# Patient Record
Sex: MALE | Race: BLACK OR AFRICAN AMERICAN | Employment: FULL TIME | URBAN - METROPOLITAN AREA
[De-identification: names, ages, dates, MRNs, and addresses within clinical notes are randomized per-mention and may not be internally consistent; named-entity substitution may affect disease eponyms.]

---

## 2017-03-26 ENCOUNTER — HOSPITAL ENCOUNTER (EMERGENCY)
Age: 32
Discharge: HOME | End: 2017-03-26
Attending: EMERGENCY MEDICINE
Payer: COMMERCIAL

## 2017-03-26 ENCOUNTER — APPOINTMENT (OUTPATIENT)
Dept: ULTRASOUND IMAGING | Age: 32
End: 2017-03-26
Attending: STUDENT IN AN ORGANIZED HEALTH CARE EDUCATION/TRAINING PROGRAM
Payer: COMMERCIAL

## 2017-03-26 VITALS
WEIGHT: 297 LBS | HEART RATE: 87 BPM | TEMPERATURE: 98.6 F | SYSTOLIC BLOOD PRESSURE: 160 MMHG | BODY MASS INDEX: 43.99 KG/M2 | DIASTOLIC BLOOD PRESSURE: 91 MMHG | RESPIRATION RATE: 16 BRPM | HEIGHT: 69 IN | OXYGEN SATURATION: 98 %

## 2017-03-26 DIAGNOSIS — N45.1 EPIDIDYMITIS: Primary | ICD-10-CM

## 2017-03-26 LAB
APPEARANCE UR: CLEAR
BACTERIA URNS QL MICRO: NEGATIVE /HPF
BILIRUB UR QL: NEGATIVE
COLOR UR: ABNORMAL
EPITH CASTS URNS QL MICRO: ABNORMAL /LPF
GLUCOSE UR STRIP.AUTO-MCNC: NEGATIVE MG/DL
HGB UR QL STRIP: NEGATIVE
HYALINE CASTS URNS QL MICRO: ABNORMAL /LPF (ref 0–5)
KETONES UR QL STRIP.AUTO: NEGATIVE MG/DL
LEUKOCYTE ESTERASE UR QL STRIP.AUTO: NEGATIVE
NITRITE UR QL STRIP.AUTO: NEGATIVE
PH UR STRIP: 6.5 [PH] (ref 5–8)
PROT UR STRIP-MCNC: 30 MG/DL
RBC #/AREA URNS HPF: ABNORMAL /HPF (ref 0–5)
SP GR UR REFRACTOMETRY: 1.02 (ref 1–1.03)
UA: UC IF INDICATED,UAUC: ABNORMAL
UROBILINOGEN UR QL STRIP.AUTO: 0.2 EU/DL (ref 0.2–1)
WBC URNS QL MICRO: ABNORMAL /HPF (ref 0–4)

## 2017-03-26 PROCEDURE — 81001 URINALYSIS AUTO W/SCOPE: CPT | Performed by: EMERGENCY MEDICINE

## 2017-03-26 PROCEDURE — 76870 US EXAM SCROTUM: CPT

## 2017-03-26 PROCEDURE — 99283 EMERGENCY DEPT VISIT LOW MDM: CPT

## 2017-03-26 RX ORDER — CIPROFLOXACIN 500 MG/1
500 TABLET ORAL 2 TIMES DAILY
Qty: 20 TAB | Refills: 0 | Status: SHIPPED | OUTPATIENT
Start: 2017-03-26 | End: 2017-03-26

## 2017-03-26 RX ORDER — CIPROFLOXACIN 500 MG/1
500 TABLET ORAL 2 TIMES DAILY
Qty: 20 TAB | Refills: 0 | Status: SHIPPED | OUTPATIENT
Start: 2017-03-26 | End: 2017-04-05

## 2017-03-26 NOTE — ED PROVIDER NOTES
HPI Comments: 28 y.o. male with past medical history significant for HTN who presents from [facility] with chief complaint of testicular pain. Pt reports he has had 7/10 L testicle pain for the last 2 weeks which worsened this morning while working. He states it is tender to touch and \"has 2 lumps, one at the top and one at the bottom\". He also c/o a \"pulling\" pain which radiates from his groin through his L upper medial thigh. Pt notes his pain is exacerbated by walking, standing, and sitting. He states he was evaluated at [facility] \"for 20 minutes\" PTA where he was given Motrin and referred to a urologist. He states he did not receive urine tests or an US while at [facility]. In addition, Pt also c/o a \"knot\" around his R ribs which he initially noticed 5 years ago. He states it has been increasing in size over the last 5 years, and was previously told it \"could be a fatty tumor\". There are no other acute medical concerns at this time. Note written by Natalia Lopez, as dictated by Radha Patterson MD 1:56 PM     The history is provided by the patient. Past Medical History:   Diagnosis Date    Hypertension        Past Surgical History:   Procedure Laterality Date    HX ADENOIDECTOMY           History reviewed. No pertinent family history. Social History     Social History    Marital status: SINGLE     Spouse name: N/A    Number of children: N/A    Years of education: N/A     Occupational History    Not on file. Social History Main Topics    Smoking status: Never Smoker    Smokeless tobacco: Not on file    Alcohol use No    Drug use: No    Sexual activity: Not on file     Other Topics Concern    Not on file     Social History Narrative         ALLERGIES: Lisinopril and Pcn [penicillins]    Review of Systems   Constitutional: Negative for appetite change, chills and fever. HENT: Negative for rhinorrhea, sore throat and trouble swallowing.     Eyes: Negative for photophobia. Respiratory: Negative for cough and shortness of breath. Cardiovascular: Negative for chest pain and palpitations. Gastrointestinal: Negative for abdominal pain, nausea and vomiting. Genitourinary: Positive for testicular pain. Negative for dysuria, frequency and hematuria. Musculoskeletal: Positive for myalgias. Negative for arthralgias. Neurological: Negative for dizziness, syncope and weakness. Psychiatric/Behavioral: Negative for behavioral problems. The patient is not nervous/anxious. All other systems reviewed and are negative. Vitals:    03/26/17 1305   BP: (!) 171/109   Pulse: 99   Resp: 16   Temp: 98.8 °F (37.1 °C)   SpO2: 96%   Weight: 134.7 kg (297 lb)   Height: 5' 9\" (1.753 m)            Physical Exam   Constitutional: He appears well-developed and well-nourished. HENT:   Head: Normocephalic and atraumatic. Mouth/Throat: Oropharynx is clear and moist.   Eyes: EOM are normal. Pupils are equal, round, and reactive to light. Neck: Normal range of motion. Neck supple. Cardiovascular: Normal rate, regular rhythm, normal heart sounds and intact distal pulses. Exam reveals no gallop and no friction rub. No murmur heard. Pulmonary/Chest: Effort normal. No respiratory distress. He has no wheezes. He has no rales. Abdominal: Soft. There is no tenderness. There is no rebound. Genitourinary:   Genitourinary Comments: No palpable lymph nodes. Tender over L proximal scrotum. Musculoskeletal: Normal range of motion. He exhibits no tenderness. Neurological: He is alert. No cranial nerve deficit. Motor; symmetric   Skin: No erythema. Psychiatric: He has a normal mood and affect. His behavior is normal.   Nursing note and vitals reviewed.   Note written by Natalia Taylor, as dictated by Maksim Moreno MD 1:56 PM     [facility]  ED Course       Procedures

## 2017-03-26 NOTE — ED TRIAGE NOTES
TRIAGE NOTE: \"I just left [place]. I have been having a sharp pain in my left testicle and there are two knots in it. \" Symptoms started two weeks ago, denies penile drainage.  Is , denies change in partner

## 2017-03-26 NOTE — DISCHARGE INSTRUCTIONS
Epididymitis and Orchitis: Care Instructions  Your Care Instructions    Epididymitis is pain and swelling of the tube that is attached to each testicle. This tube is called the epididymis. Orchitis is pain and swelling of the testicle. Infection with bacteria often causes these conditions. Sexually transmitted infections (STIs) also can cause both conditions. This is often the case in men younger than 28. Other causes in boys and older men are infections from surgery or having a catheter that drains urine. The mumps virus also can cause orchitis. Anti-inflammatory or pain medicines can help with the pain. Antibiotics are used if the problem is caused by bacteria. They are not used if a virus is the cause. Your testicle may stay swollen for many days or even a few weeks. The doctor has checked you carefully, but problems can develop later. If you notice any problems or new symptoms, get medical treatment right away. Follow-up care is a key part of your treatment and safety. Be sure to make and go to all appointments, and call your doctor if you are having problems. It's also a good idea to know your test results and keep a list of the medicines you take. How can you care for yourself at home? · If your doctor prescribed antibiotics, take them as directed. Do not stop taking them just because you feel better. You need to take the full course of antibiotics. · Ask your doctor if you can take an over-the-counter pain medicine, such as acetaminophen (Tylenol), ibuprofen (Advil, Motrin), or naproxen (Aleve). Be safe with medicines. Read and follow all instructions on the label. · Limit your activity to what is comfortable. · Wear snug underwear or an athletic supporter. This can help reduce pain. · Apply either cold or heat to the swollen area. Use the one that works best for your pain. Sitting in a warm bath for 15 minutes twice a day will help reduce the swelling more quickly.   · If you have been told that an STI may have caused your condition, do not have sex until your doctor says it is safe. This will prevent spreading the infection. Tell your sex partner or partners that they need to be checked. They may need treatment. When should you call for help? Call your doctor now or seek immediate medical care if:  · Your pain gets worse. · You have a new or higher fever. · You have new or more swelling of your testicle. · You have new belly pain, or your pain gets worse. Watch closely for changes in your health, and be sure to contact your doctor if:  · You do not get better as expected. Where can you learn more? Go to http://maciel-de.info/. Enter S360 in the search box to learn more about \"Epididymitis and Orchitis: Care Instructions. \"  Current as of: August 12, 2016  Content Version: 11.1  © 5462-0288 [employer]. Care instructions adapted under license by [employer] (which disclaims liability or warranty for this information). If you have questions about a medical condition or this instruction, always ask your healthcare professional. [address] any warranty or liability for your use of this information. We hope that we have addressed all of your medical concerns. The examination and treatment you received in the Emergency Department were for an emergent problem and were not intended as complete care. It is important that you follow up with your healthcare provider(s) for ongoing care. If your symptoms worsen or do not improve as expected, and you are unable to reach your usual health care provider(s), you should return to the Emergency Department. Today's healthcare is undergoing tremendous change, and patient satisfaction surveys are one of the many tools to assess the quality of medical care. You may receive a survey from the [employer] regarding your experience in the Emergency Department.   I hope that your experience has been completely positive, particularly the medical care that I provided. As such, please participate in the survey; anything less than excellent does not meet my expectations or intentions. [address] [place] [address] and [employer] participate in nationally recognized quality of care measures. If your blood pressure is greater than 120/80, as reported below, we urge that you seek medical care to address the potential of high blood pressure, commonly known as hypertension. Hypertension can be hereditary or can be caused by certain medical conditions, pain, stress, or \"white coat syndrome. \"       Please make an appointment with your health care provider(s) for follow up of your Emergency Department visit. VITALS:   Patient Vitals for the past 8 hrs:   Temp Pulse Resp BP SpO2   03/26/17 1305 98.8 °F (37.1 °C) 99 16 (!) 171/109 96 %          Thank you for allowing us to provide you with medical care today. We realize that you have many choices for your emergency care needs. Please choose us in the future for any continued health care needs.       Samuel Ferrara MD    [address] [place] [address].   Office: [phone]            Recent Results (from the past 24 hour(s))   URINALYSIS W/ REFLEX CULTURE    Collection Time: 03/26/17  2:22 PM   Result Value Ref Range    Color YELLOW/STRAW      Appearance CLEAR CLEAR      Specific gravity 1.025 1.003 - 1.030      pH (UA) 6.5 5.0 - 8.0      Protein 30 (A) NEG mg/dL    Glucose NEGATIVE  NEG mg/dL    Ketone NEGATIVE  NEG mg/dL    Bilirubin NEGATIVE  NEG      Blood NEGATIVE  NEG      Urobilinogen 0.2 0.2 - 1.0 EU/dL    Nitrites NEGATIVE  NEG      Leukocyte Esterase NEGATIVE  NEG      UA:UC IF INDICATED PENDING     WBC 0-4 0 - 4 /hpf    RBC 0-5 0 - 5 /hpf    Epithelial cells FEW FEW /lpf    Bacteria NEGATIVE  NEG /hpf    Hyaline cast 0-2 0 - 5 /lpf       Us Scrotum/testicles    Result Date: 3/26/2017  EXAM:  US SCROTUM/TESTICLES INDICATION: Left testicular pain. COMPARISON: 7/1/2012. TECHNIQUE: Real-time sonography of the scrotum was performed with a high frequency linear transducer. Multiple static images were obtained. Color Doppler evaluation was also performed. FINDINGS: RIGHT TESTICLE: The right testicle is normal in size and echotexture with normal color-flow. The right testis measures 4.8 x 3.0 x 2.5 cm and the right testicular volume is 18.7 mL. RIGHT EPIDIDYMIS: The right epididymis is normal. LEFT TESTICLE: The left testicle is normal in size and echotexture with normal color-flow. The left testis measures 4.9 x 3.0 x 2.3 cm and the left testicular volume is 17.7 mL. There is a small left varicocele. LEFT EPIDIDYMIS: The left epididymis is slightly hypervascular. The palpable abnormality on the left corresponds to the head and tail of the left epididymis. IMPRESSION: Slightly hypervascular left epididymis likely related to acute epididymitis. Small left varicocele. Otherwise unremarkable.

## 2017-03-31 ENCOUNTER — HOSPITAL ENCOUNTER (EMERGENCY)
Age: 32
Discharge: HOME | End: 2017-03-31
Attending: EMERGENCY MEDICINE | Admitting: EMERGENCY MEDICINE
Payer: COMMERCIAL

## 2017-03-31 ENCOUNTER — OFFICE VISIT (OUTPATIENT)
Dept: SLEEP MEDICINE | Age: 32
End: 2017-03-31

## 2017-03-31 ENCOUNTER — APPOINTMENT (OUTPATIENT)
Dept: GENERAL RADIOLOGY | Age: 32
End: 2017-03-31
Attending: PHYSICIAN ASSISTANT
Payer: COMMERCIAL

## 2017-03-31 VITALS
HEART RATE: 101 BPM | RESPIRATION RATE: 18 BRPM | BODY MASS INDEX: 43.04 KG/M2 | WEIGHT: 290.57 LBS | HEIGHT: 69 IN | DIASTOLIC BLOOD PRESSURE: 110 MMHG | OXYGEN SATURATION: 99 % | SYSTOLIC BLOOD PRESSURE: 170 MMHG | TEMPERATURE: 97.8 F

## 2017-03-31 VITALS
BODY MASS INDEX: 43.1 KG/M2 | WEIGHT: 291 LBS | OXYGEN SATURATION: 100 % | HEIGHT: 69 IN | DIASTOLIC BLOOD PRESSURE: 110 MMHG | SYSTOLIC BLOOD PRESSURE: 162 MMHG | HEART RATE: 95 BPM

## 2017-03-31 DIAGNOSIS — E66.01 OBESITY, MORBID, BMI 40.0-49.9 (HCC): ICD-10-CM

## 2017-03-31 DIAGNOSIS — G47.33 OBSTRUCTIVE SLEEP APNEA (ADULT) (PEDIATRIC): Primary | ICD-10-CM

## 2017-03-31 DIAGNOSIS — M10.9 ACUTE GOUT INVOLVING TOE OF LEFT FOOT, UNSPECIFIED CAUSE: ICD-10-CM

## 2017-03-31 DIAGNOSIS — M79.672 LEFT FOOT PAIN: Primary | ICD-10-CM

## 2017-03-31 DIAGNOSIS — I10 ESSENTIAL HYPERTENSION: ICD-10-CM

## 2017-03-31 PROCEDURE — L3265 PLASTAZOTE SANDAL EACH: HCPCS

## 2017-03-31 PROCEDURE — 73660 X-RAY EXAM OF TOE(S): CPT

## 2017-03-31 PROCEDURE — 99282 EMERGENCY DEPT VISIT SF MDM: CPT

## 2017-03-31 RX ORDER — OXYCODONE AND ACETAMINOPHEN 5; 325 MG/1; MG/1
1 TABLET ORAL
Qty: 10 TAB | Refills: 0 | Status: SHIPPED | OUTPATIENT
Start: 2017-03-31 | End: 2017-08-29

## 2017-03-31 RX ORDER — PREDNISONE 5 MG/1
TABLET ORAL
Qty: 21 TAB | Refills: 0 | Status: SHIPPED | OUTPATIENT
Start: 2017-03-31 | End: 2017-08-29

## 2017-03-31 NOTE — PATIENT INSTRUCTIONS
[address] [facility] [place]. [place], [address]  Tel.  [phone]  Fax. [address] [facility] [address]  [place], [address] [facility]  Tel.  [phone]  Fax. [phone] [address]  Tel.  [phone]  Fax. [phone]     Sleep Apnea: After Your Visit  Your Care Instructions  Sleep apnea occurs when you frequently stop breathing for 10 seconds or longer during sleep. It can be mild to severe, based on the number of times per hour that you stop breathing or have slowed breathing. Blocked or narrowed airways in your nose, mouth, or throat can cause sleep apnea. Your airway can become blocked when your throat muscles and tongue relax during sleep. Sleep apnea is common, occurring in 1 out of 20 individuals. Individuals having any of the following characteristics should be evaluated and treated right away due to high risk and detrimental consequences from untreated sleep apnea:  1. Obesity  2. Congestive Heart failure  3. Atrial Fibrillation  4. Uncontrolled Hypertension  5. Type II Diabetes  6. Night-time Arrhythmias  7. Stroke  8. Pulmonary Hypertension  9. High-risk Driving Populations (pilots, truck drivers, etc.)  10. Patients Considering Weight-loss Surgery    How do you know you have sleep apnea? You probably have sleep apnea if you answer 'yes' to 3 or more of the following questions:  S - Have you been told that you Snore? T - Are you often Tired during the day? O - Has anyone Observed you stop breathing while sleeping? P- Do you have (or are being treated for) high blood Pressure? B - Are you obese (Body Mass Index > 35)? A - Is your Age 48years old or older? N - Is your Neck size greater than 16 inches? G - Are you male Gender? A sleep physician can prescribe a breathing device that prevents tissues in the throat from blocking your airway.  Or your doctor may recommend using a dental device (oral breathing device) to help keep your airway open. In some cases, surgery may be needed to remove enlarged tissues in the throat. Follow-up care is a key part of your treatment and safety. Be sure to make and go to all appointments, and call your doctor if you are having problems. It's also a good idea to know your test results and keep a list of the medicines you take. How can you care for yourself at home? · Lose weight, if needed. It may reduce the number of times you stop breathing or have slowed breathing. · Go to bed at the same time every night. · Sleep on your side. It may stop mild apnea. If you tend to roll onto your back, sew a pocket in the back of your pajama top. Put a tennis ball into the pocket, and stitch the pocket shut. This will help keep you from sleeping on your back. · Avoid alcohol and medicines such as sleeping pills and sedatives before bed. · Do not smoke. Smoking can make sleep apnea worse. If you need help quitting, talk to your doctor about stop-smoking programs and medicines. These can increase your chances of quitting for good. · Prop up the head of your bed 4 to 6 inches by putting bricks under the legs of the bed. · Treat breathing problems, such as a stuffy nose, caused by a cold or allergies. · Use a continuous positive airway pressure (CPAP) breathing machine if lifestyle changes do not help your apnea and your doctor recommends it. The machine keeps your airway from closing when you sleep. · If CPAP does not help you, ask your doctor whether you should try other breathing machines. A bilevel positive airway pressure machine has two types of air pressureâone for breathing in and one for breathing out. Another device raises or lowers air pressure as needed while you breathe. · If your nose feels dry or bleeds when using one of these machines, talk with your doctor about increasing moisture in the air. A humidifier may help.   · If your nose is runny or stuffy from using a breathing machine, talk with your doctor about using decongestants or a corticosteroid nasal spray. When should you call for help? Watch closely for changes in your health, and be sure to contact your doctor if:  · You still have sleep apnea even though you have made lifestyle changes. · You are thinking of trying a device such as CPAP. · You are having problems using a CPAP or similar machine. Where can you learn more? Go to [employer]. Enter S946 in the search box to learn more about \"Sleep Apnea: After Your Visit. \"   © 9767-1506 Healthwise, Incorporated. Care instructions adapted under license by [employer]ck[employer] (which disclaims liability or warranty for this information). This care instruction is for use with your licensed healthcare professional. If you have questions about a medical condition or this instruction, always ask your healthcare professional. Luis Wagner any warranty or liability for your use of this information. PROPER SLEEP HYGIENE    What to avoid  · Do not have drinks with caffeine, such as coffee or black tea, for 8 hours before bed. · Do not smoke or use other types of tobacco near bedtime. Nicotine is a stimulant and can keep you awake. · Avoid drinking alcohol late in the evening, because it can cause you to wake in the middle of the night. · Do not eat a big meal close to bedtime. If you are hungry, eat a light snack. · Do not drink a lot of water close to bedtime, because the need to urinate may wake you up during the night. · Do not read or watch TV in bed. Use the bed only for sleeping and sexual activity. What to try  · Go to bed at the same time every night, and wake up at the same time every morning. Do not take naps during the day. · Keep your bedroom quiet, dark, and cool. · Get regular exercise, but not within 3 to 4 hours of your bedtime. .  · Sleep on a comfortable pillow and mattress.   · If watching the clock makes you anxious, turn it facing away from you so you cannot see the time. · If you worry when you lie down, start a worry book. Well before bedtime, write down your worries, and then set the book and your concerns aside. · Try meditation or other relaxation techniques before you go to bed. · If you cannot fall asleep, get up and go to another room until you feel sleepy. Do something relaxing. Repeat your bedtime routine before you go to bed again. · Make your house quiet and calm about an hour before bedtime. Turn down the lights, turn off the TV, log off the computer, and turn down the volume on music. This can help you relax after a busy day. Drowsy Driving  The [address] [place] [address] Road Traffic Safety Administration cites drowsiness as a causing factor in more than 063,853 police reported crashes annually, resulting in 76,000 injuries and 1,500 deaths. Other surveys suggest 55% of people polled have driven while drowsy in the past year, 23% had fallen asleep but not crashed, 3% crashed, and 2% had and accident due to drowsy driving. Who is at risk? Young Drivers: One study of drowsy driving accidents states that 55% of the drivers were under 25 years. Of those, 75% were male. Shift Workers and Travelers: People who work overnight or travel across time zones frequently are at higher risk of experiencing Circadian Rhythm Disorders. They are trying to work and function when their body is programed to sleep. Sleep Deprived: Lack of sleep has a serious impact on your ability to pay attention or focus on a task. Consistently getting less than the average of 8 hours your body needs creates partial or cumulative sleep deprivation. Untreated Sleep Disorders: Sleep Apnea, Narcolepsy, R.L.S., and other sleep disorders (untreated) prevent a person from getting enough restful sleep. This leads to excessive daytime sleepiness and increases the risk for drowsy driving accidents by up to 7 times.   Medications / Alcohol: Even over the counter medications can cause drowsiness. Medications that impair a drivers attention should have a warning label. Alcohol naturally makes you sleepy and on its own can cause accidents. Combined with excessive drowsiness its effects are amplified. Signs of Drowsy Driving:   * You don't remember driving the last few miles   * You may drift out of your al   * You are unable to focus and your thoughts wander   * You may yawn more often than normal   * You have difficulty keeping your eyes open / nodding off   * Missing traffic signs, speeding, or tailgating  Prevention-   Good sleep hygiene, lifestyle and behavioral choices have the most impact on drowsy driving. There is no substitute for sleep and the average person requires 8 hours nightly. If you find yourself driving drowsy, stop and sleep. Consider the sleep hygiene tips provided during your visit as well. Medication Refill Policy: Refills for all medications require 1 week advance notice. Please have your pharmacy fax a refill request. We are unable to fax, or call in \"controled substance\" medications and you will need to pick these prescriptions up from our office. [facility] Activation    Thank you for requesting access to [facility]. Please follow the instructions below to securely access and download your online medical record. [facility] allows you to send messages to your doctor, view your test results, renew your prescriptions, schedule appointments, and more. How Do I Sign Up? 1. In your internet browser, go to [email or web address]. 2. Click on the First Time User? Click Here link in the Sign In box. You will see the New Member Sign Up page. 3. Enter your [facility] Access Code exactly as it appears below. You will not need to use this code after youve completed the sign-up process. If you do not sign up before the expiration date, you must request a new code.     [facility] Access Code: [email or web address]  Expires: 6/24/2017  1:59 PM (This is the date your [employer] access code will )    4. Enter the last four digits of your Social Security Number (xxxx) and Date of Birth (mm/dd/yyyy) as indicated and click Submit. You will be taken to the next sign-up page. 5. Create a [employer] ID. This will be your [employer] login ID and cannot be changed, so think of one that is secure and easy to remember. 6. Create a [employer] password. You can change your password at any time. 7. Enter your Password Reset Question and Answer. This can be used at a later time if you forget your password. 8. Enter your e-mail address. You will receive e-mail notification when new information is available in [address] 9. Click Sign Up. You can now view and download portions of your medical record. 10. Click the Download Summary menu link to download a portable copy of your medical information. Additional Information    If you have questions, please call [phone]. Remember, [employer] is NOT to be used for urgent needs. For medical emergencies, dial 911.

## 2017-03-31 NOTE — ED PROVIDER NOTES
HPI Comments: Manas Mcclellan is a 28 y.o. male with PMhx significant for HTN who presents ambulatory to the ED with cc of gradually worsening left great toe pain x 3 days. He also c/o associated swelling in his left great toe. The pt reports that his pain improved yesterday, but the pain returned upon waking this morning. He denies any recent fall or injury to the area, and he denies any previous h/o current symptoms. He is unsure of any h/o gout. He specifically denies fever or chills at this time. SHx: -tobacco, -EtOH, -illicit drug use    PCP: None    There are no other complaints, changes or physical findings at this time. The history is provided by the patient. No  was used. Past Medical History:   Diagnosis Date    Hypertension        Past Surgical History:   Procedure Laterality Date    HX ADENOIDECTOMY           Family History:   Problem Relation Age of Onset    Sleep Apnea Mother        Social History     Social History    Marital status:      Spouse name: N/A    Number of children: N/A    Years of education: N/A     Occupational History    Not on file. Social History Main Topics    Smoking status: Never Smoker    Smokeless tobacco: Not on file    Alcohol use No    Drug use: No    Sexual activity: Not on file     Other Topics Concern    Not on file     Social History Narrative         ALLERGIES: Lisinopril and Pcn [penicillins]    Review of Systems   Constitutional: Negative. Negative for activity change, appetite change, chills, fatigue, fever and unexpected weight change. HENT: Negative. Negative for congestion, hearing loss, rhinorrhea, sneezing and voice change. Eyes: Negative. Negative for pain and visual disturbance. Respiratory: Negative. Negative for apnea, cough, choking, chest tightness and shortness of breath. Cardiovascular: Negative. Negative for chest pain and palpitations. Gastrointestinal: Negative.   Negative for abdominal distention, abdominal pain, blood in stool, diarrhea, nausea and vomiting. Genitourinary: Negative. Negative for difficulty urinating, flank pain, frequency and urgency. No discharge   Musculoskeletal: Positive for myalgias (left great toe). Negative for arthralgias, back pain and neck stiffness.        (+) swelling to left great toe   Skin: Negative. Negative for color change and rash. Neurological: Negative. Negative for dizziness, seizures, syncope, speech difficulty, weakness, numbness and headaches. Hematological: Negative for adenopathy. Psychiatric/Behavioral: Negative. Negative for agitation, behavioral problems, dysphoric mood and suicidal ideas. The patient is not nervous/anxious. Patient Vitals for the past 12 hrs:   Temp Pulse Resp BP SpO2   03/31/17 1127 - (!) 101 18 (!) 214/130 99 %   03/31/17 1054 97.8 °F (36.6 °C) (!) 112 18 (!) 207/122 97 %            Physical Exam   Constitutional: He is oriented to person, place, and time. He appears well-developed and well-nourished. No distress. HENT:   Head: Normocephalic and atraumatic. Right Ear: External ear normal.   Left Ear: External ear normal.   Nose: Nose normal.   Mouth/Throat: Oropharynx is clear and moist. No oropharyngeal exudate. Eyes: Conjunctivae and EOM are normal. Pupils are equal, round, and reactive to light. Right eye exhibits no discharge. Left eye exhibits no discharge. No scleral icterus. Neck: Normal range of motion. Neck supple. No JVD present. No tracheal deviation present. Cardiovascular: Normal rate, regular rhythm, normal heart sounds and intact distal pulses. Exam reveals no gallop and no friction rub. No murmur heard. Neurovascularly intact. Pulmonary/Chest: Effort normal and breath sounds normal. No respiratory distress. He has no wheezes. He has no rales. He exhibits no tenderness. Abdominal: Soft. Bowel sounds are normal. He exhibits no distension and no mass.  There is no tenderness. There is no rebound and no guarding. Musculoskeletal: He exhibits edema (left great toe) and tenderness (left great toe). He exhibits no deformity. Slightly decreased active and passive range of motion to left great toe secondary to pain. Lymphadenopathy:     He has no cervical adenopathy. Neurological: He is alert and oriented to person, place, and time. He has normal reflexes. No cranial nerve deficit. He exhibits normal muscle tone. Coordination normal.   Skin: Skin is warm and dry. He is not diaphoretic. Psychiatric: He has a normal mood and affect. His behavior is normal. Judgment and thought content normal.   Nursing note and vitals reviewed. MDM  Number of Diagnoses or Management Options  Left foot pain:   Diagnosis management comments:   DDx: sprain, strain, fracture. Amount and/or Complexity of Data Reviewed  Tests in the radiology section of CPT®: ordered and reviewed  Review and summarize past medical records: yes  Independent visualization of images, tracings, or specimens: yes    Patient Progress  Patient progress: stable    ED Course       Procedures        IMAGING RESULTS:  XR GREAT TOE LT MIN 2 V   Final Result   EXAM: XR GREAT TOE LT MIN 2 V     INDICATION: Patient with new onset left toe pain. . Patient denies injury.     COMPARISON: None.     FINDINGS: Three views of the left great toe demonstrate no fracture or other  acute abnormality. No radiopaque foreign bodies.     IMPRESSION  IMPRESSION: No acute abnormality. IMPRESSION:  1. Left foot pain    2. Acute gout involving toe of left foot, unspecified cause        PLAN:  1. Current Discharge Medication List      START taking these medications    Details   oxyCODONE-acetaminophen (PERCOCET) 5-325 mg per tablet Take 1 Tab by mouth every six (6) hours as needed for Pain. Max Daily Amount: 4 Tabs.   Qty: 10 Tab, Refills: 0      predniSONE (STERAPRED) 5 mg dose pack See administration instruction per 5mg dose pack  Qty: 21 Tab, Refills: 0           2. Follow-up Information     Follow up With Details Comments Contact Info    None In 2 days As needed None ([phone]) Patient stated that they have no PCP          Return to ED if worse       DISCHARGE NOTE:  11:30 AM  The patient has been re-evaluated and is ready for discharge. Reviewed available results with patient. Counseled patient on diagnosis and care plan. Patient has expressed understanding, and all questions have been answered. Patient agrees with plan and agrees to follow up as recommended, or return to the ED if their symptoms worsen. Discharge instructions have been provided and explained to the patient, along with reasons to return to the ED. This note is prepared by Devon Rosa, acting as Scribe for [employer]. [employer]: The scribe's documentation has been prepared under my direction and personally reviewed by me in its entirety. I confirm that the note above accurately reflects all work, treatment, procedures, and medical decision making performed by me.

## 2017-03-31 NOTE — DISCHARGE INSTRUCTIONS
Gout: Care Instructions  Your Care Instructions  Gout is a form of arthritis caused by a buildup of uric acid crystals in a joint. It causes sudden attacks of pain, swelling, redness, and stiffness, usually in one joint, especially the big toe. Gout usually comes on without a cause. But it can be brought on by drinking alcohol (especially beer) or eating seafood and red meat. Taking certain medicines, such as diuretics or aspirin, also can bring on an attack of gout. Taking your medicines as prescribed and following up with your doctor regularly can help you avoid gout attacks in the future. Follow-up care is a key part of your treatment and safety. Be sure to make and go to all appointments, and call your doctor if you are having problems. Its also a good idea to know your test results and keep a list of the medicines you take. How can you care for yourself at home? · If the joint is swollen, put ice or a cold pack on the area for 10 to 20 minutes at a time. Put a thin cloth between the ice and your skin. · Prop up the sore limb on a pillow when you ice it or anytime you sit or lie down during the next 3 days. Try to keep it above the level of your heart. This will help reduce swelling. · Rest sore joints. Avoid activities that put weight or strain on the joints for a few days. Take short rest breaks from your regular activities during the day. · Take your medicines exactly as prescribed. Call your doctor if you think you are having a problem with your medicine. · Take pain medicines exactly as directed. ¨ If the doctor gave you a prescription medicine for pain, take it as prescribed. ¨ If you are not taking a prescription pain medicine, ask your doctor if you can take an over-the-counter medicine. · Eat less seafood and red meat. · Check with your doctor before drinking alcohol. · Losing weight, if you are overweight, may help reduce attacks of gout. But do not go on a [employer]. \" Losing a lot of weight in a short amount of time can cause a gout attack. When should you call for help? Call your doctor now or seek immediate medical care if:  · You have a fever. · The joint is so painful you cannot use it. · You have sudden, unexplained swelling, redness, warmth, or severe pain in one or more joints. Watch closely for changes in your health, and be sure to contact your doctor if:  · You have joint pain. · Your symptoms get worse or are not improving after 2 or 3 days. Where can you learn more? Go to http://maciel-de.info/. Enter Q924 in the search box to learn more about \"Gout: Care Instructions. \"  Current as of: October 31, 2016  Content Version: 11.2  © 0826-7837 [employer]. Care instructions adapted under license by [employer] (which disclaims liability or warranty for this information). If you have questions about a medical condition or this instruction, always ask your healthcare professional. [place] [address] any warranty or liability for your use of this information. Purine-Restricted Diet: Care Instructions  Your Care Instructions  Purines are substances that are found in some foods. Your body turns purines into uric acid. High levels of uric acid can cause gout, which is a form of arthritis that causes pain and inflammation in joints. You may be able to help control the amount of uric acid in your body by limiting high-purine foods in your diet. Follow-up care is a key part of your treatment and safety. Be sure to make and go to all appointments, and call your doctor if you are having problems. It's also a good idea to know your test results and keep a list of the medicines you take. How can you care for yourself at home? · Plan your meals and snacks around foods that are low in purines and are safe for you to eat. These foods include:  ¨ Green vegetables and tomatoes. ¨ Fruits.   ¨ Whole-grain breads, rice, and cereals. ¨ Eggs, peanut butter, and nuts. ¨ Low-fat milk, cheese, and other milk products. ¨ Popcorn. ¨ Gelatin desserts, chocolate, cocoa, and cakes and sweets, in small amounts. · You can eat certain foods that are medium-high in purines, but eat them only once in a while. These foods include:  ¨ Legumes, such as dried beans and dried peas. You can have 1 cup cooked legumes each day. ¨ Asparagus, cauliflower, spinach, mushrooms, and green peas. ¨ Fish and seafood (other than very high-purine seafood). ¨ Oatmeal, wheat bran, and wheat germ. · Limit very high-purine foods, including:  ¨ Organ meats, such as liver, kidneys, sweetbreads, and brains. ¨ Meats, including whitt, beef, pork, and lamb. ¨ Game meats and any other meats in large amounts. ¨ Anchovies, sardines, herring, mackerel, and scallops. ¨ Gravy. ¨ Beer. Where can you learn more? Go to http://macielTyraTechde.info/. Enter F448 in the search box to learn more about \"Purine-Restricted Diet: Care Instructions. \"  Current as of: July 26, 2016  Content Version: 11.2  © 7603-1095 [employer]. Care instructions adapted under license by [employer] (which disclaims liability or warranty for this information). If you have questions about a medical condition or this instruction, always ask your healthcare professional. [place] [address] any warranty or liability for your use of this information. Foot Pain: Care Instructions  Your Care Instructions  Foot injuries that cause pain and swelling are fairly common. Almost all sports or home repair projects can cause a misstep that ends up as foot pain. Normal wear and tear, especially as you get older, also can cause foot pain. Most minor foot injuries will heal on their own, and home treatment is usually all you need to do. If you have a severe injury, you may need tests and treatment.   Follow-up care is a key part of your treatment and safety. Be sure to make and go to all appointments, and call your doctor if you are having problems. Its also a good idea to know your test results and keep a list of the medicines you take. How can you care for yourself at home? · Take pain medicines exactly as directed. ¨ If the doctor gave you a prescription medicine for pain, take it as prescribed. ¨ If you are not taking a prescription pain medicine, ask your doctor if you can take an over-the-counter medicine. · Rest and protect your foot. Take a break from any activity that may cause pain. · Put ice or a cold pack on your foot for 10 to 20 minutes at a time. Put a thin cloth between the ice and your skin. · Prop up the sore foot on a pillow when you ice it or anytime you sit or lie down during the next 3 days. Try to keep it above the level of your heart. This will help reduce swelling. · Your doctor may recommend that you wrap your foot with an elastic bandage. Keep your foot wrapped for as long as your doctor advises. · If your doctor recommends crutches, use them as directed. · Wear roomy footwear. · As soon as pain and swelling end, begin gentle exercises of your foot. Your doctor can tell you which exercises will help. When should you call for help? Call 911 anytime you think you may need emergency care. For example, call if:  · Your foot turns pale, white, blue, or cold. Call your doctor now or seek immediate medical care if:  · You cannot move or stand on your foot. · Your foot looks twisted or out of its normal position. · Your foot is not stable when you step down. · You have signs of infection, such as:  ¨ Increased pain, swelling, warmth, or redness. ¨ Red streaks leading from the sore area. ¨ Pus draining from a place on your foot. ¨ A fever. · Your foot is numb or tingly. Watch closely for changes in your health, and be sure to contact your doctor if:  · You do not get better as expected.   · You have bruises from an injury that last longer than 2 weeks. Where can you learn more? Go to http://maciel-de.info/. Enter P159 in the search box to learn more about \"Foot Pain: Care Instructions. \"  Current as of: May 23, 2016  Content Version: 11.2  © 4585-7188 [employer]. Care instructions adapted under license by [employer] (which disclaims liability or warranty for this information). If you have questions about a medical condition or this instruction, always ask your healthcare professional. [address] any warranty or liability for your use of this information.

## 2017-03-31 NOTE — ED NOTES
CHAVEZ Rodriguez made aware of patients blood pressure. Blood pressure taken manually 170/110. CHAVEZ CULVER made aware. No further orders received at this time. Patient states that he started taking Norvasc 5 mg by mouth daily and first dose was this morning. CHAVEZ Santana  reviewed discharge instructions with the patient. The patient verbalized understanding.  Patient ambulatory out of treatment area with steady gait

## 2017-03-31 NOTE — PROGRESS NOTES
[address] [facility] [place]. [place], [address]  Tel.  [phone]  Fax. 100 [facility] 60  [place], [address]  Tel.  [phone]  Fax. [phone] [address] [facility] [address] [place] [place]   Tel.  [phone]  Fax. [phone]         Subjective:      Paola Hand is an 28 y.o. male referred by Dr. Guerda Macias. , for evaluation for a sleep disorder. He complains of snoring, snorting, choking, periods of not breathing associated with racing heart awakening him at night. Symptoms began 4 years ago, gradually worsening since that time. He usually can fall asleep in 10 minutes. Family or house members note snoring, snorting, choking, periods of not breathing. He denies falling asleep while at work, driving. Paola Hand does not wake up frequently at night. He is not bothered by waking up too early and left unable to get back to sleep. He actually sleeps about 5 hours at night and wakes up about 3 times during the night. He does work shifts: Third Shift. Greg Rubio indicates he does get too little sleep at night. His bedtime is 2100. He awakens at 0200. He does take naps. He takes 4 naps a week lasting 2, Hour(s). He has the following observed behaviors: Loud snoring, Pauses in breathing, Biting tongue;  . 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 He has 1year old twins  [place] Sleepiness Score: 8  . Allergies   Allergen Reactions    Lisinopril Swelling     Pt has sever swelling of the throat and tongue.  Pcn [Penicillins] Other (comments)     Allergic as a child           Current Outpatient Prescriptions:     diltiazem CD (CARDIZEM CD) 180 mg ER capsule, Take 1 Cap by mouth daily. , Disp: 30 Cap, Rfl: 11    ciprofloxacin HCl (CIPRO) 500 mg tablet, Take 1 Tab by mouth two (2) times a day for 10 days. , Disp: 20 Tab, Rfl: 0     He  has a past medical history of Hypertension.     He  has a past surgical history that includes adenoidectomy. He family history includes Sleep Apnea in his mother. He  reports that he has never smoked. He does not have any smokeless tobacco history on file. He reports that he does not drink alcohol or use illicit drugs. Review of Systems:  Constitutional:  60 pound  weight gain. Eyes:  No blurred vision. CVS:  No significant chest pain  Pulm:  No significant shortness of breath  GI:  No significant nausea or vomiting  :  No significant nocturia  Musculoskeletal:  No significant joint pain at night, currently pain in toe (going to his patient first today)  Skin:  No significant rashes  Neuro:  No significant dizziness   Psych:  No active mood issues    Sleep Review of Systems: notable for no difficulty falling asleep; infrequent awakenings at night; some  dreaming noted; no nightmares ; + early morning headaches; no memory problems; no concentration issues; no history of any automobile or occupational accidents due to daytime drowsiness. Objective:     Visit Vitals    BP (!) 162/110    Pulse 95    Ht 5' 9\" (1.753 m)    Wt 291 lb (132 kg)    SpO2 100%    BMI 42.97 kg/m2         General:   Not in acute distress   Eyes:  Anicteric sclerae, no obvious strabismus   Nose:  No obvious nasal septum deviation    Oropharynx:   Class 3 oropharyngeal outlet, thick tongue base, enlarged and boggy uvula, low-lying soft palate, narrow tonsilo-pharyngeal pilars   Tonsils:   tonsils are present and normal   Neck:   Neck circ. in \"inches\": 18; midline trachea   Chest/Lungs:  Equal lung expansion, clear on auscultation    CVS:  Normal rate, regular rhythm; no JVD   Skin:  Warm to touch; no obvious rashes   Neuro:  No focal deficits ; no obvious tremor    Psych:  Normal affect,  normal countenance;          Assessment:       ICD-10-CM ICD-9-CM    1. Obstructive sleep apnea (adult) (pediatric) G47.33 327.23 SLEEP STUDY UNATTENDED, 4 CHANNEL   2.  Essential hypertension I10 401.9    3. Obesity, morbid, BMI 40.0-49.9 ([facility]) E66.01 278.01          Plan:     * The patient currently has a High Risk for having sleep apnea. STOP-BANG score 6.  * PSG was ordered for initial evaluation. He prefers the HSAT for fastest evaluation of apnea. * He was provided information on sleep apnea including coresponding risk factors and the importance of proper treatment. * Counseling was provided regarding proper sleep hygiene and safe driving. * I will call him with the results. Treatment options for sleep apnea were reviewed. he is not against a trial of PAP if found to have significant sleep apnea. 2. Hypertension - he continues on his current regimen. I urged him to see a primary doctor. He has names to call given to him by cardiologist.  I have reviewed the relationship between hypertension as it relates to sleep-disordered breathing. 3. Obesity - I have counseled the patient regarding the benefits of weight loss. Thank you for allowing us to participate in your patient's medical care. We'll keep you updated on these investigations.   Nikunj Reynoso MD  Diplomate in Sleep Medicine  [facility]

## 2017-04-05 ENCOUNTER — TELEPHONE (OUTPATIENT)
Dept: SLEEP MEDICINE | Age: 32
End: 2017-04-05

## 2017-04-05 NOTE — TELEPHONE ENCOUNTER
HSAT Returned - [facility]  G3    Date of Study: 3/31/17    The following information was gathered from the patients study log:    · Lights off: 9p  · Estimated sleep onset: 9:30p    · Awakened a total of 0 times  · The patient felt they slept 7-8 hours  · Patient took no sleep aid before starting the test  · Sleep quality was better compared to a usual nights sleep.     Further information provided: -

## 2017-04-05 NOTE — TELEPHONE ENCOUNTER
I called to review results of the sleep study. I left a message to return my call to review results.

## 2017-04-10 ENCOUNTER — TELEPHONE (OUTPATIENT)
Dept: SLEEP MEDICINE | Age: 32
End: 2017-04-10

## 2017-04-10 DIAGNOSIS — G47.33 OBSTRUCTIVE SLEEP APNEA (ADULT) (PEDIATRIC): Primary | ICD-10-CM

## 2017-04-11 NOTE — TELEPHONE ENCOUNTER
The results of his home sleep study were reviewed. The results were positive for significant sleep disordered breathing. Severe oxygen desaturations noted. Treatment options for sleep apnea were reviewed. I have advised an attended PAP titration. He would like the first available slot and to be put on the cancellation list to have the test done as soon as possible. He may need a letter for work. I will call with results and order machine.

## 2017-04-11 NOTE — TELEPHONE ENCOUNTER
Called and reviewed study results  PAP titration ordered.  Refer to other telephone call for order details

## 2017-04-12 ENCOUNTER — TELEPHONE (OUTPATIENT)
Dept: SLEEP MEDICINE | Age: 32
End: 2017-04-12

## 2017-04-28 ENCOUNTER — TELEPHONE (OUTPATIENT)
Dept: SLEEP MEDICINE | Age: 32
End: 2017-04-28

## 2017-04-28 DIAGNOSIS — G47.33 OBSTRUCTIVE SLEEP APNEA (ADULT) (PEDIATRIC): Primary | ICD-10-CM

## 2017-04-28 NOTE — TELEPHONE ENCOUNTER
I called and left a message. Please update him on the reason why we are ordering APAP. Titration denied  Order APAP. Follow-up in a month on APAP but call if he has questions or concerns  Order PAP and call patient and let them know which DME company they should be hearing from. Schedule for first adherence visit in 6 weeks.

## 2017-04-28 NOTE — TELEPHONE ENCOUNTER
----- Message from Gerri Norton sent at 4/27/2017 12:08 PM EDT -----  Regarding: RE: P2P  P2P is scheduled for tomorrow 4/28/17 at 9am.    Thank you    ----- Message -----     From: Miriam Christianson MD     Sent: 4/25/2017   3:42 PM       To: Gerri Norton  Subject: RE: Gabby Fischer      ----- Message -----     From: Gerri Norton     Sent: 4/25/2017  11:10 AM       To: Miriam Christianson MD  Subject: P2P                                              Good Morning    Patient's sleep study was denied. Would you like to me to setup a P2P review?

## 2017-05-01 ENCOUNTER — DOCUMENTATION ONLY (OUTPATIENT)
Dept: SLEEP MEDICINE | Age: 32
End: 2017-05-01

## 2017-05-16 ENCOUNTER — DOCUMENTATION ONLY (OUTPATIENT)
Dept: SLEEP MEDICINE | Age: 32
End: 2017-05-16

## 2017-05-24 ENCOUNTER — DOCUMENTATION ONLY (OUTPATIENT)
Dept: SLEEP MEDICINE | Age: 32
End: 2017-05-24

## 2017-08-29 ENCOUNTER — APPOINTMENT (OUTPATIENT)
Dept: GENERAL RADIOLOGY | Age: 32
End: 2017-08-29
Attending: STUDENT IN AN ORGANIZED HEALTH CARE EDUCATION/TRAINING PROGRAM
Payer: COMMERCIAL

## 2017-08-29 ENCOUNTER — HOSPITAL ENCOUNTER (EMERGENCY)
Age: 32
Discharge: HOME | End: 2017-08-29
Attending: EMERGENCY MEDICINE
Payer: COMMERCIAL

## 2017-08-29 VITALS
HEART RATE: 85 BPM | OXYGEN SATURATION: 94 % | WEIGHT: 295.4 LBS | DIASTOLIC BLOOD PRESSURE: 115 MMHG | SYSTOLIC BLOOD PRESSURE: 165 MMHG | HEIGHT: 69 IN | BODY MASS INDEX: 43.75 KG/M2 | TEMPERATURE: 98.4 F | RESPIRATION RATE: 16 BRPM

## 2017-08-29 DIAGNOSIS — M25.561 ACUTE PAIN OF RIGHT KNEE: Primary | ICD-10-CM

## 2017-08-29 PROCEDURE — 99283 EMERGENCY DEPT VISIT LOW MDM: CPT

## 2017-08-29 PROCEDURE — 74011000250 HC RX REV CODE- 250: Performed by: PHYSICIAN ASSISTANT

## 2017-08-29 PROCEDURE — 74011250637 HC RX REV CODE- 250/637: Performed by: PHYSICIAN ASSISTANT

## 2017-08-29 PROCEDURE — 75810000054 HC ARTHOCENTISIS JOINT

## 2017-08-29 PROCEDURE — 73562 X-RAY EXAM OF KNEE 3: CPT

## 2017-08-29 RX ORDER — AMLODIPINE BESYLATE 5 MG/1
5 TABLET ORAL DAILY
COMMUNITY
End: 2017-09-22 | Stop reason: DRUGHIGH

## 2017-08-29 RX ORDER — IBUPROFEN 400 MG/1
800 TABLET ORAL
Status: COMPLETED | OUTPATIENT
Start: 2017-08-29 | End: 2017-08-29

## 2017-08-29 RX ORDER — PREDNISONE 50 MG/1
50 TABLET ORAL DAILY
Qty: 5 TAB | Refills: 0 | Status: SHIPPED | OUTPATIENT
Start: 2017-08-29 | End: 2017-09-03

## 2017-08-29 RX ORDER — CHOLECALCIFEROL (VITAMIN D3) 125 MCG
CAPSULE ORAL AS NEEDED
COMMUNITY
End: 2017-09-22 | Stop reason: ALTCHOICE

## 2017-08-29 RX ORDER — HYDROCODONE BITARTRATE AND ACETAMINOPHEN 5; 325 MG/1; MG/1
1 TABLET ORAL
Qty: 12 TAB | Refills: 0 | Status: SHIPPED | OUTPATIENT
Start: 2017-08-29 | End: 2017-09-22 | Stop reason: ALTCHOICE

## 2017-08-29 RX ORDER — LIDOCAINE HYDROCHLORIDE 20 MG/ML
5 INJECTION, SOLUTION INFILTRATION; PERINEURAL ONCE
Status: COMPLETED | OUTPATIENT
Start: 2017-08-29 | End: 2017-08-29

## 2017-08-29 RX ORDER — OXYCODONE AND ACETAMINOPHEN 5; 325 MG/1; MG/1
1 TABLET ORAL
Status: COMPLETED | OUTPATIENT
Start: 2017-08-29 | End: 2017-08-29

## 2017-08-29 RX ADMIN — LIDOCAINE HYDROCHLORIDE 100 MG: 20 INJECTION, SOLUTION INFILTRATION; PERINEURAL at 11:45

## 2017-08-29 RX ADMIN — OXYCODONE HYDROCHLORIDE AND ACETAMINOPHEN 1 TABLET: 5; 325 TABLET ORAL at 11:46

## 2017-08-29 RX ADMIN — IBUPROFEN 800 MG: 400 TABLET, FILM COATED ORAL at 11:45

## 2017-08-29 NOTE — ED TRIAGE NOTES
Pt c/o swelling to R knee x 2 days. Pt denies injury. Difficulty and pain with bending it. Pt sent from [facility] for evaluation of fluid collection.

## 2017-08-29 NOTE — DISCHARGE INSTRUCTIONS
We hope that we have addressed all of your medical concerns. The examination and treatment you received in the Emergency Department were for an emergent problem and were not intended as complete care. It is important that you follow up with your healthcare provider(s) for ongoing care. If your symptoms worsen or do not improve as expected, and you are unable to reach your usual health care provider(s), you should return to the Emergency Department. Today's healthcare is undergoing tremendous change, and patient satisfaction surveys are one of the many tools to assess the quality of medical care. You may receive a survey from the [employer] regarding your experience in the Emergency Department. I hope that your experience has been completely positive, particularly the medical care that I provided. As such, please participate in the survey; anything less than excellent does not meet my expectations or intentions. [facility] and [address] [place] [address] participate in nationally recognized quality of care measures. If your blood pressure is greater than 120/80, as reported below, we urge that you seek medical care to address the potential of high blood pressure, commonly known as hypertension. Hypertension can be hereditary or can be caused by certain medical conditions, pain, stress, or \"white coat syndrome. \"       Please make an appointment with your health care provider(s) for follow up of your Emergency Department visit. VITALS:   Patient Vitals for the past 8 hrs:   Temp Pulse Resp BP SpO2   08/29/17 1024 98.4 °F (36.9 °C) 98 16 (!) 198/110 96 %          Thank you for allowing us to provide you with medical care today. We realize that you have many choices for your emergency care needs. Please choose us in the future for any continued health care needs. Kylie Barraza, [facility]. Office: [phone]            No results found for this or any previous visit (from the past 24 hour(s)). Xr Knee Rt 3 V    Result Date: 8/29/2017  EXAM:  XR KNEE RT 3 V INDICATION:   Right knee pain and swelling for 2 days. COMPARISON: None. FINDINGS: Three views of the right knee demonstrate no fracture or other acute osseous or articular abnormality. There is no effusion. IMPRESSION:  No acute abnormality. Knee Pain or Injury: Care Instructions  Your Care Instructions    Injuries are a common cause of knee problems. Sudden (acute) injuries may be caused by a direct blow to the knee. They can also be caused by abnormal twisting, bending, or falling on the knee. Pain, bruising, or swelling may be severe, and may start within minutes of the injury. Overuse is another cause of knee pain. Other causes are climbing stairs, kneeling, and other activities that use the knee. Everyday wear and tear, especially as you get older, also can cause knee pain. Rest, along with home treatment, often relieves pain and allows your knee to heal. If you have a serious knee injury, you may need tests and treatment. Follow-up care is a key part of your treatment and safety. Be sure to make and go to all appointments, and call your doctor if you are having problems. It's also a good idea to know your test results and keep a list of the medicines you take. How can you care for yourself at home? · Be safe with medicines. Read and follow all instructions on the label. ¨ If the doctor gave you a prescription medicine for pain, take it as prescribed. ¨ If you are not taking a prescription pain medicine, ask your doctor if you can take an over-the-counter medicine. · Rest and protect your knee. Take a break from any activity that may cause pain. · Put ice or a cold pack on your knee for 10 to 20 minutes at a time. Put a thin cloth between the ice and your skin.   · Prop up a sore knee on a pillow when you ice it or anytime you sit or lie down for the next 3 days. Try to keep it above the level of your heart. This will help reduce swelling. · If your knee is not swollen, you can put moist heat, a heating pad, or a warm cloth on your knee. · If your doctor recommends an elastic bandage, sleeve, or other type of support for your knee, wear it as directed. · Follow your doctor's instructions about how much weight you can put on your leg. Use a cane, crutches, or a walker as instructed. · Follow your doctor's instructions about activity during your healing process. If you can do mild exercise, slowly increase your activity. · Reach and stay at a healthy weight. Extra weight can strain the joints, especially the knees and hips, and make the pain worse. Losing even a few pounds may help. When should you call for help? Call 911 anytime you think you may need emergency care. For example, call if:  · You have symptoms of a blood clot in your lung (called a pulmonary embolism). These may include:  ¨ Sudden chest pain. ¨ Trouble breathing. ¨ Coughing up blood. Call your doctor now or seek immediate medical care if:  · You have severe or increasing pain. · Your leg or foot turns cold or changes color. · You cannot stand or put weight on your knee. · Your knee looks twisted or bent out of shape. · You cannot move your knee. · You have signs of infection, such as:  ¨ Increased pain, swelling, warmth, or redness. ¨ Red streaks leading from the knee. ¨ Pus draining from a place on your knee. ¨ A fever. · You have signs of a blood clot in your leg (called a deep vein thrombosis), such as:  ¨ Pain in your calf, back of the knee, thigh, or groin. ¨ Redness and swelling in your leg or groin. Watch closely for changes in your health, and be sure to contact your doctor if:  · You have tingling, weakness, or numbness in your knee. · You have any new symptoms, such as swelling.   · You have bruises from a knee injury that last longer than 2 weeks. · You do not get better as expected. Where can you learn more? Go to http://maciel-de.info/. Enter K195 in the search box to learn more about \"Knee Pain or Injury: Care Instructions. \"  Current as of: March 20, 2017  Content Version: 11.3  © 6343-6323 [employer]. Care instructions adapted under license by [employer] (which disclaims liability or warranty for this information). If you have questions about a medical condition or this instruction, always ask your healthcare professional. [place] [address] any warranty or liability for your use of this information. Gout: Care Instructions  Your Care Instructions  Gout is a form of arthritis caused by a buildup of uric acid crystals in a joint. It causes sudden attacks of pain, swelling, redness, and stiffness, usually in one joint, especially the big toe. Gout usually comes on without a cause. But it can be brought on by drinking alcohol (especially beer) or eating seafood and red meat. Taking certain medicines, such as diuretics or aspirin, also can bring on an attack of gout. Taking your medicines as prescribed and following up with your doctor regularly can help you avoid gout attacks in the future. Follow-up care is a key part of your treatment and safety. Be sure to make and go to all appointments, and call your doctor if you are having problems. Its also a good idea to know your test results and keep a list of the medicines you take. How can you care for yourself at home? · If the joint is swollen, put ice or a cold pack on the area for 10 to 20 minutes at a time. Put a thin cloth between the ice and your skin. · Prop up the sore limb on a pillow when you ice it or anytime you sit or lie down during the next 3 days. Try to keep it above the level of your heart. This will help reduce swelling. · Rest sore joints.  Avoid activities that put weight or strain on the joints for a few days. Take short rest breaks from your regular activities during the day. · Take your medicines exactly as prescribed. Call your doctor if you think you are having a problem with your medicine. · Take pain medicines exactly as directed. ¨ If the doctor gave you a prescription medicine for pain, take it as prescribed. ¨ If you are not taking a prescription pain medicine, ask your doctor if you can take an over-the-counter medicine. · Eat less seafood and red meat. · Check with your doctor before drinking alcohol. · Losing weight, if you are overweight, may help reduce attacks of gout. But do not go on a [employer]. \" Losing a lot of weight in a short amount of time can cause a gout attack. When should you call for help? Call your doctor now or seek immediate medical care if:  · You have a fever. · The joint is so painful you cannot use it. · You have sudden, unexplained swelling, redness, warmth, or severe pain in one or more joints. Watch closely for changes in your health, and be sure to contact your doctor if:  · You have joint pain. · Your symptoms get worse or are not improving after 2 or 3 days. Where can you learn more? Go to http://maciel-de.info/. Enter D722 in the search box to learn more about \"Gout: Care Instructions. \"  Current as of: October 31, 2016  Content Version: 11.3  © 7408-4062 [employer]. Care instructions adapted under license by [employer] (which disclaims liability or warranty for this information). If you have questions about a medical condition or this instruction, always ask your healthcare professional. [place] [address] any warranty or liability for your use of this information. Learning About RICE (Rest, Ice, Compression, and Elevation)  What is RICE? RICE is a way to care for an injury. RICE helps relieve pain and swelling. It may also help with healing and flexibility.  RICE stands for:  · Rest and protect the injured or sore area. · Ice or a cold pack used as soon as possible. · Compression, or wrapping the injured or sore area with an elastic bandage. · Elevation (propping up) the injured or sore area. How do you do RICE? You can use RICE for home treatment when you have general aches and pains or after an injury or surgery. Rest  · Do not put weight on the injury for at least 24 to 48 hours. · Use crutches for a badly sprained knee or ankle. · Support a sprained wrist, elbow, or shoulder with a sling. Ice  · Put ice or a cold pack on the injury right away to reduce pain and swelling. Frozen vegetables will also work as an ice pack. Put a thin cloth between the ice or cold pack and your skin. The cloth protects the injured area from getting too cold. · Use ice for 10 to 15 minutes at a time for the first 48 to 72 hours. Compression  · Use compression for sprains, strains, and surgeries of the arms and legs. · Wrap the injured area with an elastic bandage or compression sleeve to reduce swelling. · Don't wrap it too tightly. If the area below it feels numb, tingles, or feels cool, loosen the wrap. Elevation  · Use elevation for areas of the body that can be propped up, such as arms and legs. · Prop up the injured area on pillows whenever you use ice. Keep it propped up anytime you sit or lie down. · Try to keep the injured area at or above the level of your heart. This will help reduce swelling and bruising. Where can you learn more? Go to http://maciel-de.info/. Enter C273 in the search box to learn more about \"Learning About RICE (Rest, Ice, Compression, and Elevation). \"  Current as of: March 21, 2017  Content Version: 11.3  © 0265-8535 [employer]. Care instructions adapted under license by [employer] (which disclaims liability or warranty for this information).  If you have questions about a medical condition or this instruction, always ask your healthcare professional. [place] [address] any warranty or liability for your use of this information.

## 2017-08-29 NOTE — ED PROVIDER NOTES
HPI Comments: 33year old male presenting to the ED for RIGHT knee pain. Pt reports about 4 days of right knee pain and swelling, constant, worsening, moderately severe, throbbing. Pt states that he cannot bend the knee. Denies F/C or other systemic symptoms. Atraumatic. Notes that he had a prior episode of gout in a toe one time previously. Taking OTC meds at home with no relief. Sent to the ED from urgent care today. PMHx: HTN  PSx: adenoidectomy  Social: non-smoker    Patient is a 33 y.o. male presenting with knee pain. The history is provided by the patient. Knee Pain           Past Medical History:   Diagnosis Date    Hypertension        Past Surgical History:   Procedure Laterality Date    HX ADENOIDECTOMY           Family History:   Problem Relation Age of Onset    Sleep Apnea Mother        Social History     Social History    Marital status:      Spouse name: N/A    Number of children: N/A    Years of education: N/A     Occupational History    Not on file. Social History Main Topics    Smoking status: Never Smoker    Smokeless tobacco: Not on file    Alcohol use No    Drug use: No    Sexual activity: Not on file     Other Topics Concern    Not on file     Social History Narrative         ALLERGIES: Lisinopril and Pcn [penicillins]    Review of Systems   Constitutional: Negative for chills and fever. Respiratory: Negative for shortness of breath. Cardiovascular: Negative for chest pain. Gastrointestinal: Negative for vomiting. Musculoskeletal: Positive for arthralgias and joint swelling. Skin: Negative for color change and wound. All other systems reviewed and are negative. Vitals:    08/29/17 1024   BP: (!) 198/110   Pulse: 98   Resp: 16   Temp: 98.4 °F (36.9 °C)   SpO2: 96%   Weight: 134 kg (295 lb 6.4 oz)   Height: 5' 9\" (1.753 m)            Physical Exam   Constitutional: He is oriented to person, place, and time.  He appears well-developed and well-nourished. No distress. Pleasant AA male   HENT:   Head: Normocephalic and atraumatic. Right Ear: External ear normal.   Left Ear: External ear normal.   Eyes: Conjunctivae are normal. No scleral icterus. Neck: Neck supple. No tracheal deviation present. Cardiovascular: Normal rate, regular rhythm and normal heart sounds. Exam reveals no gallop and no friction rub. No murmur heard. Pulmonary/Chest: Effort normal and breath sounds normal. No stridor. No respiratory distress. He has no wheezes. Abdominal: Soft. He exhibits no distension. Musculoskeletal: Normal range of motion. RIGHT KNEE: Visibly swollen. Warm to touch. No erythema or wound. Pt able to flex knee about 15 degrees. Diffusely tender. NVID. Neurological: He is alert and oriented to person, place, and time. Skin: Skin is warm and dry. Psychiatric: He has a normal mood and affect. His behavior is normal.   Nursing note and vitals reviewed. MDM  Number of Diagnoses or Management Options  Acute pain of right knee:   Diagnosis management comments: 33year old male hx gout presenting for atraumatic RIGHT knee pain, swelling, warmth. No cellulitis. No F/C or other systemic symptoms. Arthrocentesis attempted in the ED, unsuccessful. Discussed with ortho. Will assume gout and start steroids, ortho and rheumatology given for f/u. Amount and/or Complexity of Data Reviewed  Tests in the radiology section of CPT®: ordered and reviewed  Discuss the patient with other providers: yes ( [facility], ED attending. Ortho PA on call.)      ED Course       Arthrocentesis  Date/Time: 8/29/2017 12:08 PM  Performed by: Andrea Smith  Authorized by: Andrea Smith     Consent:     Consent obtained:  Verbal    Consent given by:  Patient    Risks discussed:  Infection    Alternatives discussed:  Delayed treatment  Location:     Location: right knee. Anesthesia (see MAR for exact dosages):      Anesthesia method:  Local infiltration    Local anesthetic:  Lidocaine 2% w/o epi  Procedure details:     Preparation: Patient was prepped and draped in usual sterile fashion      Needle gauge: 21.    Ultrasound guidance: no      Approach:  Lateral    Steroid injected: no      Specimen collected: no    Post-procedure details:     Dressing: ace wrap. Patient tolerance of procedure: Tolerated well, no immediate complications  Comments:      Dry tap, no synovial fluid obtained          Discussed with ortho via tiger text. Recommended ortho or rheumatology f/u PRN.   CHAVEZ Woods  12:34 PM

## 2017-09-22 ENCOUNTER — TELEPHONE (OUTPATIENT)
Dept: INTERNAL MEDICINE CLINIC | Age: 32
End: 2017-09-22

## 2017-09-22 ENCOUNTER — OFFICE VISIT (OUTPATIENT)
Dept: INTERNAL MEDICINE CLINIC | Age: 32
End: 2017-09-22

## 2017-09-22 VITALS
TEMPERATURE: 98.3 F | DIASTOLIC BLOOD PRESSURE: 106 MMHG | RESPIRATION RATE: 18 BRPM | HEIGHT: 69 IN | WEIGHT: 297 LBS | BODY MASS INDEX: 43.99 KG/M2 | OXYGEN SATURATION: 96 % | SYSTOLIC BLOOD PRESSURE: 154 MMHG | HEART RATE: 91 BPM

## 2017-09-22 DIAGNOSIS — I10 ESSENTIAL HYPERTENSION: ICD-10-CM

## 2017-09-22 DIAGNOSIS — E66.01 MORBID OBESITY DUE TO EXCESS CALORIES (HCC): ICD-10-CM

## 2017-09-22 DIAGNOSIS — J45.30: ICD-10-CM

## 2017-09-22 DIAGNOSIS — M10.9 ACUTE GOUT OF MULTIPLE SITES, UNSPECIFIED CAUSE: Primary | ICD-10-CM

## 2017-09-22 DIAGNOSIS — R06.2 WHEEZING: Primary | ICD-10-CM

## 2017-09-22 DIAGNOSIS — R06.2 WHEEZING: ICD-10-CM

## 2017-09-22 RX ORDER — ALBUTEROL SULFATE 90 UG/1
1 AEROSOL, METERED RESPIRATORY (INHALATION)
Qty: 1 INHALER | Refills: 1 | Status: SHIPPED | OUTPATIENT
Start: 2017-09-22 | End: 2017-12-06 | Stop reason: SDUPTHER

## 2017-09-22 RX ORDER — AMLODIPINE BESYLATE 10 MG/1
10 TABLET ORAL DAILY
Qty: 30 TAB | Refills: 5 | Status: SHIPPED | OUTPATIENT
Start: 2017-09-22 | End: 2018-04-17 | Stop reason: SDUPTHER

## 2017-09-22 RX ORDER — FLUTICASONE FUROATE AND VILANTEROL 100; 25 UG/1; UG/1
1 POWDER RESPIRATORY (INHALATION) DAILY
Qty: 1 INHALER | Refills: 1 | Status: SHIPPED | OUTPATIENT
Start: 2017-09-22 | End: 2017-12-06 | Stop reason: SDUPTHER

## 2017-09-22 NOTE — PROGRESS NOTES
Mr. Carine Crane is a new patient who is here to establish care. CC:  Establish Care (new patient); Breathing Problem (wheezing & shortness of breath intermittent x1 year); and Side Pain (\"knot\" on R side & pain that radiates along ribs )       HPI:      Recent gout on right knee - had arthrocentesis and nothing came out. And then prescribed steroids and got better. Had gout in foot before earlier this year. Diagnosis was made clinically no crystal analysis obtained. Only 2 episodes - first was on toe and then knee. Currently asymptomatic. HTN: Went to heart specialist at [facility] and was supposed to get stress test and had issues setting it up due to insurance. Had TTE and has not received results yet. Had blood work done there but unsure what was tested  During that visit he was started on  norvasc 5mg by cardiologist.   Bettie Ortiz well  Denies CP and dyspnea  Patient had angioedema on lisinopril      Respiratory symptoms: \"When the air is cold I start wheezign and cant breath\". Does not smoke. Happening for years. Never required hospitalization. No former diagnosis of asthma.   Patient has also noted at home dust can trigger symptoms      Review of systems:  Constitutional: negative for fever, chills, weight loss, night sweats   Eyes : negative for vision changes, eye pain and discharge  Nose and Throat: negative for tinnitus, sore throat   Cardiovascular: negative for chest pain, palpitations and shortness of breath  Respiratory: see HPI  Gastroinstestinal: negative for abdominal pain, nausea, vomiting, diarrhea, constipation, and blood in the stool  Musculoskeletal: negative for back ache and joint ache   Genitourinary: negative for dysuria, nocturia, polyuria and hematuria   Neurologic: Negative for focal weakness, numbness or incoordination  Skin: negative for rash, pruritus  Hematologic: negative for easy bruising      Past Medical History:   Diagnosis Date    Cold-induced asthma     Gout     Hypertension     JOS (obstructive sleep apnea)         Past Surgical History:   Procedure Laterality Date    HX ADENOIDECTOMY         Allergies   Allergen Reactions    Lisinopril Swelling     Pt has sever swelling of the throat and tongue.  Pcn [Penicillins] Other (comments)     Allergic as a child         No current outpatient prescriptions on file prior to visit. No current facility-administered medications on file prior to visit. family history includes Hypertension in his father and mother; Sleep Apnea in his mother. Social History     Social History    Marital status:      Spouse name: N/A    Number of children: N/A    Years of education: N/A     Occupational History    Not on file. Social History Main Topics    Smoking status: Never Smoker    Smokeless tobacco: Never Used    Alcohol use No    Drug use: No    Sexual activity: Yes     Partners: Female     Other Topics Concern    Not on file     Social History Narrative       Visit Vitals    BP (!) 154/106 (BP 1 Location: Left arm, BP Patient Position: Sitting)  Comment: manual BP    Pulse 91    Temp 98.3 °F (36.8 °C) (Oral)    Resp 18    Ht 5' 9\" (1.753 m)    Wt 297 lb (134.7 kg)    SpO2 96%    BMI 43.86 kg/m2     General:  Well appearing male no acute distress, obese  HEENT:   PERRL,normal conjunctiva. External ear and canals normal, TMs normal.  Hearing normal to voice. Nose without edema or discharge, normal septum. Lips, teeth, gums normal.  Oropharynx: no erythema, no exudates, no lesions, normal tongue. Neck:  Supple. Thyroid normal size, nontender, without nodules. No carotid bruit. No masses or lymphadenopathy  Respiratory: no respiratory distress,  no wheezing, no rhonchi, no rales. No chest wall tenderness. Cardiovascular:  RRR, normal S1S2, no murmur. Gastrointestinal: normal bowel sounds, soft, nontender, without masses. No hepatosplenomegaly.   Extremities +2 pulses, no edema, normal sensation   Musculoskeletal:  Normal gait. Normal digits and nails. Normal strength and tone, no atrophy, and no abnormal movement. Skin:  No rash, no lesions, no ulcers. Skin warm, normal turgor, without induration or nodules. Neuro:  A and OX4, fluent speech, cranial nerves normal 2-12. Sensation normal to light touch. DTR symmetrical  Psych:  Normal affect      Lab Results   Component Value Date/Time    WBC 8.5 05/05/2016 04:21 AM    HGB 14.3 05/05/2016 04:21 AM    HCT 43.5 05/05/2016 04:21 AM    PLATELET 736 63/20/4385 04:21 AM    MCV 81.6 05/05/2016 04:21 AM     Lab Results   Component Value Date/Time    Sodium 142 05/05/2016 04:21 AM    Potassium 3.8 05/05/2016 04:21 AM    Chloride 104 05/05/2016 04:21 AM    CO2 30 05/05/2016 04:21 AM    Anion gap 8 05/05/2016 04:21 AM    Glucose 125 05/05/2016 04:21 AM    BUN 15 05/05/2016 04:21 AM    Creatinine 1.16 05/05/2016 04:21 AM    BUN/Creatinine ratio 13 05/05/2016 04:21 AM    GFR est AA >60 05/05/2016 04:21 AM    GFR est non-AA >60 05/05/2016 04:21 AM    Calcium 8.7 05/05/2016 04:21 AM     Lab Results   Component Value Date/Time    Cholesterol, total 229 08/11/2014 02:45 PM     Lab Results   Component Value Date/Time    TSH 0.92 02/26/2013 04:17 AM     No results found for: HBA1C, HGBE8, LJA5FCJZ, LQZ7CCAF, XYB7POSQ  No results found for: VITD3, XQVID2, XQVID3, XQVID, VD3RIA                Assessment and Plan:       1. Essential hypertension  - blood pressure not well controlled on Norvasc 5mg. Increase dose to 10mg. - Discussed dash diet and avoiding drinking caffeine and energy drinks  - METABOLIC PANEL, COMPREHENSIVE  - CBC WITH AUTOMATED DIFF  - amLODIPine (NORVASC) 10 mg tablet; Take 1 Tab by mouth daily. Dispense: 30 Tab; Refill: 5  - will likely need second agent - cannot use hCTZ has hx of gout and cannot use lisinopril had angioedema   - requested [facility] records likely had TTE and lipid panel and EKG    2.  Acute gout of multiple sites, unspecified cause  - had 2 episodes this year / no prior hx. No crystal analysis  - URIC ACID  - URINALYSIS W/ RFLX MICROSCOPIC    3. Wheezing  - XR CHEST PA LAT; Future  - AMB POC SPIROMETRY REVIEW/INTERP  - ALLERGEN PROFILE, ZONE 2    4. Suspected  Cold-induced asthma, mild persistent, uncomplicated  - albuterol (PROVENTIL HFA, VENTOLIN HFA, PROAIR HFA) 90 mcg/actuation inhaler; Take 1 Puff by inhalation every six (6) hours as needed for Wheezing. Dispense: 1 Inhaler; Refill: 1  - fluticasone-vilanterol (BREO ELLIPTA) 100-25 mcg/dose inhaler; Take 1 Puff by inhalation daily. Dispense: 1 Inhaler; Refill: 1  - ALLERGEN PROFILE, ZONE 2    4. Morbid obesity due to excess calories ([address]  - counseled on weight loss   - HEMOGLOBIN A1C W/O EAG  Follow-up Disposition:  Return in about 2 weeks (around 10/6/2017) for blood pressure with MD and asthma .      Opal Warren MD

## 2017-09-22 NOTE — PATIENT INSTRUCTIONS
Asthma in Adults: Care Instructions  Your Care Instructions    During an asthma attack, your airways swell and narrow as a reaction to certain things (triggers). This makes it hard to breathe. You may be able to prevent asthma attacks if you avoid the things that set off your asthma symptoms. Keeping your asthma under control and treating symptoms before they get bad can help you avoid severe attacks. If you can control your asthma, you may be able to do all of your normal daily activities. You may also avoid asthma attacks and trips to the hospital.  Follow-up care is a key part of your treatment and safety. Be sure to make and go to all appointments, and call your doctor if you are having problems. It's also a good idea to know your test results and keep a list of the medicines you take. How can you care for yourself at home? · Follow your asthma action plan so you can manage your symptoms at home. An asthma action plan will help you prevent and control airway reactions and will tell you what to do during an asthma attack. If you do not have an asthma action plan, work with your doctor to build one. · Take your asthma medicine exactly as prescribed. Medicine plays an important role in controlling asthma. Talk to your doctor right away if you have any questions about what to take and how to take it. ¨ Use your quick-relief medicine when you have symptoms of an attack. Quick-relief medicine often is an albuterol inhaler. Some people need to use quick-relief medicine before they exercise. ¨ Take your controller medicine every day, not just when you have symptoms. Controller medicine is usually an inhaled corticosteroid. The goal is to prevent problems before they occur. Do not use your controller medicine to try to treat an attack that has already started. It does not work fast enough to help. ¨ If your doctor prescribed corticosteroid pills to use during an attack, take them as directed.  They may take hours to work, but they may shorten the attack and help you breathe better. ¨ Keep your quick-relief medicine with you at all times. · Talk to your doctor before using other medicines. Some medicines, such as aspirin, can cause asthma attacks in some people. · Check yourself for asthma symptoms to know which step to follow in your action plan. Watch for things like being short of breath, having chest tightness, coughing, and wheezing. Also notice if symptoms wake you up at night or if you get tired quickly when you exercise. · If you have a peak flow meter, use it to check how well you are breathing. This can help you predict when an asthma attack is going to occur. Then you can take medicine to prevent the asthma attack or make it less severe. · See your doctor regularly. These visits will help you learn more about asthma and what you can do to control it. Your doctor will monitor your treatment to make sure the medicine is helping you. · Keep track of your asthma attacks and your treatment. After you have had an attack, write down what triggered it, what helped end it, and any concerns you have about your asthma action plan. Take your diary when you see your doctor. You can then review your asthma action plan and decide if it is working. · Do not smoke or allow others to smoke around you. Avoid smoky places. Smoking makes asthma worse. If you need help quitting, talk to your doctor about stop-smoking programs and medicines. These can increase your chances of quitting for good. · Learn what triggers an asthma attack for you, and avoid the triggers when you can. Common triggers include colds, smoke, air pollution, dust, pollen, mold, pets, cockroaches, stress, and cold air. · Avoid colds and the flu. Get a pneumococcal vaccine shot. If you have had one before, ask your doctor whether you need a second dose. Get a flu vaccine every fall.  If you must be around people with colds or the flu, wash your hands often.  When should you call for help? Call 911 anytime you think you may need emergency care. For example, call if:  · You have severe trouble breathing. Call your doctor now or seek immediate medical care if:  · Your symptoms do not get better after you have followed your asthma action plan. · You cough up yellow, dark brown, or bloody mucus (sputum). Watch closely for changes in your health, and be sure to contact your doctor if:  · Your coughing and wheezing get worse. · You need to use quick-relief medicine on more than 2 days a week (unless it is just for exercise). · You need help figuring out what is triggering your asthma attacks. Where can you learn more? Go to http://maciel-de.info/. Enter P597 in the search box to learn more about \"Asthma in Adults: Care Instructions. \"  Current as of: March 25, 2017  Content Version: 11.3  © 4673-9087 [employer]. Care instructions adapted under license by [employer] (which disclaims liability or warranty for this information). If you have questions about a medical condition or this instruction, always ask your healthcare professional. [place] [address] any warranty or liability for your use of this information.    -------------------------------------------------  Take Breo once a day one puff  Take albuterol ( ventolin) as needed for wheezing - rescue inhaler  -------------------------  -Purchase a blood pressure cuff that goes around your upper arm and check blood pressure at least 3 times per week. Write down your numbers for review. Check blood pressure in the morning and evening. Rest for 5 minutes with feet elevated and arm resting on a table (at mid-chest level) when checking blood pressure  Goal blood pressure is 130/80    -Exercise 30-60 minutes most days of the week, preferably with a mix of cardiovascular and strength training.  Exercise can improve blood pressure, even if you do not lose weight!    -Limit alcohol intake to less than 2-3 drinks daily     -Avoid tobacco products    -Avoid caffeine, energy drinks, stimulants    -DASH diet - includes fruits, vegetables, fiber, and less than 2000 mg sodium (salt) daily. Try to eat less than 7614-5689 calories daily.  Limit fat intake.     -Avoid non-steroidal inflammatory medications (NSAIDS) such as ibuprofen, advil, motrin, aleve, and naproxyn as these may increase blood pressure if used long-term    -Avoid OTC decongestants such as pseudopherine, phenylephrine, Afrin

## 2017-09-22 NOTE — TELEPHONE ENCOUNTER
Patient states he wants a call back to get test results from tests done today. Please call.  Thank you

## 2017-09-22 NOTE — MR AVS SNAPSHOT
Visit Information Date & Time Provider Department Dept. Phone Encounter #  
 9/22/2017 11:15 AM Earl Parker [address] [place] [address],[address] [phone] [id number] Follow-up Instructions Return in about 2 weeks (around 10/6/2017) for blood pressure with MD and asthma . Upcoming Health Maintenance Date Due DTaP/Tdap/Td series (2 - Td) 9/30/2026 Allergies as of 9/22/2017  Review Complete On: 9/22/2017 By: Nicole Rubin LPN Severity Noted Reaction Type Reactions Lisinopril  09/30/2016    Swelling Pt has sever swelling of the throat and tongue. Pcn [Penicillins]  04/20/2012    Other (comments) Allergic as a child Current Immunizations  Never Reviewed No immunizations on file. Not reviewed this visit You Were Diagnosed With   
  
 Codes Comments Acute gout of multiple sites, unspecified cause    -  Primary ICD-10-CM: M10.9 ICD-9-CM: 274.01 Essential hypertension     ICD-10-CM: I10 
ICD-9-CM: 401. 9 Wheezing     ICD-10-CM: R06.2 ICD-9-CM: 786.07 Morbid obesity due to excess calories (HCC)     ICD-10-CM: E66.01 
ICD-9-CM: 278.01 Cold-induced asthma, mild persistent, uncomplicated     WDY-95-UT: J45.30 ICD-9-CM: 493.10 Vitals BP Pulse Temp Resp Height(growth percentile) Weight(growth percentile) (!) 154/106 (BP 1 Location: Left arm, BP Patient Position: Sitting) 91 98.3 °F (36.8 °C) (Oral) 18 5' 9\" (1.753 m) 297 lb (134.7 kg) SpO2 BMI Smoking Status 96% 43.86 kg/m2 Never Smoker Vitals History BMI and BSA Data Body Mass Index Body Surface Area  
 43.86 kg/m 2 2.56 m 2 Preferred Pharmacy Pharmacy Name Phone [facility] [address], [address] [place] [address] [phone] Your Updated Medication List  
  
   
This list is accurate as of: 9/22/17 11:59 AM.  Always use your most recent med list.  
  
  
  
  
 albuterol 90 mcg/actuation inhaler Commonly known as:  PROVENTIL HFA, VENTOLIN HFA, PROAIR HFA Take 1 Puff by inhalation every six (6) hours as needed for Wheezing. amLODIPine 10 mg tablet Commonly known as:  Toni Rings Take 1 Tab by mouth daily. cpap machine kit  
by Does Not Apply route. fluticasone-vilanterol 100-25 mcg/dose inhaler Commonly known as:  BREO ELLIPTA Take 1 Puff by inhalation daily. ONE-A-DAY MEN'S PO Take  by mouth. Prescriptions Sent to Pharmacy Refills  
 albuterol (PROVENTIL HFA, VENTOLIN HFA, PROAIR HFA) 90 mcg/actuation inhaler 1 Sig: Take 1 Puff by inhalation every six (6) hours as needed for Wheezing. Class: Normal  
 Pharmacy: [address] Ph #: [phone] Route: Inhalation  
 fluticasone-vilanterol (BREO ELLIPTA) 100-25 mcg/dose inhaler 1 Sig: Take 1 Puff by inhalation daily. Class: Normal  
 Pharmacy: [address] Ph #: [phone] Route: Inhalation  
 amLODIPine (NORVASC) 10 mg tablet 5 Sig: Take 1 Tab by mouth daily. Class: Normal  
 Pharmacy: [address] Ph #: [phone] Route: Oral  
  
We Performed the Following AMB POC SPIROMETRY REVIEW/INTERP [id number] CPT(R)] CBC WITH AUTOMATED DIFF [72235 CPT(R)] HEMOGLOBIN A1C W/O EAG [33187 CPT(R)] METABOLIC PANEL, COMPREHENSIVE [29780 CPT(R)] URIC ACID [id number] CPT(R)] URINALYSIS W/ RFLX MICROSCOPIC [81936 CPT(R)] Follow-up Instructions Return in about 2 weeks (around 10/6/2017) for blood pressure with MD and asthma . To-Do List   
 09/22/2017 Imaging:  XR CHEST PA LAT Patient Instructions Asthma in Adults: Care Instructions Your Care Instructions During an asthma attack, your airways swell and narrow as a reaction to certain things (triggers). This makes it hard to breathe. You may be able to prevent asthma attacks if you avoid the things that set off your asthma symptoms. Keeping your asthma under control and treating symptoms before they get bad can help you avoid severe attacks. If you can control your asthma, you may be able to do all of your normal daily activities. You may also avoid asthma attacks and trips to the hospital. 
Follow-up care is a key part of your treatment and safety. Be sure to make and go to all appointments, and call your doctor if you are having problems. It's also a good idea to know your test results and keep a list of the medicines you take. How can you care for yourself at home? · Follow your asthma action plan so you can manage your symptoms at home. An asthma action plan will help you prevent and control airway reactions and will tell you what to do during an asthma attack. If you do not have an asthma action plan, work with your doctor to build one. · Take your asthma medicine exactly as prescribed. Medicine plays an important role in controlling asthma. Talk to your doctor right away if you have any questions about what to take and how to take it. ¨ Use your quick-relief medicine when you have symptoms of an attack. Quick-relief medicine often is an albuterol inhaler. Some people need to use quick-relief medicine before they exercise. ¨ Take your controller medicine every day, not just when you have symptoms. Controller medicine is usually an inhaled corticosteroid. The goal is to prevent problems before they occur. Do not use your controller medicine to try to treat an attack that has already started. It does not work fast enough to help. ¨ If your doctor prescribed corticosteroid pills to use during an attack, take them as directed. They may take hours to work, but they may shorten the attack and help you breathe better. ¨ Keep your quick-relief medicine with you at all times. · Talk to your doctor before using other medicines. Some medicines, such as aspirin, can cause asthma attacks in some people. · Check yourself for asthma symptoms to know which step to follow in your action plan. Watch for things like being short of breath, having chest tightness, coughing, and wheezing. Also notice if symptoms wake you up at night or if you get tired quickly when you exercise. · If you have a peak flow meter, use it to check how well you are breathing. This can help you predict when an asthma attack is going to occur. Then you can take medicine to prevent the asthma attack or make it less severe. · See your doctor regularly. These visits will help you learn more about asthma and what you can do to control it. Your doctor will monitor your treatment to make sure the medicine is helping you. · Keep track of your asthma attacks and your treatment. After you have had an attack, write down what triggered it, what helped end it, and any concerns you have about your asthma action plan. Take your diary when you see your doctor. You can then review your asthma action plan and decide if it is working. · Do not smoke or allow others to smoke around you. Avoid smoky places. Smoking makes asthma worse. If you need help quitting, talk to your doctor about stop-smoking programs and medicines. These can increase your chances of quitting for good. · Learn what triggers an asthma attack for you, and avoid the triggers when you can. Common triggers include colds, smoke, air pollution, dust, pollen, mold, pets, cockroaches, stress, and cold air. · Avoid colds and the flu. Get a pneumococcal vaccine shot. If you have had one before, ask your doctor whether you need a second dose. Get a flu vaccine every fall. If you must be around people with colds or the flu, wash your hands often. When should you call for help? Call 911 anytime you think you may need emergency care. For example, call if: · You have severe trouble breathing. Call your doctor now or seek immediate medical care if: 
· Your symptoms do not get better after you have followed your asthma action plan. · You cough up yellow, dark brown, or bloody mucus (sputum). Watch closely for changes in your health, and be sure to contact your doctor if: 
· Your coughing and wheezing get worse. · You need to use quick-relief medicine on more than 2 days a week (unless it is just for exercise). · You need help figuring out what is triggering your asthma attacks. Where can you learn more? Go to http://maciel-de.info/. Enter P597 in the search box to learn more about \"Asthma in Adults: Care Instructions. \" Current as of: March 25, 2017 Content Version: 11.3 © 7270-7818 [employer]. Care instructions adapted under license by [employer] (which disclaims liability or warranty for this information). If you have questions about a medical condition or this instruction, always ask your healthcare professional. [address] any warranty or liability for your use of this information. 
 
------------------------------------------------- Take Breo once a day one puff Take albuterol ( ventolin) as needed for wheezing - rescue inhaler 
------------------------- 
-Purchase a blood pressure cuff that goes around your upper arm and check blood pressure at least 3 times per week. Write down your numbers for review. Check blood pressure in the morning and evening. Rest for 5 minutes with feet elevated and arm resting on a table (at mid-chest level) when checking blood pressure Goal blood pressure is 130/80 
 
-Exercise 30-60 minutes most days of the week, preferably with a mix of cardiovascular and strength training. Exercise can improve blood pressure, even if you do not lose weight! 
 
-Limit alcohol intake to less than 2-3 drinks daily -Avoid tobacco products -Avoid caffeine, energy drinks, stimulants -DASH diet - includes fruits, vegetables, fiber, and less than 2000 mg sodium (salt) daily. Try to eat less than 5462-5968 calories daily. Limit fat intake.  
 
-Avoid non-steroidal inflammatory medications (NSAIDS) such as ibuprofen, advil, motrin, aleve, and naproxyn as these may increase blood pressure if used long-term 
 
-Avoid OTC decongestants such as pseudopherine, phenylephrine, Afrin Introducing [facility]! [facility] introduces E-Sign patient portal. Now you can access parts of your medical record, email your doctor's office, and request medication refills online. 1. In your internet browser, go to [email or web address] 2. Click on the First Time User? Click Here link in the Sign In box. You will see the New Member Sign Up page. 3. Enter your E-Sign Access Code exactly as it appears below. You will not need to use this code after youve completed the sign-up process. If you do not sign up before the expiration date, you must request a new code. · E-Sign Access Code: [email or web address] Expires: 11/27/2017  1:07 PM 
 
4. Enter the last four digits of your Social Security Number (xxxx) and Date of Birth (mm/dd/yyyy) as indicated and click Submit. You will be taken to the next sign-up page. 5. Create a E-Sign ID. This will be your E-Sign login ID and cannot be changed, so think of one that is secure and easy to remember. 6. Create a E-Sign password. You can change your password at any time. 7. Enter your Password Reset Question and Answer. This can be used at a later time if you forget your password. 8. Enter your e-mail address. You will receive e-mail notification when new information is available in [address] 9. Click Sign Up. You can now view and download portions of your medical record. 10. Click the Download Summary menu link to download a portable copy of your medical information. If you have questions, please visit the Frequently Asked Questions section of the [employer] website. Remember, [employer] is NOT to be used for urgent needs. For medical emergencies, dial 911. Now available from your iPhone and Android! Please provide this summary of care documentation to your next provider. Your primary care clinician is listed as NONE. If you have any questions after today's visit, please call [phone].

## 2017-09-24 ENCOUNTER — APPOINTMENT (OUTPATIENT)
Dept: GENERAL RADIOLOGY | Age: 32
End: 2017-09-24
Attending: PHYSICIAN ASSISTANT
Payer: COMMERCIAL

## 2017-09-24 ENCOUNTER — HOSPITAL ENCOUNTER (EMERGENCY)
Age: 32
Discharge: HOME | End: 2017-09-24
Attending: EMERGENCY MEDICINE
Payer: COMMERCIAL

## 2017-09-24 VITALS
SYSTOLIC BLOOD PRESSURE: 161 MMHG | WEIGHT: 298.28 LBS | TEMPERATURE: 97.8 F | RESPIRATION RATE: 14 BRPM | OXYGEN SATURATION: 99 % | HEIGHT: 69 IN | BODY MASS INDEX: 44.18 KG/M2 | DIASTOLIC BLOOD PRESSURE: 98 MMHG | HEART RATE: 98 BPM

## 2017-09-24 DIAGNOSIS — R07.89 CHEST WALL PAIN: Primary | ICD-10-CM

## 2017-09-24 PROCEDURE — 71101 X-RAY EXAM UNILAT RIBS/CHEST: CPT

## 2017-09-24 PROCEDURE — 99282 EMERGENCY DEPT VISIT SF MDM: CPT

## 2017-09-24 RX ORDER — OXYCODONE AND ACETAMINOPHEN 5; 325 MG/1; MG/1
1 TABLET ORAL
Qty: 10 TAB | Refills: 0 | Status: SHIPPED | OUTPATIENT
Start: 2017-09-24 | End: 2017-10-16 | Stop reason: ALTCHOICE

## 2017-09-24 RX ORDER — CARISOPRODOL 350 MG/1
350 TABLET ORAL
Qty: 10 TAB | Refills: 0 | Status: SHIPPED | OUTPATIENT
Start: 2017-09-24 | End: 2017-10-16 | Stop reason: ALTCHOICE

## 2017-09-24 NOTE — ED NOTES
CHAVEZ Mcmahon reviewed discharge instructions with the patient. The patient verbalized understanding. Patient discharged home with BP decreasing. Patient ambulatory to vehicle with slow and steady gait. No further complaints noted.

## 2017-09-24 NOTE — DISCHARGE INSTRUCTIONS
Musculoskeletal Chest Pain: Care Instructions  Your Care Instructions  Chest pain is not always a sign that something is wrong with your heart or that you have another serious problem. The doctor thinks your chest pain is caused by strained muscles or ligaments, inflamed chest cartilage, or another problem in your chest, rather than by your heart. You may need more tests to find the cause of your chest pain. Follow-up care is a key part of your treatment and safety. Be sure to make and go to all appointments, and call your doctor if you are having problems. Its also a good idea to know your test results and keep a list of the medicines you take. How can you care for yourself at home? · Take pain medicines exactly as directed. ¨ If the doctor gave you a prescription medicine for pain, take it as prescribed. ¨ If you are not taking a prescription pain medicine, ask your doctor if you can take an over-the-counter medicine. · Rest and protect the sore area. · Stop, change, or take a break from any activity that may be causing your pain or soreness. · Put ice or a cold pack on the sore area for 10 to 20 minutes at a time. Try to do this every 1 to 2 hours for the next 3 days (when you are awake) or until the swelling goes down. Put a thin cloth between the ice and your skin. · After 2 or 3 days, apply a heating pad set on low or a warm cloth to the area that hurts. Some doctors suggest that you go back and forth between hot and cold. · Do not wrap or tape your ribs for support. This may cause you to take smaller breaths, which could increase your risk of lung problems. · Mentholated creams such as Bengay or Icy Hot may soothe sore muscles. Follow the instructions on the package. · Follow your doctor's instructions for exercising. · Gentle stretching and massage may help you get better faster. Stretch slowly to the point just before pain begins, and hold the stretch for at least 15 to 30 seconds.  Do this 3 or 4 times a day. Stretch just after you have applied heat. · As your pain gets better, slowly return to your normal activities. Any increased pain may be a sign that you need to rest a while longer. When should you call for help? Call 911 anytime you think you may need emergency care. For example, call if:  · You have chest pain or pressure. This may occur with:  ¨ Sweating. ¨ Shortness of breath. ¨ Nausea or vomiting. ¨ Pain that spreads from the chest to the neck, jaw, or one or both shoulders or arms. ¨ Dizziness or lightheadedness. ¨ A fast or uneven pulse. After calling 911, chew 1 adult-strength aspirin. Wait for an ambulance. Do not try to drive yourself. · You have sudden chest pain and shortness of breath, or you cough up blood. Call your doctor now or seek immediate medical care if:  · You have any trouble breathing. · Your chest pain gets worse. · Your chest pain occurs consistently with exercise and is relieved by rest.  Watch closely for changes in your health, and be sure to contact your doctor if:  · Your chest pain does not get better after 1 week. Where can you learn more? Go to http://maciel-de.info/. Enter V293 in the search box to learn more about \"Musculoskeletal Chest Pain: Care Instructions. \"  Current as of: March 20, 2017  Content Version: 11.3  © 2559-5523 [employer]. Care instructions adapted under license by [employer] (which disclaims liability or warranty for this information). If you have questions about a medical condition or this instruction, always ask your healthcare professional. [place] [address] any warranty or liability for your use of this information.

## 2017-09-24 NOTE — ED NOTES
Pt. States he was leaning down to tie shoe last night and sneezed and complains of right sided pain with a \"lump\"

## 2017-09-24 NOTE — ED PROVIDER NOTES
HPI Comments: Flori Reyes is a 33 y.o. male with significant PMHx of hypertension, presents ambulatory to the ED with c/o sudden onset of right sided rib pain since last night. Pt notes associated symptoms of pain radiating to his mid back and a \"painful lump\". He states he was bent over tying his shoe and when he sneezed he felt a sharp pain. Pt reports he noticed a painful \"lump\" in the RUQ of his abdomen. He notes he saw his PCP yesterday morning and he may have asthma. Pt denies pain with a deep breath, nausea, vomiting, fall/injury, any exacerbating/modifying factors, or alcohol/tobacco/illicit drug use. PCP: None    There are no other complaints, changes or physical findings at this time. Written by EFRAIN Barnett, as dictated by Gerri Rebollar. The history is provided by the patient. No  was used. Past Medical History:   Diagnosis Date    Angioedema     ace induced asthma     Cold-induced asthma     Gout     Hypertension     JOS (obstructive sleep apnea)        Past Surgical History:   Procedure Laterality Date    HX ADENOIDECTOMY           Family History:   Problem Relation Age of Onset    Sleep Apnea Mother     Hypertension Mother     Hypertension Father        Social History     Social History    Marital status:      Spouse name: N/A    Number of children: N/A    Years of education: N/A     Occupational History    Not on file. Social History Main Topics    Smoking status: Never Smoker    Smokeless tobacco: Never Used    Alcohol use No    Drug use: No    Sexual activity: Yes     Partners: Female     Other Topics Concern    Not on file     Social History Narrative         ALLERGIES: Lisinopril and Pcn [penicillins]    Review of Systems   Constitutional: Negative for fatigue and fever. HENT: Negative for congestion, ear pain and rhinorrhea. Eyes: Negative for pain and redness.    Respiratory: Negative for cough and wheezing. Cardiovascular: Negative for chest pain and palpitations. Gastrointestinal: Negative for abdominal pain, nausea and vomiting. Genitourinary: Negative for dysuria, frequency and urgency. Musculoskeletal: Positive for myalgias (right sided rib pain). Negative for back pain, neck pain and neck stiffness. Skin: Negative for rash and wound. + painful \"lump\" to RUQ abdomen   Neurological: Negative for weakness, light-headedness, numbness and headaches. Vitals:    09/24/17 0849 09/24/17 0920   BP: (!) 202/112 (!) 161/98   Pulse: 98    Resp: 14    Temp: 97.8 °F (36.6 °C)    SpO2: 99%    Weight: 135.3 kg (298 lb 4.5 oz)    Height: 5' 9\" (1.753 m)             Physical Exam   Constitutional: He is oriented to person, place, and time. He appears well-developed and well-nourished. No distress. HENT:   Head: Normocephalic and atraumatic. Right Ear: External ear normal.   Left Ear: External ear normal.   Nose: Nose normal.   Mouth/Throat: Oropharynx is clear and moist. No oropharyngeal exudate. Eyes: Conjunctivae and EOM are normal. Pupils are equal, round, and reactive to light. Right eye exhibits no discharge. Left eye exhibits no discharge. No scleral icterus. Neck: Normal range of motion. Neck supple. No JVD present. No tracheal deviation present. Cardiovascular: Normal rate, regular rhythm, normal heart sounds and intact distal pulses. Exam reveals no gallop and no friction rub. No murmur heard. Pulmonary/Chest: Effort normal and breath sounds normal. No respiratory distress. He has no wheezes. He has no rhonchi. He has no rales. He exhibits no tenderness. Abdominal: Soft. Bowel sounds are normal. He exhibits no distension and no mass. There is no tenderness. There is no rebound and no guarding. Musculoskeletal: Normal range of motion. He exhibits tenderness. He exhibits no edema. Anterior lateral rib cage tenderness noted.  No gross deformity    Lymphadenopathy: He has no cervical adenopathy. Neurological: He is alert and oriented to person, place, and time. He has normal reflexes. No cranial nerve deficit. He exhibits normal muscle tone. Coordination normal.   Skin: Skin is warm and dry. He is not diaphoretic. Psychiatric: He has a normal mood and affect. His behavior is normal. Judgment and thought content normal.   Nursing note and vitals reviewed. MDM  Number of Diagnoses or Management Options  Diagnosis management comments:     DDx: sprain, strain, fracture, pneumothorax        Amount and/or Complexity of Data Reviewed  Tests in the radiology section of CPT®: ordered and reviewed    Patient Progress  Patient progress: stable    ED Course       Procedures    IMAGING RESULTS:  INDICATION:   pain after sneeze feels like broke a rib or worried about  pneumothorax     COMPARISON: September 23, 2017     FINDINGS:     PA view of the chest demonstrates a normal cardiomediastinal silhouette. The  lungs are adequately expanded. There is no edema, effusion, consolidation, or  pneumothorax. 3 views of the right ribs demonstrate no displaced fracture.     IMPRESSION  IMPRESSION:  No acute process. IMPRESSION:  1. Chest wall pain        PLAN:  1. Discharge Medication List as of 9/24/2017  9:12 AM      START taking these medications    Details   oxyCODONE-acetaminophen (PERCOCET) 5-325 mg per tablet Take 1 Tab by mouth every six (6) hours as needed for Pain. Max Daily Amount: 4 Tabs., Print, Disp-10 Tab, R-0      carisoprodol (SOMA) 350 mg tablet Take 1 Tab by mouth every eight (8) hours as needed for Muscle Spasm(s).  Max Daily Amount: 1,050 mg., Print, Disp-10 Tab, R-0         CONTINUE these medications which have NOT CHANGED    Details   cpap machine kit by Does Not Apply route., Historical Med      MULTIVIT-MINERALS/FA/LYCOPENE (ONE-A-DAY MEN'S PO) Take  by mouth., Historical Med      albuterol (PROVENTIL HFA, VENTOLIN HFA, PROAIR HFA) 90 mcg/actuation inhaler Take 1 Puff by inhalation every six (6) hours as needed for Wheezing., Normal, Disp-1 Inhaler, R-1      fluticasone-vilanterol (BREO ELLIPTA) 100-25 mcg/dose inhaler Take 1 Puff by inhalation daily. , Normal, Disp-1 Inhaler, R-1      amLODIPine (NORVASC) 10 mg tablet Take 1 Tab by mouth daily. , Normal, Disp-30 Tab, R-5           2. Follow-up Information     Follow up With Details Comments Contact Info    None In 2 days As needed None ([phone]) Patient stated that they have no PCP          Return to ED if worse     DISCHARGE NOTE  9:22AM  The patient has been re-evaluated and is ready for discharge. Reviewed available results with patient. Counseled pt on diagnosis and care plan. Pt has expressed understanding, and all questions have been answered. Pt agrees with plan and agrees to F/U as recommended, or return to the ED if their sxs worsen. Discharge instructions have been provided and explained to the pt, along with reasons to return to the ED. Written by Mariela Hernández, ED Scribe, as dictated by Jason Rodriguez. This note is prepared by Mariela Hernández, acting as Scribe for Jason Rodriguez. SIOBHAN Cool : The scribe's documentation has been prepared under my direction and personally reviewed by me in its entirety. I confirm that the note above accurately reflects all work, treatment, procedures, and medical decision making performed by me.

## 2017-09-25 NOTE — TELEPHONE ENCOUNTER
Pt returning nurse call. Best contact for the pt is [phone].        Message received & copied from [facility] after closing on 9/22/17

## 2017-09-25 NOTE — TELEPHONE ENCOUNTER
Called patient. Left detailed message advising lab results are not back yet. Once we receive them and Dr. Neftaly Jerez reviews, we will call him with those results.

## 2017-09-26 LAB
A ALTERNATA IGE QN: <0.1 KU/L
A FUMIGATUS IGE QN: 0.16 KU/L
ALBUMIN SERPL-MCNC: 4.2 G/DL (ref 3.5–5.5)
ALBUMIN/GLOB SERPL: 1.3 {RATIO} (ref 1.2–2.2)
ALP SERPL-CCNC: 70 IU/L (ref 39–117)
ALT SERPL-CCNC: 33 IU/L (ref 0–44)
AMER ROACH IGE QN: 0.11 KU/L
APPEARANCE UR: CLEAR
AST SERPL-CCNC: 25 IU/L (ref 0–40)
BAHIA GRASS IGE QN: 1.99 KU/L
BASOPHILS # BLD AUTO: 0 X10E3/UL (ref 0–0.2)
BASOPHILS NFR BLD AUTO: 1 %
BERMUDA GRASS IGE QN: 3.06 KU/L
BILIRUB SERPL-MCNC: 0.3 MG/DL (ref 0–1.2)
BILIRUB UR QL STRIP: NEGATIVE
BOXELDER IGE QN: 5.71 KU/L
BUN SERPL-MCNC: 12 MG/DL (ref 6–20)
BUN/CREAT SERPL: 14 (ref 9–20)
C HERBARUM IGE QN: 0.23 KU/L
CALCIUM SERPL-MCNC: 9.4 MG/DL (ref 8.7–10.2)
CAT DANDER IGE QN: 0.26 KU/L
CHLORIDE SERPL-SCNC: 98 MMOL/L (ref 96–106)
CMN PIGWEED IGE QN: 2.57 KU/L
CO2 SERPL-SCNC: 25 MMOL/L (ref 18–29)
COLOR UR: YELLOW
COMMON RAGWEED IGE QN: 1.23 KU/L
CREAT SERPL-MCNC: 0.86 MG/DL (ref 0.76–1.27)
D FARINAE IGE QN: 20.8 KU/L
D PTERONYSS IGE QN: 19 KU/L
DOG DANDER IGE QN: 4.07 KU/L
ENGL PLANTAIN IGE QN: 1.8 KU/L
EOSINOPHIL # BLD AUTO: 0.2 X10E3/UL (ref 0–0.4)
EOSINOPHIL NFR BLD AUTO: 3 %
ERYTHROCYTE [DISTWIDTH] IN BLOOD BY AUTOMATED COUNT: 14.4 % (ref 12.3–15.4)
GLOBULIN SER CALC-MCNC: 3.3 G/DL (ref 1.5–4.5)
GLUCOSE SERPL-MCNC: 91 MG/DL (ref 65–99)
GLUCOSE UR QL: NEGATIVE
HBA1C MFR BLD: 5.8 % (ref 4.8–5.6)
HCT VFR BLD AUTO: 40.1 % (ref 37.5–51)
HGB BLD-MCNC: 12.9 G/DL (ref 12.6–17.7)
HGB UR QL STRIP: NEGATIVE
IMM GRANULOCYTES # BLD: 0 X10E3/UL (ref 0–0.1)
IMM GRANULOCYTES NFR BLD: 0 %
JOHNSON GRASS IGE QN: 1.07 KU/L
KETONES UR QL STRIP: NEGATIVE
LEUKOCYTE ESTERASE UR QL STRIP: NEGATIVE
LONDON PLANE IGE QN: 2.55 KU/L
LYMPHOCYTES # BLD AUTO: 2.5 X10E3/UL (ref 0.7–3.1)
LYMPHOCYTES NFR BLD AUTO: 36 %
Lab: ABNORMAL
M RACEMOSUS IGE QN: 0.24 KU/L
MCH RBC QN AUTO: 26 PG (ref 26.6–33)
MCHC RBC AUTO-ENTMCNC: 32.2 G/DL (ref 31.5–35.7)
MCV RBC AUTO: 81 FL (ref 79–97)
MICRO URNS: NORMAL
MONOCYTES # BLD AUTO: 0.5 X10E3/UL (ref 0.1–0.9)
MONOCYTES NFR BLD AUTO: 7 %
MT JUNIPER IGE QN: 0.8 KU/L
MUGWORT IGE QN: 1.43 KU/L
NETTLE IGE QN: 0.85 KU/L
NEUTROPHILS # BLD AUTO: 3.8 X10E3/UL (ref 1.4–7)
NEUTROPHILS NFR BLD AUTO: 53 %
NITRITE UR QL STRIP: NEGATIVE
P NOTATUM IGE QN: <0.1 KU/L
PH UR STRIP: 6 [PH] (ref 5–7.5)
PLATELET # BLD AUTO: 215 X10E3/UL (ref 150–379)
POTASSIUM SERPL-SCNC: 4.1 MMOL/L (ref 3.5–5.2)
PROT SERPL-MCNC: 7.5 G/DL (ref 6–8.5)
PROT UR QL STRIP: NEGATIVE
RBC # BLD AUTO: 4.96 X10E6/UL (ref 4.14–5.8)
S BOTRYOSUM IGE QN: 0.13 KU/L
SHEEP SORREL IGE QN: 3.13 KU/L
SILVER BIRCH IGE QN: 9 KU/L
SODIUM SERPL-SCNC: 139 MMOL/L (ref 134–144)
SP GR UR: 1.02 (ref 1–1.03)
SWEET GUM IGE QN: 3.4 KU/L
TIMOTHY IGE QN: 5.78 KU/L
URATE SERPL-MCNC: 9.4 MG/DL (ref 3.7–8.6)
UROBILINOGEN UR STRIP-MCNC: 0.2 MG/DL (ref 0.2–1)
WBC # BLD AUTO: 6.9 X10E3/UL (ref 3.4–10.8)
WHITE ELM IGE QN: 2.48 KU/L
WHITE HICKORY IGE QN: 2.28 KU/L
WHITE MULBERRY IGE QN: <0.1 KU/L
WHITE OAK IGE QN: 10.1 KU/L

## 2017-09-26 RX ORDER — ALLOPURINOL 100 MG/1
200 TABLET ORAL DAILY
Qty: 60 TAB | Refills: 3 | Status: SHIPPED | OUTPATIENT
Start: 2017-09-26 | End: 2018-03-05 | Stop reason: SDUPTHER

## 2017-09-27 NOTE — PROGRESS NOTES
Uric acid level is high recommend starting allopurinol 200mg daily to lower uric acid level and prevent gout  We will monitor the uric acid level   1. Screening for diabetes: shows that you in the pre diabetic range with you sugars. It is important to increase exercise to 30 minutes daily and decrease the amount of carbohydrates ( sugars, bread, pasta, rice, potato) to ensure that you do not develop diabetes. We will repeat this in 3-6 months and if still elevated we can consider starting medication to prevent diabetes - Metformin  Kidney and liver function are normal  Blood count is normal  Normal urine  Patient has allergies to dog hair and dander, mites, caitlin grass, white oak, silver birch and maple.   He should avoid these triggers

## 2017-09-28 ENCOUNTER — OFFICE VISIT (OUTPATIENT)
Dept: INTERNAL MEDICINE CLINIC | Age: 32
End: 2017-09-28

## 2017-09-28 DIAGNOSIS — R06.2 WHEEZING: Primary | ICD-10-CM

## 2017-09-28 LAB — SPIROMETRY INTERPRETATION, SPITPOCT: NORMAL

## 2017-09-28 NOTE — PROGRESS NOTES
Pt presents for spirometry per Dr. Santiago Scripture. Pt results with 3 matches in 6 attempts. Pt informed that results will be given to PCP for further review and pt will be contacted with results. Pt verbalized understanding of information discussed w/ no further questions at this time.

## 2017-09-30 NOTE — PROGRESS NOTES
Breathing test spirometry showed mild restrictive lung disease which is likely related to obesity, no evidence emphysema. Patient has clinical symptoms of asthma and allergy testing showed multiple environmental allergies. Please ask patient if inhaler is helping.

## 2017-10-04 NOTE — PROGRESS NOTES
Spoke to the patient and let patient know Breathing test spirometry showed mild restrictive lung disease which is likely related to obesity, no evidence emphysema. Patient has clinical symptoms of asthma and allergy testing showed multiple environmental allergies. Please ask patient if inhaler is helping. . Patient stated that the inhaler is helping.

## 2017-10-16 ENCOUNTER — OFFICE VISIT (OUTPATIENT)
Dept: INTERNAL MEDICINE CLINIC | Age: 32
End: 2017-10-16

## 2017-10-16 VITALS
DIASTOLIC BLOOD PRESSURE: 66 MMHG | TEMPERATURE: 97.9 F | SYSTOLIC BLOOD PRESSURE: 136 MMHG | HEIGHT: 69 IN | WEIGHT: 298.6 LBS | BODY MASS INDEX: 44.23 KG/M2 | HEART RATE: 102 BPM

## 2017-10-16 DIAGNOSIS — E78.00 HIGH CHOLESTEROL: ICD-10-CM

## 2017-10-16 DIAGNOSIS — M10.9 ACUTE GOUT OF KNEE, UNSPECIFIED CAUSE, UNSPECIFIED LATERALITY: Primary | ICD-10-CM

## 2017-10-16 NOTE — PROGRESS NOTES
Mr. Chris Tan is a new patient who is here to establish care. CC:  Hypertension (follow up) and Asthma (follow up)  Gout     HPI:    Gout   Recent gout on right knee -Only 2 episodes - first was on toe and then knee. Currently asymptomatic.   high uric acid and started on allopurinol and tolerating it well. HTN: Last visit I increased the Norvasc to 10 mg. Tolerating mediation well  Denies CP and dyspnea  Patient had angioedema on lisinopril  Walking and increasing water and vegetable intake      Asthma: multiple environmental allergies. Oliverio Wright is helping. Cannot afford albuterol  No recent flareups        Review of systems:  12 systems reviewed and negative other than HPI    Past Medical History:   Diagnosis Date    Angioedema     ace induced asthma     Cold-induced asthma     Gout     Hypertension     JOS (obstructive sleep apnea)         Past Surgical History:   Procedure Laterality Date    HX ADENOIDECTOMY         Allergies   Allergen Reactions    Lisinopril Swelling     Pt has sever swelling of the throat and tongue.  Pcn [Penicillins] Other (comments)     Allergic as a child         Current Outpatient Prescriptions on File Prior to Visit   Medication Sig Dispense Refill    allopurinol (ZYLOPRIM) 100 mg tablet Take 2 Tabs by mouth daily. 60 Tab 3    cpap machine kit by Does Not Apply route.  MULTIVIT-MINERALS/FA/LYCOPENE (ONE-A-DAY MEN'S PO) Take  by mouth.  albuterol (PROVENTIL HFA, VENTOLIN HFA, PROAIR HFA) 90 mcg/actuation inhaler Take 1 Puff by inhalation every six (6) hours as needed for Wheezing. 1 Inhaler 1    fluticasone-vilanterol (BREO ELLIPTA) 100-25 mcg/dose inhaler Take 1 Puff by inhalation daily. 1 Inhaler 1    amLODIPine (NORVASC) 10 mg tablet Take 1 Tab by mouth daily. 30 Tab 5     No current facility-administered medications on file prior to visit. family history includes Hypertension in his father and mother; Sleep Apnea in his mother.     Social History     Social History    Marital status:      Spouse name: N/A    Number of children: N/A    Years of education: N/A     Occupational History    Not on file. Social History Main Topics    Smoking status: Never Smoker    Smokeless tobacco: Never Used    Alcohol use No    Drug use: No    Sexual activity: Yes     Partners: Female     Other Topics Concern    Not on file     Social History Narrative       Visit Vitals    /66 (BP 1 Location: Right arm, BP Patient Position: Sitting)    Pulse (!) 102    Temp 97.9 °F (36.6 °C) (Oral)    Ht 5' 9\" (1.753 m)    Wt 298 lb 9.6 oz (135.4 kg)    BMI 44.1 kg/m2     General:  Well appearing male no acute distress, obese  HEENT:   PERRL,normal conjunctiva. External ear and canals normal, TMs normal.  Hearing normal to voice. Nose without edema or discharge, normal septum. Lips, teeth, gums normal.  Oropharynx: no erythema, no exudates, no lesions, normal tongue. Neck:  Supple. Thyroid normal size, nontender, without nodules. No carotid bruit. No masses or lymphadenopathy  Respiratory: no respiratory distress,  no wheezing, no rhonchi, no rales. No chest wall tenderness. Cardiovascular:  RRR, normal S1S2, no murmur. Gastrointestinal: normal bowel sounds, soft, nontender, without masses. No hepatosplenomegaly. Extremities +2 pulses, no edema, normal sensation   Musculoskeletal:  Normal gait. Normal digits and nails. Normal strength and tone, no atrophy, and no abnormal movement. Skin:  No rash, no lesions, no ulcers. Skin warm, normal turgor, without induration or nodules. Neuro:  A and OX4, fluent speech, cranial nerves normal 2-12. Sensation normal to light touch.   DTR symmetrical  Psych:  Normal affect      Lab Results   Component Value Date/Time    WBC 6.9 09/22/2017 12:12 PM    HGB 12.9 09/22/2017 12:12 PM    HCT 40.1 09/22/2017 12:12 PM    PLATELET 018 29/97/3294 12:12 PM    MCV 81 09/22/2017 12:12 PM     Lab Results   Component Value Date/Time    Sodium 139 09/22/2017 12:12 PM    Potassium 4.1 09/22/2017 12:12 PM    Chloride 98 09/22/2017 12:12 PM    CO2 25 09/22/2017 12:12 PM    Anion gap 8 05/05/2016 04:21 AM    Glucose 91 09/22/2017 12:12 PM    BUN 12 09/22/2017 12:12 PM    Creatinine 0.86 09/22/2017 12:12 PM    BUN/Creatinine ratio 14 09/22/2017 12:12 PM    GFR est  09/22/2017 12:12 PM    GFR est non- 09/22/2017 12:12 PM    Calcium 9.4 09/22/2017 12:12 PM     Lab Results   Component Value Date/Time    Cholesterol, total 229 08/11/2014 02:45 PM     Lab Results   Component Value Date/Time    TSH 0.92 02/26/2013 04:17 AM     Lab Results   Component Value Date/Time    Hemoglobin A1c 5.8 09/22/2017 12:12 PM     No results found for: Sandip Godinez, XQVID3, XQVID, VD3RIA                Assessment and Plan:       1. Essential hypertension  -Blood pressure is well controlled today on Norvasc 10mg  - Discussed dash diet and avoiding drinking caffeine and energy drinks  -Had normal [facility] and [facility]/records review pending to be scanned    2. Acute gout of multiple sites, unspecified cause  - had 2 episodes this year / no prior hx. No crystal analysis  - URIC ACID high and patient was started on allopurinol last visit  -Repeat uric acid to ensure ago    3. Mild persistent asthma-improved with Breo. -Patient has multiple environmental allergies.  -Encouraged to buy albuterol to have a rescue inhaler     4. Morbid obesity due to excess calories ([address]  - counseled on weight loss   -Has prediabetes hemoglobin A1c is 5.8    5. Obstructive sleep apnea: on CPAP     Return for fasting cholesterol  Follow-up Disposition:  Return in about 4 months (around 2/16/2018) for HTN, obesity and asthma .      Fernandez Lira MD

## 2017-10-16 NOTE — MR AVS SNAPSHOT
Visit Information Date & Time Provider Department Dept. Phone Encounter #  
 10/16/2017  9:00 AM Lillie Liao [facility] [phone] [id number] Follow-up Instructions Return in about 4 months (around 2/16/2018) for HTN, obesity and asthma . Upcoming Health Maintenance Date Due Pneumococcal 19-64 Medium Risk (1 of 1 - PPSV23) 8/6/2005 DTaP/Tdap/Td series (2 - Td) 9/30/2026 Allergies as of 10/16/2017  Review Complete On: 10/16/2017 By: Jt Beard Severity Noted Reaction Type Reactions Lisinopril  09/30/2016    Swelling Pt has sever swelling of the throat and tongue. Pcn [Penicillins]  04/20/2012    Other (comments) Allergic as a child Current Immunizations  Never Reviewed No immunizations on file. Not reviewed this visit You Were Diagnosed With   
  
 Codes Comments Acute gout of knee, unspecified cause, unspecified laterality    -  Primary ICD-10-CM: M10.9 ICD-9-CM: 274.01 High cholesterol     ICD-10-CM: E78.00 ICD-9-CM: 272.0 Class 3 obesity due to excess calories without serious comorbidity with body mass index (BMI) of 40.0 to 44.9 in adult [facility])     ICD-10-CM: E66.09, Z68.41 
ICD-9-CM: 278.00, V85.41 Vitals BP Pulse Temp Height(growth percentile) Weight(growth percentile) BMI  
 136/66 (BP 1 Location: Right arm, BP Patient Position: Sitting) (!) 102 97.9 °F (36.6 °C) (Oral) 5' 9\" (1.753 m) 298 lb 9.6 oz (135.4 kg) 44.1 kg/m2 Smoking Status Never Smoker Vitals History BMI and BSA Data Body Mass Index Body Surface Area  
 44.1 kg/m 2 2.57 m 2 Preferred Pharmacy Pharmacy Name Phone [id number] [phone] Your Updated Medication List  
  
   
This list is accurate as of: 10/16/17  9:08 AM.  Always use your most recent med list.  
  
  
  
  
 albuterol 90 mcg/actuation inhaler Commonly known as:  PROVENTIL HFA, VENTOLIN HFA, PROAIR HFA Take 1 Puff by inhalation every six (6) hours as needed for Wheezing. allopurinol 100 mg tablet Commonly known as:  Clementeen Cuevas Take 2 Tabs by mouth daily. amLODIPine 10 mg tablet Commonly known as:  Sammie Fraction Take 1 Tab by mouth daily. cpap machine kit  
by Does Not Apply route. fluticasone-vilanterol 100-25 mcg/dose inhaler Commonly known as:  BREO ELLIPTA Take 1 Puff by inhalation daily. ONE-A-DAY MEN'S PO Take  by mouth. We Performed the Following LIPID PANEL [17969 CPT(R)] URIC ACID [id number] CPT(R)] Follow-up Instructions Return in about 4 months (around 2/16/2018) for HTN, obesity and asthma . Patient Instructions Constipation: Care Instructions Your Care Instructions Constipation means that you have a hard time passing stools (bowel movements). People pass stools from 3 times a day to once every 3 days. What is normal for you may be different. Constipation may occur with pain in the rectum and cramping. The pain may get worse when you try to pass stools. Sometimes there are small amounts of bright red blood on toilet paper or the surface of stools. This is because of enlarged veins near the rectum (hemorrhoids). A few changes in your diet and lifestyle may help you avoid ongoing constipation. Your doctor may also prescribe medicine to help loosen your stool. Some medicines can cause constipation. These include pain medicines and antidepressants. Tell your doctor about all the medicines you take. Your doctor may want to make a medicine change to ease your symptoms. Follow-up care is a key part of your treatment and safety. Be sure to make and go to all appointments, and call your doctor if you are having problems. It's also a good idea to know your test results and keep a list of the medicines you take. How can you care for yourself at home? · Drink plenty of fluids, enough so that your urine is light yellow or clear like water. If you have kidney, heart, or liver disease and have to limit fluids, talk with your doctor before you increase the amount of fluids you drink. · Include high-fiber foods in your diet each day. These include fruits, vegetables, beans, and whole grains. · Get at least 30 minutes of exercise on most days of the week. Walking is a good choice. You also may want to do other activities, such as running, swimming, cycling, or playing tennis or team sports. · Take a fiber supplement, such as Citrucel or Metamucil, every day. Read and follow all instructions on the label. · Schedule time each day for a bowel movement. A daily routine may help. Take your time having your bowel movement. · Support your feet with a small step stool when you sit on the toilet. This helps flex your hips and places your pelvis in a squatting position. · Your doctor may recommend an over-the-counter laxative to relieve your constipation. Examples are Milk of Magnesia and MiraLax. Read and follow all instructions on the label. Do not use laxatives on a long-term basis. When should you call for help? Call your doctor now or seek immediate medical care if: 
· You have new or worse belly pain. · You have new or worse nausea or vomiting. · You have blood in your stools. Watch closely for changes in your health, and be sure to contact your doctor if: 
· Your constipation is getting worse. · You do not get better as expected. Where can you learn more? Go to http://maciel-de.info/. Enter 21  in the search box to learn more about \"Constipation: Care Instructions. \" Current as of: March 20, 2017 Content Version: 11.3 © 0285-6799 [employer].  Care instructions adapted under license by [employer] (which disclaims liability or warranty for this information). If you have questions about a medical condition or this instruction, always ask your healthcare professional. [address] any warranty or liability for your use of this information. 
 
-------------------------- Metamucil capsules  
 
------------------------------- Return fasting for cholesterol check/ no appointment needed Introducing [facility] SERVICES! Dear Moises Smith: Thank you for requesting a [employer] account. Our records indicate that you already have an active [employer] account. You can access your account anytime at [email or web address]. [employer] Did you know that you can access your hospital and ER discharge instructions at any time in [employer]? You can also review all of your test results from your hospital stay or ER visit. Additional Information If you have questions, please visit the Frequently Asked Questions section of the [employer] website at [email or web address]. [employer]/. Remember, [employer] is NOT to be used for urgent needs. For medical emergencies, dial 911. Now available from your iPhone and Android! Please provide this summary of care documentation to your next provider. Your primary care clinician is listed as CRISSY Cartagena. If you have any questions after today's visit, please call [phone].

## 2017-10-16 NOTE — PROGRESS NOTES
Chief Complaint   Patient presents with    Hypertension     follow up    Asthma     follow up     1. Have you been to the ER, urgent care clinic since your last visit? Hospitalized since your last visit? No    2. Have you seen or consulted any other health care providers outside of the [address] [place] [address] since your last visit? Include any pap smears or colon screening.  No

## 2017-10-16 NOTE — PATIENT INSTRUCTIONS
Constipation: Care Instructions  Your Care Instructions  Constipation means that you have a hard time passing stools (bowel movements). People pass stools from 3 times a day to once every 3 days. What is normal for you may be different. Constipation may occur with pain in the rectum and cramping. The pain may get worse when you try to pass stools. Sometimes there are small amounts of bright red blood on toilet paper or the surface of stools. This is because of enlarged veins near the rectum (hemorrhoids). A few changes in your diet and lifestyle may help you avoid ongoing constipation. Your doctor may also prescribe medicine to help loosen your stool. Some medicines can cause constipation. These include pain medicines and antidepressants. Tell your doctor about all the medicines you take. Your doctor may want to make a medicine change to ease your symptoms. Follow-up care is a key part of your treatment and safety. Be sure to make and go to all appointments, and call your doctor if you are having problems. It's also a good idea to know your test results and keep a list of the medicines you take. How can you care for yourself at home? · Drink plenty of fluids, enough so that your urine is light yellow or clear like water. If you have kidney, heart, or liver disease and have to limit fluids, talk with your doctor before you increase the amount of fluids you drink. · Include high-fiber foods in your diet each day. These include fruits, vegetables, beans, and whole grains. · Get at least 30 minutes of exercise on most days of the week. Walking is a good choice. You also may want to do other activities, such as running, swimming, cycling, or playing tennis or team sports. · Take a fiber supplement, such as Citrucel or Metamucil, every day. Read and follow all instructions on the label. · Schedule time each day for a bowel movement. A daily routine may help.  Take your time having your bowel movement. · Support your feet with a small step stool when you sit on the toilet. This helps flex your hips and places your pelvis in a squatting position. · Your doctor may recommend an over-the-counter laxative to relieve your constipation. Examples are Milk of Magnesia and MiraLax. Read and follow all instructions on the label. Do not use laxatives on a long-term basis. When should you call for help? Call your doctor now or seek immediate medical care if:  · You have new or worse belly pain. · You have new or worse nausea or vomiting. · You have blood in your stools. Watch closely for changes in your health, and be sure to contact your doctor if:  · Your constipation is getting worse. · You do not get better as expected. Where can you learn more? Go to http://maciel-de.info/. Enter 21 [phone] in the search box to learn more about \"Constipation: Care Instructions. \"  Current as of: March 20, 2017  Content Version: 11.3  © 9783-8511 [email or web address]. Care instructions adapted under license by [employer] (which disclaims liability or warranty for this information).  If you have questions about a medical condition or this instruction, always ask your healthcare professional. [address] any warranty or liability for your use of this information.    --------------------------  Metamucil capsules     -------------------------------  Return fasting for cholesterol check/ no appointment needed

## 2017-12-05 ENCOUNTER — TELEPHONE (OUTPATIENT)
Dept: INTERNAL MEDICINE CLINIC | Age: 32
End: 2017-12-05

## 2017-12-05 DIAGNOSIS — J45.30: ICD-10-CM

## 2017-12-05 DIAGNOSIS — R07.9 CHEST PAIN, UNSPECIFIED TYPE: Primary | ICD-10-CM

## 2017-12-05 NOTE — TELEPHONE ENCOUNTER
----- Message from Heavenly Marino sent at 12/5/2017  2:42 PM EST -----  Regarding: Dr. Cinda Beltrán is requesting an appointment for a stress test for heart and irregular heart beat test. Best contact number [phone].           Message copied/pasted from [employer]

## 2017-12-06 RX ORDER — ALBUTEROL SULFATE 90 UG/1
1 AEROSOL, METERED RESPIRATORY (INHALATION)
Qty: 1 INHALER | Refills: 1 | Status: SHIPPED | COMMUNITY
Start: 2017-12-06 | End: 2019-04-16 | Stop reason: SDUPTHER

## 2017-12-06 RX ORDER — FLUTICASONE FUROATE AND VILANTEROL 100; 25 UG/1; UG/1
1 POWDER RESPIRATORY (INHALATION) DAILY
Qty: 1 INHALER | Refills: 1 | Status: SHIPPED | COMMUNITY
Start: 2017-12-06 | End: 2018-06-04 | Stop reason: SDUPTHER

## 2017-12-06 NOTE — TELEPHONE ENCOUNTER
From: Georgia Matos  To: Rowdy Merritt MD  Sent: 12/5/2017 11:51 AM EST  Subject: Medication Renewal Request    Original authorizing provider: MD Nathaniel Vail.  Berhane Pagan would like a refill of the following medications:  albuterol (PROVENTIL HFA, VENTOLIN HFA, PROAIR HFA) 90 mcg/actuation inhaler [Monica Crum MD]  fluticasone-vilanterol (BREO ELLIPTA) 100-25 mcg/dose inhaler [Monica Crum MD]    Preferred pharmacy: [facility] [address] [place] [address], [facility]    Comment:

## 2017-12-07 NOTE — TELEPHONE ENCOUNTER
Called patient. Two patient identifiers verified. Patient states at [address] on 10/16/17 he discussed getting an order for a stress test with Dr. Marija Da Silva. States he had been seen at [facility] for arrhythmia. States order was not entered at the visit. Please advise and enter order if appropriate.

## 2017-12-07 NOTE — TELEPHONE ENCOUNTER
Called patient. Two patient identifiers verified. Advised order for stress test entered by Dr. Connor Victor. Phone number to [employer] given to patient.

## 2018-01-11 ENCOUNTER — HOSPITAL ENCOUNTER (OUTPATIENT)
Dept: NON INVASIVE DIAGNOSTICS | Age: 33
Discharge: HOME | End: 2018-01-11
Attending: INTERNAL MEDICINE
Payer: COMMERCIAL

## 2018-01-11 DIAGNOSIS — R07.9 CHEST PAIN, UNSPECIFIED TYPE: ICD-10-CM

## 2018-01-11 LAB
ATTENDING PHYSICIAN, CST07: NORMAL
DIAGNOSIS, 93000: NORMAL
DUKE TM SCORE RESULT, CST14: NORMAL
DUKE TREADMILL SCORE, CST13: 5456
ECG INTERP BEFORE EX, CST11: NORMAL
ECG INTERP DURING EX, CST12: NORMAL
FUNCTIONAL CAPACITY, CST17: NORMAL
KNOWN CARDIAC CONDITION, CST08: NORMAL
MAX. DIASTOLIC BP, CST04: 110 MMHG
MAX. HEART RATE, CST05: 162 BPM
MAX. SYSTOLIC BP, CST03: 218 MMHG
OVERALL BP RESPONSE TO EXERCISE, CST16: NORMAL
OVERALL HR RESPONSE TO EXERCISE, CST15: NORMAL
PEAK EX METS, CST10: 7 METS
PROTOCOL NAME, CST01: NORMAL
TEST INDICATION, CST09: NORMAL

## 2018-01-11 PROCEDURE — 93017 CV STRESS TEST TRACING ONLY: CPT

## 2018-01-12 NOTE — PROGRESS NOTES
Patient had a normal stress test but developed high blood pressure during exercise that severe.  We need to consider adding a second agent to treat high blood pressure please have patient schedule appointment with me soon to discuss options

## 2018-02-09 ENCOUNTER — DOCUMENTATION ONLY (OUTPATIENT)
Dept: INTERNAL MEDICINE CLINIC | Age: 33
End: 2018-02-09

## 2018-02-09 NOTE — PROGRESS NOTES
Pt was upset and states that his appt was changed for the wrong time. Apologized to pt and let him know that he could be worked in,  he said no I will just find me another  and left.

## 2018-03-05 DIAGNOSIS — M10.9 ACUTE GOUT OF MULTIPLE SITES, UNSPECIFIED CAUSE: ICD-10-CM

## 2018-03-05 RX ORDER — ALLOPURINOL 100 MG/1
200 TABLET ORAL DAILY
Qty: 60 TAB | Refills: 3 | Status: SHIPPED | OUTPATIENT
Start: 2018-03-05 | End: 2018-03-14 | Stop reason: SDUPTHER

## 2018-03-05 NOTE — TELEPHONE ENCOUNTER
Please remind patient to schedule follow up appointment, also there are labs in the system ordered for patient to complete

## 2018-03-13 ENCOUNTER — TELEPHONE (OUTPATIENT)
Dept: INTERNAL MEDICINE CLINIC | Age: 33
End: 2018-03-13

## 2018-03-13 DIAGNOSIS — M10.9 ACUTE GOUT OF MULTIPLE SITES, UNSPECIFIED CAUSE: ICD-10-CM

## 2018-03-13 NOTE — TELEPHONE ENCOUNTER
Deanne//Christina needs a call back to discuss patient's allopurinol (ZYLOPRIM) 100 mg tablet prescription. Please call.  Thank you

## 2018-03-14 RX ORDER — ALLOPURINOL 100 MG/1
200 TABLET ORAL DAILY
Qty: 60 TAB | Refills: 3 | Status: SHIPPED | OUTPATIENT
Start: 2018-03-14 | End: 2018-08-08

## 2018-03-14 NOTE — TELEPHONE ENCOUNTER
Returned call to [facility]. Spoke with pharmacist, Giuseppe Ramirez. They need a prescription for patient's allopurinol 100 mg tabs. Was sent to [facility] on 3/5/18. Resent to correct pharmacy per original order from Dr. Halle Gee.

## 2018-03-28 ENCOUNTER — TELEPHONE (OUTPATIENT)
Dept: INTERNAL MEDICINE CLINIC | Age: 33
End: 2018-03-28

## 2018-03-28 DIAGNOSIS — M54.5 LOW BACK PAIN, UNSPECIFIED BACK PAIN LATERALITY, UNSPECIFIED CHRONICITY, WITH SCIATICA PRESENCE UNSPECIFIED: Primary | ICD-10-CM

## 2018-03-28 NOTE — TELEPHONE ENCOUNTER
Spoke with patient . Patient will come in 3/29/18 for appt . Then go for the x-ray after appointment.

## 2018-03-28 NOTE — TELEPHONE ENCOUNTER
Patient states he needs a call back to discuss his back pain & difficulty standing or walking for any longer than 10 minutes. Patient also reports neck pain & groin pain. Patient is requesting an order for x-ray to be done. Please also see My Chart. Please call to discuss & advise.  Thank you

## 2018-03-29 ENCOUNTER — OFFICE VISIT (OUTPATIENT)
Dept: INTERNAL MEDICINE CLINIC | Age: 33
End: 2018-03-29

## 2018-03-29 VITALS
RESPIRATION RATE: 18 BRPM | TEMPERATURE: 97.8 F | DIASTOLIC BLOOD PRESSURE: 101 MMHG | HEART RATE: 81 BPM | OXYGEN SATURATION: 99 % | HEIGHT: 69 IN | SYSTOLIC BLOOD PRESSURE: 156 MMHG | BODY MASS INDEX: 45.03 KG/M2 | WEIGHT: 304 LBS

## 2018-03-29 DIAGNOSIS — G89.29 CHRONIC BILATERAL LOW BACK PAIN WITH LEFT-SIDED SCIATICA: Primary | ICD-10-CM

## 2018-03-29 DIAGNOSIS — M54.5 LOW BACK PAIN, UNSPECIFIED BACK PAIN LATERALITY, UNSPECIFIED CHRONICITY, WITH SCIATICA PRESENCE UNSPECIFIED: Primary | ICD-10-CM

## 2018-03-29 DIAGNOSIS — I10 ESSENTIAL HYPERTENSION: ICD-10-CM

## 2018-03-29 DIAGNOSIS — M54.42 CHRONIC BILATERAL LOW BACK PAIN WITH LEFT-SIDED SCIATICA: Primary | ICD-10-CM

## 2018-03-29 PROBLEM — M54.40 CHRONIC BILATERAL LOW BACK PAIN WITH SCIATICA: Status: ACTIVE | Noted: 2018-03-29

## 2018-03-29 PROBLEM — E66.01 OBESITY, MORBID (HCC): Status: ACTIVE | Noted: 2018-03-29

## 2018-03-29 RX ORDER — CYCLOBENZAPRINE HCL 10 MG
10 TABLET ORAL
Qty: 60 TAB | Refills: 1 | Status: SHIPPED | OUTPATIENT
Start: 2018-03-29 | End: 2018-08-08

## 2018-03-29 RX ORDER — TRIAMTERENE AND HYDROCHLOROTHIAZIDE 37.5; 25 MG/1; MG/1
1 CAPSULE ORAL DAILY
Qty: 30 CAP | Refills: 1 | Status: SHIPPED | OUTPATIENT
Start: 2018-03-29 | End: 2018-06-04 | Stop reason: SDUPTHER

## 2018-03-29 NOTE — PROGRESS NOTES
Reviewed record in preparation for visit and have obtained necessary documentation. Identified pt with two pt identifiers(name and ). Chief Complaint   Patient presents with    Back Pain       Health Maintenance Due   Topic Date Due    Pneumococcal Vaccine (1  - PPSV23) 2005       Mr. Saroj Wolff has a reminder for a \"due or due soon\" health maintenance. I have asked that he discuss this further with his primary care provider for follow-up on this health maintenance. Coordination of Care Questionnaire:  :     1) Have you been to an emergency room, urgent care clinic since your last visit? no   Hospitalized since your last visit? no             2) Have you seen or consulted any other health care providers outside of [address] [place] [address] since your last visit? no  (Include any pap smears or colon screenings in this section.)    3) In the event something were to happen to you and you were unable to speak on your behalf, do you have an Advance Directive/ Living Will in place stating your wishes? NO    Do you have an Advance Directive on file? no    4) Are you interested in receiving information on Advance Directives? NO    Patient is accompanied by self I have received verbal consent from Malorie Matthews to discuss any/all medical information while they are present in the room.

## 2018-03-29 NOTE — PROGRESS NOTES
CC: Back Pain      HPI:    He is a 33 y.o. male who presents for evaluation of back pain     Patient reports excruciating pain in back- Starts in the middle of the back and excruciating in the lower part of back- symptoms since November ( 4 months ago) but progressively worst. Pain is better with sitting, worst with standing and walking,   Has to walk slowly when standing, has sweats due to pain  Denies weakness in the legs  On left side radiates to groin and back of leg  Denies issues with bowel or bladder incontinence  chiropractor visit was not helpful  Not taking any medication for this   Denies changes in sensation  Denies issues with bowel or bladder incontinence    Hypertension: currently on 10mg of Norvasc. Recall had stress test and blood pressure was elevated during test   Today BP is 156/101    ROS:  Constitutional: negative for fevers, chills, anorexia and weight loss  Eyes:   negative for visual disturbance,  irritation  ENT:   negative for tinnitus,sore throat,nasal congestion,ear pain, sinus pain. Respiratory:  negative for cough, hemoptysis, dyspnea,wheezing  CV:   negative for chest pain, palpitations, lower extremity edema  GI:   negative for nausea, vomiting, diarrhea, abdominal pain,melena  Genitourinary: negative for frequency, dysuria, hematuria  Musculoskel: See HPI  Neurological:  negative for headaches, dizziness, focal weakness, numbness  Psych:             Negative for depression and anxiety    Past Medical History:   Diagnosis Date    Angioedema     ace induced asthma     Cold-induced asthma     Gout     Hypertension     JOS (obstructive sleep apnea)        Current Outpatient Prescriptions on File Prior to Visit   Medication Sig Dispense Refill    allopurinol (ZYLOPRIM) 100 mg tablet Take 2 Tabs by mouth daily. 60 Tab 3    albuterol (PROVENTIL HFA, VENTOLIN HFA, PROAIR HFA) 90 mcg/actuation inhaler Take 1 Puff by inhalation every six (6) hours as needed for Wheezing.  1 Inhaler 1  cpap machine kit by Does Not Apply route.  MULTIVIT-MINERALS/FA/LYCOPENE (ONE-A-DAY MEN'S PO) Take  by mouth.  amLODIPine (NORVASC) 10 mg tablet Take 1 Tab by mouth daily. 30 Tab 5    fluticasone-vilanterol (BREO ELLIPTA) 100-25 mcg/dose inhaler Take 1 Puff by inhalation daily. 1 Inhaler 1     No current facility-administered medications on file prior to visit. Past Surgical History:   Procedure Laterality Date    HX ADENOIDECTOMY         Family History   Problem Relation Age of Onset    Sleep Apnea Mother     Hypertension Mother     Hypertension Father      Reviewed and no changes     Social History     Social History    Marital status:      Spouse name: N/A    Number of children: N/A    Years of education: N/A     Occupational History    Not on file. Social History Main Topics    Smoking status: Never Smoker    Smokeless tobacco: Never Used    Alcohol use No    Drug use: No    Sexual activity: Yes     Partners: Female     Other Topics Concern    Not on file     Social History Narrative            Visit Vitals    BP (!) 156/101    Pulse 81    Temp 97.8 °F (36.6 °C) (Oral)    Resp 18    Ht 5' 9\" (1.753 m)    Wt 304 lb (137.9 kg)    SpO2 99%    BMI 44.89 kg/m2       Physical Examination:   General - Well appearing male, obese  HEENT - PERRL, TM no erythema/opacification, normal nasal turbinates, oropharynx no erythema or exudate, MMM  Neck - supple, no bruits, no TMG, no LAD  Pulm - clear to auscultation bilaterally  Cardio - RRR, normal S1 S2, no murmur gallops or rubs  Abd - soft, nontender, no masses, no HSM  Extrem - no edema, +2 distal pulses  Psych - normal affect, appropriate mood      Back ROS: denies weakness in legs, no bowel or bladder incontinence, no lack of sensation, no weight loss and no fever. No hx of cancer.      Back exam- lowerparaspinous muscle pain on palpation, no vertebral pain, able to move on exam table without difficulty, straight leg raise is on left is positive  Back neuro:  5/5 strength in legs, normal sensation,  normal reflexes in knee and achilles, normal gait. Lab Results   Component Value Date/Time    WBC 6.9 09/22/2017 12:12 PM    HGB 12.9 09/22/2017 12:12 PM    HCT 40.1 09/22/2017 12:12 PM    PLATELET 782 77/27/6526 12:12 PM    MCV 81 09/22/2017 12:12 PM     Lab Results   Component Value Date/Time    Sodium 139 09/22/2017 12:12 PM    Potassium 4.1 09/22/2017 12:12 PM    Chloride 98 09/22/2017 12:12 PM    CO2 25 09/22/2017 12:12 PM    Anion gap 8 05/05/2016 04:21 AM    Glucose 91 09/22/2017 12:12 PM    BUN 12 09/22/2017 12:12 PM    Creatinine 0.86 09/22/2017 12:12 PM    BUN/Creatinine ratio 14 09/22/2017 12:12 PM    GFR est  09/22/2017 12:12 PM    GFR est non- 09/22/2017 12:12 PM    Calcium 9.4 09/22/2017 12:12 PM     Lab Results   Component Value Date/Time    Cholesterol, total 229 (H) 08/11/2014 02:45 PM     Lab Results   Component Value Date/Time    TSH 0.92 02/26/2013 04:17 AM     No results found for: PSA [employer] Expose, [id number], [id number], PSALT  Lab Results   Component Value Date/Time    Hemoglobin A1c 5.8 (H) 09/22/2017 12:12 PM     No results found for: Corrine Hathaway [id number]    Lab Results   Component Value Date/Time    ALT (SGPT) 33 09/22/2017 12:12 PM    AST (SGOT) 25 09/22/2017 12:12 PM    Alk. phosphatase 70 09/22/2017 12:12 PM    Bilirubin, total 0.3 09/22/2017 12:12 PM           Assessment/Plan:    1. Chronic bilateral low back pain with left-sided sciatica  - lower back ajay, discussed overweight is contributing to pain. Will obtain x ray of lower back  - REFERRAL TO PHYSICAL THERAPY  - cyclobenzaprine (FLEXERIL) 10 mg tablet; Take 1 Tab by mouth two (2) times daily as needed for Muscle Spasm(s). Dispense: 60 Tab; Refill: 1  - can take tylenol for pain, no NSAIDs given elevated BP   2.  Essential hypertension  - not well controlled on current regimen ( norvasc 10mg) add dyazide  - triamterene-hydroCHLOROthiazide (DYAZIDE) 37.5-25 mg per capsule; Take 1 Cap by mouth daily. Dispense: 30 Cap; Refill: 1  - recent stress test was negative for ischemic changes but noted higher BP with exercise    Follow-up Disposition:  Return in about 4 weeks (around 4/26/2018) for Blood pressure and back .     Tray Mattson MD

## 2018-03-29 NOTE — PATIENT INSTRUCTIONS
Learning About How to Have a Healthy Back  What causes back pain? Back pain is often caused by overuse, strain, or injury. For example, people often hurt their backs playing sports or working in the yard, being jolted in a car accident, or lifting something too heavy. Aging plays a part too. Your bones and muscles tend to lose strength as you age, which makes injury more likely. The spongy discs between the bones of the spine (vertebrae) may suffer from wear and tear and no longer provide enough cushion between the bones. A disc that bulges or breaks open (herniated disc) can press on nerves, causing back pain. In some people, back pain is the result of arthritis, broken vertebrae caused by bone loss (osteoporosis), illness, or a spine problem. Although most people have back pain at one time or another, there are steps you can take to make it less likely. How can you have a healthy back? Reduce stress on your back through good posture  Slumping or slouching alone may not cause low back pain. But after the back has been strained or injured, bad posture can make pain worse. · Sleep in a position that maintains your back's normal curves and on a mattress that feels comfortable. Sleep on your side with a pillow between your knees, or sleep on your back with a pillow under your knees. These positions can reduce strain on your back. · Stand and sit up straight. \"Good posture\" generally means your ears, shoulders, and hips are in a straight line. · If you must stand for a long time, put one foot on a stool, ledge, or box. Switch feet every now and then. · Sit in a chair that is low enough to let you place both feet flat on the floor with both knees nearly level with your hips. If your chair or desk is too high, use a footrest to raise your knees. Place a small pillow, a rolled-up towel, or a lumbar roll in the curve of your back if you need extra support.   · Try a kneeling chair, which helps tilt your hips forward. This takes pressure off your lower back. · Try sitting on an exercise ball. It can rock from side to side, which helps keep your back loose. · When driving, keep your knees nearly level with your hips. Sit straight, and drive with both hands on the steering wheel. Your arms should be in a slightly bent position. Reduce stress on your back through careful lifting  · Squat down, bending at the hips and knees only. If you need to, put one knee to the floor and extend your other knee in front of you, bent at a right angle (half kneeling). · Press your chest straight forward. This helps keep your upper back straight while keeping a slight arch in your low back. · Hold the load as close to your body as possible, at the level of your belly button (navel). · Use your feet to change direction, taking small steps. · Lead with your hips as you change direction. Keep your shoulders in line with your hips as you move. · Set down your load carefully, squatting with your knees and hips only. Exercise and stretch your back  · Do some exercise on most days of the week, if your doctor says it is okay. You can walk, run, swim, or cycle. · Stretch your back muscles. Here are a few exercises to try:  Annie Place on your back, and gently pull one bent knee to your chest. Put that foot back on the floor, and then pull the other knee to your chest.  ¨ Do pelvic tilts. Lie on your back with your knees bent. Tighten your stomach muscles. Pull your belly button (navel) in and up toward your ribs. You should feel like your back is pressing to the floor and your hips and pelvis are slightly lifting off the floor. Hold for 6 seconds while breathing smoothly. ¨ Sit with your back flat against a wall. · Keep your core muscles strong. The muscles of your back, belly (abdomen), and buttocks support your spine. ¨ Pull in your belly and imagine pulling your navel toward your spine. Hold this for 6 seconds, then relax.  Remember to keep breathing normally as you tense your muscles. ¨ Do curl-ups. Always do them with your knees bent. Keep your low back on the floor, and curl your shoulders toward your knees using a smooth, slow motion. Keep your arms folded across your chest. If this bothers your neck, try putting your hands behind your neck (not your head), with your elbows spread apart. ¨ Lie on your back with your knees bent and your feet flat on the floor. Tighten your belly muscles, and then push with your feet and raise your buttocks up a few inches. Hold this position 6 seconds as you continue to breathe normally, then lower yourself slowly to the floor. Repeat 8 to 12 times. ¨ If you like group exercise, try Pilates or yoga. These classes have poses that strengthen the core muscles. Lead a healthy lifestyle  · Stay at a healthy weight to avoid strain on your back. · Do not smoke. Smoking increases the risk of osteoporosis, which weakens the spine. If you need help quitting, talk to your doctor about stop-smoking programs and medicines. These can increase your chances of quitting for good. Where can you learn more? Go to http://maciel[employer]ician Referral Network (PRN)de.info/. Enter L315 in the search box to learn more about \"Learning About How to Have a Healthy Back. \"  Current as of: March 21, 2017  Content Version: 11.4  © 0710-1263 [employer]. Care instructions adapted under license by [employer] (which disclaims liability or warranty for this information).  If you have questions about a medical condition or this instruction, always ask your healthcare professional. [place] [address] any warranty or liability for your use of this information.    ------------------------------------------  Take tylenol extra strength two times a day for pain    Schedule appointment with physical therapy    Take muscle relaxant at bedtime - cannot operate machinery or drive after taking medication --------------------------------------------------------------------------------------------------    For blood pressure added dyazide to your medication regimen     Work on weight loss

## 2018-03-29 NOTE — MR AVS SNAPSHOT
[address] [facility] 
[phone] Patient: Mason Guzmán MRN: [id number] GDM:[id number][birth year] Visit Information Date & Time Provider Department Dept. Phone Encounter #  
 3/29/2018  8:00 AM Koki Bass, [address] [place] [address],[address] [phone] [id number] Follow-up Instructions Return in about 4 weeks (around 4/26/2018) for Blood pressure and back . Upcoming Health Maintenance Date Due Pneumococcal 19-64 Medium Risk (1 of 1 - PPSV23) 8/6/2005 DTaP/Tdap/Td series (2 - Td) 9/30/2026 Allergies as of 3/29/2018  Review Complete On: 3/29/2018 By: Koki Bass MD  
  
 Severity Noted Reaction Type Reactions Lisinopril  09/30/2016    Swelling Pt has sever swelling of the throat and tongue. Pcn [Penicillins]  04/20/2012    Other (comments) Allergic as a child Current Immunizations  Never Reviewed No immunizations on file. Not reviewed this visit You Were Diagnosed With   
  
 Codes Comments Chronic bilateral low back pain with left-sided sciatica    -  Primary ICD-10-CM: M54.42, G89.29 ICD-9-CM: 724.2, 724.3, 338.29 Essential hypertension     ICD-10-CM: I10 
ICD-9-CM: 401.9 Vitals BP Pulse Temp Resp Height(growth percentile) Weight(growth percentile) (!) 156/101 81 97.8 °F (36.6 °C) (Oral) 18 5' 9\" (1.753 m) 304 lb (137.9 kg) SpO2 BMI Smoking Status 99% 44.89 kg/m2 Never Smoker Vitals History BMI and BSA Data Body Mass Index Body Surface Area 44.89 kg/m 2 2.59 m 2 Preferred Pharmacy Pharmacy Name Phone [id number] [phone] Your Updated Medication List  
  
   
This list is accurate as of 3/29/18  8:30 AM.  Always use your most recent med list.  
  
  
  
  
 albuterol 90 mcg/actuation inhaler Commonly known as:  PROVENTIL HFA, VENTOLIN HFA, PROAIR HFA Take 1 Puff by inhalation every six (6) hours as needed for Wheezing. allopurinol 100 mg tablet Commonly known as:  Eleanornadine Johnson Take 2 Tabs by mouth daily. amLODIPine 10 mg tablet Commonly known as:  Blanche Segura Take 1 Tab by mouth daily. cpap machine kit  
by Does Not Apply route. cyclobenzaprine 10 mg tablet Commonly known as:  FLEXERIL Take 1 Tab by mouth two (2) times daily as needed for Muscle Spasm(s). fluticasone-vilanterol 100-25 mcg/dose inhaler Commonly known as:  BREO ELLIPTA Take 1 Puff by inhalation daily. ONE-A-DAY MEN'S PO Take  by mouth.  
  
 triamterene-hydroCHLOROthiazide 37.5-25 mg per capsule Commonly known as:  Mayur Sacana Take 1 Cap by mouth daily. Prescriptions Sent to Pharmacy Refills  
 triamterene-hydroCHLOROthiazide (DYAZIDE) 37.5-25 mg per capsule 1 Sig: Take 1 Cap by mouth daily. Class: Normal  
 Pharmacy: [id number] [address] [place] [address] Ph #: [phone] Route: Oral  
 cyclobenzaprine (FLEXERIL) 10 mg tablet 1 Sig: Take 1 Tab by mouth two (2) times daily as needed for Muscle Spasm(s). Class: Normal  
 Pharmacy: [id number] [address] [place] [address] Ph #: [phone] Route: Oral  
  
We Performed the Following REFERRAL TO PHYSICAL THERAPY [ZRO60 Custom] Follow-up Instructions Return in about 4 weeks (around 4/26/2018) for Blood pressure and back . Referral Information Referral ID Referred By Referred To  
  
 [id number] [facility] [address], [address] [facility] [address] , [address] Phone: [phone] Fax: [phone] Visits Status Start Date End Date 1 New Request 3/29/18 3/29/19  If your referral has a status of pending review or denied, additional information will be sent to support the outcome of this decision. Patient Instructions Learning About How to Have a Healthy Back What causes back pain? Back pain is often caused by overuse, strain, or injury. For example, people often hurt their backs playing sports or working in the yard, being jolted in a car accident, or lifting something too heavy. Aging plays a part too. Your bones and muscles tend to lose strength as you age, which makes injury more likely. The spongy discs between the bones of the spine (vertebrae) may suffer from wear and tear and no longer provide enough cushion between the bones. A disc that bulges or breaks open (herniated disc) can press on nerves, causing back pain. In some people, back pain is the result of arthritis, broken vertebrae caused by bone loss (osteoporosis), illness, or a spine problem. Although most people have back pain at one time or another, there are steps you can take to make it less likely. How can you have a healthy back? Reduce stress on your back through good posture Slumping or slouching alone may not cause low back pain. But after the back has been strained or injured, bad posture can make pain worse. · Sleep in a position that maintains your back's normal curves and on a mattress that feels comfortable. Sleep on your side with a pillow between your knees, or sleep on your back with a pillow under your knees. These positions can reduce strain on your back. · Stand and sit up straight. \"Good posture\" generally means your ears, shoulders, and hips are in a straight line. · If you must stand for a long time, put one foot on a stool, ledge, or box. Switch feet every now and then. · Sit in a chair that is low enough to let you place both feet flat on the floor with both knees nearly level with your hips. If your chair or desk is too high, use a footrest to raise your knees.  Place a small pillow, a rolled-up towel, or a lumbar roll in the curve of your back if you need extra support. · Try a kneeling chair, which helps tilt your hips forward. This takes pressure off your lower back. · Try sitting on an exercise ball. It can rock from side to side, which helps keep your back loose. · When driving, keep your knees nearly level with your hips. Sit straight, and drive with both hands on the steering wheel. Your arms should be in a slightly bent position. Reduce stress on your back through careful lifting · Squat down, bending at the hips and knees only. If you need to, put one knee to the floor and extend your other knee in front of you, bent at a right angle (half kneeling). · Press your chest straight forward. This helps keep your upper back straight while keeping a slight arch in your low back. · Hold the load as close to your body as possible, at the level of your belly button (navel). · Use your feet to change direction, taking small steps. · Lead with your hips as you change direction. Keep your shoulders in line with your hips as you move. · Set down your load carefully, squatting with your knees and hips only. Exercise and stretch your back · Do some exercise on most days of the week, if your doctor says it is okay. You can walk, run, swim, or cycle. · Stretch your back muscles. Here are a few exercises to try: ¨ Lie on your back, and gently pull one bent knee to your chest. Put that foot back on the floor, and then pull the other knee to your chest. 
¨ Do pelvic tilts. Lie on your back with your knees bent. Tighten your stomach muscles. Pull your belly button (navel) in and up toward your ribs. You should feel like your back is pressing to the floor and your hips and pelvis are slightly lifting off the floor. Hold for 6 seconds while breathing smoothly. ¨ Sit with your back flat against a wall. · Keep your core muscles strong.  The muscles of your back, belly (abdomen), and buttocks support your spine. ¨ Pull in your belly and imagine pulling your navel toward your spine. Hold this for 6 seconds, then relax. Remember to keep breathing normally as you tense your muscles. ¨ Do curl-ups. Always do them with your knees bent. Keep your low back on the floor, and curl your shoulders toward your knees using a smooth, slow motion. Keep your arms folded across your chest. If this bothers your neck, try putting your hands behind your neck (not your head), with your elbows spread apart. ¨ Lie on your back with your knees bent and your feet flat on the floor. Tighten your belly muscles, and then push with your feet and raise your buttocks up a few inches. Hold this position 6 seconds as you continue to breathe normally, then lower yourself slowly to the floor. Repeat 8 to 12 times. ¨ If you like group exercise, try Pilates or yoga. These classes have poses that strengthen the core muscles. Lead a healthy lifestyle · Stay at a healthy weight to avoid strain on your back. · Do not smoke. Smoking increases the risk of osteoporosis, which weakens the spine. If you need help quitting, talk to your doctor about stop-smoking programs and medicines. These can increase your chances of quitting for good. Where can you learn more? Go to http://maciel-de.info/. Enter L315 in the search box to learn more about \"Learning About How to Have a Healthy Back. \" Current as of: March 21, 2017 Content Version: 11.4 © 3633-9244 [employer]. Care instructions adapted under license by [employer] (which disclaims liability or warranty for this information).  If you have questions about a medical condition or this instruction, always ask your healthcare professional. [place] [address] any warranty or liability for your use of this information. 
 
------------------------------------------ 
 Take tylenol extra strength two times a day for pain Schedule appointment with physical therapy Take muscle relaxant at bedtime - cannot operate machinery or drive after taking medication   
 
-------------------------------------------------------------------------------------------------- For blood pressure added dyazide to your medication regimen Work on weight loss Introducing [facility]! Dear Vinny Bhagat: Thank you for requesting a Memobox account. Our records indicate that you already have an active Memobox account. You can access your account anytime at [email or web address] Did you know that you can access your hospital and ER discharge instructions at any time in Memobox? You can also review all of your test results from your hospital stay or ER visit. Additional Information If you have questions, please visit the Frequently Asked Questions section of the Memobox website at [email or web address] Remember, Memobox is NOT to be used for urgent needs. For medical emergencies, dial 911. Now available from your iPhone and Android! Please provide this summary of care documentation to your next provider. Your primary care clinician is listed as CRISSY rivera. If you have any questions after today's visit, please call [phone].

## 2018-04-17 DIAGNOSIS — I10 ESSENTIAL HYPERTENSION: ICD-10-CM

## 2018-04-17 RX ORDER — AMLODIPINE BESYLATE 10 MG/1
10 TABLET ORAL DAILY
Qty: 30 TAB | Refills: 5 | Status: SHIPPED | OUTPATIENT
Start: 2018-04-17 | End: 2019-02-06 | Stop reason: SDUPTHER

## 2018-06-04 ENCOUNTER — TELEPHONE (OUTPATIENT)
Dept: INTERNAL MEDICINE CLINIC | Age: 33
End: 2018-06-04

## 2018-06-04 DIAGNOSIS — I10 ESSENTIAL HYPERTENSION: ICD-10-CM

## 2018-06-04 DIAGNOSIS — J45.30: ICD-10-CM

## 2018-06-04 RX ORDER — TRIAMTERENE AND HYDROCHLOROTHIAZIDE 37.5; 25 MG/1; MG/1
1 CAPSULE ORAL DAILY
Qty: 30 CAP | Refills: 1 | Status: SHIPPED | OUTPATIENT
Start: 2018-06-04 | End: 2018-10-28 | Stop reason: SDUPTHER

## 2018-06-04 NOTE — TELEPHONE ENCOUNTER
Spoke with Deanne, pharmacist for [facility]. Deanne states that she is calling about the prescription that was received today for patients Maxide. Deanne states that the prescription was for # 30 with 1 refill. Deanne would like to know if patient can be given # 60 with 0 refills because it would be cheaper for him. Advised Deanne this is ok to change. Deanne verbalized understanding of information discussed w/ no further questions at this time.

## 2018-06-04 NOTE — TELEPHONE ENCOUNTER
Deanne//[facility] states she needs a call back to discuss refill received & quantity question. Please call.  Thank you

## 2018-06-04 NOTE — TELEPHONE ENCOUNTER
PCP: Rosetta Morales MD    Last appt: 3/29/2018  No future appointments. Requested Prescriptions     Pending Prescriptions Disp Refills    triamterene-hydroCHLOROthiazide (DYAZIDE) 37.5-25 mg per capsule 30 Cap 1     Sig: Take 1 Cap by mouth daily.

## 2018-06-05 RX ORDER — FLUTICASONE FUROATE AND VILANTEROL 100; 25 UG/1; UG/1
1 POWDER RESPIRATORY (INHALATION) DAILY
Qty: 1 INHALER | Refills: 1 | Status: SHIPPED | OUTPATIENT
Start: 2018-06-05 | End: 2020-02-24 | Stop reason: SDUPTHER

## 2018-06-05 NOTE — TELEPHONE ENCOUNTER
From: [employer]  To: Avi Casey MD  Sent: 6/4/2018 5:09 PM EDT  Subject: Medication Renewal Request    Original authorizing provider: MD Dennis Gil.  Mildred Welsh would like a refill of the following medications:  fluticasone-vilanterol (BREO ELLIPTA) 100-25 mcg/dose inhaler [Monica Singh MD]    Preferred pharmacy: [facility] [address] [place] [address]    Comment:

## 2018-08-08 ENCOUNTER — HOSPITAL ENCOUNTER (EMERGENCY)
Age: 33
Discharge: HOME | End: 2018-08-08
Attending: EMERGENCY MEDICINE
Payer: COMMERCIAL

## 2018-08-08 VITALS
TEMPERATURE: 98.5 F | HEART RATE: 70 BPM | DIASTOLIC BLOOD PRESSURE: 104 MMHG | BODY MASS INDEX: 41.47 KG/M2 | SYSTOLIC BLOOD PRESSURE: 165 MMHG | RESPIRATION RATE: 18 BRPM | WEIGHT: 280 LBS | HEIGHT: 69 IN | OXYGEN SATURATION: 99 %

## 2018-08-08 DIAGNOSIS — M79.672 CHRONIC HEEL PAIN, LEFT: Primary | ICD-10-CM

## 2018-08-08 DIAGNOSIS — M10.9 ACUTE GOUT OF MULTIPLE SITES, UNSPECIFIED CAUSE: ICD-10-CM

## 2018-08-08 DIAGNOSIS — M76.62 ACHILLES TENDINITIS OF LEFT LOWER EXTREMITY: ICD-10-CM

## 2018-08-08 DIAGNOSIS — G89.29 CHRONIC HEEL PAIN, LEFT: Primary | ICD-10-CM

## 2018-08-08 PROCEDURE — 99283 EMERGENCY DEPT VISIT LOW MDM: CPT

## 2018-08-08 PROCEDURE — 74011250637 HC RX REV CODE- 250/637: Performed by: EMERGENCY MEDICINE

## 2018-08-08 RX ORDER — ALLOPURINOL 100 MG/1
200 TABLET ORAL DAILY
Qty: 30 TAB | Refills: 0 | Status: SHIPPED | OUTPATIENT
Start: 2018-08-08 | End: 2020-04-15 | Stop reason: SDUPTHER

## 2018-08-08 RX ORDER — HYDROCODONE BITARTRATE AND ACETAMINOPHEN 5; 325 MG/1; MG/1
1 TABLET ORAL
Qty: 10 TAB | Refills: 0 | Status: SHIPPED | OUTPATIENT
Start: 2018-08-08 | End: 2021-02-11 | Stop reason: ALTCHOICE

## 2018-08-08 RX ORDER — PREDNISONE 20 MG/1
40 TABLET ORAL DAILY
Qty: 15 TAB | Refills: 0 | Status: SHIPPED | OUTPATIENT
Start: 2018-08-08 | End: 2018-08-13

## 2018-08-08 RX ORDER — AMLODIPINE BESYLATE 5 MG/1
10 TABLET ORAL
Status: DISCONTINUED | OUTPATIENT
Start: 2018-08-08 | End: 2018-08-08 | Stop reason: HOSPADM

## 2018-08-08 RX ORDER — TRIAMTERENE/HYDROCHLOROTHIAZID 37.5-25 MG
1 TABLET ORAL
Status: COMPLETED | OUTPATIENT
Start: 2018-08-08 | End: 2018-08-08

## 2018-08-08 RX ADMIN — TRIAMTERENE AND HYDROCHLOROTHIAZIDE 1 TABLET: 37.5; 25 TABLET ORAL at 09:47

## 2018-08-08 NOTE — ED PROVIDER NOTES
EMERGENCY DEPARTMENT HISTORY AND PHYSICAL EXAM      Date: 8/8/2018  Patient Name: Jess Cortés    History of Presenting Illness     Chief Complaint   Patient presents with    Gout     pt reports \"gout flair up in left foot x3 days\"; pt reports \"I stopped taking my allopurinol a while ago because I thought it had gotten better but I took it this morning once and last night\"       History Provided By: Patient    HPI: Jess Cortés, 29 y.o. male with PMHx significant for HTN and gout, presents ambulatory to the ED with cc of worsening, atraumatic left heel pain x 2 days. Pt denies any associated sxs, nor any exacerbating or alleviating factors. Pt states that he took a muscle relaxer with no relief of his sxs. Pt notes that he has a Hx of similar sxs in the past which was the result of Gout. Pt explains that he has been having left heel pain for the past 2 days that became significantly worse in severity last night. Pt reports that he has been unable to bear weight since the onset of his pain, and he has not been taking his prescribed Allopurinol. Pt states that he has also been off of his Norvasc and Dyazide for the past 3 days, but he recently refilled his prescription and he plans to start the medication when he gets home today. Of note, pt states that he is a  for his profession. Pt specifically denies chest pain, SOB, blurry vision, HA, or N/V. There are no other complaints, changes, or physical findings at this time. PCP: MD Keith    Current Facility-Administered Medications   Medication Dose Route Frequency Provider Last Rate Last Dose    amLODIPine (NORVASC) tablet 10 mg  10 mg Oral NOW Dot Tamez MD         Current Outpatient Prescriptions   Medication Sig Dispense Refill    allopurinol (ZYLOPRIM) 100 mg tablet Take 2 Tabs by mouth daily.  30 Tab 0    HYDROcodone-acetaminophen (NORCO) 5-325 mg per tablet Take 1 Tab by mouth every four (4) hours as needed for Pain. Max Daily Amount: 6 Tabs. 10 Tab 0    predniSONE (DELTASONE) 20 mg tablet Take 2 Tabs by mouth daily for 5 days. 15 Tab 0    fluticasone-vilanterol (BREO ELLIPTA) 100-25 mcg/dose inhaler Take 1 Puff by inhalation daily. 1 Inhaler 1    triamterene-hydroCHLOROthiazide (DYAZIDE) 37.5-25 mg per capsule Take 1 Cap by mouth daily. 30 Cap 1    amLODIPine (NORVASC) 10 mg tablet Take 1 Tab by mouth daily. 30 Tab 5    albuterol (PROVENTIL HFA, VENTOLIN HFA, PROAIR HFA) 90 mcg/actuation inhaler Take 1 Puff by inhalation every six (6) hours as needed for Wheezing. 1 Inhaler 1    cpap machine kit by Does Not Apply route.  MULTIVIT-MINERALS/FA/LYCOPENE (ONE-A-DAY MEN'S PO) Take  by mouth. Past History     Past Medical History:  Past Medical History:   Diagnosis Date    Angioedema     ace induced asthma     Cold-induced asthma     Gout     Hypertension     JOS (obstructive sleep apnea)        Past Surgical History:  Past Surgical History:   Procedure Laterality Date    HX ADENOIDECTOMY         Family History:  Family History   Problem Relation Age of Onset    Sleep Apnea Mother     Hypertension Mother     Hypertension Father        Social History:  Social History   Substance Use Topics    Smoking status: Never Smoker    Smokeless tobacco: Never Used    Alcohol use No       Allergies: Allergies   Allergen Reactions    Lisinopril Swelling     Pt has sever swelling of the throat and tongue.  Pcn [Penicillins] Other (comments)     Allergic as a child           Review of Systems   Review of Systems   Constitutional: Negative for activity change, appetite change, chills, fever and unexpected weight change. HENT: Negative for congestion. Eyes: Negative for pain and visual disturbance. Respiratory: Negative for cough and shortness of breath. Cardiovascular: Negative for chest pain. Gastrointestinal: Negative for abdominal pain, diarrhea, nausea and vomiting.    Genitourinary: Negative for dysuria. Musculoskeletal: Positive for myalgias. Negative for back pain. Skin: Negative for rash. Neurological: Negative for headaches. Physical Exam   Physical Exam   Constitutional: He is oriented to person, place, and time. He appears well-developed and well-nourished. Well appearing male in minimal distress due to pain   HENT:   Head: Normocephalic and atraumatic. Mouth/Throat: Oropharynx is clear and moist.   Eyes: Conjunctivae and EOM are normal. Pupils are equal, round, and reactive to light. Right eye exhibits no discharge. Left eye exhibits no discharge. Neck: Normal range of motion. Neck supple. Cardiovascular: Normal rate, regular rhythm and normal heart sounds. No murmur heard. Intact pulses   Pulmonary/Chest: Effort normal and breath sounds normal. No respiratory distress. He has no wheezes. He has no rales. Abdominal: Soft. Bowel sounds are normal. He exhibits no distension. There is no tenderness. Musculoskeletal: Normal range of motion. He exhibits no edema. LE - no edema, warmth, or erythema, tenderness of the posterior calcaneus along the Achilles tendon insertion, normal plantar and dorsiflexion with normal ankle ROM   Neurological: He is alert and oriented to person, place, and time. No cranial nerve deficit. He exhibits normal muscle tone. Skin: Skin is warm and dry. No rash noted. He is not diaphoretic. Nursing note and vitals reviewed. Medical Decision Making   I am the first provider for this patient. I reviewed the vital signs, available nursing notes, past medical history, past surgical history, family history and social history. Vital Signs-Reviewed the patient's vital signs.   Patient Vitals for the past 12 hrs:   Temp Pulse Resp BP SpO2   08/08/18 0946 - - - (!) 165/104 -   08/08/18 0820 98.5 °F (36.9 °C) 70 18 (!) 196/107 99 %       Pulse Oximetry Analysis - 99% on RA    Cardiac Monitor:   Rate: 69 bpm  Rhythm: Normal Sinus Rhythm Records Reviewed: Nursing Notes and Old Medical Records    Provider Notes (Medical Decision Making): Hypertensive, non complaint pt due to out of HTN medications. Pt already filled HTN medication while waiting in the department. No sxs concerning for hypertensive urgency. LE exam with possible Achilles tendonitis. Do not feel exam is consistent with gout, septic joint, cellulitis, or DVT. ED Course:   Initial assessment performed. The patients presenting problems have been discussed, and they are in agreement with the care plan formulated and outlined with them. I have encouraged them to ask questions as they arise throughout their visit. Critical Care Time:   0    Disposition:  9:43 AM  The patient has been re-evaluated and is ready for discharge. Reviewed available results with patient. Counseled patient on diagnosis and care plan. Patient has expressed understanding, and all questions have been answered. Patient agrees with plan and agrees to follow up as recommended, or return to the ED if their symptoms worsen. Discharge instructions have been provided and explained to the patient, along with reasons to return to the ED. PLAN:  1. Current Discharge Medication List      START taking these medications    Details   HYDROcodone-acetaminophen (NORCO) 5-325 mg per tablet Take 1 Tab by mouth every four (4) hours as needed for Pain. Max Daily Amount: 6 Tabs. Qty: 10 Tab, Refills: 0    Associated Diagnoses: Achilles tendinitis of left lower extremity; Acute gout of multiple sites, unspecified cause      predniSONE (DELTASONE) 20 mg tablet Take 2 Tabs by mouth daily for 5 days. Qty: 15 Tab, Refills: 0    Associated Diagnoses: Achilles tendinitis of left lower extremity; Acute gout of multiple sites, unspecified cause         CONTINUE these medications which have CHANGED    Details   allopurinol (ZYLOPRIM) 100 mg tablet Take 2 Tabs by mouth daily.   Qty: 30 Tab, Refills: 0    Associated Diagnoses: Acute gout of multiple sites, unspecified cause; Achilles tendinitis of left lower extremity         STOP taking these medications       cyclobenzaprine (FLEXERIL) 10 mg tablet Comments:   Reason for Stoppin.   Follow-up Information     Follow up With Details Comments Contact Nayeli Thomas MD Call today for a recheck appointment and physical therapy [address] [facility].  [phone]      [facility] EMERGENCY DEPT  If symptoms worsen [address] [place] [address]  [phone]        Return to ED if worse     Diagnosis     Clinical Impression:   1. Chronic heel pain, left    2. Achilles tendinitis of left lower extremity    3. Acute gout of multiple sites, unspecified cause        Attestations: This note is prepared by Rita Garcia, acting as Scribe for Clifford Parker MD.    Clifford Parker MD: The scribe's documentation has been prepared under my direction and personally reviewed by me in its entirety. I confirm that the note above accurately reflects all work, treatment, procedures, and medical decision making performed by me.

## 2018-08-08 NOTE — LETTER
[address] 
[facility] EMERGENCY DEPT 
[address] [place] [address] [id number] [phone] 
[phone] Work/School Note Date: 8/8/2018 To Whom It May concern: 
 
Lalita Watkins was seen and treated today in the emergency room by the following provider(s): 
Attending Provider: Earl Tamez MD. Lalita Watkins seen at ED today may return to work 8/9/2018 Sincerely, Shamar Ordonez RN

## 2018-08-08 NOTE — DISCHARGE INSTRUCTIONS
Bursitis: Care Instructions  Your Care Instructions  A bursa is a small sac of fluid that helps the tissues around a joint slide over one another easily. Injury or overuse of a joint can cause pain, redness, and inflammation in the bursa (bursitis). Bursitis usually gets better if you avoid the activity that caused it. You can help prevent bursitis from coming back by doing stretching and strengthening exercises. You may also need to change the way you do some activities. Follow-up care is a key part of your treatment and safety. Be sure to make and go to all appointments, and call your doctor if you are having problems. It's also a good idea to know your test results and keep a list of the medicines you take. How can you care for yourself at home? · Put ice or a cold pack on the area for 10 to 20 minutes at a time. Try to do this every 1 to 2 hours for the next 3 days (when you are awake) or until the swelling goes down. Put a thin cloth between the ice and your skin. · After the 3 days of using ice, you may use heat on the area. You can use a hot water bottle; a warm, moist towel; or a heating pad set on low. You can also try alternating heat and ice. · Rest the area where you have pain. Stop any activities that cause pain. Switch to activities that do not stress the area. · Take pain medicines exactly as directed. ¨ If the doctor gave you a prescription medicine for pain, take it as prescribed. ¨ If you are not taking a prescription pain medicine, ask your doctor if you can take an over-the-counter medicine. ¨ Do not take two or more pain medicines at the same time unless the doctor told you to. Many pain medicines have acetaminophen, which is Tylenol. Too much acetaminophen (Tylenol) can be harmful. · To prevent stiffness, gently move the joint as much as you can without pain every day. As the pain gets better, keep doing range-of-motion exercises.  Ask your doctor for exercises that will make the muscles around the joint stronger. Do these as directed. · You can slowly return to the activity that caused the pain, but do it with less effort until you can do it without pain or swelling. Be sure to warm up before and stretch after you do the activity. When should you call for help? Call your doctor now or seek immediate medical care if:    · You have new or worse symptoms of infection, such as:  ¨ Increased pain, swelling, warmth, or redness. ¨ Red streaks leading from the area. ¨ Pus draining from the area. ¨ A fever.    Watch closely for changes in your health, and be sure to contact your doctor if:    · You do not get better as expected. Where can you learn more? Go to http://maciel-de.info/. Enter C281 in the search box to learn more about \"Bursitis: Care Instructions. \"  Current as of: November 29, 2017  Content Version: 11.7  © 7100-5363 [employer]. Care instructions adapted under license by [employer] (which disclaims liability or warranty for this information). If you have questions about a medical condition or this instruction, always ask your healthcare professional. [place] [address] any warranty or liability for your use of this information.

## 2019-02-06 DIAGNOSIS — I10 ESSENTIAL HYPERTENSION: ICD-10-CM

## 2019-02-06 RX ORDER — AMLODIPINE BESYLATE 10 MG/1
TABLET ORAL
Qty: 30 TAB | Refills: 5 | Status: SHIPPED | OUTPATIENT
Start: 2019-02-06 | End: 2020-02-05 | Stop reason: SDUPTHER

## 2019-02-06 NOTE — TELEPHONE ENCOUNTER
PCP: Marilou Mars MD    Last appt: 3/29/2018  Future Appointments   Date Time Provider Portia Steele   3/5/2019 10:00 AM Appa MD Chris Huntley 88       Requested Prescriptions     Pending Prescriptions Disp Refills    amLODIPine (NORVASC) 10 mg tablet [Pharmacy Med Name: AMLODIPINE 10 MG TAB[*]] 30 Tab 5     Sig: TAKE ONE TABLET BY MOUTH ONE TIME DAILY

## 2019-03-10 DIAGNOSIS — I10 ESSENTIAL HYPERTENSION: ICD-10-CM

## 2019-03-11 RX ORDER — TRIAMTERENE AND HYDROCHLOROTHIAZIDE 37.5; 25 MG/1; MG/1
CAPSULE ORAL
Qty: 30 CAP | Refills: 0 | Status: SHIPPED | OUTPATIENT
Start: 2019-03-11 | End: 2019-05-26 | Stop reason: SDUPTHER

## 2019-04-15 DIAGNOSIS — J45.30: ICD-10-CM

## 2019-04-16 DIAGNOSIS — J45.30: ICD-10-CM

## 2019-04-16 RX ORDER — ALBUTEROL SULFATE 90 UG/1
1 AEROSOL, METERED RESPIRATORY (INHALATION)
Qty: 1 INHALER | Refills: 1 | Status: SHIPPED | OUTPATIENT
Start: 2019-04-16 | End: 2020-04-27 | Stop reason: SDUPTHER

## 2019-04-16 RX ORDER — ALBUTEROL SULFATE 90 UG/1
1 AEROSOL, METERED RESPIRATORY (INHALATION)
Qty: 1 INHALER | Refills: 1 | Status: SHIPPED | OUTPATIENT
Start: 2019-04-16 | End: 2020-02-24 | Stop reason: SDUPTHER

## 2019-05-26 DIAGNOSIS — I10 ESSENTIAL HYPERTENSION: ICD-10-CM

## 2019-05-28 RX ORDER — TRIAMTERENE AND HYDROCHLOROTHIAZIDE 37.5; 25 MG/1; MG/1
CAPSULE ORAL
Qty: 30 CAP | Refills: 0 | Status: SHIPPED | OUTPATIENT
Start: 2019-05-28 | End: 2020-07-13 | Stop reason: SDUPTHER

## 2020-02-05 DIAGNOSIS — I10 ESSENTIAL HYPERTENSION: ICD-10-CM

## 2020-02-05 RX ORDER — AMLODIPINE BESYLATE 10 MG/1
TABLET ORAL
Qty: 30 TAB | Refills: 5 | Status: SHIPPED | OUTPATIENT
Start: 2020-02-05 | End: 2020-06-16 | Stop reason: SDUPTHER

## 2020-02-05 RX ORDER — TRIAMTERENE AND HYDROCHLOROTHIAZIDE 37.5; 25 MG/1; MG/1
CAPSULE ORAL
Qty: 30 CAP | Refills: 0 | OUTPATIENT
Start: 2020-02-05

## 2020-02-05 NOTE — TELEPHONE ENCOUNTER
PCP: Kimo Shepherd MD    Last appt: 3/29/2018  No future appointments.     Requested Prescriptions     Pending Prescriptions Disp Refills    triamterene-hydroCHLOROthiazide (DYAZIDE) 37.5-25 mg per capsule 30 Cap 0

## 2020-02-05 NOTE — TELEPHONE ENCOUNTER
----- Message from [address] Last sent at 2/5/2020  2:32 PM EST -----  Regarding: Prescription Question  Contact: [phone]  Good afternoon, I have not been to see you because i currently do not have health insurance. I requested my blood pressure to be refilled please. I have not taken my blood pressure pills in almost 3 months or so.  Please give me a call [phone]

## 2020-02-24 ENCOUNTER — APPOINTMENT (OUTPATIENT)
Dept: CT IMAGING | Age: 35
End: 2020-02-24
Attending: EMERGENCY MEDICINE
Payer: SELF-PAY

## 2020-02-24 ENCOUNTER — HOSPITAL ENCOUNTER (EMERGENCY)
Age: 35
Discharge: HOME | End: 2020-02-24
Attending: EMERGENCY MEDICINE
Payer: SELF-PAY

## 2020-02-24 ENCOUNTER — APPOINTMENT (OUTPATIENT)
Dept: GENERAL RADIOLOGY | Age: 35
End: 2020-02-24
Attending: EMERGENCY MEDICINE
Payer: SELF-PAY

## 2020-02-24 ENCOUNTER — OFFICE VISIT (OUTPATIENT)
Dept: INTERNAL MEDICINE CLINIC | Age: 35
End: 2020-02-24

## 2020-02-24 VITALS
SYSTOLIC BLOOD PRESSURE: 165 MMHG | HEIGHT: 69 IN | TEMPERATURE: 99.4 F | RESPIRATION RATE: 24 BRPM | DIASTOLIC BLOOD PRESSURE: 94 MMHG | WEIGHT: 301 LBS | HEART RATE: 114 BPM | BODY MASS INDEX: 44.58 KG/M2 | OXYGEN SATURATION: 92 %

## 2020-02-24 VITALS
DIASTOLIC BLOOD PRESSURE: 90 MMHG | TEMPERATURE: 100.1 F | BODY MASS INDEX: 44.43 KG/M2 | WEIGHT: 300 LBS | OXYGEN SATURATION: 94 % | HEART RATE: 128 BPM | HEIGHT: 69 IN | SYSTOLIC BLOOD PRESSURE: 149 MMHG | RESPIRATION RATE: 22 BRPM

## 2020-02-24 DIAGNOSIS — R06.02 SOB (SHORTNESS OF BREATH): Primary | ICD-10-CM

## 2020-02-24 DIAGNOSIS — J45.30: ICD-10-CM

## 2020-02-24 DIAGNOSIS — I10 ACCELERATED HYPERTENSION: ICD-10-CM

## 2020-02-24 DIAGNOSIS — J98.01 BRONCHOSPASM, ACUTE: ICD-10-CM

## 2020-02-24 DIAGNOSIS — J20.9 ACUTE BRONCHITIS, UNSPECIFIED ORGANISM: ICD-10-CM

## 2020-02-24 DIAGNOSIS — E66.01 MORBID OBESITY (HCC): ICD-10-CM

## 2020-02-24 LAB
ALBUMIN SERPL-MCNC: 3.9 G/DL (ref 3.5–5)
ALBUMIN/GLOB SERPL: 0.8 {RATIO} (ref 1.1–2.2)
ALP SERPL-CCNC: 91 U/L (ref 45–117)
ALT SERPL-CCNC: 43 U/L (ref 12–78)
ANION GAP SERPL CALC-SCNC: 5 MMOL/L (ref 5–15)
AST SERPL-CCNC: 42 U/L (ref 15–37)
BASOPHILS # BLD: 0.1 K/UL (ref 0–0.1)
BASOPHILS NFR BLD: 1 % (ref 0–1)
BILIRUB SERPL-MCNC: 0.6 MG/DL (ref 0.2–1)
BUN SERPL-MCNC: 14 MG/DL (ref 6–20)
BUN/CREAT SERPL: 12 (ref 12–20)
CALCIUM SERPL-MCNC: 9.3 MG/DL (ref 8.5–10.1)
CHLORIDE SERPL-SCNC: 102 MMOL/L (ref 97–108)
CO2 SERPL-SCNC: 29 MMOL/L (ref 21–32)
CREAT SERPL-MCNC: 1.19 MG/DL (ref 0.7–1.3)
D DIMER PPP FEU-MCNC: 0.22 MG/L FEU (ref 0–0.65)
DIFFERENTIAL METHOD BLD: NORMAL
EOSINOPHIL # BLD: 0.1 K/UL (ref 0–0.4)
EOSINOPHIL NFR BLD: 1 % (ref 0–7)
ERYTHROCYTE [DISTWIDTH] IN BLOOD BY AUTOMATED COUNT: 13 % (ref 11.5–14.5)
FLUAV AG NPH QL IA: NEGATIVE
FLUBV AG NOSE QL IA: NEGATIVE
GLOBULIN SER CALC-MCNC: 5 G/DL (ref 2–4)
GLUCOSE SERPL-MCNC: 125 MG/DL (ref 65–100)
HCT VFR BLD AUTO: 43.3 % (ref 36.6–50.3)
HGB BLD-MCNC: 13.6 G/DL (ref 12.1–17)
IMM GRANULOCYTES # BLD AUTO: 0 K/UL (ref 0–0.04)
IMM GRANULOCYTES NFR BLD AUTO: 0 % (ref 0–0.5)
LACTATE SERPL-SCNC: 1.2 MMOL/L (ref 0.4–2)
LYMPHOCYTES # BLD: 1.5 K/UL (ref 0.8–3.5)
LYMPHOCYTES NFR BLD: 18 % (ref 12–49)
MCH RBC QN AUTO: 26.3 PG (ref 26–34)
MCHC RBC AUTO-ENTMCNC: 31.4 G/DL (ref 30–36.5)
MCV RBC AUTO: 83.8 FL (ref 80–99)
MONOCYTES # BLD: 1 K/UL (ref 0–1)
MONOCYTES NFR BLD: 12 % (ref 5–13)
NEUTS SEG # BLD: 5.6 K/UL (ref 1.8–8)
NEUTS SEG NFR BLD: 68 % (ref 32–75)
NRBC # BLD: 0 K/UL (ref 0–0.01)
NRBC BLD-RTO: 0 PER 100 WBC
PLATELET # BLD AUTO: 196 K/UL (ref 150–400)
PMV BLD AUTO: 12.9 FL (ref 8.9–12.9)
POTASSIUM SERPL-SCNC: 3.7 MMOL/L (ref 3.5–5.1)
PROT SERPL-MCNC: 8.9 G/DL (ref 6.4–8.2)
RBC # BLD AUTO: 5.17 M/UL (ref 4.1–5.7)
SODIUM SERPL-SCNC: 136 MMOL/L (ref 136–145)
WBC # BLD AUTO: 8.3 K/UL (ref 4.1–11.1)

## 2020-02-24 PROCEDURE — 71046 X-RAY EXAM CHEST 2 VIEWS: CPT

## 2020-02-24 PROCEDURE — 83605 ASSAY OF LACTIC ACID: CPT

## 2020-02-24 PROCEDURE — 87040 BLOOD CULTURE FOR BACTERIA: CPT

## 2020-02-24 PROCEDURE — 87804 INFLUENZA ASSAY W/OPTIC: CPT

## 2020-02-24 PROCEDURE — 74011250636 HC RX REV CODE- 250/636: Performed by: EMERGENCY MEDICINE

## 2020-02-24 PROCEDURE — 85379 FIBRIN DEGRADATION QUANT: CPT

## 2020-02-24 PROCEDURE — 96374 THER/PROPH/DIAG INJ IV PUSH: CPT

## 2020-02-24 PROCEDURE — 80053 COMPREHEN METABOLIC PANEL: CPT

## 2020-02-24 PROCEDURE — 74011000250 HC RX REV CODE- 250: Performed by: EMERGENCY MEDICINE

## 2020-02-24 PROCEDURE — 93005 ELECTROCARDIOGRAM TRACING: CPT

## 2020-02-24 PROCEDURE — 36415 COLL VENOUS BLD VENIPUNCTURE: CPT

## 2020-02-24 PROCEDURE — 71275 CT ANGIOGRAPHY CHEST: CPT

## 2020-02-24 PROCEDURE — 85025 COMPLETE CBC W/AUTO DIFF WBC: CPT

## 2020-02-24 PROCEDURE — 96375 TX/PRO/DX INJ NEW DRUG ADDON: CPT

## 2020-02-24 PROCEDURE — 74011636320 HC RX REV CODE- 636/320: Performed by: EMERGENCY MEDICINE

## 2020-02-24 PROCEDURE — 99285 EMERGENCY DEPT VISIT HI MDM: CPT

## 2020-02-24 PROCEDURE — 94640 AIRWAY INHALATION TREATMENT: CPT

## 2020-02-24 RX ORDER — ONDANSETRON 2 MG/ML
4 INJECTION INTRAMUSCULAR; INTRAVENOUS
Status: DISCONTINUED | OUTPATIENT
Start: 2020-02-24 | End: 2020-02-24 | Stop reason: HOSPADM

## 2020-02-24 RX ORDER — FLUTICASONE FUROATE AND VILANTEROL 100; 25 UG/1; UG/1
1 POWDER RESPIRATORY (INHALATION) DAILY
Qty: 1 INHALER | Refills: 1 | Status: SHIPPED | OUTPATIENT
Start: 2020-02-24 | End: 2021-02-11 | Stop reason: ALTCHOICE

## 2020-02-24 RX ORDER — HYDRALAZINE HYDROCHLORIDE 20 MG/ML
20 INJECTION INTRAMUSCULAR; INTRAVENOUS
Status: COMPLETED | OUTPATIENT
Start: 2020-02-24 | End: 2020-02-24

## 2020-02-24 RX ORDER — PREDNISONE 10 MG/1
TABLET ORAL
Qty: 21 TAB | Refills: 0 | Status: SHIPPED | OUTPATIENT
Start: 2020-02-24 | End: 2021-02-11 | Stop reason: ALTCHOICE

## 2020-02-24 RX ORDER — SODIUM CHLORIDE 0.9 % (FLUSH) 0.9 %
10 SYRINGE (ML) INJECTION
Status: COMPLETED | OUTPATIENT
Start: 2020-02-24 | End: 2020-02-24

## 2020-02-24 RX ORDER — ACETAMINOPHEN 325 MG/1
650 TABLET ORAL
Status: DISCONTINUED | OUTPATIENT
Start: 2020-02-24 | End: 2020-02-24 | Stop reason: HOSPADM

## 2020-02-24 RX ORDER — IPRATROPIUM BROMIDE AND ALBUTEROL SULFATE 2.5; .5 MG/3ML; MG/3ML
3 SOLUTION RESPIRATORY (INHALATION)
Status: COMPLETED | OUTPATIENT
Start: 2020-02-24 | End: 2020-02-24

## 2020-02-24 RX ORDER — LORAZEPAM 2 MG/ML
1 INJECTION INTRAMUSCULAR
Status: COMPLETED | OUTPATIENT
Start: 2020-02-24 | End: 2020-02-24

## 2020-02-24 RX ADMIN — IPRATROPIUM BROMIDE AND ALBUTEROL SULFATE 3 ML: .5; 3 SOLUTION RESPIRATORY (INHALATION) at 15:58

## 2020-02-24 RX ADMIN — SODIUM CHLORIDE 1000 ML: 900 INJECTION, SOLUTION INTRAVENOUS at 17:11

## 2020-02-24 RX ADMIN — HYDRALAZINE HYDROCHLORIDE 20 MG: 20 INJECTION INTRAMUSCULAR; INTRAVENOUS at 16:07

## 2020-02-24 RX ADMIN — IOPAMIDOL 49 ML: 755 INJECTION, SOLUTION INTRAVENOUS at 19:41

## 2020-02-24 RX ADMIN — METHYLPREDNISOLONE SODIUM SUCCINATE 125 MG: 125 INJECTION, POWDER, FOR SOLUTION INTRAMUSCULAR; INTRAVENOUS at 16:08

## 2020-02-24 RX ADMIN — LORAZEPAM 1 MG: 2 INJECTION INTRAMUSCULAR; INTRAVENOUS at 19:53

## 2020-02-24 RX ADMIN — Medication 10 ML: at 19:42

## 2020-02-24 NOTE — ED PROVIDER NOTES
EMERGENCY DEPARTMENT HISTORY AND PHYSICAL EXAM      Date: 2/24/2020  Patient Name: Maggie Hurtado  Patient Age and Sex: 36 y.o. male    History of Presenting Illness     Chief Complaint   Patient presents with    Shortness of Breath     To triage via wheelchair, patient was being seen at [facility] when ambulance was called for SOB and he was found to be hypoxic at 89% on RA. Has a hx of bronchitis and used his albuterol inhaler. Received duoneb in route with sats up to 97% on RA       History Provided By: Patient    Ability to gather history was limited by:     HPI: Maggie Hurtado, 36 y.o. male with history of morbid obesity, asthma, hypertension, complains of shortness of breath and wheezing for the past 2 days, gradually worsening. He went to a primary care appointment today, was found to be hypoxic at 89% on room air, sent to the emergency department by EMS. In route to the emergency department he was administered nebulizer treatment, reports improvement in symptoms. No chest pain. No reported fevers. Mild dry cough. States he was using albuterol frequently last night with moderate relief. Location:    Quality:      Severity:    Duration:   Timing:      Context:    Modifying factors:   Associated symptoms:     Pt denies any other alleviating or exacerbating factors. There are no other complaints, changes or physical findings at this time.      Past Medical History:   Diagnosis Date    Angioedema     ace induced asthma     Cold-induced asthma     Gout     Hypertension     JOS (obstructive sleep apnea)      Past Surgical History:   Procedure Laterality Date    HX ADENOIDECTOMY         PCP: Joe Shelton MD    Past History     Past Medical History:  Past Medical History:   Diagnosis Date    Angioedema     ace induced asthma     Cold-induced asthma     Gout     Hypertension     JOS (obstructive sleep apnea)        Past Surgical History:  Past Surgical History:   Procedure Laterality Date  HX ADENOIDECTOMY         Family History:  Family History   Problem Relation Age of Onset    Sleep Apnea Mother     Hypertension Mother     Hypertension Father        Social History:  Social History     Tobacco Use    Smoking status: Never Smoker    Smokeless tobacco: Never Used   Substance Use Topics    Alcohol use: No    Drug use: No       Allergies: Allergies   Allergen Reactions    Lisinopril Swelling     Pt has sever swelling of the throat and tongue.  Pcn [Penicillins] Other (comments)     Allergic as a child         Current Medications:  No current facility-administered medications on file prior to encounter. Current Outpatient Medications on File Prior to Encounter   Medication Sig Dispense Refill    amLODIPine (NORVASC) 10 mg tablet TAKE ONE TABLET BY MOUTH ONE TIME DAILY 30 Tab 5    triamterene-hydroCHLOROthiazide (DYAZIDE) 37.5-25 mg per capsule TAKE ONE CAPSULE BY MOUTH ONE TIME DAILY 30 Cap 0    albuterol (PROVENTIL HFA, VENTOLIN HFA, PROAIR HFA) 90 mcg/actuation inhaler Take 1 Puff by inhalation every six (6) hours as needed for Wheezing. 1 Inhaler 1    [DISCONTINUED] albuterol (PROVENTIL HFA, VENTOLIN HFA, PROAIR HFA) 90 mcg/actuation inhaler Take 1 Puff by inhalation every six (6) hours as needed for Wheezing. 1 Inhaler 1    allopurinol (ZYLOPRIM) 100 mg tablet Take 2 Tabs by mouth daily. (Patient not taking: Reported on 2/24/2020) 30 Tab 0    HYDROcodone-acetaminophen (NORCO) 5-325 mg per tablet Take 1 Tab by mouth every four (4) hours as needed for Pain. Max Daily Amount: 6 Tabs. 10 Tab 0    fluticasone-vilanterol (BREO ELLIPTA) 100-25 mcg/dose inhaler Take 1 Puff by inhalation daily. 1 Inhaler 1    cpap machine kit by Does Not Apply route.  MULTIVIT-MINERALS/FA/LYCOPENE (ONE-A-DAY MEN'S PO) Take  by mouth. Review of Systems   Review of Systems   Constitutional: Negative for fatigue and fever.    Respiratory: Positive for chest tightness, shortness of breath and wheezing. Cardiovascular: Negative for chest pain and leg swelling. Gastrointestinal: Negative for abdominal pain. All other systems reviewed and are negative. Physical Exam   Vital Signs  Patient Vitals for the past 24 hrs:   Temp Pulse Resp BP SpO2   02/24/20 1830 -- (!) 115 27 153/79 95 %   02/24/20 1800 -- (!) 113 18 137/71 100 %   02/24/20 1730 -- (!) 110 24 (!) 146/93 97 %   02/24/20 1700 -- (!) 108 23 137/78 93 %   02/24/20 1630 -- -- -- 121/66 95 %   02/24/20 1609 -- -- -- (!) 161/96 94 %   02/24/20 1607 -- (!) 116 -- (!) 161/96 --   02/24/20 1558 -- -- -- -- 95 %   02/24/20 1543 -- -- -- -- 93 %   02/24/20 1542 -- -- -- (!) 166/105 --   02/24/20 1448 100.1 °F (37.8 °C) (!) 118 26 (!) 205/105 93 %       Physical Exam  Vitals signs and nursing note reviewed. Constitutional:       General: He is not in acute distress. Appearance: He is well-developed. He is obese. He is not ill-appearing or toxic-appearing. HENT:      Head: Normocephalic and atraumatic. Eyes:      General:         Right eye: No discharge. Left eye: No discharge. Conjunctiva/sclera: Conjunctivae normal.   Neck:      Musculoskeletal: Normal range of motion and neck supple. Cardiovascular:      Rate and Rhythm: Normal rate and regular rhythm. Heart sounds: Normal heart sounds. No murmur. Pulmonary:      Effort: Pulmonary effort is normal. No prolonged expiration or respiratory distress. Breath sounds: Normal breath sounds. No wheezing. Comments: No significant wheezing by my exam  Abdominal:      General: There is no distension. Palpations: Abdomen is soft. Tenderness: There is no abdominal tenderness. Musculoskeletal: Normal range of motion. General: No deformity. Skin:     General: Skin is warm and dry. Findings: No rash. Neurological:      Mental Status: He is alert and oriented to person, place, and time.    Psychiatric:         Behavior: Behavior normal. Thought Content: Thought content normal.         Diagnostic Study Results   Labs  Recent Results (from the past 24 hour(s))   CBC WITH AUTOMATED DIFF    Collection Time: 02/24/20  3:01 PM   Result Value Ref Range    WBC 8.3 4.1 - 11.1 K/uL    RBC 5.17 4.10 - 5.70 M/uL    HGB 13.6 12.1 - 17.0 g/dL    HCT 43.3 36.6 - 50.3 %    MCV 83.8 80.0 - 99.0 FL    MCH 26.3 26.0 - 34.0 PG    MCHC 31.4 30.0 - 36.5 g/dL    RDW 13.0 11.5 - 14.5 %    PLATELET 205 249 - 987 K/uL    MPV 12.9 8.9 - 12.9 FL    NRBC 0.0 0  WBC    ABSOLUTE NRBC 0.00 0.00 - 0.01 K/uL    NEUTROPHILS 68 32 - 75 %    LYMPHOCYTES 18 12 - 49 %    MONOCYTES 12 5 - 13 %    EOSINOPHILS 1 0 - 7 %    BASOPHILS 1 0 - 1 %    IMMATURE GRANULOCYTES 0 0.0 - 0.5 %    ABS. NEUTROPHILS 5.6 1.8 - 8.0 K/UL    ABS. LYMPHOCYTES 1.5 0.8 - 3.5 K/UL    ABS. MONOCYTES 1.0 0.0 - 1.0 K/UL    ABS. EOSINOPHILS 0.1 0.0 - 0.4 K/UL    ABS. BASOPHILS 0.1 0.0 - 0.1 K/UL    ABS. IMM. GRANS. 0.0 0.00 - 0.04 K/UL    DF AUTOMATED     METABOLIC PANEL, COMPREHENSIVE    Collection Time: 02/24/20  3:01 PM   Result Value Ref Range    Sodium 136 136 - 145 mmol/L    Potassium 3.7 3.5 - 5.1 mmol/L    Chloride 102 97 - 108 mmol/L    CO2 29 21 - 32 mmol/L    Anion gap 5 5 - 15 mmol/L    Glucose 125 (H) 65 - 100 mg/dL    BUN 14 6 - 20 MG/DL    Creatinine 1.19 0.70 - 1.30 MG/DL    BUN/Creatinine ratio 12 12 - 20      GFR est AA >60 >60 ml/min/1.73m2    GFR est non-AA >60 >60 ml/min/1.73m2    Calcium 9.3 8.5 - 10.1 MG/DL    Bilirubin, total 0.6 0.2 - 1.0 MG/DL    ALT (SGPT) 43 12 - 78 U/L    AST (SGOT) 42 (H) 15 - 37 U/L    Alk.  phosphatase 91 45 - 117 U/L    Protein, total 8.9 (H) 6.4 - 8.2 g/dL    Albumin 3.9 3.5 - 5.0 g/dL    Globulin 5.0 (H) 2.0 - 4.0 g/dL    A-G Ratio 0.8 (L) 1.1 - 2.2     LACTIC ACID    Collection Time: 02/24/20  3:01 PM   Result Value Ref Range    Lactic acid 1.2 0.4 - 2.0 MMOL/L   INFLUENZA A+B VIRAL AGS    Collection Time: 02/24/20  4:15 PM   Result Value Ref Range Influenza A Antigen NEGATIVE  NEG      Influenza B Antigen NEGATIVE  NEG     D DIMER    Collection Time: 02/24/20  4:15 PM   Result Value Ref Range    D-dimer 0.22 0.00 - 0.65 mg/L FEU       Radiologic Studies  CTA CHEST W OR W WO CONT   Final Result   IMPRESSION:   No acute cardiopulmonary disease. No acute pulmonary embolus      XR CHEST PA LAT   Final Result   Impression:   1. No acute cardiopulmonary disease           CT Results  (Last 48 hours)               02/24/20 1941  CTA CHEST W OR W WO CONT Final result    Impression:  IMPRESSION:   No acute cardiopulmonary disease. No acute pulmonary embolus       Narrative:  INDICATION: Tachycardia and hypoxia. COMPARISON: 2/24/2020       TECHNIQUE:  2.5 mm axial images were obtained from the bases to the lung apices   after the intravenous administration of 49 cc of Isovue-370. Three-dimensional   postprocessing was performed by the technologist with MIP reconstructions. CT   dose reduction was achieved through use of a standardized protocol tailored for   this examination and automatic exposure control for dose modulation. FINDINGS:       THYROID: No nodule. MEDIASTINUM: No mass or lymphadenopathy. KAL: No mass or lymphadenopathy. THORACIC AORTA: No dissection or aneurysm. MAIN PULMONARY ARTERY: No acute pulmonary embolus   TRACHEA/BRONCHI: Patent. ESOPHAGUS: No wall thickening or dilatation. HEART: Normal in size. PLEURA: No effusion or pneumothorax. LUNGS: No nodule, mass, or airspace disease. INCIDENTALLY IMAGED UPPER ABDOMEN: No focal abnormality. BONES: No destructive bone lesion. CXR Results  (Last 48 hours)               02/24/20 1549  XR CHEST PA LAT Final result    Impression:  Impression:   1. No acute cardiopulmonary disease           Narrative:  INDICATION:  SOB        Exam: Chest 2 views. Comparison: 9/22/2017.         Findings: Cardiomediastinal silhouette is normal. Pulmonary vasculature is not engorged. No focal parenchymal opacities, effusions, or pneumothorax. Bony   thorax is intact. Procedures   EKG  Date/Time: 2/24/2020 8:45 PM  Performed by: Yanely Posey MD  Authorized by: Yanely Posey MD     ECG reviewed by ED Physician in the absence of a cardiologist: yes    Interpretation:     Interpretation: non-specific    Rate:     ECG rate assessment: normal    Rhythm:     Rhythm: sinus rhythm    Ectopy:     Ectopy: none    QRS:     QRS axis:  Normal  ST segments:     ST segments:  Normal  T waves:     T waves: inverted      Inverted:  III and aVF  Comments: Inverted T waves are old, seen in multiple prior EKGs        Medical Decision Making     Provider Notes (Medical Decision Making):   70-year-old male with obesity, asthma, exercise and cold-induced asthma, complaining of shortness of breath, chest tightness, wheezing for the past 2 days. On exam he is in no significant distress. He does not have significant wheezing. He is noted to be very hypertensive and tachycardic, borderline fever. Will check laboratories, including d-dimer to further evaluate for possible pulmonary embolism. DuoNeb and Solu-Medrol for chest tightness and sensation of wheezing. Possible community-acquired pneumonia. Doubt ACS or myocardial infarction. Matilda Giraldo MD  3:49 PM    Laboratories and chest x-ray are all reassuring. D-dimer was negative. However patient continued to feel short of breath, had tachycardia (likely secondary to his beta agonist nebulizer treatment) and mild hypoxia. CTA was performed, no acute abnormality is noted, no pulmonary edema or pneumonia or pneumothorax or pulmonary embolism. His EKG shows inferior T wave inversions, which are old, going back many years on prior EKGs. Ultimately there are no signs of ACS or MI or other acute cardiopulmonary abnormality.   This is likely uncomplicated bronchitis, can safely discharge home with prednisone. Is also likely worsened by his morbid obesity. He is 136 kg. I had a conversation for about 10 minutes with the patient about his obesity, health consequences of obesity, and the likely contribution of his obesity to his current presentation today. He will explore further options with his primary care physician. Consults:      Medications Administered During ED Course:  Medications   acetaminophen (TYLENOL) tablet 650 mg (650 mg Oral Refused 2/24/20 1607)   ondansetron (ZOFRAN) injection 4 mg (4 mg IntraVENous Refused 2/24/20 1658)   methylPREDNISolone (PF) (Solu-MEDROL) injection 125 mg (125 mg IntraVENous Given 2/24/20 1608)   albuterol-ipratropium (DUO-NEB) 2.5 MG-0.5 MG/3 ML (3 mL Nebulization Given 2/24/20 1558)   hydrALAZINE (APRESOLINE) 20 mg/mL injection 20 mg (20 mg IntraVENous Given 2/24/20 1607)   sodium chloride 0.9 % bolus infusion 1,000 mL (1,000 mL IntraVENous New Bag 2/24/20 1711)   LORazepam (ATIVAN) injection 1 mg (1 mg IntraVENous Given 2/24/20 1953)   iopamidoL (ISOVUE-370) 76 % injection 100 mL (49 mL IntraVENous Given 2/24/20 1941)   sodium chloride (NS) flush 10 mL (10 mL IntraVENous Given 2/24/20 1942)          Diagnosis and Disposition     Disposition:  Discharged    Clinical Impression:   1. SOB (shortness of breath)    2. Acute bronchitis, unspecified organism    3. Morbid obesity ([address]    4. Accelerated hypertension        Attestation:  I personally performed the services described in this documentation on this date 2/24/2020 for patient Evie Tatum. Cleveland Nicole MD        I was the first provider for this patient on this visit. To the best of my ability I reviewed relevant prior medical records, electrocardiograms, laboratories, and radiologic studies. The patient's presenting problems were discussed, and the patient was in agreement with the care plan formulated and outlined with them.       Cleveland Nicole MD    Please note that this dictation was completed with Dragon voice recognition software. Quite often unanticipated grammatical, syntax, homophones, and other interpretive errors are inadvertently transcribed by the computer software. Please disregard these errors and excuse any errors that have escaped final proofreading.

## 2020-02-24 NOTE — PROGRESS NOTES
SUBJECTIVE:   Quan Marshall is a 36 y.o. male who is here for SOB. Pt endorses SOB, chest congestion, low-grade fever, and a cough. He states that the mucus from his nares is yellow. He has tried using albuterol frequently ( every hour to two hours with no relief from his sx. He started using his inhaler last night and reports using it six times and again around 7 AM this morning. He has used it four times today. He notes that cold air always makes it difficult to breathe. He reports that his sx started on 2/21/2020. Pt denies CP, lightheadedness or sick contacts. Pt's PCP is Dr. Jillian Álvarez. At this time, he is otherwise doing well and has brought no other complaints to my attention today. For a list of the medical issues addressed today, see the assessment and plan below. PMH:   Past Medical History:   Diagnosis Date    Angioedema     ace induced asthma     Cold-induced asthma     Gout     Hypertension     JOS (obstructive sleep apnea)        PSH:  has a past surgical history that includes hx adenoidectomy. Allergies: is allergic to lisinopril and pcn [penicillins]. Meds:   Current Outpatient Medications   Medication Sig    triamterene-hydroCHLOROthiazide (DYAZIDE) 37.5-25 mg per capsule TAKE ONE CAPSULE BY MOUTH ONE TIME DAILY    albuterol (PROVENTIL HFA, VENTOLIN HFA, PROAIR HFA) 90 mcg/actuation inhaler Take 1 Puff by inhalation every six (6) hours as needed for Wheezing.  cpap machine kit by Does Not Apply route.  amLODIPine (NORVASC) 10 mg tablet TAKE ONE TABLET BY MOUTH ONE TIME DAILY    allopurinol (ZYLOPRIM) 100 mg tablet Take 2 Tabs by mouth daily. (Patient not taking: Reported on 2/24/2020)    HYDROcodone-acetaminophen (NORCO) 5-325 mg per tablet Take 1 Tab by mouth every four (4) hours as needed for Pain. Max Daily Amount: 6 Tabs.  fluticasone-vilanterol (BREO ELLIPTA) 100-25 mcg/dose inhaler Take 1 Puff by inhalation daily.     MULTIVIT-MINERALS/FA/LYCOPENE (ONE-A-DAY MEN'S PO) Take  by mouth. No current facility-administered medications for this visit. Fam hx: family history includes Hypertension in his father and mother; Sleep Apnea in his mother. Soc hx:  reports that he has never smoked. He has never used smokeless tobacco. He reports that he does not drink alcohol or use drugs. Review of Systems - History obtained from the patient  General ROS: +low-grade fever. Psychological ROS: negative  Ophthalmic ROS: negative  ENT ROS: +chest congestion. Respiratory ROS: +cough, shortness of breath. no wheezing  Cardiovascular ROS: no chest pain or dyspnea on exertion  Gastrointestinal ROS: no abdominal pain, change in bowel habits, or black or bloody stools  Genito-Urinary ROS: negative  Musculoskeletal ROS: negative  Neurological ROS: negative  Dermatological ROS: negative    OBJECTIVE:   Vitals:   Visit Vitals  BP (!) 165/94 (BP 1 Location: Right arm, BP Patient Position: Sitting)   Pulse (!) 114   Temp 99.4 °F (37.4 °C) (Oral)   Resp 24   Ht 5' 9\" (1.753 m)   Wt 301 lb (136.5 kg)   SpO2 92%   BMI 44.45 kg/m²     Gen: Pleasant 36 y.o. male in NAD. HEENT: NC/AT   HEART: +tachycardia. No M/G/R.     LUNGS: +minimal air flow. +oxygen saturation 90% with standing, between 92-93% while walking. ASSESSMENT/ PLAN:     Diagnoses and all orders for this visit:    1. SOB (shortness of breath)    2. Bronchospasm, acute    1. SOB  Pt was transferred to [address] [place] [address] by ambulatory services for evaluation of severely decreased oxygen saturation and tachycardia. Sent to ER for stat imaging and further evaluation and treatment of hypoxia. 2. Bronchospasm, Acute  Pt was transferred to [address] [place] [address] by ambulatory services for evaluation of severely decreased oxygen saturation and tachycardia. Patient was advised to follow up with his PCP to discuss using additional medications ( inhalers) to manage his asthma.     Follow-up and Dispositions    · Return if symptoms worsen or fail to improve. I have reviewed the patient's medications and risks/side effects/benefits were discussed. Diagnosis(-es) explained to patient and questions answered. Literature provided where appropriate.      Written by Peng Hightower, as dictated by Shreyas Valencia MD.

## 2020-02-24 NOTE — LETTER
[facility] EMERGENCY DEPT 
[address] [place] [address] 
[phone] Work/School Note Date: 2/24/2020 To Whom It May concern: 
 
Amna Griffith was seen and treated today in the emergency room by the following provider(s): 
Attending Provider: Cristopher Richards MD. Amna Griffith may return to work on 2/26/2020.  
 
Sincerely,

## 2020-02-24 NOTE — PROGRESS NOTES
Identified pt with two pt identifiers(name and ). Reviewed record in preparation for visit and have obtained necessary documentation. Chief Complaint   Patient presents with    Complete Physical    Shortness of Breath     Pt reports he had a cold and now having SOB that started lastnight and inhaler is not working. Visit Vitals  BP (!) 165/94 (BP 1 Location: Right arm, BP Patient Position: Sitting)   Pulse (!) 114   Temp 99.4 °F (37.4 °C) (Oral)   Resp 24   Ht 5' 9\" (1.753 m)   Wt 301 lb (136.5 kg)   SpO2 92%   BMI 44.45 kg/m²       Health Maintenance Due   Topic    Pneumococcal 0-64 years (1 of 1 - PPSV23)    Influenza Age 5 to Adult        Med Reconciliation: Completed    Coordination of Care Questionnaire:  :   1) Have you been to an emergency room, urgent care, or hospitalized since your last visit? If yes, where when, and reason for visit? No       2. Have seen or consulted any other health care provider since your last visit? If yes, where when, and reason for visit? No       3) Do you have an Advanced Directive/ Living Will in place? No  If yes, do we have a copy on file   If no, would you like information     Patient is accompanied by self I have received verbal consent from Georgia Matos to discuss any/all medical information while they are present in the room.

## 2020-02-25 LAB
ATRIAL RATE: 100 BPM
CALCULATED P AXIS, ECG09: 55 DEGREES
CALCULATED R AXIS, ECG10: 44 DEGREES
CALCULATED T AXIS, ECG11: -15 DEGREES
DIAGNOSIS, 93000: NORMAL
P-R INTERVAL, ECG05: 144 MS
Q-T INTERVAL, ECG07: 338 MS
QRS DURATION, ECG06: 94 MS
QTC CALCULATION (BEZET), ECG08: 436 MS
VENTRICULAR RATE, ECG03: 100 BPM

## 2020-02-25 NOTE — ED NOTES
1920: Assumed care of Brenda Diallo RN at this time. Patient to CT. 2040: Dr. Hannah Hinds has reviewed discharge instructions with the patient. The patient verbalized understanding. Patient ambulatory out of ED.

## 2020-02-25 NOTE — DISCHARGE INSTRUCTIONS
Your evaluation and testing today is all reassuring. There are no signs of a blood clot in your lung or pneumonia or heart attack.

## 2020-03-01 LAB
BACTERIA SPEC CULT: NORMAL
BACTERIA SPEC CULT: NORMAL
SERVICE CMNT-IMP: NORMAL
SERVICE CMNT-IMP: NORMAL

## 2020-04-14 ENCOUNTER — PATIENT MESSAGE (OUTPATIENT)
Dept: INTERNAL MEDICINE CLINIC | Age: 35
End: 2020-04-14

## 2020-04-14 DIAGNOSIS — M76.62 ACHILLES TENDINITIS OF LEFT LOWER EXTREMITY: ICD-10-CM

## 2020-04-14 DIAGNOSIS — M10.9 ACUTE GOUT OF MULTIPLE SITES, UNSPECIFIED CAUSE: ICD-10-CM

## 2020-04-16 RX ORDER — ALLOPURINOL 100 MG/1
200 TABLET ORAL DAILY
Qty: 30 TAB | Refills: 0 | Status: SHIPPED | OUTPATIENT
Start: 2020-04-16 | End: 2020-06-16 | Stop reason: SDUPTHER

## 2020-04-27 DIAGNOSIS — J45.30: ICD-10-CM

## 2020-04-27 RX ORDER — ALBUTEROL SULFATE 90 UG/1
1 AEROSOL, METERED RESPIRATORY (INHALATION)
Qty: 1 INHALER | Refills: 1 | Status: SHIPPED | OUTPATIENT
Start: 2020-04-27 | End: 2022-02-17

## 2020-04-27 RX ORDER — ALBUTEROL SULFATE 90 UG/1
1 AEROSOL, METERED RESPIRATORY (INHALATION)
Qty: 1 INHALER | Refills: 1 | Status: SHIPPED | OUTPATIENT
Start: 2020-04-27 | End: 2021-06-13 | Stop reason: SDUPTHER

## 2020-04-27 NOTE — TELEPHONE ENCOUNTER
PCP: Yolanda Tomas MD    Last appt: 2/24/2020  No future appointments. Requested Prescriptions     Pending Prescriptions Disp Refills    albuterol (PROVENTIL HFA, VENTOLIN HFA, PROAIR HFA) 90 mcg/actuation inhaler 1 Inhaler 1     Sig: Take 1 Puff by inhalation every six (6) hours as needed for Wheezing.

## 2020-06-16 DIAGNOSIS — I10 ESSENTIAL HYPERTENSION: ICD-10-CM

## 2020-06-16 DIAGNOSIS — M10.9 ACUTE GOUT OF MULTIPLE SITES, UNSPECIFIED CAUSE: ICD-10-CM

## 2020-06-16 DIAGNOSIS — M76.62 ACHILLES TENDINITIS OF LEFT LOWER EXTREMITY: ICD-10-CM

## 2020-06-16 RX ORDER — ALLOPURINOL 100 MG/1
200 TABLET ORAL DAILY
Qty: 30 TAB | Refills: 0 | Status: SHIPPED | OUTPATIENT
Start: 2020-06-16 | End: 2021-02-12 | Stop reason: SDUPTHER

## 2020-06-16 RX ORDER — AMLODIPINE BESYLATE 10 MG/1
TABLET ORAL
Qty: 30 TAB | Refills: 5 | Status: SHIPPED | OUTPATIENT
Start: 2020-06-16 | End: 2020-12-30 | Stop reason: SDUPTHER

## 2020-07-13 DIAGNOSIS — I10 ESSENTIAL HYPERTENSION: ICD-10-CM

## 2020-07-13 RX ORDER — TRIAMTERENE AND HYDROCHLOROTHIAZIDE 37.5; 25 MG/1; MG/1
1 CAPSULE ORAL DAILY
Qty: 30 CAP | Refills: 3 | Status: SHIPPED | OUTPATIENT
Start: 2020-07-13 | End: 2020-10-13

## 2020-07-13 NOTE — TELEPHONE ENCOUNTER
PCP: Javad James MD    Last appt: 2/24/2020  No future appointments.     Requested Prescriptions     Pending Prescriptions Disp Refills    triamterene-hydroCHLOROthiazide (DYAZIDE) 37.5-25 mg per capsule 30 Cap 0

## 2020-07-13 NOTE — TELEPHONE ENCOUNTER
PCP: Neo Rojas MD    Last appt: 2/24/2020  No future appointments.     Requested Prescriptions     Pending Prescriptions Disp Refills    triamterene-hydroCHLOROthiazide (DYAZIDE) 37.5-25 mg per capsule 30 Cap 0

## 2020-07-16 ENCOUNTER — CLINICAL SUPPORT (OUTPATIENT)
Dept: INTERNAL MEDICINE CLINIC | Age: 35
End: 2020-07-16

## 2020-07-16 VITALS — SYSTOLIC BLOOD PRESSURE: 125 MMHG | DIASTOLIC BLOOD PRESSURE: 84 MMHG | HEART RATE: 86 BPM

## 2020-07-16 DIAGNOSIS — Z01.30 BLOOD PRESSURE CHECK: Primary | ICD-10-CM

## 2020-07-16 RX ORDER — LANOLIN ALCOHOL/MO/W.PET/CERES
400 CREAM (GRAM) TOPICAL DAILY
Qty: 30 TAB | Refills: 2 | Status: SHIPPED | OUTPATIENT
Start: 2020-07-16 | End: 2021-02-11 | Stop reason: ALTCHOICE

## 2020-07-16 RX ORDER — POTASSIUM CHLORIDE 20 MEQ/1
20 TABLET, EXTENDED RELEASE ORAL DAILY
Qty: 30 TAB | Refills: 2 | Status: SHIPPED | OUTPATIENT
Start: 2020-07-16 | End: 2021-02-11 | Stop reason: ALTCHOICE

## 2020-07-16 NOTE — PROGRESS NOTES
Patient presented for BP check and much improved   Visit Vitals  /84 (BP 1 Location: Right arm, BP Patient Position: Sitting)   Pulse 86       He is on amlodipine and  Dyazide and now his BP is controlled    Dyazide  Causing cramps     Start   Orders Placed This Encounter    potassium chloride (K-DUR, KLOR-CON) 20 mEq tablet     Sig: Take 1 Tab by mouth daily. Dispense:  30 Tab     Refill:  2    magnesium oxide (MAG-OX) 400 mg tablet     Sig: Take 1 Tab by mouth daily.      Dispense:  30 Tab     Refill:  2     Call if symptoms do not improve    Follow up in one month

## 2020-10-12 DIAGNOSIS — I10 ESSENTIAL HYPERTENSION: ICD-10-CM

## 2020-10-13 RX ORDER — TRIAMTERENE AND HYDROCHLOROTHIAZIDE 37.5; 25 MG/1; MG/1
CAPSULE ORAL
Qty: 30 CAP | Refills: 3 | Status: SHIPPED | OUTPATIENT
Start: 2020-10-13 | End: 2021-01-18 | Stop reason: SDUPTHER

## 2020-12-22 ENCOUNTER — HOSPITAL ENCOUNTER (EMERGENCY)
Age: 35
Discharge: HOME | End: 2020-12-22
Attending: EMERGENCY MEDICINE

## 2020-12-22 VITALS
WEIGHT: 301.37 LBS | RESPIRATION RATE: 18 BRPM | BODY MASS INDEX: 44.64 KG/M2 | SYSTOLIC BLOOD PRESSURE: 130 MMHG | TEMPERATURE: 98.4 F | OXYGEN SATURATION: 98 % | HEART RATE: 90 BPM | DIASTOLIC BLOOD PRESSURE: 56 MMHG | HEIGHT: 69 IN

## 2020-12-22 DIAGNOSIS — R00.2 PALPITATIONS: Primary | ICD-10-CM

## 2020-12-22 DIAGNOSIS — R74.8 ELEVATED CPK: ICD-10-CM

## 2020-12-22 LAB
ALBUMIN SERPL-MCNC: 4.2 G/DL (ref 3.5–5)
ALBUMIN/GLOB SERPL: 1 {RATIO} (ref 1.1–2.2)
ALP SERPL-CCNC: 91 U/L (ref 45–117)
ALT SERPL-CCNC: 52 U/L (ref 12–78)
AMPHET UR QL SCN: NEGATIVE
ANION GAP SERPL CALC-SCNC: 5 MMOL/L (ref 5–15)
APPEARANCE UR: CLEAR
AST SERPL-CCNC: 50 U/L (ref 15–37)
BACTERIA URNS QL MICRO: NEGATIVE /HPF
BARBITURATES UR QL SCN: NEGATIVE
BASOPHILS # BLD: 0.1 K/UL (ref 0–0.1)
BASOPHILS NFR BLD: 1 % (ref 0–1)
BENZODIAZ UR QL: NEGATIVE
BILIRUB SERPL-MCNC: 1.2 MG/DL (ref 0.2–1)
BILIRUB UR QL: NEGATIVE
BUN SERPL-MCNC: 23 MG/DL (ref 6–20)
BUN/CREAT SERPL: 18 (ref 12–20)
CALCIUM SERPL-MCNC: 9.2 MG/DL (ref 8.5–10.1)
CANNABINOIDS UR QL SCN: NEGATIVE
CHLORIDE SERPL-SCNC: 101 MMOL/L (ref 97–108)
CK SERPL-CCNC: 810 U/L (ref 39–308)
CO2 SERPL-SCNC: 30 MMOL/L (ref 21–32)
COCAINE UR QL SCN: NEGATIVE
COLOR UR: NORMAL
COMMENT, HOLDF: NORMAL
CREAT SERPL-MCNC: 1.29 MG/DL (ref 0.7–1.3)
DIFFERENTIAL METHOD BLD: NORMAL
DRUG SCRN COMMENT,DRGCM: NORMAL
EOSINOPHIL # BLD: 0.2 K/UL (ref 0–0.4)
EOSINOPHIL NFR BLD: 2 % (ref 0–7)
EPITH CASTS URNS QL MICRO: NORMAL /LPF
ERYTHROCYTE [DISTWIDTH] IN BLOOD BY AUTOMATED COUNT: 13.3 % (ref 11.5–14.5)
GLOBULIN SER CALC-MCNC: 4.4 G/DL (ref 2–4)
GLUCOSE SERPL-MCNC: 112 MG/DL (ref 65–100)
GLUCOSE UR STRIP.AUTO-MCNC: NEGATIVE MG/DL
HCT VFR BLD AUTO: 44.4 % (ref 36.6–50.3)
HGB BLD-MCNC: 13.9 G/DL (ref 12.1–17)
HGB UR QL STRIP: NEGATIVE
HYALINE CASTS URNS QL MICRO: NORMAL /LPF (ref 0–5)
IMM GRANULOCYTES # BLD AUTO: 0 K/UL (ref 0–0.04)
IMM GRANULOCYTES NFR BLD AUTO: 0 % (ref 0–0.5)
KETONES UR QL STRIP.AUTO: NEGATIVE MG/DL
LEUKOCYTE ESTERASE UR QL STRIP.AUTO: NEGATIVE
LYMPHOCYTES # BLD: 2.7 K/UL (ref 0.8–3.5)
LYMPHOCYTES NFR BLD: 37 % (ref 12–49)
MAGNESIUM SERPL-MCNC: 2.2 MG/DL (ref 1.6–2.4)
MCH RBC QN AUTO: 26.5 PG (ref 26–34)
MCHC RBC AUTO-ENTMCNC: 31.3 G/DL (ref 30–36.5)
MCV RBC AUTO: 84.6 FL (ref 80–99)
METHADONE UR QL: NEGATIVE
MONOCYTES # BLD: 0.7 K/UL (ref 0–1)
MONOCYTES NFR BLD: 10 % (ref 5–13)
NEUTS SEG # BLD: 3.6 K/UL (ref 1.8–8)
NEUTS SEG NFR BLD: 50 % (ref 32–75)
NITRITE UR QL STRIP.AUTO: NEGATIVE
NRBC # BLD: 0 K/UL (ref 0–0.01)
NRBC BLD-RTO: 0 PER 100 WBC
OPIATES UR QL: NEGATIVE
PCP UR QL: NEGATIVE
PH UR STRIP: 5.5 [PH] (ref 5–8)
PLATELET # BLD AUTO: 205 K/UL (ref 150–400)
PMV BLD AUTO: 12.8 FL (ref 8.9–12.9)
POTASSIUM SERPL-SCNC: 4 MMOL/L (ref 3.5–5.1)
PROT SERPL-MCNC: 8.6 G/DL (ref 6.4–8.2)
PROT UR STRIP-MCNC: NEGATIVE MG/DL
RBC # BLD AUTO: 5.25 M/UL (ref 4.1–5.7)
RBC #/AREA URNS HPF: NORMAL /HPF (ref 0–5)
SAMPLES BEING HELD,HOLD: NORMAL
SODIUM SERPL-SCNC: 136 MMOL/L (ref 136–145)
SP GR UR REFRACTOMETRY: 1.02 (ref 1–1.03)
TROPONIN I SERPL-MCNC: <0.05 NG/ML
TSH SERPL DL<=0.05 MIU/L-ACNC: 2.38 UIU/ML (ref 0.36–3.74)
UA: UC IF INDICATED,UAUC: NORMAL
UROBILINOGEN UR QL STRIP.AUTO: 0.2 EU/DL (ref 0.2–1)
WBC # BLD AUTO: 7.4 K/UL (ref 4.1–11.1)
WBC URNS QL MICRO: NORMAL /HPF (ref 0–4)

## 2020-12-22 PROCEDURE — 93005 ELECTROCARDIOGRAM TRACING: CPT

## 2020-12-22 PROCEDURE — 85025 COMPLETE CBC W/AUTO DIFF WBC: CPT

## 2020-12-22 PROCEDURE — 80053 COMPREHEN METABOLIC PANEL: CPT

## 2020-12-22 PROCEDURE — 36415 COLL VENOUS BLD VENIPUNCTURE: CPT

## 2020-12-22 PROCEDURE — 83735 ASSAY OF MAGNESIUM: CPT

## 2020-12-22 PROCEDURE — 82550 ASSAY OF CK (CPK): CPT

## 2020-12-22 PROCEDURE — 84484 ASSAY OF TROPONIN QUANT: CPT

## 2020-12-22 PROCEDURE — 80307 DRUG TEST PRSMV CHEM ANLYZR: CPT

## 2020-12-22 PROCEDURE — 84443 ASSAY THYROID STIM HORMONE: CPT

## 2020-12-22 PROCEDURE — 99284 EMERGENCY DEPT VISIT MOD MDM: CPT

## 2020-12-22 PROCEDURE — 81001 URINALYSIS AUTO W/SCOPE: CPT

## 2020-12-22 NOTE — DISCHARGE INSTRUCTIONS

## 2020-12-22 NOTE — LETTER
NOTIFICATION RETURN TO WORK / SCHOOL 
 
12/22/2020 7:27 AM 
 
Mr. Darryl Rebolledo [facility] [phone] To Whom It May Concern: 
 
Darryl Rebolledo is currently under the care of [facility] EMERGENCY DEPT. Provider: Bean Souza MD 
 
Darryl Rebolledo may return to work on 12/23/20. If there are questions or concerns please have the patient contact our office. Sincerely, Jeannette Jesus RN

## 2020-12-22 NOTE — ED NOTES
Patient was provided with discharge instructions. Instructions and any medications were reviewed with the patient &/or family by Dr. Keyanna Servin. Questions and concerns addressed by the provider. Patient discharged in stable condition via ambulation and was unaccompanied.

## 2020-12-22 NOTE — ED PROVIDER NOTES
EMERGENCY DEPARTMENT HISTORY AND PHYSICAL EXAM      Date: 12/22/2020  Patient Name: Johnnie Das    History of Presenting Illness     Chief Complaint   Patient presents with    Dizziness     Ambulatory to triage c/o lightheadedness/dizziness tonight, \"i felt like my heart was racing. \" Recently used C4 for lifting at the gym. History Provided By: Patient    HPI: Johnnie Das, 30 y.o. male with PMHx significant for morbid obesity, hypertension, obstructive sleep apnea presents to the ED with chief complaint of palpitations and difficulty breathing. Patient reports that he was sleeping at about 1:30 AM he woke up and felt like his heart was racing and his chest was tight and he had numbness in both of his hands. He states his legs felt \"jittery\". Patient reports that over the past few weeks he has been working out/lifting weights at the gym and for the last few days he has been taking a supplement called C4 ripped which apparently has a large amount of caffeine as a preworkout stimulant. Last night he took it before going to the gym at 10 PM.   he denies any nausea or vomiting or diarrhea. He reports that he ate a hotdog and drink a soda after going to the gym and admits he probably did not drink enough water yesterday. He also admits that he has been stressed out a bit lately and that anxiety might play a role in his symptoms. He denies any dysuria, hematuria, urinary frequency, or change in the color of his urine. He does report that he is feeling better at the time of my interview    PCP: George Packer MD    No current facility-administered medications on file prior to encounter. Current Outpatient Medications on File Prior to Encounter   Medication Sig Dispense Refill    triamterene-hydroCHLOROthiazide (DYAZIDE) 37.5-25 mg per capsule TAKE ONE CAPSULE BY MOUTH ONE TIME DAILY 30 Cap 3    potassium chloride (K-DUR, KLOR-CON) 20 mEq tablet Take 1 Tab by mouth daily.  30 Tab 2    magnesium oxide (MAG-OX) 400 mg tablet Take 1 Tab by mouth daily. 30 Tab 2    allopurinoL (ZYLOPRIM) 100 mg tablet Take 2 Tabs by mouth daily. 30 Tab 0    amLODIPine (NORVASC) 10 mg tablet TAKE ONE TABLET BY MOUTH ONE TIME DAILY 30 Tab 5    albuterol (PROVENTIL HFA, VENTOLIN HFA, PROAIR HFA) 90 mcg/actuation inhaler Take 1 Puff by inhalation every six (6) hours as needed for Wheezing. 1 Inhaler 1    albuterol (PROVENTIL HFA, VENTOLIN HFA, PROAIR HFA) 90 mcg/actuation inhaler Take 1 Puff by inhalation every six (6) hours as needed for Wheezing. 1 Inhaler 1    predniSONE (STERAPRED DS) 10 mg dose pack Take as prescribed 21 Tab 0    fluticasone furoate-vilanteroL (BREO ELLIPTA) 100-25 mcg/dose inhaler Take 1 Puff by inhalation daily. 1 Inhaler 1    HYDROcodone-acetaminophen (NORCO) 5-325 mg per tablet Take 1 Tab by mouth every four (4) hours as needed for Pain. Max Daily Amount: 6 Tabs. 10 Tab 0    cpap machine kit by Does Not Apply route.  MULTIVIT-MINERALS/FA/LYCOPENE (ONE-A-DAY MEN'S PO) Take  by mouth. Past History     Past Medical History:  Past Medical History:   Diagnosis Date    Angioedema     ace induced asthma     Cold-induced asthma     Gout     Hypertension     JOS (obstructive sleep apnea)        Past Surgical History:  Past Surgical History:   Procedure Laterality Date    HX ADENOIDECTOMY         Family History:  Family History   Problem Relation Age of Onset    Sleep Apnea Mother     Hypertension Mother     Hypertension Father        Social History:  Social History     Tobacco Use    Smoking status: Never Smoker    Smokeless tobacco: Never Used   Substance Use Topics    Alcohol use: No    Drug use: No       Allergies: Allergies   Allergen Reactions    Lisinopril Swelling     Pt has sever swelling of the throat and tongue.     Pcn [Penicillins] Other (comments)     Allergic as a child           Review of Systems   Review of Systems   Constitutional: Negative for chills and fever. HENT: Negative for congestion, ear pain, rhinorrhea and sore throat. Respiratory: Positive for chest tightness and shortness of breath. Negative for cough and wheezing. Cardiovascular: Positive for palpitations. Negative for chest pain and leg swelling. Gastrointestinal: Negative for abdominal pain, diarrhea, nausea and vomiting. Genitourinary: Negative for dysuria, flank pain and hematuria. Musculoskeletal: Negative for back pain, myalgias and neck pain. Skin: Negative for rash and wound. Neurological: Positive for numbness ( Of hands). Negative for dizziness, syncope, weakness and headaches. Psychiatric/Behavioral: Negative for confusion. The patient is nervous/anxious. All other systems reviewed and are negative.         Physical Exam    General appearance -obese, well appearing, and in no distress  Eyes - pupils equal and reactive, extraocular eye movements intact  ENT - mucous membranes moist, pharynx normal without lesions  Neck - supple, no significant adenopathy; non-tender to palpation  Chest - clear to auscultation, no wheezes, rales or rhonchi; non-tender to palpation  Heart - normal rate and regular rhythm, S1 and S2 normal, no murmurs noted  Abdomen - soft, nontender, nondistended, no masses or organomegaly  Musculoskeletal - no joint tenderness, deformity or swelling; normal ROM  Extremities - peripheral pulses normal, no pedal edema  Skin - normal coloration and turgor, no rashes  Neurological - alert, oriented x3, normal speech, no focal findings or movement disorder noted    Diagnostic Study Results     Labs -     Recent Results (from the past 12 hour(s))   EKG, 12 LEAD, INITIAL    Collection Time: 12/22/20  2:14 AM   Result Value Ref Range    Ventricular Rate 95 BPM    Atrial Rate 95 BPM    P-R Interval 148 ms    QRS Duration 98 ms    Q-T Interval 332 ms    QTC Calculation (Bezet) 417 ms    Calculated P Axis 60 degrees    Calculated R Axis 54 degrees    Calculated T Axis -13 degrees    Diagnosis       Normal sinus rhythm  Abnormal QRS-T angle, consider primary T wave abnormality  When compared with ECG of 24-FEB-2020 16:33,  No significant change was found     CBC WITH AUTOMATED DIFF    Collection Time: 12/22/20  5:14 AM   Result Value Ref Range    WBC 7.4 4.1 - 11.1 K/uL    RBC 5.25 4. 10 - 5.70 M/uL    HGB 13.9 12.1 - 17.0 g/dL    HCT 44.4 36.6 - 50.3 %    MCV 84.6 80.0 - 99.0 FL    MCH 26.5 26.0 - 34.0 PG    MCHC 31.3 30.0 - 36.5 g/dL    RDW 13.3 11.5 - 14.5 %    PLATELET 896 509 - 256 K/uL    MPV 12.8 8.9 - 12.9 FL    NRBC 0.0 0  WBC    ABSOLUTE NRBC 0.00 0.00 - 0.01 K/uL    NEUTROPHILS 50 32 - 75 %    LYMPHOCYTES 37 12 - 49 %    MONOCYTES 10 5 - 13 %    EOSINOPHILS 2 0 - 7 %    BASOPHILS 1 0 - 1 %    IMMATURE GRANULOCYTES 0 0.0 - 0.5 %    ABS. NEUTROPHILS 3.6 1.8 - 8.0 K/UL    ABS. LYMPHOCYTES 2.7 0.8 - 3.5 K/UL    ABS. MONOCYTES 0.7 0.0 - 1.0 K/UL    ABS. EOSINOPHILS 0.2 0.0 - 0.4 K/UL    ABS. BASOPHILS 0.1 0.0 - 0.1 K/UL    ABS. IMM. GRANS. 0.0 0.00 - 0.04 K/UL    DF AUTOMATED     METABOLIC PANEL, COMPREHENSIVE    Collection Time: 12/22/20  5:14 AM   Result Value Ref Range    Sodium 136 136 - 145 mmol/L    Potassium 4.0 3.5 - 5.1 mmol/L    Chloride 101 97 - 108 mmol/L    CO2 30 21 - 32 mmol/L    Anion gap 5 5 - 15 mmol/L    Glucose 112 (H) 65 - 100 mg/dL    BUN 23 (H) 6 - 20 MG/DL    Creatinine 1.29 0.70 - 1.30 MG/DL    BUN/Creatinine ratio 18 12 - 20      GFR est AA >60 >60 ml/min/1.73m2    GFR est non-AA >60 >60 ml/min/1.73m2    Calcium 9.2 8.5 - 10.1 MG/DL    Bilirubin, total 1.2 (H) 0.2 - 1.0 MG/DL    ALT (SGPT) 52 12 - 78 U/L    AST (SGOT) 50 (H) 15 - 37 U/L    Alk.  phosphatase 91 45 - 117 U/L    Protein, total 8.6 (H) 6.4 - 8.2 g/dL    Albumin 4.2 3.5 - 5.0 g/dL    Globulin 4.4 (H) 2.0 - 4.0 g/dL    A-G Ratio 1.0 (L) 1.1 - 2.2     CK    Collection Time: 12/22/20  5:14 AM   Result Value Ref Range     (H) 39 - 308 U/L   DRUG SCREEN, URINE    Collection Time: 12/22/20  5:14 AM   Result Value Ref Range    AMPHETAMINES Negative NEG      BARBITURATES Negative NEG      BENZODIAZEPINES Negative NEG      COCAINE Negative NEG      METHADONE Negative NEG      OPIATES Negative NEG      PCP(PHENCYCLIDINE) Negative NEG      THC (TH-CANNABINOL) Negative NEG      Drug screen comment (NOTE)    MAGNESIUM    Collection Time: 12/22/20  5:14 AM   Result Value Ref Range    Magnesium 2.2 1.6 - 2.4 mg/dL   URINALYSIS W/ REFLEX CULTURE    Collection Time: 12/22/20  5:14 AM    Specimen: Urine   Result Value Ref Range    Color YELLOW/STRAW      Appearance CLEAR CLEAR      Specific gravity 1.022 1.003 - 1.030      pH (UA) 5.5 5.0 - 8.0      Protein Negative NEG mg/dL    Glucose Negative NEG mg/dL    Ketone Negative NEG mg/dL    Bilirubin Negative NEG      Blood Negative NEG      Urobilinogen 0.2 0.2 - 1.0 EU/dL    Nitrites Negative NEG      Leukocyte Esterase Negative NEG      WBC 0-4 0 - 4 /hpf    RBC 0-5 0 - 5 /hpf    Epithelial cells FEW FEW /lpf    Bacteria Negative NEG /hpf    UA:UC IF INDICATED CULTURE NOT INDICATED BY UA RESULT CNI      Hyaline cast 0-2 0 - 5 /lpf   SAMPLES BEING HELD    Collection Time: 12/22/20  5:14 AM   Result Value Ref Range    SAMPLES BEING HELD RD     COMMENT        Add-on orders for these samples will be processed based on acceptable specimen integrity and analyte stability, which may vary by analyte. TROPONIN I    Collection Time: 12/22/20  5:14 AM   Result Value Ref Range    Troponin-I, Qt. <0.05 <0.05 ng/mL   TSH 3RD GENERATION    Collection Time: 12/22/20  5:14 AM   Result Value Ref Range    TSH 2.38 0.36 - 3.74 uIU/mL       Radiologic Studies -   No orders to display     CT Results  (Last 48 hours)    None        CXR Results  (Last 48 hours)    None            Medical Decision Making   I am the first provider for this patient.     I reviewed the vital signs, available nursing notes, past medical history, past surgical history, family history and social history. Vital Signs-Reviewed the patient's vital signs. Patient Vitals for the past 12 hrs:   Temp Pulse Resp BP SpO2   12/22/20 0501 98.4 °F (36.9 °C) 90 18 (!) 130/56 98 %   12/22/20 0205 98.1 °F (36.7 °C) 100 20 (!) 161/97 100 %       EKG normal sinus rhythm, 95 bpm, normal axis, normal WA, QRS, QTc intervals, nonspecific ST changes    Records Reviewed: Nursing Notes and Old Medical Records    Provider Notes (Medical Decision Making):   Differential diagnosis: Dehydration, caffeine overuse, electrolyte abnormality, hypothyroidism, rhabdo, anxiety, panic attack  We will check CBC, CMP, CPK, troponin, mag, TSH, UA, drug screen  We will treat with IV fluids. ED Course:   Initial assessment performed. The patients presenting problems have been discussed, and they are in agreement with the care plan formulated and outlined with them. I have encouraged them to ask questions as they arise throughout their visit. Progress Notes:     Patient is feeling better in the ED. CPK was moderately elevated at approximately 800. Patient has received IV fluids. He has been encouraged to drink plenty of water and avoid caffeinated beverages and supplements. He agrees to follow-up with his primary care doctor and return to ED for worsening symptoms  Disposition:  Discharge home    PLAN:  1. Current Discharge Medication List        2. Follow-up Information     Follow up With Specialties Details Why Contact Info    [facility] EMERGENCY DEPT Emergency Medicine  If symptoms worsen [address]    Alma Rosa Humphrey MD Internal Medicine Schedule an appointment as soon as possible for a visit  [address] [place] [address]  [phone]          Return to ED if worse     Diagnosis     Clinical Impression:   1. Palpitations    2.  Elevated CPK

## 2020-12-23 LAB
ATRIAL RATE: 95 BPM
CALCULATED P AXIS, ECG09: 60 DEGREES
CALCULATED R AXIS, ECG10: 54 DEGREES
CALCULATED T AXIS, ECG11: -13 DEGREES
DIAGNOSIS, 93000: NORMAL
P-R INTERVAL, ECG05: 148 MS
Q-T INTERVAL, ECG07: 332 MS
QRS DURATION, ECG06: 98 MS
QTC CALCULATION (BEZET), ECG08: 417 MS
VENTRICULAR RATE, ECG03: 95 BPM

## 2020-12-30 DIAGNOSIS — I10 ESSENTIAL HYPERTENSION: ICD-10-CM

## 2020-12-30 RX ORDER — AMLODIPINE BESYLATE 10 MG/1
TABLET ORAL
Qty: 30 TAB | Refills: 5 | Status: SHIPPED | OUTPATIENT
Start: 2020-12-30 | End: 2021-04-18 | Stop reason: SDUPTHER

## 2020-12-30 NOTE — TELEPHONE ENCOUNTER
Future Appointments:  Future Appointments   Date Time Provider Portia Ivette   2/11/2021 11:15 AM Appa Jasmin Long MD [facility] BS AMB        Last Appointment With Me:  7/16/2020     Requested Prescriptions     Pending Prescriptions Disp Refills    amLODIPine (NORVASC) 10 mg tablet 30 Tab 5     Sig: TAKE ONE TABLET BY MOUTH ONE TIME DAILY

## 2021-01-17 DIAGNOSIS — I10 ESSENTIAL HYPERTENSION: ICD-10-CM

## 2021-01-18 RX ORDER — TRIAMTERENE AND HYDROCHLOROTHIAZIDE 37.5; 25 MG/1; MG/1
CAPSULE ORAL
Qty: 30 CAP | Refills: 3 | Status: SHIPPED | OUTPATIENT
Start: 2021-01-18 | End: 2021-06-01

## 2021-02-11 ENCOUNTER — OFFICE VISIT (OUTPATIENT)
Dept: INTERNAL MEDICINE CLINIC | Age: 36
End: 2021-02-11

## 2021-02-11 VITALS
OXYGEN SATURATION: 98 % | HEIGHT: 69 IN | DIASTOLIC BLOOD PRESSURE: 84 MMHG | RESPIRATION RATE: 18 BRPM | BODY MASS INDEX: 43.55 KG/M2 | TEMPERATURE: 97 F | WEIGHT: 294 LBS | SYSTOLIC BLOOD PRESSURE: 132 MMHG | HEART RATE: 100 BPM

## 2021-02-11 DIAGNOSIS — I10 ESSENTIAL HYPERTENSION: Primary | ICD-10-CM

## 2021-02-11 DIAGNOSIS — R73.9 ELEVATED BLOOD SUGAR: ICD-10-CM

## 2021-02-11 DIAGNOSIS — M10.9 GOUT, UNSPECIFIED CAUSE, UNSPECIFIED CHRONICITY, UNSPECIFIED SITE: ICD-10-CM

## 2021-02-11 DIAGNOSIS — M10.9 ACUTE GOUT OF MULTIPLE SITES, UNSPECIFIED CAUSE: ICD-10-CM

## 2021-02-11 DIAGNOSIS — L91.8 CUTANEOUS SKIN TAGS: ICD-10-CM

## 2021-02-11 DIAGNOSIS — E66.9 OBESITY, UNSPECIFIED CLASSIFICATION, UNSPECIFIED OBESITY TYPE, UNSPECIFIED WHETHER SERIOUS COMORBIDITY PRESENT: ICD-10-CM

## 2021-02-11 DIAGNOSIS — M76.62 ACHILLES TENDINITIS OF LEFT LOWER EXTREMITY: ICD-10-CM

## 2021-02-11 PROCEDURE — 99214 OFFICE O/P EST MOD 30 MIN: CPT | Performed by: INTERNAL MEDICINE

## 2021-02-11 NOTE — PROGRESS NOTES
Reviewed record in preparation for visit and have obtained necessary documentation. Identified pt with two pt identifiers(name and ). Chief Complaint   Patient presents with    Complete Physical       Health Maintenance Due   Topic Date Due    Pneumococcal Vaccine (1 of 1 - PPSV23) 1992    COVID-19 Vaccine (1 of 2) 2002    Yearly Flu Vaccine (1) 2020       Mr. Dani White has a reminder for a \"due or due soon\" health maintenance. I have asked that he discuss this further with his primary care provider for follow-up on this health maintenance. Coordination of Care Questionnaire:  :     1) Have you been to an emergency room, urgent care clinic since your last visit? no   Hospitalized since your last visit? no             2) Have you seen or consulted any other health care providers outside of [address] [place] [address] since your last visit? no  (Include any pap smears or colon screenings in this section.)    3) In the event something were to happen to you and you were unable to speak on your behalf, do you have an Advance Directive/ Living Will in place stating your wishes? NO    Do you have an Advance Directive on file? no    4) Are you interested in receiving information on Advance Directives? NO    Patient is accompanied by self I have received verbal consent from Leroy Jimenez to discuss any/all medical information while they are present in the room.

## 2021-02-11 NOTE — PROGRESS NOTES
Mr. Sahra Lopez is a new patient who is here for HTN    CC:  Complete Physical  HTN     HPI:   31 yo male with a hx HTN and obesity presenting to follow up    Obesity: loosing weight   311- 294 lbs currently - 250 lbs is his goal   Eating healthy no sodas and eating healthy  Exercising, going to [employer]  1 mile every night    HTN: taking amlodipine 10mg and dyazide  Denies CP and dyspnea, no dizziness    JOS using CPAP every night    Gout: last attack one year ago and doing better since changing diet. Rarely takes allopurinol     Has two 10 yo boys and 3 yo daughter  Non smoker  No ETOH  No drugs    Review of systems:  Constitutional: negative for fever, chills, weight loss, night sweats   Eyes : negative for vision changes, eye pain and discharge  Nose and Throat: negative for tinnitus, sore throat   Cardiovascular: negative for chest pain, palpitations and shortness of breath  Respiratory: negative for shortness of breath, cough and wheezing   Gastroinstestinal: negative for abdominal pain, nausea, vomiting, diarrhea, constipation, and blood in the stool  Musculoskeletal: negative for back ache and joint ache   Genitourinary: negative for dysuria, nocturia, polyuria and hematuria   Neurologic: Negative for focal weakness, numbness or incoordination  Skin: skin tags on right thigh that get caught on clothes  Hematologic: negative for easy bruising      Past Medical History:   Diagnosis Date    Angioedema     ace induced asthma     Cold-induced asthma     Gout     Hypertension     JOS (obstructive sleep apnea)         Past Surgical History:   Procedure Laterality Date    HX ADENOIDECTOMY         Allergies   Allergen Reactions    Lisinopril Swelling     Pt has sever swelling of the throat and tongue.     Pcn [Penicillins] Other (comments)     Allergic as a child         Current Outpatient Medications on File Prior to Visit   Medication Sig Dispense Refill    triamterene-hydroCHLOROthiazide (DYAZIDE) 37.5-25 mg per capsule TAKE ONE CAPSULE BY MOUTH ONE TIME DAILY 30 Cap 3    amLODIPine (NORVASC) 10 mg tablet TAKE ONE TABLET BY MOUTH ONE TIME DAILY 30 Tab 5    allopurinoL (ZYLOPRIM) 100 mg tablet Take 2 Tabs by mouth daily. 30 Tab 0    albuterol (PROVENTIL HFA, VENTOLIN HFA, PROAIR HFA) 90 mcg/actuation inhaler Take 1 Puff by inhalation every six (6) hours as needed for Wheezing. 1 Inhaler 1    albuterol (PROVENTIL HFA, VENTOLIN HFA, PROAIR HFA) 90 mcg/actuation inhaler Take 1 Puff by inhalation every six (6) hours as needed for Wheezing. 1 Inhaler 1    cpap machine kit by Does Not Apply route.  [DISCONTINUED] triamterene-hydroCHLOROthiazide (DYAZIDE) 37.5-25 mg per capsule TAKE ONE CAPSULE BY MOUTH ONE TIME DAILY 30 Cap 3     No current facility-administered medications on file prior to visit. family history includes Hypertension in his father and mother; Sleep Apnea in his mother.     Social History     Socioeconomic History    Marital status:      Spouse name: Not on file    Number of children: Not on file    Years of education: Not on file    Highest education level: Not on file   Occupational History    Not on file   Social Needs    Financial resource strain: Not on file    Food insecurity     Worry: Not on file     Inability: Not on file    Transportation needs     Medical: Not on file     Non-medical: Not on file   Tobacco Use    Smoking status: Never Smoker    Smokeless tobacco: Never Used   Substance and Sexual Activity    Alcohol use: No    Drug use: No    Sexual activity: Yes     Partners: Female   Lifestyle    Physical activity     Days per week: Not on file     Minutes per session: Not on file    Stress: Not on file   Relationships    Social connections     Talks on phone: Not on file     Gets together: Not on file     Attends Church service: Not on file     Active member of club or organization: Not on file     Attends meetings of clubs or organizations: Not on file     Relationship status: Not on file    Intimate partner violence     Fear of current or ex partner: Not on file     Emotionally abused: Not on file     Physically abused: Not on file     Forced sexual activity: Not on file   Other Topics Concern    Not on file   Social History Narrative    Not on file       Visit Vitals  /84 (BP 1 Location: Right arm, BP Patient Position: Sitting, BP Cuff Size: Adult)   Pulse 100   Temp 97 °F (36.1 °C) (Axillary)   Resp 18   Ht 5' 9\" (1.753 m)   Wt 294 lb (133.4 kg)   SpO2 98%   BMI 43.42 kg/m²     General:  Well appearing male no acute distress  HEENT:   PERRL,normal conjunctiva. External ear and canals normal, TMs normal.  Hearing normal to voice. Nose without edema or discharge, normal septum. Lips, teeth, gums normal.  Oropharynx: no erythema, no exudates, no lesions, normal tongue. Neck:  Supple. Thyroid normal size, nontender, without nodules. No carotid bruit. No masses or lymphadenopathy  Respiratory: no respiratory distress,  no wheezing, no rhonchi, no rales. No chest wall tenderness. Cardiovascular:  RRR, normal S1S2, no murmur. Gastrointestinal: normal bowel sounds, soft, nontender, without masses. No hepatosplenomegaly. Extremities +2 pulses, no edema, normal sensation   Musculoskeletal:  Normal gait. Normal digits and nails. Normal strength and tone, no atrophy, and no abnormal movement. Skin:  No rash, has skin tags on right thigh  Neuro:  A and OX4, fluent speech, cranial nerves normal 2-12. Sensation normal to light touch.   DTR symmetrical  Psych:  Normal affect      Lab Results   Component Value Date/Time    WBC 7.4 12/22/2020 05:14 AM    HGB 13.9 12/22/2020 05:14 AM    HCT 44.4 12/22/2020 05:14 AM    PLATELET 848 30/34/2337 05:14 AM    MCV 84.6 12/22/2020 05:14 AM     Lab Results   Component Value Date/Time    Sodium 136 12/22/2020 05:14 AM    Potassium 4.0 12/22/2020 05:14 AM    Chloride 101 12/22/2020 05:14 AM CO2 30 12/22/2020 05:14 AM    Anion gap 5 12/22/2020 05:14 AM    Glucose 112 (H) 12/22/2020 05:14 AM    BUN 23 (H) 12/22/2020 05:14 AM    Creatinine 1.29 12/22/2020 05:14 AM    BUN/Creatinine ratio 18 12/22/2020 05:14 AM    GFR est AA >60 12/22/2020 05:14 AM    GFR est non-AA >60 12/22/2020 05:14 AM    Calcium 9.2 12/22/2020 05:14 AM     Lab Results   Component Value Date/Time    Cholesterol, total 229 (H) 08/11/2014 02:45 PM     Lab Results   Component Value Date/Time    TSH 2.38 12/22/2020 05:14 AM     Lab Results   Component Value Date/Time    Hemoglobin A1c 5.8 (H) 09/22/2017 12:12 PM     No results found for: Merry Agent, XQVID3, XQVID, VD3RIA                Assessment and Plan:     1. Essential hypertension  -controlled at goal  - on amlodipine 10mg daily and dyazide 70.7/03  - METABOLIC PANEL, COMPREHENSIVE    2. Gout, unspecified cause, unspecified chronicity, unspecified site  Stopped allopurinol. Rare flairs  - URIC ACID    3.  Obesity, unspecified classification, unspecified obesity type, unspecified whether serious comorbidity present  Working on weight loss, congratulated patient   - LIPID PANEL    4. Elevated blood sugar  HA1C 5.8 in 2017   - HEMOGLOBIN A1C W/O EAG    JOS: compliant with CPAP      skin tags: referral to derm     Follow up in 6 months    Sara Hough MD

## 2021-02-12 LAB
ALBUMIN SERPL-MCNC: 4.6 G/DL (ref 4–5)
ALBUMIN/GLOB SERPL: 1.6 {RATIO} (ref 1.2–2.2)
ALP SERPL-CCNC: 70 IU/L (ref 39–117)
ALT SERPL-CCNC: 34 IU/L (ref 0–44)
AST SERPL-CCNC: 42 IU/L (ref 0–40)
BILIRUB SERPL-MCNC: 0.4 MG/DL (ref 0–1.2)
BUN SERPL-MCNC: 19 MG/DL (ref 6–20)
BUN/CREAT SERPL: 16 (ref 9–20)
CALCIUM SERPL-MCNC: 10.1 MG/DL (ref 8.7–10.2)
CHLORIDE SERPL-SCNC: 102 MMOL/L (ref 96–106)
CHOLEST SERPL-MCNC: 182 MG/DL (ref 100–199)
CO2 SERPL-SCNC: 25 MMOL/L (ref 20–29)
CREAT SERPL-MCNC: 1.2 MG/DL (ref 0.76–1.27)
GLOBULIN SER CALC-MCNC: 2.9 G/DL (ref 1.5–4.5)
GLUCOSE SERPL-MCNC: 108 MG/DL (ref 65–99)
HBA1C MFR BLD: 5.9 % (ref 4.8–5.6)
HDLC SERPL-MCNC: 37 MG/DL
LDLC SERPL CALC-MCNC: 108 MG/DL (ref 0–99)
POTASSIUM SERPL-SCNC: 5.1 MMOL/L (ref 3.5–5.2)
PROT SERPL-MCNC: 7.5 G/DL (ref 6–8.5)
SODIUM SERPL-SCNC: 142 MMOL/L (ref 134–144)
TRIGL SERPL-MCNC: 210 MG/DL (ref 0–149)
URATE SERPL-MCNC: 9.5 MG/DL (ref 3.8–8.4)
VLDLC SERPL CALC-MCNC: 37 MG/DL (ref 5–40)

## 2021-02-12 RX ORDER — ALLOPURINOL 100 MG/1
200 TABLET ORAL DAILY
Qty: 60 TAB | Refills: 5 | Status: SHIPPED | OUTPATIENT
Start: 2021-02-12 | End: 2021-06-23 | Stop reason: SDUPTHER

## 2021-02-12 NOTE — PROGRESS NOTES
Uric acid level is high recommend that you take allopurinol 200mg daily prescription sent in   Normal renal and liver function   Blood sugar in pre diabetic range continue with healthy diet low in sugar and weight loss to prevent developing diabetes

## 2021-02-15 ENCOUNTER — PATIENT MESSAGE (OUTPATIENT)
Dept: INTERNAL MEDICINE CLINIC | Age: 36
End: 2021-02-15

## 2021-04-01 ENCOUNTER — OFFICE VISIT (OUTPATIENT)
Dept: FAMILY MEDICINE CLINIC | Age: 36
End: 2021-04-01

## 2021-04-01 DIAGNOSIS — Z11.52 ENCOUNTER FOR SCREENING FOR COVID-19: Primary | ICD-10-CM

## 2021-04-03 LAB
SARS-COV-2, NAA 2 DAY TAT: NORMAL
SARS-COV-2, NAA: NOT DETECTED

## 2021-04-15 ENCOUNTER — PATIENT MESSAGE (OUTPATIENT)
Dept: INTERNAL MEDICINE CLINIC | Age: 36
End: 2021-04-15

## 2021-04-15 DIAGNOSIS — M10.9 ACUTE GOUT INVOLVING TOE, UNSPECIFIED CAUSE, UNSPECIFIED LATERALITY: Primary | ICD-10-CM

## 2021-04-15 RX ORDER — PREDNISONE 20 MG/1
40 TABLET ORAL
Qty: 10 TAB | Refills: 0 | Status: SHIPPED | OUTPATIENT
Start: 2021-04-15 | End: 2022-02-17 | Stop reason: ALTCHOICE

## 2021-04-15 NOTE — TELEPHONE ENCOUNTER
Christo Wallace 4/15/2021 2:22 PM EDT      ----- Message -----  From: Kenji Dash  Sent: 4/15/2021 12:21 PM EDT  To: [facility]  Subject: Referral Request     I would like a refill on a 5 day gout steroid.  I'm having a gout attack and it's pretty unbearable

## 2021-04-18 DIAGNOSIS — I10 ESSENTIAL HYPERTENSION: ICD-10-CM

## 2021-04-19 RX ORDER — AMLODIPINE BESYLATE 10 MG/1
TABLET ORAL
Qty: 30 TAB | Refills: 5 | Status: SHIPPED | OUTPATIENT
Start: 2021-04-19 | End: 2021-05-29

## 2021-05-29 DIAGNOSIS — I10 ESSENTIAL HYPERTENSION: ICD-10-CM

## 2021-05-29 RX ORDER — AMLODIPINE BESYLATE 10 MG/1
TABLET ORAL
Qty: 30 TABLET | Refills: 5 | Status: SHIPPED | OUTPATIENT
Start: 2021-05-29 | End: 2022-02-04 | Stop reason: SDUPTHER

## 2021-06-01 RX ORDER — TRIAMTERENE AND HYDROCHLOROTHIAZIDE 37.5; 25 MG/1; MG/1
CAPSULE ORAL
Qty: 30 CAPSULE | Refills: 3 | Status: SHIPPED | OUTPATIENT
Start: 2021-06-01 | End: 2021-08-18 | Stop reason: ALTCHOICE

## 2021-06-13 DIAGNOSIS — J45.30: ICD-10-CM

## 2021-06-14 RX ORDER — ALBUTEROL SULFATE 90 UG/1
1 AEROSOL, METERED RESPIRATORY (INHALATION)
Qty: 1 INHALER | Refills: 1 | Status: SHIPPED | OUTPATIENT
Start: 2021-06-14 | End: 2022-07-26 | Stop reason: ALTCHOICE

## 2021-06-14 NOTE — TELEPHONE ENCOUNTER
Future Appointments:  No future appointments. Last Appointment With Me:  2/11/2021     Requested Prescriptions     Pending Prescriptions Disp Refills    albuterol (PROVENTIL HFA, VENTOLIN HFA, PROAIR HFA) 90 mcg/actuation inhaler 1 Inhaler 1     Sig: Take 1 Puff by inhalation every six (6) hours as needed for Wheezing.

## 2021-06-23 DIAGNOSIS — M10.9 ACUTE GOUT OF MULTIPLE SITES, UNSPECIFIED CAUSE: ICD-10-CM

## 2021-06-23 DIAGNOSIS — M76.62 ACHILLES TENDINITIS OF LEFT LOWER EXTREMITY: ICD-10-CM

## 2021-06-23 NOTE — TELEPHONE ENCOUNTER
Future Appointments:  No future appointments. Last Appointment With Me:  2/11/2021     Requested Prescriptions     Pending Prescriptions Disp Refills    allopurinoL (ZYLOPRIM) 100 mg tablet 60 Tablet 5     Sig: Take 2 Tablets by mouth daily.

## 2021-06-24 RX ORDER — ALLOPURINOL 100 MG/1
200 TABLET ORAL DAILY
Qty: 60 TABLET | Refills: 5 | Status: SHIPPED | OUTPATIENT
Start: 2021-06-24 | End: 2022-03-19 | Stop reason: SDUPTHER

## 2021-08-09 ENCOUNTER — PATIENT MESSAGE (OUTPATIENT)
Dept: INTERNAL MEDICINE CLINIC | Age: 36
End: 2021-08-09

## 2021-08-10 ENCOUNTER — TELEPHONE (OUTPATIENT)
Dept: INTERNAL MEDICINE CLINIC | Age: 36
End: 2021-08-10

## 2021-08-10 DIAGNOSIS — J45.30: Primary | ICD-10-CM

## 2021-08-10 NOTE — TELEPHONE ENCOUNTER
Checked insurance, no insurance referral required. Can Dr. Maria M Medrano place a referral to [facility] and once referral is completed I can faxed referral and office notes to their office.

## 2021-08-10 NOTE — TELEPHONE ENCOUNTER
---- Message -----  From: Marla Sensor  Sent: 8/10/2021  10:46 AM EDT  To: [facility]  Subject: Dr. Zaid Arellano/Jabari                        Referral    Caller (first and last name if not the patient or from practice): Pt.       Caller's relationship to patient (if not from a practice): N/a. Name of caller (if calling from a practice): N/a. Name of practice: [facility]. Specialist's title, first, and last name: Unknown. Office Phone Number: [phone]      Fax number: [phone] Attention of Yovana Betancourt. Date and time of appointment: 8/16/21 8:30. Reason for appointment: Check lungs. Details to clarify the request: Also needs last office notes.        Message from [facility]

## 2021-08-18 RX ORDER — SPIRONOLACTONE 25 MG/1
25 TABLET ORAL DAILY
Qty: 30 TABLET | Refills: 1 | Status: SHIPPED | OUTPATIENT
Start: 2021-08-18 | End: 2022-07-26 | Stop reason: ALTCHOICE

## 2021-08-18 RX ORDER — CHLORTHALIDONE 25 MG/1
25 TABLET ORAL DAILY
Qty: 30 TABLET | Refills: 1 | Status: SHIPPED | OUTPATIENT
Start: 2021-08-18 | End: 2022-07-26 | Stop reason: ALTCHOICE

## 2021-08-18 NOTE — TELEPHONE ENCOUNTER
Robehart, Generic 8/18/2021 3:04 PM EDT    Yes, my pressure for the last 3 - 4 weeks has been around 175/100 - 160/90

## 2022-02-17 ENCOUNTER — VIRTUAL VISIT (OUTPATIENT)
Dept: INTERNAL MEDICINE CLINIC | Age: 37
End: 2022-02-17

## 2022-02-17 NOTE — PROGRESS NOTES
Janae Bishop (: 1985) is a 29 y.o. male, established patient, here for evaluation of the following chief complaint(s): Anxiety and Depression       ASSESSMENT/PLAN:  Below is the assessment and plan developed based on review of pertinent history, labs, studies, and medications. SUBJECTIVE/OBJECTIVE:      Patient-Reported Weight: 269lb         Janae Bishop, was evaluated through a synchronous (real-time) audio-video encounter. The patient (or guardian if applicable) is aware that this is a billable service, which includes applicable co-pays. Verbal consent to proceed has been obtained. The visit was conducted pursuant to the emergency declaration under the [address] [place] [address] authority and the [employer] General Act. Patient identification was verified, and a caregiver was present when appropriate. The patient was located at home in a state where the provider was licensed to provide care. An electronic signature was used to authenticate this note.   -- Jhon Owens LPN

## 2022-03-17 ENCOUNTER — VIRTUAL VISIT (OUTPATIENT)
Dept: INTERNAL MEDICINE CLINIC | Age: 37
End: 2022-03-17
Payer: COMMERCIAL

## 2022-03-17 DIAGNOSIS — F41.9 ANXIETY: Primary | ICD-10-CM

## 2022-03-17 PROCEDURE — 99214 OFFICE O/P EST MOD 30 MIN: CPT | Performed by: FAMILY MEDICINE

## 2022-03-17 RX ORDER — BUSPIRONE HYDROCHLORIDE 10 MG/1
10 TABLET ORAL 3 TIMES DAILY
Qty: 90 TABLET | Refills: 0 | Status: SHIPPED | OUTPATIENT
Start: 2022-03-17 | End: 2022-07-26 | Stop reason: ALTCHOICE

## 2022-03-17 NOTE — PROGRESS NOTES
1. \"Have you been to the ER, urgent care clinic since your last visit? Hospitalized since your last visit? \" No    2. \"Have you seen or consulted any other health care providers outside of the [address] [place] [address] since your last visit? \" No     3. For patients aged 39-70: Has the patient had a colonoscopy / FIT/ Cologuard? NA - based on age      If the patient is female:    4. For patients aged 41-77: Has the patient had a mammogram within the past 2 years? NA - based on age or sex      11. For patients aged 21-65: Has the patient had a pap smear?  NA - based on age or sex

## 2022-03-17 NOTE — PROGRESS NOTES
Jonathan Bradley is a 29 y.o. male who was seen by synchronous (real-time) audio-video technology on 3/17/2022 for Anxiety (also panic attacks. new to patient. got Vaccine in July or August ever since then he has been nervous, worried and elevated heart rate. pulse goes up to 120 when at rest it is 80. )        Assessment & Plan:   Diagnoses and all orders for this visit:    1. Anxiety  -     busPIRone (BUSPAR) 10 mg tablet; Take 1 Tablet by mouth three (3) times daily. I prescribed Buspar. I advised him to take half a tablet tid for the first three days. I referred the pt to a psychologist. He does not have a gender preference for a counselor. Since he now lives in [place], he would like a psychologist in the [place] area. Subjective:   He, a pt of Dr. Giovany Butler, presents with a c/o of panic attacks and increased anxiety, which started in the summer. After his first vaccine shot, he experienced increased episodes of accelerated heartbeat, increased anxiety, and troubled breathing. He denies a similar experiences prior to the vaccine. He notes that his personality has changed from being calm and is now hyper and excitable. The anxiety has made him more pessimistic. Recently he went on a roller coaster. He started hyperventilating and was unable to breathe. He notes feeling hyper and excited in the last month. Yesterday, he had to take deep breathes to calm down at a recent . It takes 15-20 for him to calm down from a panic attack. He feels that he never wants to seek help, and rejects the idea of therapy. He considers therapy as a sign of weakness. He denies a history of thyroid abnormalities. His girlfriend is able to help him relax. He is not employed, but he has a lot of responsibilities. This has caused him to feel  overwhelmed. He followed up with cardiologist in Sept. A stress test and Holter test were completed, and no consistent abnormal heartbeat was noted in the results.  He takes melatonin, and he notes that it is effective at helping him sleep. He tried an edible last April, which led to uncomfortable side effects. During a previous doctor's appointment at the start of COVID, he was noted to have an oxygen saturation of 46%, and sent to the ER. He was able to return to baseline. Prior to Admission medications    Medication Sig Start Date End Date Taking? Authorizing Provider   busPIRone (BUSPAR) 10 mg tablet Take 1 Tablet by mouth three (3) times daily. 3/17/22  Yes Maria Antonia Daniel MD   amLODIPine (NORVASC) 10 mg tablet Take 1 Tablet by mouth daily. 2/4/22  Yes Monica Tang MD   albuterol (PROVENTIL HFA, VENTOLIN HFA, PROAIR HFA) 90 mcg/actuation inhaler Take 1 Puff by inhalation every six (6) hours as needed for Wheezing. 6/14/21  Yes Antoni William MD   cpap machine kit by Does Not Apply route. Yes Provider, Historical   chlorthalidone (HYGROTON) 25 mg tablet Take 1 Tablet by mouth daily. 8/18/21   Appdenise William MD   spironolactone (ALDACTONE) 25 mg tablet Take 1 Tablet by mouth daily. 8/18/21   Antoni William MD   allopurinoL (ZYLOPRIM) 100 mg tablet Take 2 Tablets by mouth daily. Patient not taking: Reported on 3/17/2022 6/24/21   Vinny King MD     Patient Active Problem List    Diagnosis Date Noted    Obesity, morbid ([facility] [address] 03/29/2018    Chronic bilateral low back pain with sciatica 03/29/2018    JOS (obstructive sleep apnea)     Hypertension     Cold-induced asthma     Gout      Current Outpatient Medications   Medication Sig Dispense Refill    busPIRone (BUSPAR) 10 mg tablet Take 1 Tablet by mouth three (3) times daily. 90 Tablet 0    amLODIPine (NORVASC) 10 mg tablet Take 1 Tablet by mouth daily. 30 Tablet 1    albuterol (PROVENTIL HFA, VENTOLIN HFA, PROAIR HFA) 90 mcg/actuation inhaler Take 1 Puff by inhalation every six (6) hours as needed for Wheezing. 1 Inhaler 1    cpap machine kit by Does Not Apply route.       chlorthalidone (HYGROTON) 25 mg tablet Take 1 Tablet by mouth daily. 30 Tablet 1    spironolactone (ALDACTONE) 25 mg tablet Take 1 Tablet by mouth daily. 30 Tablet 1    allopurinoL (ZYLOPRIM) 100 mg tablet Take 2 Tablets by mouth daily. (Patient not taking: Reported on 3/17/2022) 60 Tablet 5     Allergies   Allergen Reactions    Lisinopril Swelling     Pt has sever swelling of the throat and tongue.  Pcn [Penicillins] Other (comments)     Allergic as a child       Past Medical History:   Diagnosis Date    Angioedema     ace induced asthma     Cold-induced asthma     Gout     Hypertension     JOS (obstructive sleep apnea)      Past Surgical History:   Procedure Laterality Date    HX ADENOIDECTOMY       Family History   Problem Relation Age of Onset    Sleep Apnea Mother     Hypertension Mother     Hypertension Father      Social History     Tobacco Use    Smoking status: Never Smoker    Smokeless tobacco: Never Used   Substance Use Topics    Alcohol use: No       Review of Systems   Constitutional: Negative. HENT: Negative. Eyes: Negative. Respiratory: Positive for shortness of breath. Cardiovascular: Negative. Gastrointestinal: Negative. Genitourinary: Negative. Musculoskeletal: Negative. Skin: Negative. Neurological: Negative. Endo/Heme/Allergies: Negative. Psychiatric/Behavioral: The patient is nervous/anxious.         Objective:     Patient-Reported Vitals 3/17/2022   Patient-Reported Weight 268lb        [INSTRUCTIONS:  \"[x]\" Indicates a positive item  \"[]\" Indicates a negative item  -- DELETE ALL ITEMS NOT EXAMINED]    Constitutional: [x] Appears well-developed and well-nourished [x] No apparent distress      [] Abnormal -     Mental status: [x] Alert and awake  [x] Oriented to person/place/time [x] Able to follow commands    [] Abnormal -     Eyes:   EOM    [x]  Normal    [] Abnormal -   Sclera  [x]  Normal    [] Abnormal -          Discharge [x]  None visible [] Abnormal -     HENT: [x] Normocephalic, atraumatic  [] Abnormal -   [x] Mouth/Throat: Mucous membranes are moist    External Ears [x] Normal  [] Abnormal -    Neck: [x] No visualized mass [] Abnormal -     Pulmonary/Chest: [x] Respiratory effort normal   [x] No visualized signs of difficulty breathing or respiratory distress        [] Abnormal -      Musculoskeletal:   [x] Normal gait with no signs of ataxia         [x] Normal range of motion of neck        [] Abnormal -     Neurological:        [x] No Facial Asymmetry (Cranial nerve 7 motor function) (limited exam due to video visit)          [x] No gaze palsy        [] Abnormal -          Skin:        [x] No significant exanthematous lesions or discoloration noted on facial skin         [] Abnormal -            Psychiatric:       [x] Normal Affect [] Abnormal -        [x] No Hallucinations    Other pertinent observable physical exam findings:-        We discussed the expected course, resolution and complications of the diagnosis(es) in detail. Medication risks, benefits, costs, interactions, and alternatives were discussed as indicated. I advised him to contact the office if his condition worsens, changes or fails to improve as anticipated. He expressed understanding with the diagnosis(es) and plan. Sofi Spaulding, was evaluated through a synchronous (real-time) audio-video encounter. The patient (or guardian if applicable) is aware that this is a billable service, which includes applicable co-pays. Verbal consent to proceed has been obtained. The visit was conducted pursuant to the emergency declaration under the [address] [place] [address] authority and the [employer] General Act. Patient identification was verified, and a caregiver was present when appropriate. The patient was located at home in a state where the provider was licensed to provide care.     Written by Na Johnson Mary Babin, as dictated by Mary Villarreal MD.    Xochitl Holden

## 2022-03-19 DIAGNOSIS — M10.9 ACUTE GOUT OF MULTIPLE SITES, UNSPECIFIED CAUSE: ICD-10-CM

## 2022-03-19 DIAGNOSIS — M76.62 ACHILLES TENDINITIS OF LEFT LOWER EXTREMITY: ICD-10-CM

## 2022-03-19 PROBLEM — M54.41 CHRONIC BILATERAL LOW BACK PAIN WITH SCIATICA: Status: ACTIVE | Noted: 2018-03-29

## 2022-03-19 PROBLEM — M54.42 CHRONIC BILATERAL LOW BACK PAIN WITH SCIATICA: Status: ACTIVE | Noted: 2018-03-29

## 2022-03-19 PROBLEM — E66.01 OBESITY, MORBID (HCC): Status: ACTIVE | Noted: 2018-03-29

## 2022-03-19 PROBLEM — G89.29 CHRONIC BILATERAL LOW BACK PAIN WITH SCIATICA: Status: ACTIVE | Noted: 2018-03-29

## 2022-03-19 PROBLEM — M54.40 CHRONIC BILATERAL LOW BACK PAIN WITH SCIATICA: Status: ACTIVE | Noted: 2018-03-29

## 2022-03-21 RX ORDER — ALLOPURINOL 100 MG/1
200 TABLET ORAL DAILY
Qty: 60 TABLET | Refills: 5 | Status: SHIPPED | OUTPATIENT
Start: 2022-03-21 | End: 2022-07-26 | Stop reason: ALTCHOICE

## 2022-03-21 NOTE — TELEPHONE ENCOUNTER
PCP: Josef Reed MD    Last appt: 3/17/2022  No future appointments. Requested Prescriptions     Pending Prescriptions Disp Refills    allopurinoL (ZYLOPRIM) 100 mg tablet 60 Tablet 5     Sig: Take 2 Tablets by mouth daily.

## 2022-04-11 DIAGNOSIS — I10 ESSENTIAL HYPERTENSION: ICD-10-CM

## 2022-04-11 RX ORDER — AMLODIPINE BESYLATE 10 MG/1
10 TABLET ORAL DAILY
Qty: 30 TABLET | Refills: 5 | Status: SHIPPED | OUTPATIENT
Start: 2022-04-11 | End: 2022-07-26 | Stop reason: DRUGHIGH

## 2022-04-11 NOTE — TELEPHONE ENCOUNTER
Future Appointments:  No future appointments. Last Appointment With Me:  2/17/2022     Requested Prescriptions     Pending Prescriptions Disp Refills    amLODIPine (NORVASC) 10 mg tablet 30 Tablet 5     Sig: Take 1 Tablet by mouth daily.      Signed By: Syed Erickson LPN     April 11, 9027

## 2022-07-26 ENCOUNTER — APPOINTMENT (OUTPATIENT)
Dept: GENERAL RADIOLOGY | Age: 37
End: 2022-07-26
Attending: EMERGENCY MEDICINE
Payer: COMMERCIAL

## 2022-07-26 ENCOUNTER — HOSPITAL ENCOUNTER (EMERGENCY)
Age: 37
Discharge: HOME | End: 2022-07-26
Attending: EMERGENCY MEDICINE
Payer: COMMERCIAL

## 2022-07-26 ENCOUNTER — OFFICE VISIT (OUTPATIENT)
Dept: INTERNAL MEDICINE CLINIC | Age: 37
End: 2022-07-26
Payer: COMMERCIAL

## 2022-07-26 VITALS
SYSTOLIC BLOOD PRESSURE: 142 MMHG | RESPIRATION RATE: 18 BRPM | TEMPERATURE: 98.3 F | DIASTOLIC BLOOD PRESSURE: 90 MMHG | HEART RATE: 80 BPM | OXYGEN SATURATION: 100 %

## 2022-07-26 VITALS
BODY MASS INDEX: 41.83 KG/M2 | HEART RATE: 112 BPM | RESPIRATION RATE: 16 BRPM | OXYGEN SATURATION: 100 % | HEIGHT: 69 IN | WEIGHT: 282.41 LBS | TEMPERATURE: 98.4 F | DIASTOLIC BLOOD PRESSURE: 104 MMHG | SYSTOLIC BLOOD PRESSURE: 178 MMHG

## 2022-07-26 DIAGNOSIS — F41.1 ANXIETY STATE: ICD-10-CM

## 2022-07-26 DIAGNOSIS — I10 ESSENTIAL HYPERTENSION: Primary | ICD-10-CM

## 2022-07-26 DIAGNOSIS — I10 HYPERTENSION, UNSPECIFIED TYPE: Primary | ICD-10-CM

## 2022-07-26 DIAGNOSIS — F41.9 ANXIETY: ICD-10-CM

## 2022-07-26 LAB
ALBUMIN SERPL-MCNC: 3.8 G/DL (ref 3.5–5)
ALBUMIN/GLOB SERPL: 0.9 {RATIO} (ref 1.1–2.2)
ALP SERPL-CCNC: 85 U/L (ref 45–117)
ALT SERPL-CCNC: 37 U/L (ref 12–78)
ANION GAP SERPL CALC-SCNC: 6 MMOL/L (ref 5–15)
AST SERPL-CCNC: 18 U/L (ref 15–37)
ATRIAL RATE: 93 BPM
BASOPHILS # BLD: 0.1 K/UL (ref 0–0.1)
BASOPHILS NFR BLD: 2 % (ref 0–1)
BILIRUB SERPL-MCNC: 0.7 MG/DL (ref 0.2–1)
BNP SERPL-MCNC: 10 PG/ML
BUN SERPL-MCNC: 10 MG/DL (ref 6–20)
BUN/CREAT SERPL: 10 (ref 12–20)
CALCIUM SERPL-MCNC: 9.1 MG/DL (ref 8.5–10.1)
CALCULATED P AXIS, ECG09: 58 DEGREES
CALCULATED R AXIS, ECG10: 27 DEGREES
CALCULATED T AXIS, ECG11: 2 DEGREES
CHLORIDE SERPL-SCNC: 104 MMOL/L (ref 97–108)
CO2 SERPL-SCNC: 28 MMOL/L (ref 21–32)
CREAT SERPL-MCNC: 1.04 MG/DL (ref 0.7–1.3)
DIAGNOSIS, 93000: NORMAL
DIFFERENTIAL METHOD BLD: ABNORMAL
EOSINOPHIL # BLD: 0 K/UL (ref 0–0.4)
EOSINOPHIL NFR BLD: 0 % (ref 0–7)
ERYTHROCYTE [DISTWIDTH] IN BLOOD BY AUTOMATED COUNT: 13 % (ref 11.5–14.5)
GLOBULIN SER CALC-MCNC: 4.2 G/DL (ref 2–4)
GLUCOSE SERPL-MCNC: 127 MG/DL (ref 65–100)
HCT VFR BLD AUTO: 43.1 % (ref 36.6–50.3)
HGB BLD-MCNC: 13.7 G/DL (ref 12.1–17)
IMM GRANULOCYTES # BLD AUTO: 0 K/UL (ref 0–0.04)
IMM GRANULOCYTES NFR BLD AUTO: 0 % (ref 0–0.5)
LYMPHOCYTES # BLD: 1.8 K/UL (ref 0.8–3.5)
LYMPHOCYTES NFR BLD: 28 % (ref 12–49)
MCH RBC QN AUTO: 27.1 PG (ref 26–34)
MCHC RBC AUTO-ENTMCNC: 31.8 G/DL (ref 30–36.5)
MCV RBC AUTO: 85.3 FL (ref 80–99)
MONOCYTES # BLD: 0.2 K/UL (ref 0–1)
MONOCYTES NFR BLD: 3 % (ref 5–13)
NEUTS SEG # BLD: 4.3 K/UL (ref 1.8–8)
NEUTS SEG NFR BLD: 67 % (ref 32–75)
NRBC # BLD: 0 K/UL (ref 0–0.01)
NRBC BLD-RTO: 0 PER 100 WBC
P-R INTERVAL, ECG05: 148 MS
PLATELET # BLD AUTO: 199 K/UL (ref 150–400)
PMV BLD AUTO: 12.4 FL (ref 8.9–12.9)
POTASSIUM SERPL-SCNC: 3.7 MMOL/L (ref 3.5–5.1)
PROT SERPL-MCNC: 8 G/DL (ref 6.4–8.2)
Q-T INTERVAL, ECG07: 338 MS
QRS DURATION, ECG06: 102 MS
QTC CALCULATION (BEZET), ECG08: 420 MS
RBC # BLD AUTO: 5.05 M/UL (ref 4.1–5.7)
RBC MORPH BLD: ABNORMAL
SODIUM SERPL-SCNC: 138 MMOL/L (ref 136–145)
TROPONIN-HIGH SENSITIVITY: 9 NG/L (ref 0–76)
VENTRICULAR RATE, ECG03: 93 BPM
WBC # BLD AUTO: 6.4 K/UL (ref 4.1–11.1)

## 2022-07-26 PROCEDURE — 99214 OFFICE O/P EST MOD 30 MIN: CPT | Performed by: INTERNAL MEDICINE

## 2022-07-26 PROCEDURE — 36415 COLL VENOUS BLD VENIPUNCTURE: CPT

## 2022-07-26 PROCEDURE — 80053 COMPREHEN METABOLIC PANEL: CPT

## 2022-07-26 PROCEDURE — 85025 COMPLETE CBC W/AUTO DIFF WBC: CPT

## 2022-07-26 PROCEDURE — 83880 ASSAY OF NATRIURETIC PEPTIDE: CPT

## 2022-07-26 PROCEDURE — 93005 ELECTROCARDIOGRAM TRACING: CPT

## 2022-07-26 PROCEDURE — 71045 X-RAY EXAM CHEST 1 VIEW: CPT

## 2022-07-26 PROCEDURE — 99285 EMERGENCY DEPT VISIT HI MDM: CPT

## 2022-07-26 PROCEDURE — 84484 ASSAY OF TROPONIN QUANT: CPT

## 2022-07-26 RX ORDER — FLUOXETINE 10 MG/1
10 TABLET ORAL DAILY
Qty: 30 TABLET | Refills: 3 | Status: SHIPPED | OUTPATIENT
Start: 2022-07-26 | End: 2022-08-16 | Stop reason: ALTCHOICE

## 2022-07-26 RX ORDER — BUSPIRONE HYDROCHLORIDE 10 MG/1
10 TABLET ORAL 2 TIMES DAILY
Qty: 60 TABLET | Refills: 3 | Status: SHIPPED | OUTPATIENT
Start: 2022-07-26 | End: 2022-09-13 | Stop reason: DRUGHIGH

## 2022-07-26 RX ORDER — AMLODIPINE AND VALSARTAN 10; 160 MG/1; MG/1
1 TABLET ORAL DAILY
Qty: 30 TABLET | Refills: 3 | Status: SHIPPED | OUTPATIENT
Start: 2022-07-26 | End: 2022-08-16 | Stop reason: ALTCHOICE

## 2022-07-26 NOTE — ED PROVIDER NOTES
EMERGENCY DEPARTMENT HISTORY AND PHYSICAL EXAM      Date: 7/26/2022  Patient Name: Sahra Lopez    History of Presenting Illness     Chief Complaint   Patient presents with    Rapid Heart Rate     Pt arrives ambulatory to triage, reports that yesterday he was taken by ambulance to outside ED for HTN and rapid heart rate. Patient reports he had a normal work up but feels the same today. Reports hx of anxiety. Patient reports I \"just don't feel right\"        History Provided By: Patient    HPI: Sahra Lopez, 29 y.o. male with a past medical history significant for Angioedema, hypertension, gout, obstructive sleep apnea, medical problems as stated below presents to the ED with cc of complaining of of rapid heart rate, numbness in his feet, anxiety, shortness of breath. Patient reports has had increase anxiety over the last 1 to 2 days. He does have a long history of this and has been followed by Dr. Lynette Garcia.  Patient was told and prescribed BuSpar but he has not started this because he was concerned he could interact with his hypertensive medications. He reports no discrete chest pain associated with this. He is concerned about his blood pressure. He has an appointment today with his primary care doctor. No other associated symptoms. No other exacerbating or mounting factors. There are no other complaints, changes, or physical findings at this time. PCP: Ishaan Carson MD    No current facility-administered medications on file prior to encounter. Current Outpatient Medications on File Prior to Encounter   Medication Sig Dispense Refill    amLODIPine (NORVASC) 10 mg tablet Take 1 Tablet by mouth daily. 30 Tablet 5    allopurinoL (ZYLOPRIM) 100 mg tablet Take 2 Tablets by mouth daily. 60 Tablet 5    busPIRone (BUSPAR) 10 mg tablet Take 1 Tablet by mouth three (3) times daily. 90 Tablet 0    chlorthalidone (HYGROTON) 25 mg tablet Take 1 Tablet by mouth daily.  30 Tablet 1    spironolactone (ALDACTONE) 25 mg tablet Take 1 Tablet by mouth daily. 30 Tablet 1    albuterol (PROVENTIL HFA, VENTOLIN HFA, PROAIR HFA) 90 mcg/actuation inhaler Take 1 Puff by inhalation every six (6) hours as needed for Wheezing. 1 Inhaler 1    cpap machine kit by Does Not Apply route. Past History     Past Medical History:  Past Medical History:   Diagnosis Date    Angioedema     ace induced asthma     Cold-induced asthma     Gout     Hypertension     JOS (obstructive sleep apnea)        Past Surgical History:  Past Surgical History:   Procedure Laterality Date    HX ADENOIDECTOMY         Family History:  Family History   Problem Relation Age of Onset    Sleep Apnea Mother     Hypertension Mother     Hypertension Father        Social History:  Social History     Tobacco Use    Smoking status: Never    Smokeless tobacco: Never   Substance Use Topics    Alcohol use: No    Drug use: No       Allergies: Allergies   Allergen Reactions    Lisinopril Swelling     Pt has sever swelling of the throat and tongue. Pcn [Penicillins] Other (comments)     Allergic as a child           Review of Systems   Review of Systems   Constitutional:  Negative for chills, diaphoresis, fatigue and fever. HENT:  Negative for ear pain and sore throat. Eyes:  Negative for pain and redness. Respiratory:  Negative for cough and shortness of breath. Cardiovascular:  Negative for chest pain and leg swelling. Gastrointestinal:  Negative for abdominal pain, diarrhea, nausea and vomiting. Endocrine: Negative for cold intolerance and heat intolerance. Genitourinary:  Negative for flank pain and hematuria. Musculoskeletal:  Negative for back pain and neck stiffness. Skin:  Negative for rash and wound. Neurological:  Positive for numbness. Negative for dizziness, syncope and headaches. Psychiatric/Behavioral:  The patient is nervous/anxious. All other systems reviewed and are negative.     Physical Exam   Physical Exam  Vitals and nursing note reviewed. Constitutional:       General: He is not in acute distress. Appearance: He is well-developed. He is not ill-appearing. HENT:      Head: Normocephalic and atraumatic. Mouth/Throat:      Pharynx: No oropharyngeal exudate. Eyes:      Conjunctiva/sclera: Conjunctivae normal.      Pupils: Pupils are equal, round, and reactive to light. Cardiovascular:      Rate and Rhythm: Normal rate and regular rhythm. Heart sounds: No murmur heard. Pulmonary:      Effort: Pulmonary effort is normal. No respiratory distress. Breath sounds: Normal breath sounds. No wheezing. Abdominal:      General: Bowel sounds are normal. There is no distension. Palpations: Abdomen is soft. Tenderness: There is no abdominal tenderness. Musculoskeletal:         General: No deformity. Normal range of motion. Cervical back: Normal range of motion. Skin:     General: Skin is warm and dry. Findings: No rash. Neurological:      Mental Status: He is alert and oriented to person, place, and time. Cranial Nerves: No cranial nerve deficit. Sensory: No sensory deficit. Motor: No weakness.       Coordination: Coordination normal.      Gait: Gait normal.   Psychiatric:         Behavior: Behavior normal.       Diagnostic Study Results     Labs -     Recent Results (from the past 24 hour(s))   EKG, 12 LEAD, INITIAL    Collection Time: 07/26/22 10:16 AM   Result Value Ref Range    Ventricular Rate 93 BPM    Atrial Rate 93 BPM    P-R Interval 148 ms    QRS Duration 102 ms    Q-T Interval 338 ms    QTC Calculation (Bezet) 420 ms    Calculated P Axis 58 degrees    Calculated R Axis 27 degrees    Calculated T Axis 2 degrees    Diagnosis       Normal sinus rhythm  Normal ECG  When compared with ECG of 22-DEC-2020 02:14,  No significant change was found     CBC WITH AUTOMATED DIFF    Collection Time: 07/26/22 10:20 AM   Result Value Ref Range    WBC 6.4 4.1 - 11.1 K/uL    RBC 5. 05 4. 10 - 5.70 M/uL    HGB 13.7 12.1 - 17.0 g/dL    HCT 43.1 36.6 - 50.3 %    MCV 85.3 80.0 - 99.0 FL    MCH 27.1 26.0 - 34.0 PG    MCHC 31.8 30.0 - 36.5 g/dL    RDW 13.0 11.5 - 14.5 %    PLATELET 822 806 - 800 K/uL    MPV 12.4 8.9 - 12.9 FL    NRBC 0.0 0  WBC    ABSOLUTE NRBC 0.00 0.00 - 0.01 K/uL    NEUTROPHILS 67 32 - 75 %    LYMPHOCYTES 28 12 - 49 %    MONOCYTES 3 (L) 5 - 13 %    EOSINOPHILS 0 0 - 7 %    BASOPHILS 2 (H) 0 - 1 %    IMMATURE GRANULOCYTES 0 0.0 - 0.5 %    ABS. NEUTROPHILS 4.3 1.8 - 8.0 K/UL    ABS. LYMPHOCYTES 1.8 0.8 - 3.5 K/UL    ABS. MONOCYTES 0.2 0.0 - 1.0 K/UL    ABS. EOSINOPHILS 0.0 0.0 - 0.4 K/UL    ABS. BASOPHILS 0.1 0.0 - 0.1 K/UL    ABS. IMM. GRANS. 0.0 0.00 - 0.04 K/UL    DF MANUAL      RBC COMMENTS NORMOCYTIC, NORMOCHROMIC     METABOLIC PANEL, COMPREHENSIVE    Collection Time: 07/26/22 10:20 AM   Result Value Ref Range    Sodium 138 136 - 145 mmol/L    Potassium 3.7 3.5 - 5.1 mmol/L    Chloride 104 97 - 108 mmol/L    CO2 28 21 - 32 mmol/L    Anion gap 6 5 - 15 mmol/L    Glucose 127 (H) 65 - 100 mg/dL    BUN 10 6 - 20 MG/DL    Creatinine 1.04 0.70 - 1.30 MG/DL    BUN/Creatinine ratio 10 (L) 12 - 20      GFR est AA >60 >60 ml/min/1.73m2    GFR est non-AA >60 >60 ml/min/1.73m2    Calcium 9.1 8.5 - 10.1 MG/DL    Bilirubin, total 0.7 0.2 - 1.0 MG/DL    ALT (SGPT) 37 12 - 78 U/L    AST (SGOT) 18 15 - 37 U/L    Alk. phosphatase 85 45 - 117 U/L    Protein, total 8.0 6.4 - 8.2 g/dL    Albumin 3.8 3.5 - 5.0 g/dL    Globulin 4.2 (H) 2.0 - 4.0 g/dL    A-G Ratio 0.9 (L) 1.1 - 2.2     NT-PRO BNP    Collection Time: 07/26/22 10:20 AM   Result Value Ref Range    NT pro-BNP 10 <125 PG/ML   TROPONIN-HIGH SENSITIVITY    Collection Time: 07/26/22 10:20 AM   Result Value Ref Range    Troponin-High Sensitivity 9 0 - 76 ng/L       Radiologic Studies -   XR CHEST PORT   Final Result   1. No radiographic evidence of acute cardiopulmonary disease.                  CT Results  (Last 48 hours)      None CXR Results  (Last 48 hours)                 07/26/22 1032  XR CHEST PORT Final result    Impression:  1. No radiographic evidence of acute cardiopulmonary disease. Narrative:  INDICATION: . chest pain   Additional history:   COMPARISON: Previous chest xray, 2/24/2020. CT of the chest, 2/24/2020. LIMITATIONS: Portable technique. Keikol Spinner FINDINGS: Single frontal view of the chest.    .   Lines/tubes/surgical: None. Heart/mediastinum: Unremarkable. Lungs/pleura:  No focal consolidation or mass. No visualized pleural effusion or   pneumothorax. Additional Comments: None. .                 Medical Decision Making   I am the first provider for this patient. I reviewed the vital signs, available nursing notes, past medical history, past surgical history, family history and social history. Vital Signs-Reviewed the patient's vital signs. Patient Vitals for the past 12 hrs:   Temp Pulse Resp BP SpO2   07/26/22 1008 98.4 °F (36.9 °C) (!) 112 16 (!) 178/104 100 %       Records Reviewed: Nursing records and medical records reviewed    MDM:  Anxiety versus hypertensive emergency versus hypertensive urgency versus elevated hypertension versus panic attack    Provider Notes (Medical Decision Making):   Patient is a 28-year-old male who is mildly overweight presenting with elevated blood pressure and symptoms consistent with anxiety state. He has no evidence of endorgan damage, no evidence of ACS or hypertensive emergency. His blood pressure is normalizing acutely safe for discharge to his primary care doctors today for consideration of different antihypertensive medications. ED Course:   Initial assessment performed. The patients presenting problems have been discussed, and they are in agreement with the care plan formulated and outlined with them. I have encouraged them to ask questions as they arise throughout their visit.     ED Course as of 07/26/22 1213   Tue Jul 26, 2022 1158 EKG: normal EKG, normal sinus rhythm, normal sinus rhythm    [CC]   1159 Pulse Oximetry Analysis - Normal 98% on ra    Cardiac Monitor:   Rate: 90   Rhythm: Normal Sinus Rhythm without ectopy            [CC]      ED Course User Index  [CC] Isidra So MD               Critical Care:  None      Disposition:  12:13 PM  Quan Maya's  results have been reviewed with him. He has been counseled regarding his diagnosis. He verbally conveys understanding and agreement of the signs, symptoms, diagnosis, treatment and prognosis and additionally agrees to follow up as recommended with Dr. Rubi Zarate MD in 24 - 48 hours. He also agrees with the care-plan and conveys that all of his questions have been answered. I have also put together some discharge instructions for him that include: 1) educational information regarding their diagnosis, 2) how to care for their diagnosis at home, as well a 3) list of reasons why they would want to return to the ED prior to their follow-up appointment, should their condition change. DISCHARGE PLAN:  1. Current Discharge Medication List        2. Follow-up Information       Follow up With Specialties Details Why Contact Mukesh Lizama MD Internal Medicine Physician Today For a follow-up evaluation. Keep your scheduled appointment for today. You may need to adjust her medications based on what your primary care doctors assessment is. Your lab work was normal today. [address] [facility] [address].  [phone]      [facility] EMERGENCY DEPT Emergency Medicine In 2 days If symptoms worsen [address] [place] [address]  [phone]          3. Return to ED if worse     Diagnosis     Clinical Impression:   1. Hypertension, unspecified type    2.  Anxiety state        Attestations:    Kye Webster MD    Please note that this dictation was completed with BISON, the computer voice recognition software. Quite often unanticipated grammatical, syntax, homophones, and other interpretive errors are inadvertently transcribed by the computer software. Please disregard these errors. Please excuse any errors that have escaped final proofreading. Thank you. Carac Counseling:  I discussed with the patient the risks of Carac including but not limited to erythema, scaling, itching, weeping, crusting, and pain.

## 2022-07-26 NOTE — PROGRESS NOTES
1. Have you been to the ER, urgent care clinic since your last visit? Hospitalized since your last visit? Yes When: 7/25/2022 [facility]     2. Have you seen or consulted any other health care providers outside of the [address] [place] [address] since your last visit? Include any pap smears or colon screening.  No

## 2022-07-26 NOTE — PROGRESS NOTES
Mr. Poncho Foster is presenting to follow up     CC: Anxiety, Depression (Reports more anxiety ), ED Follow-up (\"Panic attack yesterday at work\" ), Hypertension, and Medication Evaluation       HPI:  Mr. Dacia Puckett has a history of hypertension and gout. He started to experience anxiety symptoms in 2021 summer. He associates the onset of anxiety after getting his present COVID shot   that his personality has changed from being calm and is now hyper and excitable. The anxiety has made him more pessimistic  Saw Dr. Akin Rodriges  and advised to take BuSpar 5 mg to 3 times a day  He felt the medication was not working and rarely takes it. Today he felt extreme anxiety with tingling in hands and extremity shortness of breath went to the emergency room-labs were fine. Troponin negative  Blood pressure was high 178/100  He is compliant with amlodipine 10 mg daily. JOS using CPAP every night     Gout: last attack one year ago and doing better since changing diet. Rarely takes allopurinol    He denies suicidal thoughts. He denies depression. He is in a good relationship. Review of systems:  Constitutional: negative for fever, chills, weight loss, night sweats   10 systems reviewed and negative other than HPI      Past Medical History:   Diagnosis Date    Angioedema     ace induced asthma     Cold-induced asthma     Gout     Hypertension     JOS (obstructive sleep apnea)         Past Surgical History:   Procedure Laterality Date    HX ADENOIDECTOMY         Allergies   Allergen Reactions    Lisinopril Swelling     Pt has sever swelling of the throat and tongue. Pcn [Penicillins] Other (comments)     Allergic as a child         Current Outpatient Medications on File Prior to Visit   Medication Sig Dispense Refill    amLODIPine (NORVASC) 10 mg tablet Take 1 Tablet by mouth daily. 30 Tablet 5    cpap machine kit by Does Not Apply route. allopurinoL (ZYLOPRIM) 100 mg tablet Take 2 Tablets by mouth daily.  60 Tablet 5 busPIRone (BUSPAR) 10 mg tablet Take 1 Tablet by mouth three (3) times daily. (Patient not taking: Reported on 7/26/2022) 90 Tablet 0    chlorthalidone (HYGROTON) 25 mg tablet Take 1 Tablet by mouth daily. 30 Tablet 1    spironolactone (ALDACTONE) 25 mg tablet Take 1 Tablet by mouth daily. 30 Tablet 1    albuterol (PROVENTIL HFA, VENTOLIN HFA, PROAIR HFA) 90 mcg/actuation inhaler Take 1 Puff by inhalation every six (6) hours as needed for Wheezing. (Patient not taking: Reported on 7/26/2022) 1 Inhaler 1     No current facility-administered medications on file prior to visit. family history includes Hypertension in his father and mother; Sleep Apnea in his mother. Social History     Socioeconomic History    Marital status: SINGLE     Spouse name: Not on file    Number of children: Not on file    Years of education: Not on file    Highest education level: Not on file   Occupational History    Not on file   Tobacco Use    Smoking status: Never    Smokeless tobacco: Never   Substance and Sexual Activity    Alcohol use: No    Drug use: No    Sexual activity: Yes     Partners: Female   Other Topics Concern    Not on file   Social History Narrative    Not on file     Social Determinants of Health     Financial Resource Strain: Not on file   Food Insecurity: Not on file   Transportation Needs: Not on file   Physical Activity: Not on file   Stress: Not on file   Social Connections: Not on file   Intimate Partner Violence: Not on file   Housing Stability: Not on file       Visit Vitals  BP (!) 142/90   Pulse 80   Temp 98.3 °F (36.8 °C) (Temporal)   Resp 18   SpO2 100%     General:  Well appearing male no acute distress  HEENT:   PERRL,normal conjunctiva. External ear and canals normal, TMs normal.  Hearing normal to voice. Nose without edema or discharge, normal septum. Lips, teeth, gums normal.  Oropharynx: no erythema, no exudates, no lesions, normal tongue. Neck:  Supple.  Thyroid normal size, nontender, without nodules. No carotid bruit. No masses or lymphadenopathy  Respiratory: no respiratory distress,  no wheezing, no rhonchi, no rales. No chest wall tenderness. Cardiovascular:  RRR, normal S1S2, no murmur. Gastrointestinal: normal bowel sounds, soft, nontender, without masses. No hepatosplenomegaly. Extremities +2 pulses, no edema, normal sensation   Musculoskeletal:  Normal gait. Normal digits and nails. Normal strength and tone, no atrophy, and no abnormal movement. Skin:  No rash, no lesions, no ulcers. Skin warm, normal turgor, without induration or nodules. Neuro:  A and OX4, fluent speech, cranial nerves normal 2-12. Sensation normal to light touch.   DTR symmetrical  Psych: Anxious affect      Lab Results   Component Value Date/Time    WBC 6.4 07/26/2022 10:20 AM    HGB 13.7 07/26/2022 10:20 AM    HCT 43.1 07/26/2022 10:20 AM    PLATELET 337 04/57/1582 10:20 AM    MCV 85.3 07/26/2022 10:20 AM     Lab Results   Component Value Date/Time    Sodium 138 07/26/2022 10:20 AM    Potassium 3.7 07/26/2022 10:20 AM    Chloride 104 07/26/2022 10:20 AM    CO2 28 07/26/2022 10:20 AM    Anion gap 6 07/26/2022 10:20 AM    Glucose 127 (H) 07/26/2022 10:20 AM    BUN 10 07/26/2022 10:20 AM    Creatinine 1.04 07/26/2022 10:20 AM    BUN/Creatinine ratio 10 (L) 07/26/2022 10:20 AM    GFR est AA >60 07/26/2022 10:20 AM    GFR est non-AA >60 07/26/2022 10:20 AM    Calcium 9.1 07/26/2022 10:20 AM     Lab Results   Component Value Date/Time    Cholesterol, total 182 02/11/2021 12:11 PM    HDL Cholesterol 37 (L) 02/11/2021 12:11 PM    LDL, calculated 108 (H) 02/11/2021 12:11 PM    VLDL, calculated 37 02/11/2021 12:11 PM    Triglyceride 210 (H) 02/11/2021 12:11 PM     Lab Results   Component Value Date/Time    TSH 2.38 12/22/2020 05:14 AM     Lab Results   Component Value Date/Time    Hemoglobin A1c 5.9 (H) 02/11/2021 12:11 PM     No results found for: ALLEN Winn                Assessment and Plan: 1. Essential hypertension  Blood pressure is elevated therefore we will add valsartan. Patient does have a history of lisinopril angioedema. There is less than 1% association with valsartan and angioedema. Therefore will prescribe valsartan  Continue amlodipine  Patient requested a combination pill  - amLODIPine-valsartan (EXFORGE)  mg per tablet; Take 1 Tablet by mouth in the morning. Dispense: 30 Tablet; Refill: 3  recommend checking blood pressure with goal of less than  130/85 on average. Follow lo sodium diet. Given DASH diet guidelines. Recommend cardiovascular exercise regularly. Work on weight reduction. Call with blood pressure readings. 2. Anxiety  -Discussed the physiologic changes with anxiety. Discussed chemical imbalance. Patient will start Prozac 10 mg and I will increase BuSpar to 10 mg twice a day. He does not feel that he can take a medication to 3 times a day  Discussed Prozac is can take a few weeks to start working  - FLUoxetine (PROzac) 10 mg tablet; Take 1 Tablet by mouth in the morning. Dispense: 30 Tablet; Refill: 3  - busPIRone (BUSPAR) 10 mg tablet; Take 1 Tablet by mouth two (2) times a day. Dispense: 60 Tablet;  Refill: 3        Follow up in 2 -3 weeks   Shell Magdaleno MD

## 2022-07-26 NOTE — DISCHARGE INSTRUCTIONS
It was a pleasure taking care of you at [facility] Department today. We know that when you come to [facility], you are entrusting us with your health, comfort, and safety. Our physicians and nurses honor that trust, and we truly appreciate the opportunity to care for you and your loved ones. We also value your feedback. If you receive a survey about your Emergency Department experience today, please fill it out. We care about our patients' feedback, and we listen to what you have to say. Thank you!

## 2022-07-27 ENCOUNTER — HOSPITAL ENCOUNTER (EMERGENCY)
Age: 37
Discharge: LEFT BEFORE DISCHARGE | End: 2022-07-27
Payer: COMMERCIAL

## 2022-07-27 PROCEDURE — 75810000275 HC EMERGENCY DEPT VISIT NO LEVEL OF CARE

## 2022-07-29 ENCOUNTER — OFFICE VISIT (OUTPATIENT)
Dept: CARDIOLOGY CLINIC | Age: 37
End: 2022-07-29
Payer: COMMERCIAL

## 2022-07-29 VITALS
SYSTOLIC BLOOD PRESSURE: 126 MMHG | BODY MASS INDEX: 40.58 KG/M2 | OXYGEN SATURATION: 97 % | HEIGHT: 69 IN | HEART RATE: 87 BPM | WEIGHT: 274 LBS | RESPIRATION RATE: 22 BRPM | DIASTOLIC BLOOD PRESSURE: 82 MMHG

## 2022-07-29 DIAGNOSIS — R00.2 PALPITATIONS: ICD-10-CM

## 2022-07-29 DIAGNOSIS — E66.01 OBESITY, MORBID (HCC): ICD-10-CM

## 2022-07-29 DIAGNOSIS — I10 HYPERTENSION, UNSPECIFIED TYPE: Primary | ICD-10-CM

## 2022-07-29 PROCEDURE — 99204 OFFICE O/P NEW MOD 45 MIN: CPT | Performed by: INTERNAL MEDICINE

## 2022-07-29 RX ORDER — LORAZEPAM 0.5 MG/1
TABLET ORAL AS NEEDED
COMMUNITY
Start: 2022-07-27 | End: 2022-09-13 | Stop reason: SDUPTHER

## 2022-07-29 RX ORDER — HYDROXYZINE 25 MG/1
TABLET, FILM COATED ORAL AS NEEDED
COMMUNITY
Start: 2022-07-27 | End: 2022-07-29

## 2022-07-29 NOTE — PROGRESS NOTES
Edna Cruz MD, MS, [address] [facility]            HISTORY OF PRESENT ILLNESS:    Susan Reyes is a 29 y.o. male referred for palpitations.    ++ anxiety, started after COVID vaccine. Had near panic attack here in waiting room. ED visit 7/26 HTN, CP, anxiety attacks. Diagnosed HTN 10 years ago. Long history of anxiety, Patient reports has had increase anxiety over the last 1 to 2 days. He does have a long history of this and has been followed by Dr. Kyra Fitzpatrick.  Patient was told and prescribed BuSpar but he has not started this because he was concerned he could interact with his hypertensive medications. Saw Cardiology last Fall - VCS - monitor placed - no echo. No appetite, little week - may be new meds. Discussed echo - continue current valsartan/amlodipine. Buspar BID, fluoxetine BID, valsartan/amlodipine. Asked that he use the hydroxyzine and xanax less if possible. EKG reviewed. SUMMARY:   Problem List  Date Reviewed: 2/24/2020            Codes Class Noted    Ob[facility], morbid ([facility] ICD-10-CM: E66.01  ICD-9-CM: 278.01  3/29/2018        Chronic bilateral low back pain with sciatica ICD-10-CM: M54.40, G89.29  ICD-9-CM: 724.2, 724.3, 338.29  3/29/2018        JOS (obstructive sleep apnea) ICD-10-CM: R36.30  ICD-9-CM: 327.23  Unknown        Hypertension ICD-10-CM: I10  ICD-9-CM: 401.9  Unknown        Cold-induced asthma ICD-10-CM: J45.909  ICD-9-CM: 493.10  Unknown        Gout ICD-10-CM: M10.9  ICD-9-CM: 274.9  Unknown           Current Outpatient Medications on File Prior to Visit   Medication Sig    amLODIPine-valsartan (EXFORGE)  mg per tablet Take 1 Tablet by mouth in the morning. FLUoxetine (PROzac) 10 mg tablet Take 1 Tablet by mouth in the morning. busPIRone (BUSPAR) 10 mg tablet Take 1 Tablet by mouth two (2) times a day. cpap machine kit by Does Not Apply route. No current facility-administered medications on file prior to visit.        CARDIOLOGY STUDIES TO DATE:  No results found for this visit on 07/29/22. No chief complaint on file. CARDIAC ROS:   positive for fatigue, dyspnea on exertion    Past Medical History:   Diagnosis Date    Angioedema     ace induced asthma     Cold-induced asthma     Gout     Hypertension     JOS (obstructive sleep apnea)        Family History   Problem Relation Age of Onset    Sleep Apnea Mother     Hypertension Mother     Hypertension Father        Social History     Socioeconomic History    Marital status: SINGLE     Spouse name: Not on file    Number of children: Not on file    Years of education: Not on file    Highest education level: Not on file   Occupational History    Not on file   Tobacco Use    Smoking status: Never    Smokeless tobacco: Never   Substance and Sexual Activity    Alcohol use: No    Drug use: No    Sexual activity: Yes     Partners: Female   Other Topics Concern    Not on file   Social History Narrative    Not on file     Social Determinants of Health     Financial Resource Strain: Not on file   Food Insecurity: Not on file   Transportation Needs: Not on file   Physical Activity: Not on file   Stress: Not on file   Social Connections: Not on file   Intimate Partner Violence: Not on file   Housing Stability: Not on file        GENERAL ROS:  A comprehensive review of systems was negative except for that written in the HPI. There were no vitals taken for this visit.     Wt Readings from Last 3 Encounters:   07/26/22 282 lb 6.6 oz (128.1 kg)   02/11/21 294 lb (133.4 kg)   12/22/20 301 lb 5.9 oz (136.7 kg)            BP Readings from Last 3 Encounters:   07/26/22 (!) 142/90   07/26/22 (!) 178/104   02/11/21 132/84       PHYSICAL EXAM  General appearance: alert, cooperative, no distress, appears stated age  Neck: supple, symmetrical, trachea midline, no adenopathy, thyroid: not enlarged, symmetric, no tenderness/mass/nodules, no carotid bruit, and no JVD  Lungs: clear to auscultation bilaterally  Heart: regular rate and rhythm, S1, S2 normal, no murmur, click, rub or gallop  Extremities: extremities normal, atraumatic, no cyanosis or edema    Lab Results   Component Value Date/Time    Cholesterol, total 182 02/11/2021 12:11 PM    Cholesterol, total 229 (H) 08/11/2014 02:45 PM    HDL Cholesterol 37 (L) 02/11/2021 12:11 PM    LDL, calculated 108 (H) 02/11/2021 12:11 PM    Triglyceride 210 (H) 02/11/2021 12:11 PM       ASSESSMENT    ICD-10-CM ICD-9-CM    1. Hypertension, unspecified type  I10 401.9       2. Obesity, morbid ([facility]  E66.01 278.01       3. Palpitations  R00.2 785.1           No diagnosis found. No orders of the defined types were placed in this encounter. Gemini Moreira MD  7/29/2022        [address]   [address]  [address]  [address] [facility] 05 (F)    [address] [place] [address] [place]  [phone] (P)  [phone] (F)    ATTENTION:   This medical record was transcribed using an electronic medical records/speech recognition system. Although proofread, it may and can contain electronic, spelling and other errors. Corrections may be executed at a later time. Please feel free to contact us for any clarifications as needed.

## 2022-07-29 NOTE — PROGRESS NOTES
Patient arrived early for his scheduled new patient appointment. Upon arriving patient request ambulance from the check in desk prior to check in.  911 called and when arrived spoke with patient and provided reassurance and did not take patient to ER.

## 2022-07-30 ENCOUNTER — HOSPITAL ENCOUNTER (EMERGENCY)
Age: 37
Discharge: HOME | End: 2022-07-30
Attending: EMERGENCY MEDICINE
Payer: COMMERCIAL

## 2022-07-30 VITALS
OXYGEN SATURATION: 99 % | BODY MASS INDEX: 39.84 KG/M2 | HEART RATE: 76 BPM | RESPIRATION RATE: 16 BRPM | TEMPERATURE: 98.4 F | DIASTOLIC BLOOD PRESSURE: 86 MMHG | HEIGHT: 69 IN | WEIGHT: 269 LBS | SYSTOLIC BLOOD PRESSURE: 138 MMHG

## 2022-07-30 DIAGNOSIS — R00.2 PALPITATIONS WITH REGULAR CARDIAC RHYTHM: Primary | ICD-10-CM

## 2022-07-30 DIAGNOSIS — R53.1 WEAKNESS: ICD-10-CM

## 2022-07-30 LAB
ALBUMIN SERPL-MCNC: 3.7 G/DL (ref 3.5–5)
ALBUMIN/GLOB SERPL: 0.9 {RATIO} (ref 1.1–2.2)
ALP SERPL-CCNC: 85 U/L (ref 45–117)
ALT SERPL-CCNC: 30 U/L (ref 12–78)
ANION GAP SERPL CALC-SCNC: 5 MMOL/L (ref 5–15)
AST SERPL-CCNC: 16 U/L (ref 15–37)
BASOPHILS # BLD: 0.1 K/UL (ref 0–0.1)
BASOPHILS NFR BLD: 1 % (ref 0–1)
BILIRUB SERPL-MCNC: 0.6 MG/DL (ref 0.2–1)
BUN SERPL-MCNC: 12 MG/DL (ref 6–20)
BUN/CREAT SERPL: 11 (ref 12–20)
CALCIUM SERPL-MCNC: 9 MG/DL (ref 8.5–10.1)
CHLORIDE SERPL-SCNC: 105 MMOL/L (ref 97–108)
CO2 SERPL-SCNC: 29 MMOL/L (ref 21–32)
CREAT SERPL-MCNC: 1.1 MG/DL (ref 0.7–1.3)
D DIMER PPP FEU-MCNC: 0.22 MG/L FEU (ref 0–0.65)
DIFFERENTIAL METHOD BLD: NORMAL
EOSINOPHIL # BLD: 0 K/UL (ref 0–0.4)
EOSINOPHIL NFR BLD: 0 % (ref 0–7)
ERYTHROCYTE [DISTWIDTH] IN BLOOD BY AUTOMATED COUNT: 12.7 % (ref 11.5–14.5)
GLOBULIN SER CALC-MCNC: 4.3 G/DL (ref 2–4)
GLUCOSE SERPL-MCNC: 132 MG/DL (ref 65–100)
HCT VFR BLD AUTO: 42 % (ref 36.6–50.3)
HGB BLD-MCNC: 13.7 G/DL (ref 12.1–17)
IMM GRANULOCYTES # BLD AUTO: 0 K/UL (ref 0–0.04)
IMM GRANULOCYTES NFR BLD AUTO: 0 % (ref 0–0.5)
LYMPHOCYTES # BLD: 1.8 K/UL (ref 0.8–3.5)
LYMPHOCYTES NFR BLD: 26 % (ref 12–49)
MCH RBC QN AUTO: 27.2 PG (ref 26–34)
MCHC RBC AUTO-ENTMCNC: 32.6 G/DL (ref 30–36.5)
MCV RBC AUTO: 83.3 FL (ref 80–99)
MONOCYTES # BLD: 0.6 K/UL (ref 0–1)
MONOCYTES NFR BLD: 9 % (ref 5–13)
NEUTS SEG # BLD: 4.3 K/UL (ref 1.8–8)
NEUTS SEG NFR BLD: 64 % (ref 32–75)
NRBC # BLD: 0 K/UL (ref 0–0.01)
NRBC BLD-RTO: 0 PER 100 WBC
PLATELET # BLD AUTO: 229 K/UL (ref 150–400)
PMV BLD AUTO: 12.1 FL (ref 8.9–12.9)
POTASSIUM SERPL-SCNC: 3.8 MMOL/L (ref 3.5–5.1)
PROT SERPL-MCNC: 8 G/DL (ref 6.4–8.2)
RBC # BLD AUTO: 5.04 M/UL (ref 4.1–5.7)
SODIUM SERPL-SCNC: 139 MMOL/L (ref 136–145)
TROPONIN-HIGH SENSITIVITY: 9 NG/L (ref 0–76)
TSH SERPL DL<=0.05 MIU/L-ACNC: 0.93 UIU/ML (ref 0.36–3.74)
WBC # BLD AUTO: 6.8 K/UL (ref 4.1–11.1)

## 2022-07-30 PROCEDURE — 84484 ASSAY OF TROPONIN QUANT: CPT

## 2022-07-30 PROCEDURE — 85025 COMPLETE CBC W/AUTO DIFF WBC: CPT

## 2022-07-30 PROCEDURE — 85379 FIBRIN DEGRADATION QUANT: CPT

## 2022-07-30 PROCEDURE — 99284 EMERGENCY DEPT VISIT MOD MDM: CPT

## 2022-07-30 PROCEDURE — 80053 COMPREHEN METABOLIC PANEL: CPT

## 2022-07-30 PROCEDURE — 84443 ASSAY THYROID STIM HORMONE: CPT

## 2022-07-30 PROCEDURE — 36415 COLL VENOUS BLD VENIPUNCTURE: CPT

## 2022-07-30 PROCEDURE — 74011250637 HC RX REV CODE- 250/637: Performed by: STUDENT IN AN ORGANIZED HEALTH CARE EDUCATION/TRAINING PROGRAM

## 2022-07-30 PROCEDURE — 93005 ELECTROCARDIOGRAM TRACING: CPT

## 2022-07-30 RX ORDER — ACETAMINOPHEN 500 MG
1000 TABLET ORAL ONCE
Status: COMPLETED | OUTPATIENT
Start: 2022-07-30 | End: 2022-07-30

## 2022-07-30 RX ADMIN — ACETAMINOPHEN 1000 MG: 500 TABLET ORAL at 16:03

## 2022-07-30 NOTE — ED PROVIDER NOTES
81-year-old male with history of obesity, gout, HTN, JOS presents to ED with 1 week of intermittent palpitations and generalized weakness. Patient reports that for the past week intermittently he has had episodes of palpitations with associated generalized weakness and fatigue. He notes that there is no pattern to when these palpitations come on and could occur while just lying around. There is no change with activity. He denies any associated chest pain, shortness of breath, cough, fevers, chills, nausea, vomiting or diarrhea. He notes that these episodes last just a few minutes at a time and then resolved. He has been monitoring his blood oxygen levels with a pulse ox which he reports has been fine. He does note that he has a history of anxiety and has been prescribed Xanax as needed which she has not tried yet. He is also prescribed something that he is supposed to take every day but he has not been taking this because \"it makes him feel drowsy\". He is not sure what this medication was. The history is provided by the patient. Palpitations   Pertinent negatives include no fever, no chest pain, no nausea, no vomiting, no headaches and no cough.       Past Medical History:   Diagnosis Date    Angioedema     ace induced asthma     Cold-induced asthma     Gout     Hypertension     JOS (obstructive sleep apnea)        Past Surgical History:   Procedure Laterality Date    HX ADENOIDECTOMY           Family History:   Problem Relation Age of Onset    Sleep Apnea Mother     Hypertension Mother     Hypertension Father        Social History     Socioeconomic History    Marital status: SINGLE     Spouse name: Not on file    Number of children: Not on file    Years of education: Not on file    Highest education level: Not on file   Occupational History    Not on file   Tobacco Use    Smoking status: Never    Smokeless tobacco: Never   Substance and Sexual Activity    Alcohol use: No    Drug use: No    Sexual activity: Yes     Partners: Female   Other Topics Concern    Not on file   Social History Narrative    Not on file     Social Determinants of Health     Financial Resource Strain: Not on file   Food Insecurity: Not on file   Transportation Needs: Not on file   Physical Activity: Not on file   Stress: Not on file   Social Connections: Not on file   Intimate Partner Violence: Not on file   Housing Stability: Not on file         ALLERGIES: Lisinopril and Pcn [penicillins]    Review of Systems   Constitutional:  Negative for fever. HENT:  Negative for congestion and sinus pain. Respiratory:  Negative for cough. Cardiovascular:  Positive for palpitations. Negative for chest pain. Gastrointestinal:  Negative for nausea and vomiting. Genitourinary:  Negative for dysuria. Musculoskeletal:  Negative for myalgias. Neurological:  Negative for headaches. Hematological:  Negative for adenopathy. All other systems reviewed and are negative. Vitals:    07/30/22 1441   BP: (!) 154/96   Pulse: 100   Resp: 16   Temp: 98.4 °F (36.9 °C)   SpO2: 98%   Weight: 122 kg (269 lb)   Height: 5' 9\" (1.753 m)            Physical Exam  Vitals and nursing note reviewed. Constitutional:       Appearance: Normal appearance. Eyes:      Extraocular Movements: Extraocular movements intact. Pupils: Pupils are equal, round, and reactive to light. Cardiovascular:      Rate and Rhythm: Normal rate and regular rhythm. Heart sounds: Normal heart sounds. Pulmonary:      Effort: Pulmonary effort is normal.      Breath sounds: Normal breath sounds. Abdominal:      Palpations: Abdomen is soft. Tenderness: There is no abdominal tenderness. Lymphadenopathy:      Cervical: No cervical adenopathy. Skin:     General: Skin is warm and dry. Neurological:      General: No focal deficit present. Mental Status: He is alert and oriented to person, place, and time.    Psychiatric:         Mood and Affect: Mood is anxious. Behavior: Behavior normal.        MDM  Number of Diagnoses or Management Options  Palpitations with regular cardiac rhythm  Weakness  Diagnosis management comments: 55-year-old male with history of obesity, gout, HTN, JOS presents to ED with 1 week of intermittent palpitations and generalized weakness. Vital signs stable in triage with pulse at 100 and patient afebrile. Physical exam unremarkable with heart sounds normal and lungs clear to auscultation bilaterally. See below for EKG interpretation. Ddimer at 0.22. Troponin at 9. Talked to patient about repeat troponin but patient refused, stating \"I was just at [facility] ER 4 days ago and they did the troponin there which was 9 too, I just want to go home\". Used shared decision making and agree that since patient was not having chest pain or shortness of breath and was at low risk for CAD it would be okay to not get repeat troponin. Educated patient about strict return proximal cautions and discharge patient with instructions for conservative care and follow-up. He already has follow-up appointment with his PCP and cardiologist.       Amount and/or Complexity of Data Reviewed  Clinical lab tests: reviewed and ordered  Tests in the radiology section of CPT®: ordered and reviewed  Discuss the patient with other providers: yes      ED Course as of 07/30/22 1605   Sat Jul 30, 2022   1456 EKG reveals rate of 103 bpm with normal interval, normal axis, sinus tachycardia with otherwise no ischemic changes.  [AH]      ED Course User Index  [AH] Radha Hinds

## 2022-07-31 LAB
ATRIAL RATE: 103 BPM
CALCULATED P AXIS, ECG09: 57 DEGREES
CALCULATED R AXIS, ECG10: 50 DEGREES
CALCULATED T AXIS, ECG11: -5 DEGREES
DIAGNOSIS, 93000: NORMAL
P-R INTERVAL, ECG05: 148 MS
Q-T INTERVAL, ECG07: 334 MS
QRS DURATION, ECG06: 100 MS
QTC CALCULATION (BEZET), ECG08: 437 MS
VENTRICULAR RATE, ECG03: 103 BPM

## 2022-08-03 ENCOUNTER — ANCILLARY PROCEDURE (OUTPATIENT)
Dept: CARDIOLOGY CLINIC | Age: 37
End: 2022-08-03
Payer: COMMERCIAL

## 2022-08-03 ENCOUNTER — TELEPHONE (OUTPATIENT)
Dept: CARDIOLOGY CLINIC | Age: 37
End: 2022-08-03

## 2022-08-03 VITALS
SYSTOLIC BLOOD PRESSURE: 132 MMHG | DIASTOLIC BLOOD PRESSURE: 84 MMHG | HEIGHT: 69 IN | BODY MASS INDEX: 39.84 KG/M2 | WEIGHT: 269 LBS

## 2022-08-03 DIAGNOSIS — I10 HYPERTENSION, UNSPECIFIED TYPE: ICD-10-CM

## 2022-08-03 LAB
ECHO AO ASC DIAM: 2.9 CM
ECHO AO ASCENDING AORTA INDEX: 1.24 CM/M2
ECHO AO ROOT DIAM: 3.4 CM
ECHO AO ROOT INDEX: 1.45 CM/M2
ECHO AV MEAN GRADIENT: 4 MMHG
ECHO AV MEAN VELOCITY: 1 M/S
ECHO AV PEAK GRADIENT: 7 MMHG
ECHO AV PEAK VELOCITY: 1.3 M/S
ECHO AV VELOCITY RATIO: 0.62
ECHO AV VTI: 22.9 CM
ECHO LA DIAMETER INDEX: 2.01 CM/M2
ECHO LA DIAMETER: 4.7 CM
ECHO LA TO AORTIC ROOT RATIO: 1.38
ECHO LA VOL 2C: 43 ML (ref 18–58)
ECHO LA VOL 4C: 35 ML (ref 18–58)
ECHO LA VOL BP: 39 ML (ref 18–58)
ECHO LA VOL/BSA BIPLANE: 17 ML/M2 (ref 16–34)
ECHO LA VOLUME AREA LENGTH: 44 ML
ECHO LA VOLUME INDEX A2C: 18 ML/M2 (ref 16–34)
ECHO LA VOLUME INDEX A4C: 15 ML/M2 (ref 16–34)
ECHO LA VOLUME INDEX AREA LENGTH: 19 ML/M2 (ref 16–34)
ECHO LV E' LATERAL VELOCITY: 7 CM/S
ECHO LV E' SEPTAL VELOCITY: 10 CM/S
ECHO LV EDV A2C: 163 ML
ECHO LV EDV A4C: 119 ML
ECHO LV EDV BP: 145 ML (ref 67–155)
ECHO LV EDV INDEX A4C: 51 ML/M2
ECHO LV EDV INDEX BP: 62 ML/M2
ECHO LV EDV NDEX A2C: 70 ML/M2
ECHO LV EJECTION FRACTION A2C: 62 %
ECHO LV EJECTION FRACTION A4C: 55 %
ECHO LV EJECTION FRACTION BIPLANE: 59 % (ref 55–100)
ECHO LV ESV A2C: 62 ML
ECHO LV ESV A4C: 54 ML
ECHO LV ESV BP: 59 ML (ref 22–58)
ECHO LV ESV INDEX A2C: 26 ML/M2
ECHO LV ESV INDEX A4C: 23 ML/M2
ECHO LV ESV INDEX BP: 25 ML/M2
ECHO LV FRACTIONAL SHORTENING: 32 % (ref 28–44)
ECHO LV INTERNAL DIMENSION DIASTOLE INDEX: 2.01 CM/M2
ECHO LV INTERNAL DIMENSION DIASTOLIC: 4.7 CM (ref 4.2–5.9)
ECHO LV INTERNAL DIMENSION SYSTOLIC INDEX: 1.37 CM/M2
ECHO LV INTERNAL DIMENSION SYSTOLIC: 3.2 CM
ECHO LV IVSD: 1.2 CM (ref 0.6–1)
ECHO LV MASS 2D: 212 G (ref 88–224)
ECHO LV MASS INDEX 2D: 90.6 G/M2 (ref 49–115)
ECHO LV POSTERIOR WALL DIASTOLIC: 1.2 CM (ref 0.6–1)
ECHO LV RELATIVE WALL THICKNESS RATIO: 0.51
ECHO LVOT AV VTI INDEX: 0.75
ECHO LVOT MEAN GRADIENT: 2 MMHG
ECHO LVOT PEAK GRADIENT: 3 MMHG
ECHO LVOT PEAK VELOCITY: 0.8 M/S
ECHO LVOT VTI: 17.2 CM
ECHO MV A VELOCITY: 0.81 M/S
ECHO MV AREA PHT: 4.2 CM2
ECHO MV E DECELERATION TIME (DT): 179.9 MS
ECHO MV E VELOCITY: 1 M/S
ECHO MV E/A RATIO: 1.23
ECHO MV E/E' LATERAL: 14.29
ECHO MV E/E' RATIO (AVERAGED): 12.14
ECHO MV E/E' SEPTAL: 10
ECHO MV PRESSURE HALF TIME (PHT): 52.2 MS
ECHO RV FREE WALL PEAK S': 17 CM/S
ECHO RV INTERNAL DIMENSION: 4 CM
ECHO RV TAPSE: 2.5 CM (ref 1.7–?)

## 2022-08-03 PROCEDURE — 93306 TTE W/DOPPLER COMPLETE: CPT | Performed by: INTERNAL MEDICINE

## 2022-08-03 NOTE — PROGRESS NOTES
Good news, your echocardiogram was normal.    It showed a structurally normal heart with normal function. There were no valve abnormalities. Please call with office should you have any further questions.     Dr. Nena Ellis

## 2022-08-03 NOTE — TELEPHONE ENCOUNTER
Patient contacted and may resume normal activity. Patient advised to monitor BP. Patient reports he is no longer taking the anxiety medication and feeling better.

## 2022-08-03 NOTE — TELEPHONE ENCOUNTER
Patient is calling because he would like some more detail information about his echocardiogram.Patient would like to know if he can resume weight lifting.     [phone]

## 2022-08-14 ENCOUNTER — PATIENT MESSAGE (OUTPATIENT)
Dept: INTERNAL MEDICINE CLINIC | Age: 37
End: 2022-08-14

## 2022-08-14 ENCOUNTER — HOSPITAL ENCOUNTER (EMERGENCY)
Age: 37
Discharge: HOME | End: 2022-08-15
Attending: STUDENT IN AN ORGANIZED HEALTH CARE EDUCATION/TRAINING PROGRAM
Payer: COMMERCIAL

## 2022-08-14 VITALS
HEIGHT: 69 IN | BODY MASS INDEX: 39.99 KG/M2 | OXYGEN SATURATION: 96 % | RESPIRATION RATE: 16 BRPM | SYSTOLIC BLOOD PRESSURE: 149 MMHG | DIASTOLIC BLOOD PRESSURE: 76 MMHG | WEIGHT: 270 LBS | HEART RATE: 74 BPM | TEMPERATURE: 98.5 F

## 2022-08-14 DIAGNOSIS — R07.89 MUSCULOSKELETAL CHEST PAIN: Primary | ICD-10-CM

## 2022-08-14 DIAGNOSIS — G47.33 OSA (OBSTRUCTIVE SLEEP APNEA): Primary | ICD-10-CM

## 2022-08-14 LAB
ALBUMIN SERPL-MCNC: 3.8 G/DL (ref 3.5–5)
ALBUMIN/GLOB SERPL: 1 {RATIO} (ref 1.1–2.2)
ALP SERPL-CCNC: 89 U/L (ref 45–117)
ALT SERPL-CCNC: 36 U/L (ref 12–78)
ANION GAP SERPL CALC-SCNC: 5 MMOL/L (ref 5–15)
AST SERPL-CCNC: 26 U/L (ref 15–37)
BASOPHILS # BLD: 0.1 K/UL (ref 0–0.1)
BASOPHILS NFR BLD: 1 % (ref 0–1)
BILIRUB SERPL-MCNC: 0.6 MG/DL (ref 0.2–1)
BUN SERPL-MCNC: 16 MG/DL (ref 6–20)
BUN/CREAT SERPL: 15 (ref 12–20)
CALCIUM SERPL-MCNC: 8.8 MG/DL (ref 8.5–10.1)
CHLORIDE SERPL-SCNC: 105 MMOL/L (ref 97–108)
CO2 SERPL-SCNC: 28 MMOL/L (ref 21–32)
COMMENT, HOLDF: NORMAL
CREAT SERPL-MCNC: 1.05 MG/DL (ref 0.7–1.3)
DIFFERENTIAL METHOD BLD: ABNORMAL
EOSINOPHIL # BLD: 0.1 K/UL (ref 0–0.4)
EOSINOPHIL NFR BLD: 2 % (ref 0–7)
ERYTHROCYTE [DISTWIDTH] IN BLOOD BY AUTOMATED COUNT: 12.9 % (ref 11.5–14.5)
GLOBULIN SER CALC-MCNC: 3.9 G/DL (ref 2–4)
GLUCOSE SERPL-MCNC: 94 MG/DL (ref 65–100)
HCT VFR BLD AUTO: 41.4 % (ref 36.6–50.3)
HGB BLD-MCNC: 13.2 G/DL (ref 12.1–17)
IMM GRANULOCYTES # BLD AUTO: 0 K/UL (ref 0–0.04)
IMM GRANULOCYTES NFR BLD AUTO: 0 % (ref 0–0.5)
LYMPHOCYTES # BLD: 2.7 K/UL (ref 0.8–3.5)
LYMPHOCYTES NFR BLD: 38 % (ref 12–49)
MCH RBC QN AUTO: 27.3 PG (ref 26–34)
MCHC RBC AUTO-ENTMCNC: 31.9 G/DL (ref 30–36.5)
MCV RBC AUTO: 85.5 FL (ref 80–99)
MONOCYTES # BLD: 0.6 K/UL (ref 0–1)
MONOCYTES NFR BLD: 9 % (ref 5–13)
NEUTS SEG # BLD: 3.6 K/UL (ref 1.8–8)
NEUTS SEG NFR BLD: 50 % (ref 32–75)
NRBC # BLD: 0 K/UL (ref 0–0.01)
NRBC BLD-RTO: 0 PER 100 WBC
PLATELET # BLD AUTO: 197 K/UL (ref 150–400)
PMV BLD AUTO: 13 FL (ref 8.9–12.9)
POTASSIUM SERPL-SCNC: 4 MMOL/L (ref 3.5–5.1)
PROT SERPL-MCNC: 7.7 G/DL (ref 6.4–8.2)
RBC # BLD AUTO: 4.84 M/UL (ref 4.1–5.7)
SAMPLES BEING HELD,HOLD: NORMAL
SODIUM SERPL-SCNC: 138 MMOL/L (ref 136–145)
TROPONIN-HIGH SENSITIVITY: 14 NG/L (ref 0–76)
WBC # BLD AUTO: 7.2 K/UL (ref 4.1–11.1)

## 2022-08-14 PROCEDURE — 99283 EMERGENCY DEPT VISIT LOW MDM: CPT

## 2022-08-14 PROCEDURE — 85025 COMPLETE CBC W/AUTO DIFF WBC: CPT

## 2022-08-14 PROCEDURE — 36415 COLL VENOUS BLD VENIPUNCTURE: CPT

## 2022-08-14 PROCEDURE — 84484 ASSAY OF TROPONIN QUANT: CPT

## 2022-08-14 PROCEDURE — 93005 ELECTROCARDIOGRAM TRACING: CPT

## 2022-08-14 PROCEDURE — 80053 COMPREHEN METABOLIC PANEL: CPT

## 2022-08-14 PROCEDURE — 74011250637 HC RX REV CODE- 250/637: Performed by: STUDENT IN AN ORGANIZED HEALTH CARE EDUCATION/TRAINING PROGRAM

## 2022-08-14 RX ORDER — IBUPROFEN 800 MG/1
800 TABLET ORAL ONCE
Status: COMPLETED | OUTPATIENT
Start: 2022-08-14 | End: 2022-08-14

## 2022-08-14 RX ADMIN — IBUPROFEN 800 MG: 800 TABLET, FILM COATED ORAL at 23:38

## 2022-08-15 ENCOUNTER — TELEPHONE (OUTPATIENT)
Dept: INTERNAL MEDICINE CLINIC | Age: 37
End: 2022-08-15

## 2022-08-15 ENCOUNTER — PATIENT MESSAGE (OUTPATIENT)
Dept: SLEEP MEDICINE | Age: 37
End: 2022-08-15

## 2022-08-15 ENCOUNTER — PATIENT MESSAGE (OUTPATIENT)
Dept: INTERNAL MEDICINE CLINIC | Age: 37
End: 2022-08-15

## 2022-08-15 LAB
ATRIAL RATE: 76 BPM
CALCULATED P AXIS, ECG09: 56 DEGREES
CALCULATED R AXIS, ECG10: 34 DEGREES
CALCULATED T AXIS, ECG11: 2 DEGREES
DIAGNOSIS, 93000: NORMAL
P-R INTERVAL, ECG05: 160 MS
Q-T INTERVAL, ECG07: 352 MS
QRS DURATION, ECG06: 96 MS
QTC CALCULATION (BEZET), ECG08: 396 MS
VENTRICULAR RATE, ECG03: 76 BPM

## 2022-08-15 NOTE — ED PROVIDER NOTES
Date: 8/14/2022  Patient Name: Susan Reyes    History of Presenting Illness     Chief Complaint   Patient presents with    Chest Pain (Angina)       History Provided By: Patient    HPI: Susan Reyes, 40 y.o. male  presents to the ED with cc of chest pain. Patient states that the chest pain began suddenly about 6 hours ago, has been present since then, worse with movements of his chest and torso and arms. Is located in the left side only. Has not had radiation to the arm or neck. No fevers, chills, pleurisy. No leg swelling. Was recently started on valsartan in addition to amlodipine although patient states that may have made him feel \"off\". He also has been taking BuSpar intermittently and noticed some dizziness when he takes it. He denies exertional symptoms. States that the pain is achy located in the left the pectoralis muscle and tender with palpation. There are no other complaints, changes, or physical findings at this time. PCP: Dilcia Tavares MD    No current facility-administered medications on file prior to encounter. Current Outpatient Medications on File Prior to Encounter   Medication Sig Dispense Refill    LORazepam (ATIVAN) 0.5 mg tablet as needed. amLODIPine-valsartan (EXFORGE)  mg per tablet Take 1 Tablet by mouth in the morning. 30 Tablet 3    FLUoxetine (PROzac) 10 mg tablet Take 1 Tablet by mouth in the morning. 30 Tablet 3    busPIRone (BUSPAR) 10 mg tablet Take 1 Tablet by mouth two (2) times a day. 60 Tablet 3    cpap machine kit by Does Not Apply route.          Past History     Past Medical History:  Past Medical History:   Diagnosis Date    Angioedema     ace induced asthma     Cold-induced asthma     Gout     Hypertension     JOS (obstructive sleep apnea)        Past Surgical History:  Past Surgical History:   Procedure Laterality Date    HX ADENOIDECTOMY         Family History:  Family History   Problem Relation Age of Onset    Sleep Apnea Mother Hypertension Mother     Hypertension Father        Social History:  Social History     Tobacco Use    Smoking status: Never    Smokeless tobacco: Never   Substance Use Topics    Alcohol use: No    Drug use: No       Allergies: Allergies   Allergen Reactions    Lisinopril Swelling     Pt has sever swelling of the throat and tongue. Pcn [Penicillins] Other (comments)     Allergic as a child           Review of Systems   Review of Systems   Constitutional:  Negative for chills, fatigue and fever. HENT:  Negative for ear pain, sore throat and trouble swallowing. Eyes:  Negative for visual disturbance. Respiratory:  Negative for cough and shortness of breath. Cardiovascular:  Positive for chest pain. Gastrointestinal:  Negative for abdominal pain, nausea and vomiting. Genitourinary:  Negative for dysuria. Musculoskeletal:  Negative for back pain. Skin:  Negative for rash. Neurological:  Positive for headaches. Negative for light-headedness. Psychiatric/Behavioral:  Negative for confusion. All other systems reviewed and are negative. Physical Exam   Physical Exam  Vitals reviewed. Constitutional:       General: He is not in acute distress. Appearance: He is not toxic-appearing. HENT:      Head: Normocephalic. Eyes:      Pupils: Pupils are equal, round, and reactive to light. Neck:      Vascular: No JVD. Cardiovascular:      Rate and Rhythm: Normal rate and regular rhythm. Pulmonary:      Effort: Pulmonary effort is normal. No respiratory distress. Breath sounds: Normal breath sounds. Chest:       Abdominal:      Palpations: Abdomen is soft. Musculoskeletal:      Cervical back: Normal range of motion and neck supple. Right lower leg: No edema. Left lower leg: No edema. Skin:     General: Skin is warm and dry. Capillary Refill: Capillary refill takes less than 2 seconds. Neurological:      General: No focal deficit present.       Mental Status: He is alert and oriented to person, place, and time. Motor: No weakness. Psychiatric:         Mood and Affect: Mood normal.       Diagnostic Study Results     Labs -  Recent Results (from the past 72 hour(s))   TROPONIN-HIGH SENSITIVITY    Collection Time: 08/14/22 10:35 PM   Result Value Ref Range    Troponin-High Sensitivity 14 0 - 76 ng/L   CBC WITH AUTOMATED DIFF    Collection Time: 08/14/22 10:35 PM   Result Value Ref Range    WBC 7.2 4.1 - 11.1 K/uL    RBC 4.84 4.10 - 5.70 M/uL    HGB 13.2 12.1 - 17.0 g/dL    HCT 41.4 36.6 - 50.3 %    MCV 85.5 80.0 - 99.0 FL    MCH 27.3 26.0 - 34.0 PG    MCHC 31.9 30.0 - 36.5 g/dL    RDW 12.9 11.5 - 14.5 %    PLATELET 036 672 - 559 K/uL    MPV 13.0 (H) 8.9 - 12.9 FL    NRBC 0.0 0  WBC    ABSOLUTE NRBC 0.00 0.00 - 0.01 K/uL    NEUTROPHILS 50 32 - 75 %    LYMPHOCYTES 38 12 - 49 %    MONOCYTES 9 5 - 13 %    EOSINOPHILS 2 0 - 7 %    BASOPHILS 1 0 - 1 %    IMMATURE GRANULOCYTES 0 0.0 - 0.5 %    ABS. NEUTROPHILS 3.6 1.8 - 8.0 K/UL    ABS. LYMPHOCYTES 2.7 0.8 - 3.5 K/UL    ABS. MONOCYTES 0.6 0.0 - 1.0 K/UL    ABS. EOSINOPHILS 0.1 0.0 - 0.4 K/UL    ABS. BASOPHILS 0.1 0.0 - 0.1 K/UL    ABS. IMM. GRANS. 0.0 0.00 - 0.04 K/UL    DF AUTOMATED     METABOLIC PANEL, COMPREHENSIVE    Collection Time: 08/14/22 10:35 PM   Result Value Ref Range    Sodium 138 136 - 145 mmol/L    Potassium 4.0 3.5 - 5.1 mmol/L    Chloride 105 97 - 108 mmol/L    CO2 28 21 - 32 mmol/L    Anion gap 5 5 - 15 mmol/L    Glucose 94 65 - 100 mg/dL    BUN 16 6 - 20 MG/DL    Creatinine 1.05 0.70 - 1.30 MG/DL    BUN/Creatinine ratio 15 12 - 20      GFR est AA >60 >60 ml/min/1.73m2    GFR est non-AA >60 >60 ml/min/1.73m2    Calcium 8.8 8.5 - 10.1 MG/DL    Bilirubin, total 0.6 0.2 - 1.0 MG/DL    ALT (SGPT) 36 12 - 78 U/L    AST (SGOT) 26 15 - 37 U/L    Alk.  phosphatase 89 45 - 117 U/L    Protein, total 7.7 6.4 - 8.2 g/dL    Albumin 3.8 3.5 - 5.0 g/dL    Globulin 3.9 2.0 - 4.0 g/dL    A-G Ratio 1.0 (L) 1.1 - 2.2 SAMPLES BEING HELD    Collection Time: 08/14/22 10:35 PM   Result Value Ref Range    SAMPLES BEING HELD 1BLUE,1SST     COMMENT        Add-on orders for these samples will be processed based on acceptable specimen integrity and analyte stability, which may vary by analyte. Radiologic Studies -   No orders to display     CT Results  (Last 48 hours)      None          CXR Results  (Last 48 hours)      None            Medical Decision Making   I am the first provider for this patient. I reviewed the vital signs, available nursing notes, past medical history, past surgical history, family history and social history. Vital Signs-Reviewed the patient's vital signs. Patient Vitals for the past 12 hrs:   Temp Pulse Resp BP SpO2   08/14/22 2343 -- 74 16 (!) 149/76 96 %   08/14/22 2228 98.5 °F (36.9 °C) 78 20 (!) 188/109 99 %         Records Reviewed: Nursing Notes and Old Medical Records    Provider Notes (Medical Decision Making):   Left-sided chest pain with associated tenderness. His EKG was within normal limits. He was reassured by the laboratory findings. Reviewed his echocardiogram recently performed. No evidence of previous MI. We will follow-up with primary care. ED Course:   Initial assessment performed. The patients presenting problems have been discussed, and they are in agreement with the care plan formulated and outlined with them. I have encouraged them to ask questions as they arise throughout their visit. ED Course as of 08/15/22 0509   Sun Aug 14, 2022   2306 EKG  10:20 PM  Normal sinus rhythm, trickle rate 76, normal axis, no ST elevation.   [NS]      ED Course User Index  [NS] Anne Anand MD             Disposition:      The patient's results have been reviewed with His. He has been counseled regarding her diagnosis.  He verbally conveys understanding and agreement of the signs, symptoms, diagnosis, treatment, and prognosis and additionally agrees to follow up as recommended with Dr. Jolynn Graham MD in 24-48 hours. He also agrees with the care-plan and conveys that all of His questions have been answered. I have also put together some discharge instructions for His that include: 1) educational information regarding their diagnosis, 2) how to care for their diagnosis at home, as well a 3) list of reasons why they would want to return to the ED prior to their follow-up appointment, should their condition change. DISCHARGE PLAN:  1. Discharge Medication List as of 8/14/2022 11:55 PM        2. Follow-up Information       Follow up With Specialties Details Why MD [facility] Physician Schedule an appointment as soon as possible for a visit  Call for a follow up appointment. [address] [place] [address]  [phone]            3. Return to ED if worse     Diagnosis     Clinical Impression:   1. Musculoskeletal chest pain        Attestations:    Robb Varma MD    Please note that this dictation was completed with [employer], the computer voice recognition software. Quite often unanticipated grammatical, syntax, homophones, and other interpretive errors are inadvertently transcribed by the computer software. Please disregard these errors. Please excuse any errors that have escaped final proofreading. Thank you.

## 2022-08-15 NOTE — TELEPHONE ENCOUNTER
----- Message from [id number]. Jeanne Henley sent at 8/14/2022 11:07 PM EDT -----  Regarding: Sleep study  Hello, I'm using my old cpap from 3 years ago and it's  not monitored. I don't think it's working properly or levels need to adjusted. Can you please refer me to a sleep study asap please? I do have sleep apnea and I need to sleep better. I would prefer a after hours test because I do work during the day.

## 2022-08-15 NOTE — TELEPHONE ENCOUNTER
Pt states he does not see a My Chart message and would like a call back as soon as possible. Thanks. I advised pt about appt tomorrow, but he did not confirm with me, he only wanted to speak with the nurse. Is he going to come to appt?

## 2022-08-15 NOTE — ED NOTES
Pt given written and verbal d/c instructions. He verbalizes understanding of all instructions, incl. Meds and follow up. PIV d/c'd. Catheter tip intact. Pt discharged home, ambulatory, in improved/ stable condition.

## 2022-08-15 NOTE — ED TRIAGE NOTES
Patient arrives with L sided chest pain x a few days, feeling as if he is going to pass out, low (91-92%) O2 sats at night while on CPAP. Patient reports a lot of anxiety and is worried it might be his heart.

## 2022-08-16 ENCOUNTER — HOSPITAL ENCOUNTER (EMERGENCY)
Age: 37
Discharge: HOME | End: 2022-08-16
Attending: EMERGENCY MEDICINE
Payer: COMMERCIAL

## 2022-08-16 ENCOUNTER — VIRTUAL VISIT (OUTPATIENT)
Dept: INTERNAL MEDICINE CLINIC | Age: 37
End: 2022-08-16
Payer: COMMERCIAL

## 2022-08-16 ENCOUNTER — TELEPHONE (OUTPATIENT)
Dept: INTERNAL MEDICINE CLINIC | Age: 37
End: 2022-08-16

## 2022-08-16 ENCOUNTER — PATIENT MESSAGE (OUTPATIENT)
Dept: SLEEP MEDICINE | Age: 37
End: 2022-08-16

## 2022-08-16 ENCOUNTER — PATIENT MESSAGE (OUTPATIENT)
Dept: INTERNAL MEDICINE CLINIC | Age: 37
End: 2022-08-16

## 2022-08-16 ENCOUNTER — APPOINTMENT (OUTPATIENT)
Dept: CT IMAGING | Age: 37
End: 2022-08-16
Attending: EMERGENCY MEDICINE
Payer: COMMERCIAL

## 2022-08-16 VITALS
BODY MASS INDEX: 39.99 KG/M2 | SYSTOLIC BLOOD PRESSURE: 168 MMHG | HEIGHT: 69 IN | WEIGHT: 270 LBS | RESPIRATION RATE: 18 BRPM | TEMPERATURE: 98 F | OXYGEN SATURATION: 96 % | DIASTOLIC BLOOD PRESSURE: 88 MMHG | HEART RATE: 94 BPM

## 2022-08-16 DIAGNOSIS — I10 ESSENTIAL HYPERTENSION: Primary | ICD-10-CM

## 2022-08-16 DIAGNOSIS — R42 DIZZINESS: Primary | ICD-10-CM

## 2022-08-16 DIAGNOSIS — F41.1 GENERALIZED ANXIETY DISORDER: ICD-10-CM

## 2022-08-16 DIAGNOSIS — R42 VERTIGO: Primary | ICD-10-CM

## 2022-08-16 DIAGNOSIS — R00.2 PALPITATIONS: ICD-10-CM

## 2022-08-16 DIAGNOSIS — I51.7 LEFT VENTRICULAR HYPERTROPHY: ICD-10-CM

## 2022-08-16 DIAGNOSIS — R42 DIZZINESS: ICD-10-CM

## 2022-08-16 DIAGNOSIS — G47.33 OSA (OBSTRUCTIVE SLEEP APNEA): ICD-10-CM

## 2022-08-16 LAB
ALBUMIN SERPL-MCNC: 3.7 G/DL (ref 3.5–5)
ALBUMIN/GLOB SERPL: 0.9 {RATIO} (ref 1.1–2.2)
ALP SERPL-CCNC: 83 U/L (ref 45–117)
ALT SERPL-CCNC: 37 U/L (ref 12–78)
ANION GAP SERPL CALC-SCNC: 6 MMOL/L (ref 5–15)
AST SERPL-CCNC: 34 U/L (ref 15–37)
BASOPHILS # BLD: 0.1 K/UL (ref 0–0.1)
BASOPHILS NFR BLD: 1 % (ref 0–1)
BILIRUB SERPL-MCNC: 0.6 MG/DL (ref 0.2–1)
BUN SERPL-MCNC: 17 MG/DL (ref 6–20)
BUN/CREAT SERPL: 16 (ref 12–20)
CALCIUM SERPL-MCNC: 9 MG/DL (ref 8.5–10.1)
CHLORIDE SERPL-SCNC: 105 MMOL/L (ref 97–108)
CO2 SERPL-SCNC: 27 MMOL/L (ref 21–32)
CREAT SERPL-MCNC: 1.08 MG/DL (ref 0.7–1.3)
DIFFERENTIAL METHOD BLD: NORMAL
EOSINOPHIL # BLD: 0.2 K/UL (ref 0–0.4)
EOSINOPHIL NFR BLD: 3 % (ref 0–7)
ERYTHROCYTE [DISTWIDTH] IN BLOOD BY AUTOMATED COUNT: 12.8 % (ref 11.5–14.5)
GLOBULIN SER CALC-MCNC: 4.2 G/DL (ref 2–4)
GLUCOSE SERPL-MCNC: 92 MG/DL (ref 65–100)
HCT VFR BLD AUTO: 42.8 % (ref 36.6–50.3)
HGB BLD-MCNC: 13.5 G/DL (ref 12.1–17)
IMM GRANULOCYTES # BLD AUTO: 0 K/UL
IMM GRANULOCYTES NFR BLD AUTO: 0 %
LYMPHOCYTES # BLD: 2.1 K/UL (ref 0.8–3.5)
LYMPHOCYTES NFR BLD: 27 % (ref 12–49)
MCH RBC QN AUTO: 27.1 PG (ref 26–34)
MCHC RBC AUTO-ENTMCNC: 31.5 G/DL (ref 30–36.5)
MCV RBC AUTO: 85.9 FL (ref 80–99)
MONOCYTES # BLD: 0.7 K/UL (ref 0–1)
MONOCYTES NFR BLD: 9 % (ref 5–13)
NEUTS SEG # BLD: 4.5 K/UL (ref 1.8–8)
NEUTS SEG NFR BLD: 60 % (ref 32–75)
NRBC # BLD: 0 K/UL (ref 0–0.01)
NRBC BLD-RTO: 0 PER 100 WBC
PLATELET # BLD AUTO: 198 K/UL (ref 150–400)
PLATELET COMMENTS,PCOM: NORMAL
PMV BLD AUTO: 12.6 FL (ref 8.9–12.9)
POTASSIUM SERPL-SCNC: 4.7 MMOL/L (ref 3.5–5.1)
PROT SERPL-MCNC: 7.9 G/DL (ref 6.4–8.2)
RBC # BLD AUTO: 4.98 M/UL (ref 4.1–5.7)
RBC MORPH BLD: NORMAL
SODIUM SERPL-SCNC: 138 MMOL/L (ref 136–145)
TROPONIN-HIGH SENSITIVITY: 9 NG/L (ref 0–76)
WBC # BLD AUTO: 7.6 K/UL (ref 4.1–11.1)

## 2022-08-16 PROCEDURE — 99214 OFFICE O/P EST MOD 30 MIN: CPT | Performed by: INTERNAL MEDICINE

## 2022-08-16 PROCEDURE — 99284 EMERGENCY DEPT VISIT MOD MDM: CPT

## 2022-08-16 PROCEDURE — 36415 COLL VENOUS BLD VENIPUNCTURE: CPT

## 2022-08-16 PROCEDURE — 93005 ELECTROCARDIOGRAM TRACING: CPT

## 2022-08-16 PROCEDURE — 80053 COMPREHEN METABOLIC PANEL: CPT

## 2022-08-16 PROCEDURE — 85025 COMPLETE CBC W/AUTO DIFF WBC: CPT

## 2022-08-16 PROCEDURE — 70450 CT HEAD/BRAIN W/O DYE: CPT

## 2022-08-16 PROCEDURE — 84484 ASSAY OF TROPONIN QUANT: CPT

## 2022-08-16 RX ORDER — CITALOPRAM 10 MG/1
10 TABLET ORAL DAILY
Qty: 30 TABLET | Refills: 2 | Status: SHIPPED | OUTPATIENT
Start: 2022-08-16 | End: 2022-09-08 | Stop reason: DRUGHIGH

## 2022-08-16 RX ORDER — AMLODIPINE BESYLATE 10 MG/1
10 TABLET ORAL DAILY
COMMUNITY
End: 2022-09-13 | Stop reason: SDUPTHER

## 2022-08-16 NOTE — TELEPHONE ENCOUNTER
Patient is requesting an order be placed for a stress test. Patient states that he would like to have it done this week if possible since he will be off of work.

## 2022-08-16 NOTE — ED PROVIDER NOTES
Dizziness  Pertinent negatives include no shortness of breath, no chest pain, no vomiting, no headaches and no nausea. Patient is a 15-year-old male with past medical history significant for asthma, gout, hypertension and sleep apnea who presents to the ED reporting intermittent dizziness for several months. He has been evaluated multiple times in the ED and his PCP office for the same complaint. He had a normal cardiac echo on 8/3/2022. He had a CBC and comprehensive metabolic on 7/24/0037 which was also normal.  The dizziness is at times brought on by head movement. He was evaluated by his PCP earlier today. He presents to the ED requesting further evaluation of his episodic dizziness. He has been offered a Head CT. Hand eye coordination are intact; normal reflexes; normal gait. He denies any headaches or pain on exam.  He has not had any pain medications today prior to arrival. He states that  He drove himself here.       Past Medical History:   Diagnosis Date    Angioedema     ace induced asthma     Cold-induced asthma     Gout     Hypertension     JOS (obstructive sleep apnea)        Past Surgical History:   Procedure Laterality Date    HX ADENOIDECTOMY           Family History:   Problem Relation Age of Onset    Sleep Apnea Mother     Hypertension Mother     Hypertension Father        Social History     Socioeconomic History    Marital status: SINGLE     Spouse name: Not on file    Number of children: Not on file    Years of education: Not on file    Highest education level: Not on file   Occupational History    Not on file   Tobacco Use    Smoking status: Never    Smokeless tobacco: Never   Vaping Use    Vaping Use: Never used   Substance and Sexual Activity    Alcohol use: No    Drug use: Yes     Comment: CBD    Sexual activity: Yes     Partners: Female   Other Topics Concern    Not on file   Social History Narrative    Not on file     Social Determinants of Health     Financial Resource Strain: Not on file   Food Insecurity: Not on file   Transportation Needs: Not on file   Physical Activity: Not on file   Stress: Not on file   Social Connections: Not on file   Intimate Partner Violence: Not on file   Housing Stability: Not on file         ALLERGIES: Lisinopril and Pcn [penicillins]    Review of Systems   Constitutional:  Negative for activity change, appetite change, fever and unexpected weight change. HENT:  Negative for congestion, sore throat and trouble swallowing. Eyes:  Negative for visual disturbance. Respiratory:  Negative for cough, chest tightness and shortness of breath. Cardiovascular:  Negative for chest pain, palpitations and leg swelling. Gastrointestinal:  Negative for abdominal pain, diarrhea, nausea and vomiting. Genitourinary:  Negative for dysuria and flank pain. Musculoskeletal:  Negative for back pain, gait problem and neck pain. Skin:  Negative for rash. Neurological:  Positive for dizziness. Negative for light-headedness and headaches. All other systems reviewed and are negative. Vitals:    08/16/22 1257   BP: (!) 168/88   Pulse: 94   Resp: 18   Temp: 98 °F (36.7 °C)   SpO2: 96%   Weight: 122.5 kg (270 lb)   Height: 5' 9\" (1.753 m)            Physical Exam  Vitals and nursing note reviewed. Constitutional:       General: He is not in acute distress. Appearance: Normal appearance. He is not ill-appearing, toxic-appearing or diaphoretic. Comments: Black male; non smoker;    HENT:      Head: Normocephalic. Cardiovascular:      Rate and Rhythm: Normal rate and regular rhythm. Pulmonary:      Effort: Pulmonary effort is normal.      Breath sounds: Normal breath sounds. Musculoskeletal:         General: Normal range of motion. Cervical back: Normal range of motion. Right lower leg: No edema. Left lower leg: No edema. Skin:     General: Skin is warm and dry. Findings: No rash.    Neurological:      General: No focal deficit present. Mental Status: He is alert and oriented to person, place, and time. MDM         Procedures    CT HEAD WO CONT    Result Date: 8/16/2022  No acute intracranial process. Patient was given copies of his cardiac echo and head CT. He was given referral to the neurology clinic  and ENT for further evaluation and treatment as needed. He has his PCP scheduling him for a cardiac stress test and an appointment with ENT. No new prescriptions were written. Patient states that he began having palpitations walking to his car. He wants to be reevaluated. Will check EKG, labs and troponin. EKG reveals normal sinus rhythm with a ventricular rate of 87; without ectopy; no ST elevation; when compared to EKG of August 14, 2022 no acute changes noted. Reviewed by Dr. Darleen Meigs. Margarita Crawley NP    Patient has been reexamined and denies any complaints of chest pain at this time. Labs are all reassuring; troponin is 9. He was also given referrals for cardiology for possible cardiac stress testing as needed. Labs Reviewed   METABOLIC PANEL, COMPREHENSIVE - Abnormal; Notable for the following components:       Result Value    Globulin 4.2 (*)     A-G Ratio 0.9 (*)     All other components within normal limits   TROPONIN-HIGH SENSITIVITY   CBC WITH AUTOMATED DIFF       4:10 PM  Patient's results and plan of care have been reviewed with him. Patient has verbally conveyed his understanding and agreement of his signs, symptoms, diagnosis, treatment and prognosis and additionally agrees to follow up as recommended or return to the Emergency Room should his condition change prior to follow-up. Discharge instructions have also been provided to the patient with some educational information regarding his diagnosis as well a list of reasons why he would want to return to the ER prior to his follow-up appointment should his condition change. Margarita Crawley NP

## 2022-08-16 NOTE — Clinical Note
Sydnie Aguilar was seen and treated in our emergency department on 8/16/2022. Sydnie Aguilar cannot return to work until 8/26/2022.     Smooth Warren MD

## 2022-08-16 NOTE — TELEPHONE ENCOUNTER
Returned pts call, verified by 2 identifiers, reports he would like to have stress test done that was discussed with you today, he is also reports dizziness with walking when walking since going on a cruise in March, requested a CT scan of brain, explained to pt that it would need to be medical necessary and that the request would be sent to Dr. Apolinar Avina

## 2022-08-16 NOTE — Clinical Note
Chase Philippe was seen and treated in our emergency department on 8/16/2022. Chase Philippe cannot return to work until 8/26/2022.     Harris Blair NP

## 2022-08-16 NOTE — PROGRESS NOTES
Mr. Zeyad Martinez is presenting to follow up     CC:  Follow-up, Form Completion (FMLA), and Anxiety       HPI:  Mr. Leana Dunn has a history of hypertension and gout. He started to experience anxiety symptoms in 2021 summer. He associates the onset of anxiety after getting his present COVID shot   that his personality has changed from being calm and is now hyper and excitable. The anxiety has made him more pessimistic  Saw Dr. Rico Rey  and advised to take BuSpar 5 mg to 3 times a day  He felt the medication was not working and rarely takes it. I saw him July 26th and advised to take buspar 10mg BID and fluoxetine . Reports he did not tolerate fluoxetine - caused dizziness, sweats and increased anxiety    He reports feeling hot and dizzy and having tingling episodes. This started prior to BuSpar. Multiple ER visits for anxiety with chest pain     Troponin negative    He is compliant with amlodipine 10 mg daily. JOS using CPAP every night  I added losartan and patient did not take it   Today's blood pressure is 132/82  We reviewed his echo of the heart which showed     Left Ventricle Normal left ventricular systolic function with a visually estimated EF of 55 - 60%. Left ventricle size is normal. Mildly increased wall thickness. Findings consistent with mild concentric hypertrophy. Normal wall motion. Normal diastolic function. Reports seen   Cardiologist and he stated it was done (stress test in October of Last year), and he saw a Cardiologist at [facility] on [facility].  I need records    Review of systems:  Constitutional: negative for fever, chills, weight loss, night sweats   10 systems reviewed and negative other than HPI      Past Medical History:   Diagnosis Date    Angioedema     ace induced asthma     Cold-induced asthma     Gout     Hypertension     JOS (obstructive sleep apnea)         Past Surgical History:   Procedure Laterality Date    HX ADENOIDECTOMY         Allergies   Allergen Reactions    Lisinopril Swelling     Pt has sever swelling of the throat and tongue. Pcn [Penicillins] Other (comments)     Allergic as a child         Current Outpatient Medications on File Prior to Visit   Medication Sig Dispense Refill    amLODIPine (NORVASC) 10 mg tablet Take 10 mg by mouth in the morning. FLUoxetine (PROzac) 10 mg tablet Take 1 Tablet by mouth in the morning. 30 Tablet 3    busPIRone (BUSPAR) 10 mg tablet Take 1 Tablet by mouth two (2) times a day. 60 Tablet 3    cpap machine kit by Does Not Apply route. LORazepam (ATIVAN) 0.5 mg tablet as needed. (Patient not taking: Reported on 8/16/2022)      amLODIPine-valsartan (EXFORGE)  mg per tablet Take 1 Tablet by mouth in the morning. (Patient not taking: Reported on 8/16/2022) 30 Tablet 3     No current facility-administered medications on file prior to visit. family history includes Hypertension in his father and mother; Sleep Apnea in his mother. Social History     Socioeconomic History    Marital status: SINGLE     Spouse name: Not on file    Number of children: Not on file    Years of education: Not on file    Highest education level: Not on file   Occupational History    Not on file   Tobacco Use    Smoking status: Never    Smokeless tobacco: Never   Substance and Sexual Activity    Alcohol use: No    Drug use: No    Sexual activity: Yes     Partners: Female   Other Topics Concern    Not on file   Social History Narrative    Not on file     Social Determinants of Health     Financial Resource Strain: Not on file   Food Insecurity: Not on file   Transportation Needs: Not on file   Physical Activity: Not on file   Stress: Not on file   Social Connections: Not on file   Intimate Partner Violence: Not on file   Housing Stability: Not on file       There were no vitals taken for this visit. General:  Well appearing male no acute distress  HEENT:   PERRL,normal conjunctiva.  External ear and canals normal, TMs normal. Hearing normal to voice. Nose without edema or discharge, normal septum. Lips, teeth, gums normal.  Oropharynx: no erythema, no exudates, no lesions, normal tongue. Neck:  Supple. Thyroid normal size, nontender, without nodules. No carotid bruit. No masses or lymphadenopathy  Respiratory: no respiratory distress,  no wheezing, no rhonchi, no rales. No chest wall tenderness. Cardiovascular:  RRR, normal S1S2, no murmur. Gastrointestinal: normal bowel sounds, soft, nontender, without masses. No hepatosplenomegaly. Extremities +2 pulses, no edema, normal sensation   Musculoskeletal:  Normal gait. Normal digits and nails. Normal strength and tone, no atrophy, and no abnormal movement. Skin:  No rash, no lesions, no ulcers. Skin warm, normal turgor, without induration or nodules. Neuro:  A and OX4, fluent speech, cranial nerves normal 2-12. Sensation normal to light touch.   DTR symmetrical  Psych: Anxious affect      Lab Results   Component Value Date/Time    WBC 7.2 08/14/2022 10:35 PM    HGB 13.2 08/14/2022 10:35 PM    HCT 41.4 08/14/2022 10:35 PM    PLATELET 281 07/02/8752 10:35 PM    MCV 85.5 08/14/2022 10:35 PM     Lab Results   Component Value Date/Time    Sodium 138 08/14/2022 10:35 PM    Potassium 4.0 08/14/2022 10:35 PM    Chloride 105 08/14/2022 10:35 PM    CO2 28 08/14/2022 10:35 PM    Anion gap 5 08/14/2022 10:35 PM    Glucose 94 08/14/2022 10:35 PM    BUN 16 08/14/2022 10:35 PM    Creatinine 1.05 08/14/2022 10:35 PM    BUN/Creatinine ratio 15 08/14/2022 10:35 PM    GFR est AA >60 08/14/2022 10:35 PM    GFR est non-AA >60 08/14/2022 10:35 PM    Calcium 8.8 08/14/2022 10:35 PM     Lab Results   Component Value Date/Time    Cholesterol, total 182 02/11/2021 12:11 PM    HDL Cholesterol 37 (L) 02/11/2021 12:11 PM    LDL, calculated 108 (H) 02/11/2021 12:11 PM    VLDL, calculated 37 02/11/2021 12:11 PM    Triglyceride 210 (H) 02/11/2021 12:11 PM     Lab Results   Component Value Date/Time    TSH 0.93 07/30/2022 02:59 PM     Lab Results   Component Value Date/Time    Hemoglobin A1c 5.9 (H) 02/11/2021 12:11 PM     No results found for: SARAYRODLuis E, XQVIYISEL, VD3RIA                Assessment and Plan:   1. Essential hypertension  Blood pressure is at goal today. He is only taking amlodipine 10 mg. He stated he did not feel well on the ARB medication. Reviewed with patient his echo which showed left ventricular hypertrophy that is mild. He reports doing a stress test last October that was normal.  His multiple ER visits have shown EKGs and troponins that are negative for ischemia. He denies chest pain. 2. Anxiety  -Discussed the physiologic changes with anxiety. Discussed chemical imbalance. I advised him to be consistent with BuSpar use and take it twice a day. I advised him to start Celexa 5 mg and if tolerated increase to 10 mg after 1 week. I also gave him a referral to see a psychologist.      3. Dizziness: Normal neurologic exam.  I am referring him to Dr. Yajaira Lowery  to evaluate him for dizziness  Follow up in 2 -3 weeks     He is a  and feels that with the dizziness and anxiety attacks he cannot drive currently. I have filled out a form for work with return date of August 22.   Andie العراقي MD

## 2022-08-16 NOTE — LETTER
NOTIFICATION RETURN TO WORK / SCHOOL    8/16/2022 9:19 AM    Mr. Juan Nielson  [facility] [id number]      To Whom It May Concern:    Juan Nielson is currently under the care of [facility]. Patient has missed several days of work due to acute illness and will return to work on August 22 2022  He will need to follow up with doctor appointments twice a month. Please allow for patient to miss work for his appointments. If there are questions or concerns please have the patient contact our office.         Sincerely,      Po Craig MD

## 2022-08-16 NOTE — PATIENT INSTRUCTIONS
Monitor your blood pressure daily goal is 130/80    Start celexa half a pill for anxiety    Continue the buspar take half a pill twice a day

## 2022-08-16 NOTE — TELEPHONE ENCOUNTER
Libia Irwin 8/16/2022 3:42 PM EDT      ----- Message -----  From: Gita Bertrand  Sent: 8/16/2022 2:09 PM EDT  To: Maria Elena Coon  Subject: Toni VILLANUEVA [facility] here at [facility] is asking Dr Susan Heaton schedule or refer a stress test with them in the system. I called myself to schedule but  will need to refer it with a code or something. Dr Rachel Spann and I talked about getting a stress this morning but I think she forgot to set it up or refer it.

## 2022-08-16 NOTE — TELEPHONE ENCOUNTER
Patient transferred to triage line due to feeling dizzy and not well. Patient was seen in office this morning with Dr Lul Bass. Patient again stated he wants a full body scan and a CT Scan done because something is not right. Patient has some concerns with returning to work on Monday and stated he cant even walk through the grocery store without getting hot, dizzy and his feet tingling, also gets weak. Advised of last note regarding Cardiologist and he stated it was done (stress test in October of Last year), and he saw a [facility]ologist at [facility] on [facility]. Advised to reach out to them and make an appointment. Patient kept repeating himself that he doesn't feel well and since his cruise in March his equilibrium has been off. Patient denies anxiety and stated he took half a tab of the pill Dr Lul Bass prescribed this morning and he doesn't feel any better (celexa). Advised it does take some time to work and its not instant. Patient kept talking and was not listening to instructions. Advised if Patient feels this bad today he needs to go to the ER and be seen. Patient stated no one is willing to help him. He cant go to work on Monday and he will get fired. Advised a message will be sent to Dr Lul Bass but those are her suggestions about following up with a Cardiologist. Patient ended call. Nothing further.

## 2022-08-16 NOTE — Clinical Note
Ricky Rodriguez was seen and treated in our emergency department on 8/16/2022.         David Sands NP

## 2022-08-16 NOTE — Clinical Note
Radhames Farias was seen and treated in our emergency department on 8/16/2022. Radhames Farias cannot return to work until 8/26/2022.     Param Escalante NP

## 2022-08-16 NOTE — ED TRIAGE NOTES
Patient reports dizziness intermittently x 1 month. States it occurs when he is walking long distances or turning head. Per patient the dizziness causes him to become worried and anxious, causing panic attacks. Patient states he has been seen multiple times for the same thing and has had multiple cardiac workups without finding cause for dizziness.

## 2022-08-16 NOTE — PROGRESS NOTES
1. \"Have you been to the ER, urgent care clinic since your last visit? Hospitalized since your last visit? \" Yes [facility] for a panic attack    2. \"Have you seen or consulted any other health care providers outside of the [address] [place] [address] since your last visit? \" No     3. For patients aged 39-70: Has the patient had a colonoscopy / FIT/ Cologuard? NA - based on age      If the patient is female:    4. For patients aged 41-77: Has the patient had a mammogram within the past 2 years? NA - based on age or sex      11. For patients aged 21-65: Has the patient had a pap smear? NA - based on age or sex    FMLA form was faxed to Glendy Estevez at [phone]. Fax confirmed it did go through.

## 2022-08-17 ENCOUNTER — TELEPHONE (OUTPATIENT)
Dept: SLEEP MEDICINE | Age: 37
End: 2022-08-17

## 2022-08-17 LAB
ATRIAL RATE: 87 BPM
ATRIAL RATE: 92 BPM
CALCULATED P AXIS, ECG09: 57 DEGREES
CALCULATED P AXIS, ECG09: 57 DEGREES
CALCULATED R AXIS, ECG10: 35 DEGREES
CALCULATED R AXIS, ECG10: 43 DEGREES
CALCULATED T AXIS, ECG11: 3 DEGREES
CALCULATED T AXIS, ECG11: 5 DEGREES
DIAGNOSIS, 93000: NORMAL
DIAGNOSIS, 93000: NORMAL
P-R INTERVAL, ECG05: 150 MS
P-R INTERVAL, ECG05: 152 MS
Q-T INTERVAL, ECG07: 330 MS
Q-T INTERVAL, ECG07: 344 MS
QRS DURATION, ECG06: 94 MS
QRS DURATION, ECG06: 98 MS
QTC CALCULATION (BEZET), ECG08: 408 MS
QTC CALCULATION (BEZET), ECG08: 413 MS
VENTRICULAR RATE, ECG03: 87 BPM
VENTRICULAR RATE, ECG03: 92 BPM

## 2022-08-17 NOTE — TELEPHONE ENCOUNTER
Phoned the patient to schedule a sleep consult per Jerrell Livingston MD.  He stated that he is scheduled with another sleep center to a sleep study.

## 2022-08-17 NOTE — TELEPHONE ENCOUNTER
Two pt identifiers confirmed. I advised the patient per , his CT of the brain is normal, his blood pressure was high in the ER visit. I encouraged the patient to take celexa. The patient stated he took half a pill and it made him dizzy. I asked him to take celexa at bedtime to see if that helped. I asked him to let us know how he is doing on Friday. The patient stated he needs his forms refaxed to HR and it needs to say No Restrictions. I told the patient I would ask .     Please advise, thank you,    Signed By: Moon Marcum LPN     August 17, 4469

## 2022-08-18 ENCOUNTER — TELEPHONE (OUTPATIENT)
Dept: INTERNAL MEDICINE CLINIC | Age: 37
End: 2022-08-18

## 2022-08-18 NOTE — TELEPHONE ENCOUNTER
Patient states he needs to get his \"Work Release Paperwork/Form Updated & re-faxed to his HR Dept. Patient states \"Form needs to Specifically include he can Return To Work With No Restrictions as this is required for his Company as he is a \". Please call if any question or to advise when done. Also can advise Via My Chart.  Thank you

## 2022-08-18 NOTE — TELEPHONE ENCOUNTER
Two pt identifiers confirmed. I called the patient and let him know per , it is ok to state no restrictions on his letter to go back to work. He should continue celexa and after one week he will tolerate it much better. I refaxed the pt's letter stating no restrictions to   [employer]  to [phone]. Fax was confirmed it went though. Pt verbalized understanding of information discussed w/ no further questions at this time.     Signed By: Eva Morales LPN     August 18, 5226

## 2022-08-19 NOTE — TELEPHONE ENCOUNTER
Pt left Mychart message, called pt, 2 identifiers, pt reports bright red blood in stool and toilet this morning, denies abdominal pain or cramping, reports stool was brown, will speak with provider for advise and call pt back.

## 2022-08-19 NOTE — TELEPHONE ENCOUNTER
Called patient to discuss recent blood in his stool. He reports that he had one episode of red blood possibly mixed in with the stool along with red staining of the water in the toilet bowel. He hasn't had anything like this happen recently. He has not had any black stool. He does not have any abdominal pain or pain with defecation. He has had some dizziness, fatigue and anxiety over the last 1-2 months for which he has received thorough workup, which has been unremarkable. He thinks he had about 1 lb of weight loss recently, which has been intentional. No fevers, diarrhea, or constipation or change in stool caliber. He has regular bowel movements. He does not have any family history of colon cancer or UC/Crohn's. No hx of abd surgeries. He may have a hemorrhoid but has not had bleeding from it in the past.     Patient checks his BP at home and was elevated while talking about the phone to 170/100 which he attributed to just having walked up the stairs and anxiety about having colon cancer. Upon further questioning patient remembered that he had a half a cup of raw beet juice yesterday which he had forgotten about. Discussed the following with patient:  - Reassured patient that red staining most likely due to beets. - Reassured patient that he is very low risk for colon cancer  - Patient will continue to monitor stool for resolution  - Patient will continue to monitor his BP at least daily  - If recurrent or more definitively blood then may need further workup. - If persistent and/or worsening lightheadedness or hypotension then will need to go to the ED.     Elton Esquivel MD

## 2022-08-22 ENCOUNTER — PATIENT MESSAGE (OUTPATIENT)
Dept: INTERNAL MEDICINE CLINIC | Age: 37
End: 2022-08-22

## 2022-08-22 ENCOUNTER — HOSPITAL ENCOUNTER (EMERGENCY)
Age: 37
Discharge: HOME | End: 2022-08-22
Attending: EMERGENCY MEDICINE
Payer: COMMERCIAL

## 2022-08-22 VITALS
HEART RATE: 99 BPM | DIASTOLIC BLOOD PRESSURE: 103 MMHG | RESPIRATION RATE: 22 BRPM | OXYGEN SATURATION: 98 % | SYSTOLIC BLOOD PRESSURE: 162 MMHG | TEMPERATURE: 98.9 F

## 2022-08-22 DIAGNOSIS — Z11.3 SCREENING EXAMINATION FOR STD (SEXUALLY TRANSMITTED DISEASE): Primary | ICD-10-CM

## 2022-08-22 DIAGNOSIS — R73.9 ELEVATED BLOOD SUGAR: ICD-10-CM

## 2022-08-22 DIAGNOSIS — R42 DIZZINESS: Primary | ICD-10-CM

## 2022-08-22 LAB
ANION GAP SERPL CALC-SCNC: 4 MMOL/L (ref 5–15)
APPEARANCE UR: CLEAR
ATRIAL RATE: 84 BPM
BASOPHILS # BLD: 0.1 K/UL (ref 0–0.1)
BASOPHILS NFR BLD: 1 % (ref 0–1)
BILIRUB UR QL: NEGATIVE
BUN SERPL-MCNC: 18 MG/DL (ref 6–20)
BUN/CREAT SERPL: 17 (ref 12–20)
CALCIUM SERPL-MCNC: 9 MG/DL (ref 8.5–10.1)
CALCULATED P AXIS, ECG09: 62 DEGREES
CALCULATED R AXIS, ECG10: 32 DEGREES
CALCULATED T AXIS, ECG11: -6 DEGREES
CHLORIDE SERPL-SCNC: 108 MMOL/L (ref 97–108)
CO2 SERPL-SCNC: 28 MMOL/L (ref 21–32)
COLOR UR: NORMAL
CREAT SERPL-MCNC: 1.07 MG/DL (ref 0.7–1.3)
DIAGNOSIS, 93000: NORMAL
DIFFERENTIAL METHOD BLD: NORMAL
EOSINOPHIL # BLD: 0.1 K/UL (ref 0–0.4)
EOSINOPHIL NFR BLD: 1 % (ref 0–7)
ERYTHROCYTE [DISTWIDTH] IN BLOOD BY AUTOMATED COUNT: 12.8 % (ref 11.5–14.5)
GLUCOSE SERPL-MCNC: 115 MG/DL (ref 65–100)
GLUCOSE UR STRIP.AUTO-MCNC: NEGATIVE MG/DL
HCT VFR BLD AUTO: 42.2 % (ref 36.6–50.3)
HGB BLD-MCNC: 13.1 G/DL (ref 12.1–17)
HGB UR QL STRIP: NEGATIVE
IMM GRANULOCYTES # BLD AUTO: 0 K/UL (ref 0–0.04)
IMM GRANULOCYTES NFR BLD AUTO: 0 % (ref 0–0.5)
KETONES UR QL STRIP.AUTO: NEGATIVE MG/DL
LEUKOCYTE ESTERASE UR QL STRIP.AUTO: NEGATIVE
LYMPHOCYTES # BLD: 2.3 K/UL (ref 0.8–3.5)
LYMPHOCYTES NFR BLD: 32 % (ref 12–49)
MCH RBC QN AUTO: 27.3 PG (ref 26–34)
MCHC RBC AUTO-ENTMCNC: 31 G/DL (ref 30–36.5)
MCV RBC AUTO: 87.9 FL (ref 80–99)
MONOCYTES # BLD: 0.8 K/UL (ref 0–1)
MONOCYTES NFR BLD: 11 % (ref 5–13)
NEUTS SEG # BLD: 4 K/UL (ref 1.8–8)
NEUTS SEG NFR BLD: 55 % (ref 32–75)
NITRITE UR QL STRIP.AUTO: NEGATIVE
NRBC # BLD: 0 K/UL (ref 0–0.01)
NRBC BLD-RTO: 0 PER 100 WBC
P-R INTERVAL, ECG05: 156 MS
PH UR STRIP: 6.5 [PH] (ref 5–8)
PLATELET # BLD AUTO: 205 K/UL (ref 150–400)
POTASSIUM SERPL-SCNC: 3.9 MMOL/L (ref 3.5–5.1)
PROT UR STRIP-MCNC: NEGATIVE MG/DL
Q-T INTERVAL, ECG07: 352 MS
QRS DURATION, ECG06: 94 MS
QTC CALCULATION (BEZET), ECG08: 415 MS
RBC # BLD AUTO: 4.8 M/UL (ref 4.1–5.7)
SODIUM SERPL-SCNC: 140 MMOL/L (ref 136–145)
SP GR UR REFRACTOMETRY: 1.02 (ref 1–1.03)
UR CULT HOLD, URHOLD: NORMAL
UROBILINOGEN UR QL STRIP.AUTO: 0.2 EU/DL (ref 0.2–1)
VENTRICULAR RATE, ECG03: 84 BPM
WBC # BLD AUTO: 7.2 K/UL (ref 4.1–11.1)

## 2022-08-22 PROCEDURE — 93005 ELECTROCARDIOGRAM TRACING: CPT

## 2022-08-22 PROCEDURE — 85025 COMPLETE CBC W/AUTO DIFF WBC: CPT

## 2022-08-22 PROCEDURE — 99284 EMERGENCY DEPT VISIT MOD MDM: CPT

## 2022-08-22 PROCEDURE — 81003 URINALYSIS AUTO W/O SCOPE: CPT

## 2022-08-22 PROCEDURE — 36415 COLL VENOUS BLD VENIPUNCTURE: CPT

## 2022-08-22 PROCEDURE — 80048 BASIC METABOLIC PNL TOTAL CA: CPT

## 2022-08-22 RX ORDER — MORPHINE SULFATE 4 MG/ML
4 INJECTION INTRAVENOUS
Status: DISCONTINUED | OUTPATIENT
Start: 2022-08-22 | End: 2022-08-22 | Stop reason: HOSPADM

## 2022-08-22 NOTE — TELEPHONE ENCOUNTER
Pt states he sent a My Chart message this morning and he can see that it was read. Pt states that being he is on anxiety medication the form needs to be filled out that was sent through My Chart. He states all info is in that message fax number and form(s)  this is not for his physical, only about the medication. Please call pt to let him know when this can be done as he can't work until this happens.

## 2022-08-22 NOTE — Clinical Note
[address]  [facility] EMERGENCY DEPT  [id number]  [phone]    Work/School Note    Date: 8/22/2022    To Whom It May concern:      Sahra Lopez was seen and treated today in the emergency room by the following provider(s):  Attending Provider: Christo Fontanez MD.      Sahra Lopez is excused from work/school on 08/22/22. He is clear to return to work/school on 08/23/22.         Sincerely,          Eliot Claire MD

## 2022-08-22 NOTE — DISCHARGE INSTRUCTIONS
You were seen in the emergency department for dizziness. The results of your tests including an EKG, imaging, and lab work, were normal.  Although an exact cause of your symptoms was not identified, the most likely cause is anxiety. Please take any medications prescribed at this visit as instructed. Please also follow-up with your PCP or return to the emergency department if you experience a worsening of symptoms or any new symptoms that are concerning to you.

## 2022-08-22 NOTE — ED TRIAGE NOTES
Patient arrives ambulatory to ED with concern for allergic reaction to nasal spray (fluticasone)    Patient reports he took buspar     Was having headache, dizziness, leg weakness (described as feeling heavy) and his throat closing    Took 2 tylenol PM (500 mg tylenol, 25 mg benadryl) about 2030 for headache    In triage patient speaking in clear sentences, no swelling to tongue or lips, oxygen saturations 98%

## 2022-08-22 NOTE — ED PROVIDER NOTES
69-year-old male with PMHx of anxiety, HTN, ACEI induced angioedema presents to the ED complaining of sudden onset dizziness, dyspnea, and paresthesias in bilateral hands immediately prior to arrival, after using fluticasone. Patient is concerned that he may have had an allergic reaction or drug to drug interaction between his BuSpar and fluticasone. The history is provided by the patient. Dizziness  This is a new problem. The current episode started 1 to 2 hours ago. The problem has been gradually improving. There was no focality noted. Pertinent negatives include no focal weakness, no slurred speech, no speech difficulty, no visual change and no mental status change. There has been no fever. Pertinent negatives include no chest pain and no altered mental status.       Past Medical History:   Diagnosis Date    Angioedema     ace induced asthma     Cold-induced asthma     Gout     Hypertension     JOS (obstructive sleep apnea)        Past Surgical History:   Procedure Laterality Date    HX ADENOIDECTOMY           Family History:   Problem Relation Age of Onset    Sleep Apnea Mother     Hypertension Mother     Hypertension Father        Social History     Socioeconomic History    Marital status: SINGLE     Spouse name: Not on file    Number of children: Not on file    Years of education: Not on file    Highest education level: Not on file   Occupational History    Not on file   Tobacco Use    Smoking status: Never    Smokeless tobacco: Never   Vaping Use    Vaping Use: Never used   Substance and Sexual Activity    Alcohol use: No    Drug use: Yes     Comment: CBD    Sexual activity: Yes     Partners: Female   Other Topics Concern    Not on file   Social History Narrative    Not on file     Social Determinants of Health     Financial Resource Strain: Not on file   Food Insecurity: Not on file   Transportation Needs: Not on file   Physical Activity: Not on file   Stress: Not on file   Social Connections: Not on file   Intimate Partner Violence: Not on file   Housing Stability: Not on file         ALLERGIES: Lisinopril and Pcn [penicillins]    Review of Systems   Constitutional: Negative. HENT: Negative. Eyes: Negative. Respiratory: Negative. Cardiovascular: Negative. Negative for chest pain. Gastrointestinal: Negative. Endocrine: Negative. Genitourinary: Negative. Musculoskeletal: Negative. Skin: Negative. Neurological:  Positive for dizziness. Negative for focal weakness and speech difficulty. Psychiatric/Behavioral:  The patient is nervous/anxious. Vitals:    08/22/22 0301   BP: (!) 162/103   Pulse: 99   Resp: 22   Temp: 98.9 °F (37.2 °C)   SpO2: 98%            Physical Exam  Vitals and nursing note reviewed. Constitutional:       General: He is not in acute distress. Appearance: Normal appearance. He is not ill-appearing. HENT:      Head: Normocephalic and atraumatic. Nose: Nose normal.      Mouth/Throat:      Mouth: Mucous membranes are moist.   Eyes:      Extraocular Movements: Extraocular movements intact. Pupils: Pupils are equal, round, and reactive to light. Cardiovascular:      Rate and Rhythm: Normal rate and regular rhythm. Pulses: Normal pulses. Pulmonary:      Effort: Pulmonary effort is normal. No respiratory distress. Breath sounds: Normal breath sounds. Abdominal:      General: There is no distension. Palpations: Abdomen is soft. Tenderness: There is no abdominal tenderness. Musculoskeletal:         General: Normal range of motion. Cervical back: Normal range of motion. Skin:     General: Skin is warm and dry. Neurological:      General: No focal deficit present. Mental Status: He is alert and oriented to person, place, and time.    Psychiatric:         Mood and Affect: Mood normal.        MDM  Number of Diagnoses or Management Options  Dizziness: new and requires workup  Diagnosis management comments: DDx: Anxiety, panic attack, allergic reaction, vasovagal episode    Plan:  - Labs: CBC, BMP, UA  - EKG    EKG: normal EKG, normal sinus rhythm, unchanged from previous tracings, 84      Reassessment: Patient's work-up is reassuring. This likely etiology of symptoms is anxiety.   Patient may be discharged at this time with recommendation to follow-up with his PCP         Amount and/or Complexity of Data Reviewed  Clinical lab tests: ordered and reviewed  Tests in the medicine section of CPT®: ordered and reviewed  Obtain history from someone other than the patient: yes  Independent visualization of images, tracings, or specimens: yes    Risk of Complications, Morbidity, and/or Mortality  Presenting problems: moderate  Diagnostic procedures: low  Management options: low    Patient Progress  Patient progress: improved         Procedures

## 2022-08-30 ENCOUNTER — LAB ONLY (OUTPATIENT)
Dept: INTERNAL MEDICINE CLINIC | Age: 37
End: 2022-08-30

## 2022-08-30 ENCOUNTER — TELEPHONE (OUTPATIENT)
Dept: INTERNAL MEDICINE CLINIC | Age: 37
End: 2022-08-30

## 2022-08-30 DIAGNOSIS — R73.9 ELEVATED BLOOD SUGAR: ICD-10-CM

## 2022-08-30 DIAGNOSIS — Z11.3 SCREENING EXAMINATION FOR STD (SEXUALLY TRANSMITTED DISEASE): ICD-10-CM

## 2022-08-30 NOTE — TELEPHONE ENCOUNTER
Brionna, Generic 8/30/2022 8:40 AM EDT    Do you have any availability today or this week foe me to come in for blood work to check for diabetes, cancer cells, STD test to see if I have any infection? I'm just trying to cover all bases of possibilities. I will only health insurance unti this coming . .....  Chase Philippe

## 2022-08-31 LAB
EST. AVERAGE GLUCOSE BLD GHB EST-MCNC: 108 MG/DL
HBA1C MFR BLD: 5.4 % (ref 4–5.6)
HIV 1+2 AB+HIV1 P24 AG SERPL QL IA: NONREACTIVE
HIV12 RESULT COMMENT, HHIVC: NORMAL
RPR SER QL: NONREACTIVE

## 2022-09-05 ENCOUNTER — APPOINTMENT (OUTPATIENT)
Dept: GENERAL RADIOLOGY | Age: 37
End: 2022-09-05
Attending: EMERGENCY MEDICINE
Payer: MEDICAID

## 2022-09-05 ENCOUNTER — HOSPITAL ENCOUNTER (EMERGENCY)
Age: 37
Discharge: HOME | End: 2022-09-05
Attending: EMERGENCY MEDICINE
Payer: MEDICAID

## 2022-09-05 VITALS
BODY MASS INDEX: 41.32 KG/M2 | HEART RATE: 92 BPM | DIASTOLIC BLOOD PRESSURE: 101 MMHG | SYSTOLIC BLOOD PRESSURE: 147 MMHG | HEIGHT: 69 IN | OXYGEN SATURATION: 94 % | TEMPERATURE: 99 F | WEIGHT: 279 LBS | RESPIRATION RATE: 14 BRPM

## 2022-09-05 DIAGNOSIS — I10 HYPERTENSION, UNSPECIFIED TYPE: ICD-10-CM

## 2022-09-05 DIAGNOSIS — R07.9 CHEST PAIN, UNSPECIFIED TYPE: Primary | ICD-10-CM

## 2022-09-05 LAB
ALBUMIN SERPL-MCNC: 3.8 G/DL (ref 3.5–5)
ALBUMIN/GLOB SERPL: 1 {RATIO} (ref 1.1–2.2)
ALP SERPL-CCNC: 86 U/L (ref 45–117)
ALT SERPL-CCNC: 37 U/L (ref 12–78)
ANION GAP SERPL CALC-SCNC: 3 MMOL/L (ref 5–15)
AST SERPL-CCNC: 27 U/L (ref 15–37)
BASOPHILS # BLD: 0.1 K/UL (ref 0–0.1)
BASOPHILS NFR BLD: 1 % (ref 0–1)
BILIRUB SERPL-MCNC: 0.5 MG/DL (ref 0.2–1)
BUN SERPL-MCNC: 22 MG/DL (ref 6–20)
BUN/CREAT SERPL: 21 (ref 12–20)
CALCIUM SERPL-MCNC: 8.8 MG/DL (ref 8.5–10.1)
CHLORIDE SERPL-SCNC: 108 MMOL/L (ref 97–108)
CO2 SERPL-SCNC: 29 MMOL/L (ref 21–32)
COMMENT, HOLDF: NORMAL
CREAT SERPL-MCNC: 1.06 MG/DL (ref 0.7–1.3)
DIFFERENTIAL METHOD BLD: ABNORMAL
EOSINOPHIL # BLD: 0.1 K/UL (ref 0–0.4)
EOSINOPHIL NFR BLD: 1 % (ref 0–7)
ERYTHROCYTE [DISTWIDTH] IN BLOOD BY AUTOMATED COUNT: 13.1 % (ref 11.5–14.5)
GLOBULIN SER CALC-MCNC: 3.8 G/DL (ref 2–4)
GLUCOSE SERPL-MCNC: 110 MG/DL (ref 65–100)
HCT VFR BLD AUTO: 41.2 % (ref 36.6–50.3)
HGB BLD-MCNC: 13 G/DL (ref 12.1–17)
IMM GRANULOCYTES # BLD AUTO: 0 K/UL (ref 0–0.04)
IMM GRANULOCYTES NFR BLD AUTO: 1 % (ref 0–0.5)
LYMPHOCYTES # BLD: 3 K/UL (ref 0.8–3.5)
LYMPHOCYTES NFR BLD: 37 % (ref 12–49)
MCH RBC QN AUTO: 26.7 PG (ref 26–34)
MCHC RBC AUTO-ENTMCNC: 31.6 G/DL (ref 30–36.5)
MCV RBC AUTO: 84.8 FL (ref 80–99)
MONOCYTES # BLD: 0.8 K/UL (ref 0–1)
MONOCYTES NFR BLD: 10 % (ref 5–13)
NEUTS SEG # BLD: 4 K/UL (ref 1.8–8)
NEUTS SEG NFR BLD: 50 % (ref 32–75)
NRBC # BLD: 0 K/UL (ref 0–0.01)
NRBC BLD-RTO: 0 PER 100 WBC
PLATELET # BLD AUTO: 227 K/UL (ref 150–400)
PMV BLD AUTO: 12.7 FL (ref 8.9–12.9)
POTASSIUM SERPL-SCNC: 3.7 MMOL/L (ref 3.5–5.1)
PROT SERPL-MCNC: 7.6 G/DL (ref 6.4–8.2)
RBC # BLD AUTO: 4.86 M/UL (ref 4.1–5.7)
SAMPLES BEING HELD,HOLD: NORMAL
SODIUM SERPL-SCNC: 140 MMOL/L (ref 136–145)
TROPONIN-HIGH SENSITIVITY: 14 NG/L (ref 0–76)
WBC # BLD AUTO: 8 K/UL (ref 4.1–11.1)

## 2022-09-05 PROCEDURE — 71045 X-RAY EXAM CHEST 1 VIEW: CPT

## 2022-09-05 PROCEDURE — 74011250636 HC RX REV CODE- 250/636: Performed by: EMERGENCY MEDICINE

## 2022-09-05 PROCEDURE — 80053 COMPREHEN METABOLIC PANEL: CPT

## 2022-09-05 PROCEDURE — 93005 ELECTROCARDIOGRAM TRACING: CPT

## 2022-09-05 PROCEDURE — 96374 THER/PROPH/DIAG INJ IV PUSH: CPT

## 2022-09-05 PROCEDURE — 74011250637 HC RX REV CODE- 250/637: Performed by: EMERGENCY MEDICINE

## 2022-09-05 PROCEDURE — 36415 COLL VENOUS BLD VENIPUNCTURE: CPT

## 2022-09-05 PROCEDURE — 84484 ASSAY OF TROPONIN QUANT: CPT

## 2022-09-05 PROCEDURE — 85025 COMPLETE CBC W/AUTO DIFF WBC: CPT

## 2022-09-05 PROCEDURE — 99285 EMERGENCY DEPT VISIT HI MDM: CPT

## 2022-09-05 RX ORDER — ONDANSETRON 2 MG/ML
4 INJECTION INTRAMUSCULAR; INTRAVENOUS ONCE
Status: COMPLETED | OUTPATIENT
Start: 2022-09-05 | End: 2022-09-05

## 2022-09-05 RX ORDER — ACETAMINOPHEN 500 MG
1000 TABLET ORAL ONCE
Status: COMPLETED | OUTPATIENT
Start: 2022-09-05 | End: 2022-09-05

## 2022-09-05 RX ADMIN — ONDANSETRON 4 MG: 2 INJECTION INTRAMUSCULAR; INTRAVENOUS at 05:05

## 2022-09-05 RX ADMIN — ACETAMINOPHEN 1000 MG: 500 TABLET ORAL at 05:05

## 2022-09-05 NOTE — ED PROVIDER NOTES
44-year-old male with history of base induced asthma, gout, hypertension, JOS presents to the emergency department chief complaint of chest pain and palpitations. He thinks he may have had a panic attack after eating pizza last night. He has been titrating off of BuSpar per his primary care doctor for anxiety when he was having some intrusive thoughts and not feeling like himself. No fevers or chills. During my interview he tells me he feels a lot better now, back to his baseline. The history is provided by the patient and medical records. Chest Pain   This is a recurrent problem. The problem has been resolved. The problem occurs constantly. The pain is moderate. The quality of the pain is described as sharp. The pain does not radiate. Associated symptoms include headaches and shortness of breath. Pertinent negatives include no abdominal pain, no cough, no fever, no nausea, no vomiting and no weakness. Headache   Associated symptoms include shortness of breath. Pertinent negatives include no fever, no weakness, no nausea and no vomiting.       Past Medical History:   Diagnosis Date    Angioedema     ace induced asthma     Cold-induced asthma     Gout     Hypertension     JOS (obstructive sleep apnea)        Past Surgical History:   Procedure Laterality Date    HX ADENOIDECTOMY           Family History:   Problem Relation Age of Onset    Sleep Apnea Mother     Hypertension Mother     Hypertension Father        Social History     Socioeconomic History    Marital status: SINGLE     Spouse name: Not on file    Number of children: Not on file    Years of education: Not on file    Highest education level: Not on file   Occupational History    Not on file   Tobacco Use    Smoking status: Never    Smokeless tobacco: Never   Vaping Use    Vaping Use: Never used   Substance and Sexual Activity    Alcohol use: No    Drug use: Yes     Comment: CBD    Sexual activity: Yes     Partners: Female   Other Topics Concern Not on file   Social History Narrative    Not on file     Social Determinants of Health     Financial Resource Strain: Not on file   Food Insecurity: Not on file   Transportation Needs: Not on file   Physical Activity: Not on file   Stress: Not on file   Social Connections: Not on file   Intimate Partner Violence: Not on file   Housing Stability: Not on file         ALLERGIES: Lisinopril and Pcn [penicillins]    Review of Systems   Constitutional:  Negative for fatigue and fever. HENT:  Negative for sneezing and sore throat. Respiratory:  Positive for shortness of breath. Negative for cough. Cardiovascular:  Positive for chest pain. Negative for leg swelling. Gastrointestinal:  Negative for abdominal pain, diarrhea, nausea and vomiting. Genitourinary:  Negative for difficulty urinating and dysuria. Musculoskeletal:  Negative for arthralgias and myalgias. Skin:  Negative for color change and rash. Neurological:  Positive for headaches. Negative for weakness. Psychiatric/Behavioral:  Negative for agitation and behavioral problems. The patient is nervous/anxious. Vitals:    09/05/22 0327 09/05/22 0335 09/05/22 0509   BP:  (!) 160/103 (!) 147/101   Pulse: 99  92   Resp: 12  14   Temp: 99 °F (37.2 °C)     SpO2: 99%  94%   Weight: 126.6 kg (279 lb)     Height: 5' 9\" (1.753 m)              Physical Exam  Vitals and nursing note reviewed. Constitutional:       General: He is not in acute distress. Appearance: Normal appearance. He is well-developed. He is not ill-appearing, toxic-appearing or diaphoretic. HENT:      Head: Normocephalic and atraumatic. Nose: Nose normal.      Mouth/Throat:      Mouth: Mucous membranes are moist.      Pharynx: Oropharynx is clear. Eyes:      Extraocular Movements: Extraocular movements intact. Conjunctiva/sclera: Conjunctivae normal.      Pupils: Pupils are equal, round, and reactive to light.    Cardiovascular:      Rate and Rhythm: Normal rate and regular rhythm. Pulses: Normal pulses. Heart sounds: No murmur heard. Pulmonary:      Effort: Pulmonary effort is normal. No respiratory distress. Breath sounds: Normal breath sounds. No wheezing. Chest:      Chest wall: No mass or tenderness. Abdominal:      General: There is no distension. Palpations: Abdomen is soft. Tenderness: There is no abdominal tenderness. There is no guarding or rebound. Musculoskeletal:         General: No swelling, tenderness, deformity or signs of injury. Normal range of motion. Cervical back: Normal range of motion and neck supple. No rigidity. No muscular tenderness. Right lower leg: No tenderness. No edema. Left lower leg: No tenderness. No edema. Skin:     General: Skin is warm and dry. Capillary Refill: Capillary refill takes less than 2 seconds. Neurological:      General: No focal deficit present. Mental Status: He is alert and oriented to person, place, and time. Psychiatric:         Mood and Affect: Mood normal.         Behavior: Behavior normal.        MDM  Number of Diagnoses or Management Options  Diagnosis management comments: 44-year-old male presents as above with chest pain. Reassuring work-up in the emergency department without evidence of ACS, PTX, PE. Suspect likely anxiety related or musculoskeletal etiology. I have recommended that he check his blood pressure at home and discuss blood pressure control with his primary care doctor. We discussed CBD and being cautious with overuse and frequent use. Follow-up with primary care, return if needed. Amount and/or Complexity of Data Reviewed  Clinical lab tests: reviewed  Tests in the radiology section of CPT®: reviewed      ED Course as of 09/05/22 0551   [facility] Sep 05, 2022   9337   ED EKG interpretation:  Rhythm: n sinus tachycardia at a rate of approximately 104. Axis: normal.  ST segment:  No concerning ST elevations or depressions.  This EKG was interpreted by Pablo Burton MD,ED Provider.  [JM]      ED Course User Index  [JM] Jillian Castillo MD       Procedures

## 2022-09-05 NOTE — ED TRIAGE NOTES
Pt presents with chest pain, headache and blurry vision. Symptoms started about an hour ago.   Took ibuprofen 45 min ago for headache

## 2022-09-07 ENCOUNTER — HOSPITAL ENCOUNTER (EMERGENCY)
Age: 37
Discharge: HOME | End: 2022-09-07
Attending: EMERGENCY MEDICINE
Payer: COMMERCIAL

## 2022-09-07 VITALS
OXYGEN SATURATION: 99 % | HEART RATE: 136 BPM | TEMPERATURE: 98 F | SYSTOLIC BLOOD PRESSURE: 163 MMHG | DIASTOLIC BLOOD PRESSURE: 95 MMHG | RESPIRATION RATE: 16 BRPM

## 2022-09-07 DIAGNOSIS — F12.180 CANNABIS ABUSE WITH CANNABIS-INDUCED ANXIETY DISORDER (HCC): ICD-10-CM

## 2022-09-07 DIAGNOSIS — R07.89 ATYPICAL CHEST PAIN: ICD-10-CM

## 2022-09-07 DIAGNOSIS — F41.0 PANIC ATTACK: Primary | ICD-10-CM

## 2022-09-07 LAB
ANION GAP SERPL CALC-SCNC: 6 MMOL/L (ref 5–15)
BASOPHILS # BLD: 0.1 K/UL (ref 0–0.1)
BASOPHILS NFR BLD: 1 % (ref 0–1)
BUN SERPL-MCNC: 14 MG/DL (ref 6–20)
BUN/CREAT SERPL: 12 (ref 12–20)
CALCIUM SERPL-MCNC: 8.8 MG/DL (ref 8.5–10.1)
CHLORIDE SERPL-SCNC: 103 MMOL/L (ref 97–108)
CO2 SERPL-SCNC: 28 MMOL/L (ref 21–32)
COMMENT, HOLDF: NORMAL
CREAT SERPL-MCNC: 1.14 MG/DL (ref 0.7–1.3)
DIFFERENTIAL METHOD BLD: NORMAL
EOSINOPHIL # BLD: 0.1 K/UL (ref 0–0.4)
EOSINOPHIL NFR BLD: 1 % (ref 0–7)
ERYTHROCYTE [DISTWIDTH] IN BLOOD BY AUTOMATED COUNT: 12.8 % (ref 11.5–14.5)
GLUCOSE SERPL-MCNC: 163 MG/DL (ref 65–100)
HCT VFR BLD AUTO: 42.6 % (ref 36.6–50.3)
HGB BLD-MCNC: 13.4 G/DL (ref 12.1–17)
IMM GRANULOCYTES # BLD AUTO: 0 K/UL (ref 0–0.04)
IMM GRANULOCYTES NFR BLD AUTO: 0 % (ref 0–0.5)
LYMPHOCYTES # BLD: 2.8 K/UL (ref 0.8–3.5)
LYMPHOCYTES NFR BLD: 36 % (ref 12–49)
MCH RBC QN AUTO: 27.2 PG (ref 26–34)
MCHC RBC AUTO-ENTMCNC: 31.5 G/DL (ref 30–36.5)
MCV RBC AUTO: 86.4 FL (ref 80–99)
MONOCYTES # BLD: 0.7 K/UL (ref 0–1)
MONOCYTES NFR BLD: 9 % (ref 5–13)
NEUTS SEG # BLD: 4.1 K/UL (ref 1.8–8)
NEUTS SEG NFR BLD: 53 % (ref 32–75)
NRBC # BLD: 0 K/UL (ref 0–0.01)
NRBC BLD-RTO: 0 PER 100 WBC
PLATELET # BLD AUTO: 229 K/UL (ref 150–400)
PMV BLD AUTO: 12.3 FL (ref 8.9–12.9)
POTASSIUM SERPL-SCNC: 3.6 MMOL/L (ref 3.5–5.1)
RBC # BLD AUTO: 4.93 M/UL (ref 4.1–5.7)
SAMPLES BEING HELD,HOLD: NORMAL
SODIUM SERPL-SCNC: 137 MMOL/L (ref 136–145)
TROPONIN-HIGH SENSITIVITY: 8 NG/L (ref 0–76)
WBC # BLD AUTO: 7.8 K/UL (ref 4.1–11.1)

## 2022-09-07 PROCEDURE — 36415 COLL VENOUS BLD VENIPUNCTURE: CPT

## 2022-09-07 PROCEDURE — 93005 ELECTROCARDIOGRAM TRACING: CPT

## 2022-09-07 PROCEDURE — 84484 ASSAY OF TROPONIN QUANT: CPT

## 2022-09-07 PROCEDURE — 99284 EMERGENCY DEPT VISIT MOD MDM: CPT

## 2022-09-07 PROCEDURE — 80048 BASIC METABOLIC PNL TOTAL CA: CPT

## 2022-09-07 PROCEDURE — 85025 COMPLETE CBC W/AUTO DIFF WBC: CPT

## 2022-09-07 RX ORDER — HYDROXYZINE PAMOATE 25 MG/1
25 CAPSULE ORAL
Qty: 20 CAPSULE | Refills: 0 | Status: SHIPPED | OUTPATIENT
Start: 2022-09-07 | End: 2022-09-21

## 2022-09-07 NOTE — ED PROVIDER NOTES
81-year-old male with history of asthma, HTN, gout, anxiety, presents to the emergency department complaining of chest palpitations and burning chest pain that started about 30 minutes prior to arrival.  He took a THC/CBD gummy about 2 hours prior to arrival and symptoms started after taking that. He states that he is uncertain how many milligrams he ingested but he \"just ate 1. \"  He denies any nausea, vomiting, shortness of breath, or any other medical concerns. He has a history of multiple prior ED evaluations for palpitations and previously has been prescribed BuSpar which she states that he has stopped taking because he had medication reaction to it. Chest Pain (Angina)   Associated symptoms include palpitations. Pertinent negatives include no abdominal pain, no back pain, no cough, no fever, no headaches, no nausea, no numbness, no shortness of breath, no vomiting and no weakness. Anxiety   Associated symptoms include palpitations. Pertinent negatives include no abdominal pain, no back pain, no cough, no fever, no headaches, no nausea, no numbness, no shortness of breath, no vomiting and no weakness.       Past Medical History:   Diagnosis Date    Angioedema     ace induced asthma     Cold-induced asthma     Gout     Hypertension     JOS (obstructive sleep apnea)        Past Surgical History:   Procedure Laterality Date    HX ADENOIDECTOMY           Family History:   Problem Relation Age of Onset    Sleep Apnea Mother     Hypertension Mother     Hypertension Father        Social History     Socioeconomic History    Marital status: SINGLE     Spouse name: Not on file    Number of children: Not on file    Years of education: Not on file    Highest education level: Not on file   Occupational History    Not on file   Tobacco Use    Smoking status: Never    Smokeless tobacco: Never   Vaping Use    Vaping Use: Never used   Substance and Sexual Activity    Alcohol use: No    Drug use: Yes     Comment: CBD Sexual activity: Yes     Partners: Female   Other Topics Concern    Not on file   Social History Narrative    Not on file     Social Determinants of Health     Financial Resource Strain: Not on file   Food Insecurity: Not on file   Transportation Needs: Not on file   Physical Activity: Not on file   Stress: Not on file   Social Connections: Not on file   Intimate Partner Violence: Not on file   Housing Stability: Not on file         ALLERGIES: Lisinopril and Pcn [penicillins]    Review of Systems   Constitutional:  Negative for activity change, appetite change, chills and fever. HENT:  Negative for congestion, rhinorrhea, sinus pain, sneezing and sore throat. Eyes:  Negative for photophobia and visual disturbance. Respiratory:  Negative for cough and shortness of breath. Cardiovascular:  Positive for chest pain and palpitations. Gastrointestinal:  Negative for abdominal pain, blood in stool, constipation, diarrhea, nausea and vomiting. Genitourinary:  Negative for difficulty urinating, dysuria, flank pain, hematuria, penile pain and testicular pain. Musculoskeletal:  Negative for arthralgias, back pain, myalgias and neck pain. Skin:  Negative for rash and wound. Neurological:  Negative for syncope, weakness, light-headedness, numbness and headaches. Psychiatric/Behavioral:  Negative for self-injury and suicidal ideas. The patient is nervous/anxious. All other systems reviewed and are negative. Vitals:    09/07/22 1202   BP: (!) 163/95   Pulse: (!) 136   Resp: 16   Temp: 98 °F (36.7 °C)   SpO2: 99%            Physical Exam  Vitals and nursing note reviewed. Constitutional:       General: He is not in acute distress. Appearance: Normal appearance. He is well-developed. He is obese. He is not diaphoretic. HENT:      Head: Normocephalic and atraumatic. Nose: Nose normal.   Eyes:      Extraocular Movements: Extraocular movements intact.       Conjunctiva/sclera: Conjunctivae normal.      Pupils: Pupils are equal, round, and reactive to light. Cardiovascular:      Rate and Rhythm: Regular rhythm. Tachycardia present. Heart sounds: Normal heart sounds. Pulmonary:      Effort: Pulmonary effort is normal.      Breath sounds: Normal breath sounds. Abdominal:      General: There is no distension. Palpations: Abdomen is soft. Tenderness: There is no abdominal tenderness. Musculoskeletal:         General: No tenderness. Cervical back: Neck supple. Skin:     General: Skin is warm and dry. Neurological:      General: No focal deficit present. Mental Status: He is alert and oriented to person, place, and time. Cranial Nerves: No cranial nerve deficit. Sensory: No sensory deficit. Motor: No weakness. Coordination: Coordination normal.   Psychiatric:         Mood and Affect: Mood is anxious. Speech: Speech is rapid and pressured. Behavior: Behavior normal. Behavior is cooperative. MDM  23-year-old male presents with panic attack and anxiety and atypical chest pain after taking THC/CBD gummy. EKG shows sinus tachycardia and appears significantly anxious, likely secondary to substance ingestion. Reassurance was given and he was advised that this will likely self resolve as he metabolizes the drug but was recommended cessation of further use. Labs returned very reassuringly showing no significant abnormalities. Negative troponin, normal electrolytes, renal function, no leukocytosis or anemia. Recommended PCP follow-up for further evaluation and return precautions were given for worsening or concerns. Please note that this dictation was completed with LookFlow, the [employer] voice recognition software. Quite often unanticipated grammatical, syntax, homophones, and other interpretive errors are inadvertently transcribed by the computer software. Please disregard these errors.   Please excuse any errors that have escaped final proofreading. ED Course as of 09/08/22 1857   Wed Sep 07, 2022   1238 EKG 1211  Sinus tachycardia at 134 bpm.  Normal axis. Normal intervals. No STEMI [GG]      ED Course User Index  [GG] Maulik Brown DO       Procedures  2504 EKG shows sinus tachycardia with a rate of 134 bpm with no acute ST or T wave abnormalities suggestive of ischemia.

## 2022-09-07 NOTE — ED TRIAGE NOTES
Pt arrives to the ER for complaints of chest palpitations and \"burning\" in chest that started 30 minutes ago. Pt states that he took a CBD gummy two hours ago, unknown of how many mg. Denies nausea or vomiting or shortness of breath. Pt has been seen here previously for same issue was given Buspar but had to stop due to medication reactions.

## 2022-09-08 ENCOUNTER — TELEPHONE (OUTPATIENT)
Dept: INTERNAL MEDICINE CLINIC | Age: 37
End: 2022-09-08

## 2022-09-08 LAB
ATRIAL RATE: 104 BPM
CALCULATED P AXIS, ECG09: 50 DEGREES
CALCULATED R AXIS, ECG10: 29 DEGREES
CALCULATED T AXIS, ECG11: 9 DEGREES
DIAGNOSIS, 93000: NORMAL
P-R INTERVAL, ECG05: 146 MS
Q-T INTERVAL, ECG07: 342 MS
QRS DURATION, ECG06: 104 MS
QTC CALCULATION (BEZET), ECG08: 449 MS
VENTRICULAR RATE, ECG03: 104 BPM

## 2022-09-08 RX ORDER — VENLAFAXINE HYDROCHLORIDE 37.5 MG/1
37.5 CAPSULE, EXTENDED RELEASE ORAL DAILY
Qty: 30 CAPSULE | Refills: 1 | Status: SHIPPED | OUTPATIENT
Start: 2022-09-08 | End: 2022-09-13 | Stop reason: SINTOL

## 2022-09-08 NOTE — TELEPHONE ENCOUNTER
#[phone]  pt states this is very urgent as he is having anxiety attacks/panic attacks everyday now. He states it is very important that he get back on a medication. Pt is asking for a call back as soon as possible. Thanks.

## 2022-09-08 NOTE — TELEPHONE ENCOUNTER
Called to speak to patient. Patient went to the ER last night with chest pains, anxiety attack he took two CDC gummies and made his heart rate go up to the 140's. He states that he has been having the anxiety attacks more often. He states that the medication that was given does not help. He was on busPIRone (BUSPAR), Celexa and then was given Hydroxyzine. States nothing is working. He is back at the ER this morning having the attacks again. Patient wants to know what we can do to help him.

## 2022-09-09 ENCOUNTER — TELEPHONE (OUTPATIENT)
Dept: CARDIOLOGY CLINIC | Age: 37
End: 2022-09-09

## 2022-09-09 LAB
ATRIAL RATE: 134 BPM
CALCULATED P AXIS, ECG09: 45 DEGREES
CALCULATED R AXIS, ECG10: 43 DEGREES
CALCULATED T AXIS, ECG11: -28 DEGREES
DIAGNOSIS, 93000: NORMAL
P-R INTERVAL, ECG05: 146 MS
Q-T INTERVAL, ECG07: 286 MS
QRS DURATION, ECG06: 92 MS
QTC CALCULATION (BEZET), ECG08: 427 MS
VENTRICULAR RATE, ECG03: 134 BPM

## 2022-09-09 NOTE — TELEPHONE ENCOUNTER
Pt called to speak with nurse, went to the ER and had a ekg done and was given medication, in addition to amlodipine 10mg, the new medication caused dizziness so was taken off the new med and is continuing amlodipine 10mg, please contact pt to discuss next steps, please advise    Pt # [phone]

## 2022-09-09 NOTE — TELEPHONE ENCOUNTER
Called patient and per MD okay to wait until 9/23 follow up and no current changes. Continue taking prescribed medications only. MD has viewed recent encounters since last office visit.

## 2022-09-12 ENCOUNTER — VIRTUAL VISIT (OUTPATIENT)
Dept: SOCIAL WORK | Age: 37
End: 2022-09-12
Payer: MEDICAID

## 2022-09-12 DIAGNOSIS — F41.1 GAD (GENERALIZED ANXIETY DISORDER): Primary | ICD-10-CM

## 2022-09-12 PROCEDURE — 90791 PSYCH DIAGNOSTIC EVALUATION: CPT | Performed by: SOCIAL WORKER

## 2022-09-12 NOTE — PROGRESS NOTES
Outpatient Initial Assessment and Psychotherapy Note     Chief Complaint:     Chief Complaint   Patient presents with    Anxiety    Depression         History of Present Illness: Kassi Doll is a 40 y.o. male who presents with symptoms of depression and anxiety. Client is referred for individual psychotherapy by his PCP, Dr. Rena Soliz MD.  Client reports experiencing the following clinical symptoms:  depressed mood, anxiety, sleep and appetite disturbance, lack of energy/motivation, difficulty concentrating, irritability/lowered frustration tolerance and anhedonia. Psychosocial stressors that impact mood include: death of uncle, loss of job, and discovering that the father he knew was not his biological father. Significant Social History:  Client was born and raised in [place], [address]. He is the oldest child and has one younger brother and younger sister. He was primarily raised by his single mother. He reports a good childhood--free of trauma/abuse. Throughout his life, he thought his father was a man that was incarcerated that mother had a relationship with and told him he was his father. He would talk to him on phone, but \"father\" did not get out of custodial until client was senior in high school, so they never really had a close relationship. Two years ago, his mother told him that another man was his father. Client took paternity test and this other man was the father. This was very traumatic for client and he continues to struggle with it. Client played football in high school. After graduating, he went to work. Client has been  twice. First marriage only lasted a few months. They got  as girlfriend was pregnant and \"it is what the [employer] expected of me. \"  He has daughter from this union, but mother allows little contact with her. Second marriage lasted for 8 years and they have three children together--twin sons that are age 6 and a daughter age 1.   They broke up amicably and he is very active in their children's lives. He is now in a relationship with current girlfriend and he feels she is supportive and has a \"calming influence\" on him. He feels this to be a very good relationship. Client was working for a [employer] but lost this job a few weeks ago due to missing a lot of work. Client shared that his maternal uncle, who was a father figure to him  of heart attack a few months ago. Since then, when he become anxious and his heart races, he becomes fearful of having a heart attack and goes to ER. He has made several trips in the last couple of months due to his anxiety. Client states that he was an \"anti-vaxer\" and did not want to get COVID19 vaccination; however, his unvaccinated uncle almost  with COVID so he got the pfizer shots. Client feels that his anxiety started after receiving vaccinations. Client's anxiety and \"panic attacks\" is what prompted referral for counseling. Substance Abuse History:    Denies      Current  Medication List:   Current Outpatient Medications   Medication Sig Dispense Refill    venlafaxine-SR (EFFEXOR-XR) 37.5 mg capsule Take 1 Capsule by mouth daily. 30 Capsule 1    hydrOXYzine pamoate (VISTARIL) 25 mg capsule Take 1 Capsule by mouth three (3) times daily as needed for Anxiety for up to 14 days. 20 Capsule 0    amLODIPine (NORVASC) 10 mg tablet Take 10 mg by mouth in the morning. LORazepam (ATIVAN) 0.5 mg tablet as needed. (Patient not taking: Reported on 2022)      busPIRone (BUSPAR) 10 mg tablet Take 1 Tablet by mouth two (2) times a day. 60 Tablet 3    cpap machine kit by Does Not Apply route.             Family Psychiatric History:   Family History   Problem Relation Age of Onset    Sleep Apnea Mother     Hypertension Mother     Hypertension Father           Family medical problems:  Family History   Problem Relation Age of Onset    Sleep Apnea Mother     Hypertension Mother Hypertension Father        Social History:      Social History     Socioeconomic History    Marital status: SINGLE     Spouse name: Not on file    Number of children: Not on file    Years of education: Not on file    Highest education level: Not on file   Occupational History    Not on file   Tobacco Use    Smoking status: Never    Smokeless tobacco: Never   Vaping Use    Vaping Use: Never used   Substance and Sexual Activity    Alcohol use: No    Drug use: Yes     Comment: CBD    Sexual activity: Yes     Partners: Female   Other Topics Concern    Not on file   Social History Narrative    Not on file     Social Determinants of Health     Financial Resource Strain: Not on file   Food Insecurity: Not on file   Transportation Needs: Not on file   Physical Activity: Not on file   Stress: Not on file   Social Connections: Not on file   Intimate Partner Violence: Not on file   Housing Stability: Not on file       Allergies: Allergies   Allergen Reactions    Lisinopril Swelling     Pt has sever swelling of the throat and tongue.     Pcn [Penicillins] Other (comments)     Allergic as a child            Psychiatric/Mental Status Examination:     MENTAL STATUS EXAM:  Sensorium  oriented to time, place and person   Orientation person, place, time/date, situation, day of week, month of year, and year   Relations cooperative   Eye Contact appropriate   Appearance:  age appropriate and casually dressed   Motor Behavior:  within normal limits   Speech:  normal pitch and normal volume   Vocabulary average   Thought Process: goal directed and logical   Thought Content free of delusions and free of hallucinations   Suicidal ideations no plan , no intention, none, and contracts for safety   Homicidal ideations no plan , no intention, none, and contracts for safety   Mood:  anxious   Affect:  anxious   Memory recent  adequate   Memory remote:  adequate   Concentration:  adequate   Abstraction:  abstract   Insight:  fair   Reliability fair   Judgment:  fair   Diagnoses:   Axis I: Generalized Anxiety Disorder  Axis II: Deferred  Axis III:   Past Medical History:   Diagnosis Date    Angioedema     ace induced asthma     Cold-induced asthma     Gout     Hypertension     JOS (obstructive sleep apnea)      Axis IV: Problems with primary support group, Problems related to social environment, and Other psychosocial or environmental problems  Axis V:51-60 moderate symptoms      Clinical Impressions:  Rufino Baron is a 40 y.o. male who presents with symptoms of depression and anxiety. Client is referred for individual psychotherapy by his PCP, Dr. Ap Morrow MD.  Client reports experiencing the following clinical symptoms:  depressed mood, anxiety, sleep and appetite disturbance, lack of energy/motivation, difficulty concentrating, irritability/lowered frustration tolerance and anhedonia. Psychosocial stressors that impact mood include: death of uncle, loss of job, and discovering that the father he knew was not his biological father. As goals, client wants to reduce anxiety, improve mood and improve overall coping skills. Will work with client on stated goals in individual psychotherapy utilizing CBT approach. Follow up established. Pursuant to the emergency declaration under the [facility], [facility] waiver authority and the [employer] Act, this Virtual Visit was conducted, with patient and parent and/or guardian's consent, to reduce the patient's risk of exposure to COVID-19 and provide continuity of care for an established patient. Due to the state of emergency related to the coronavirus, the [place] of [employer] and the [place] [employer] is allowing practitioners holding my licensure and/or certification status the ability to provide telehealth services without the usual requirements.     This individual was evaluated through a synchronous (real-time) audio-video encounter, and/or his/her healthcare decision maker, is aware that it is a billable service, which includes applicable co-pays, with coverage as determined by his/her insurance carrier. He/she provided verbal consent to proceed and patient identification was verified. This visit was conducted pursuant to the emergency declaration under the [facility], [address] [place] [address] authority and the [employer] Act. A caregiver was present when appropriate. Ability to conduct physical exam was limited. The patient was located at home in a state where the provider was licensed to provide care. The nature and course of the patient's psychiatric diagnosis were discussed with the patient. Office and confidentiality policies and procedures were discussed with patient. The patient has my contact information for routine or urgent concerns.       Marya Agustin LCSW

## 2022-09-13 ENCOUNTER — TELEPHONE (OUTPATIENT)
Dept: INTERNAL MEDICINE CLINIC | Age: 37
End: 2022-09-13

## 2022-09-13 ENCOUNTER — HOSPITAL ENCOUNTER (EMERGENCY)
Age: 37
Discharge: HOME | End: 2022-09-13
Attending: STUDENT IN AN ORGANIZED HEALTH CARE EDUCATION/TRAINING PROGRAM
Payer: COMMERCIAL

## 2022-09-13 ENCOUNTER — VIRTUAL VISIT (OUTPATIENT)
Dept: INTERNAL MEDICINE CLINIC | Age: 37
End: 2022-09-13
Payer: MEDICAID

## 2022-09-13 VITALS
BODY MASS INDEX: 38.51 KG/M2 | RESPIRATION RATE: 16 BRPM | DIASTOLIC BLOOD PRESSURE: 98 MMHG | WEIGHT: 260 LBS | TEMPERATURE: 98.6 F | HEIGHT: 69 IN | OXYGEN SATURATION: 99 % | HEART RATE: 88 BPM | SYSTOLIC BLOOD PRESSURE: 157 MMHG

## 2022-09-13 DIAGNOSIS — F41.1 ANXIETY STATE: Primary | ICD-10-CM

## 2022-09-13 DIAGNOSIS — I10 ESSENTIAL HYPERTENSION: Primary | ICD-10-CM

## 2022-09-13 DIAGNOSIS — G47.33 OSA (OBSTRUCTIVE SLEEP APNEA): ICD-10-CM

## 2022-09-13 DIAGNOSIS — I51.7 LEFT VENTRICULAR HYPERTROPHY: ICD-10-CM

## 2022-09-13 DIAGNOSIS — F41.9 ANXIETY: ICD-10-CM

## 2022-09-13 PROCEDURE — 99214 OFFICE O/P EST MOD 30 MIN: CPT | Performed by: INTERNAL MEDICINE

## 2022-09-13 PROCEDURE — 99282 EMERGENCY DEPT VISIT SF MDM: CPT

## 2022-09-13 RX ORDER — VALSARTAN 40 MG/1
40 TABLET ORAL DAILY
Qty: 30 TABLET | Refills: 2 | Status: SHIPPED | OUTPATIENT
Start: 2022-09-13 | End: 2022-09-29

## 2022-09-13 RX ORDER — AMLODIPINE BESYLATE 10 MG/1
10 TABLET ORAL DAILY
Qty: 30 TABLET | Refills: 5 | Status: SHIPPED | OUTPATIENT
Start: 2022-09-13 | End: 2022-09-29

## 2022-09-13 RX ORDER — BUSPIRONE HYDROCHLORIDE 5 MG/1
5 TABLET ORAL
Qty: 90 TABLET | Refills: 1 | Status: SHIPPED | OUTPATIENT
Start: 2022-09-13 | End: 2022-10-03 | Stop reason: SINTOL

## 2022-09-13 RX ORDER — LORAZEPAM 0.5 MG/1
0.5 TABLET ORAL
Qty: 20 TABLET | Refills: 1 | Status: SHIPPED | OUTPATIENT
Start: 2022-09-13 | End: 2022-10-14

## 2022-09-13 NOTE — TELEPHONE ENCOUNTER
Called patient to get him checked in for his visit . Stated he has Medicaid now but the number he provided was 11 digits, needs to be 12.       Once he finds the correct number he will call back with it

## 2022-09-13 NOTE — ED PROVIDER NOTES
52-year-old male with past medical history of anxiety, panic attacks, sleep apnea, gout, and hypertension presents the ER today for evaluation of panic attack. Patient states that he started to have impending sensations of a panic attack (which he has quite often) while he was at home paying some bills. His typical symptoms include tremors, tachycardia/palpitations, anxiety, impending doom, paresthesias, and feeling dizzy. He states that he took an Ativan that he was recently prescribed prior to arrival and all of his symptoms have subsequently resolved. Chart review negates that the patient has been screened multiple times for similar symptoms, many of which were recent visits and on each occasion his medical testing was within normal limits. He states that he has an appointment with his primary care doctor later this afternoon to discuss prophylactic anxiety medications.        Past Medical History:   Diagnosis Date    Angioedema     ace induced asthma     Cold-induced asthma     Gout     Hypertension     JOS (obstructive sleep apnea)        Past Surgical History:   Procedure Laterality Date    HX ADENOIDECTOMY           Family History:   Problem Relation Age of Onset    Sleep Apnea Mother     Hypertension Mother     Hypertension Father        Social History     Socioeconomic History    Marital status: SINGLE     Spouse name: Not on file    Number of children: Not on file    Years of education: Not on file    Highest education level: Not on file   Occupational History    Not on file   Tobacco Use    Smoking status: Never    Smokeless tobacco: Never   Vaping Use    Vaping Use: Never used   Substance and Sexual Activity    Alcohol use: No    Drug use: Yes     Comment: CBD    Sexual activity: Yes     Partners: Female   Other Topics Concern    Not on file   Social History Narrative    Not on file     Social Determinants of Health     Financial Resource Strain: Not on file   Food Insecurity: Not on file Transportation Needs: Not on file   Physical Activity: Not on file   Stress: Not on file   Social Connections: Not on file   Intimate Partner Violence: Not on file   Housing Stability: Not on file         ALLERGIES: Lisinopril and Pcn [penicillins]    Review of Systems   Psychiatric/Behavioral:  The patient is nervous/anxious. All other systems reviewed and are negative. Vitals:    09/13/22 1335   BP: (!) 157/98   Pulse: (!) 108   Resp: 16   Temp: 98.6 °F (37 °C)   SpO2: 98%   Weight: 117.9 kg (260 lb)   Height: 5' 9\" (1.753 m)            Physical Exam  Vitals and nursing note reviewed. Constitutional:       General: He is not in acute distress. Appearance: Normal appearance. He is not ill-appearing. HENT:      Head: Normocephalic and atraumatic. Nose: Nose normal.      Mouth/Throat:      Mouth: Mucous membranes are moist.      Pharynx: Oropharynx is clear. Eyes:      Extraocular Movements: Extraocular movements intact. Cardiovascular:      Rate and Rhythm: Normal rate and regular rhythm. Pulses: Normal pulses. Radial pulses are 2+ on the right side and 2+ on the left side. Heart sounds: Normal heart sounds. No murmur heard. No friction rub. No gallop. Pulmonary:      Effort: Pulmonary effort is normal.      Breath sounds: Normal breath sounds. Abdominal:      General: Bowel sounds are normal.      Palpations: Abdomen is soft. Musculoskeletal:         General: Normal range of motion. Cervical back: Normal range of motion and neck supple. Right lower leg: No edema. Left lower leg: No edema. Skin:     General: Skin is warm and dry. Neurological:      Mental Status: He is alert and oriented to person, place, and time. Mental status is at baseline. Psychiatric:         Attention and Perception: Attention and perception normal.         Mood and Affect: Mood normal. Mood is not anxious or depressed.          Behavior: Behavior normal.        MDM VITAL SIGNS:  Patient Vitals for the past 4 hrs:   Temp Pulse Resp BP SpO2   09/13/22 1439 -- 88 -- -- 99 %   09/13/22 1335 98.6 °F (37 °C) (!) 108 16 (!) 157/98 98 %         LABS:  No results found for this or any previous visit (from the past 6 hour(s)). IMAGING:  No orders to display         Medications During Visit:  Medications - No data to display      DECISION MAKING:  Rufino Baron is a 40 y.o. male who comes in as above. Symptoms consistent with panic disorder. Patient is asymptomatic after taking lorazepam prior to arrival.  He has an appointment with his primary care doctor this afternoon and is also being followed by cardiology for his symptoms. Patient states that he feels well and is requesting to go home, which I think is appropriate. The clinical decision making for this encounter included ordering and interpreting the above diagnostic tests with comparison to prior studies that are within our EMR. Past medical and surgical histories were reviewed, as were records from recent outpatient and emergency department visits. The above results discussed and reviewed with the patient. Patient verbalized understanding of the care plan, including any changes to current outpatient medication regimen, discussed disease process, symptom control, and follow-up care. Return precautions reviewed. IMPRESSION:  1. Anxiety state        DISPOSITION:  Discharged      Current Discharge Medication List           Follow-up Information       Follow up With Specialties Details Why Contact Info    Tevin Paige MD Internal Medicine Physician   [address]  [address] [address] [phone]                The patient is asked to follow-up with their primary care provider in the next several days. They are to call tomorrow for an appointment. The patient is asked to return promptly for any increased concerns or worsening of symptoms.   They can return to this emergency department or any other emergency department.       Procedures

## 2022-09-13 NOTE — PROGRESS NOTES
Mr. Ashley Mcdermott is presenting to follow up     CC:  ED Follow-up and Anxiety       HPI:  Mr. Jm Lancaster has a history of hypertension and gout. He started to experience anxiety symptoms in 2021 summer. He associates the onset of anxiety after getting his present COVID shot   that his personality has changed from being calm and is now hyper and excitable. The anxiety has made him more pessimistic  Saw Dr. Barak Henry  and advised to take BuSpar 5 mg to 3 times a day  He felt the medication was not working and rarely takes it. I saw him July 26th and advised to take buspar 10mg BID and fluoxetine . Had sweats with fluoxetine and stopped     He reports feeling hot and dizzy and having tingling episodes. He stopped buspar   Multiple ER visits for anxiety with chest pain     Troponin negative    Tried celexa and tried effexor and did not tolerate it   He saw therapist yesterday and will look for psychiatrist  He found the therapy interaction helpful     HTN   He is compliant with amlodipine 10 mg daily. BP has been high and has LVH on EKG and TTE   I added valsartan 160mg daily felt dizzy and stopped     We reviewed his echo of the heart which showed     Left Ventricle Normal left ventricular systolic function with a visually estimated EF of 55 - 60%. Left ventricle size is normal. Mildly increased wall thickness. Findings consistent with mild concentric hypertrophy. Normal wall motion. Normal diastolic function.      Has appointment with cards next week       Review of systems:  Constitutional: negative for fever, chills, weight loss, night sweats   10 systems reviewed and negative other than HPI      Past Medical History:   Diagnosis Date    Angioedema     ace induced asthma     Cold-induced asthma     Gout     Hypertension     JOS (obstructive sleep apnea)         Past Surgical History:   Procedure Laterality Date    HX ADENOIDECTOMY         Allergies   Allergen Reactions    Lisinopril Swelling     Pt has sever swelling of the throat and tongue. Pcn [Penicillins] Other (comments)     Allergic as a child      Venlafaxine Palpitations       Current Outpatient Medications on File Prior to Visit   Medication Sig Dispense Refill    hydrOXYzine pamoate (VISTARIL) 25 mg capsule Take 1 Capsule by mouth three (3) times daily as needed for Anxiety for up to 14 days. 20 Capsule 0    amLODIPine (NORVASC) 10 mg tablet Take 10 mg by mouth in the morning. LORazepam (ATIVAN) 0.5 mg tablet as needed. cpap machine kit by Does Not Apply route. busPIRone (BUSPAR) 10 mg tablet Take 1 Tablet by mouth two (2) times a day. (Patient not taking: Reported on 9/13/2022) 60 Tablet 3     No current facility-administered medications on file prior to visit. family history includes Hypertension in his father and mother; Sleep Apnea in his mother. Social History     Socioeconomic History    Marital status: SINGLE     Spouse name: Not on file    Number of children: Not on file    Years of education: Not on file    Highest education level: Not on file   Occupational History    Not on file   Tobacco Use    Smoking status: Never    Smokeless tobacco: Never   Vaping Use    Vaping Use: Never used   Substance and Sexual Activity    Alcohol use: No    Drug use: Yes     Comment: CBD    Sexual activity: Yes     Partners: Female   Other Topics Concern    Not on file   Social History Narrative    Not on file     Social Determinants of Health     Financial Resource Strain: Not on file   Food Insecurity: Not on file   Transportation Needs: Not on file   Physical Activity: Not on file   Stress: Not on file   Social Connections: Not on file   Intimate Partner Violence: Not on file   Housing Stability: Not on file       There were no vitals taken for this visit. General:  Well appearing male no acute distress  HEENT:   PERRL,normal conjunctiva. External ear and canals normal, TMs normal.  Hearing normal to voice.   Nose without edema or discharge, normal septum. Lips, teeth, gums normal.  Oropharynx: no erythema, no exudates, no lesions, normal tongue. Neck:  Supple. Thyroid normal size, nontender, without nodules. No carotid bruit. No masses or lymphadenopathy  Respiratory: no respiratory distress,  no wheezing, no rhonchi, no rales. No chest wall tenderness. Cardiovascular:  RRR, normal S1S2, no murmur. Gastrointestinal: normal bowel sounds, soft, nontender, without masses. No hepatosplenomegaly. Extremities +2 pulses, no edema, normal sensation   Musculoskeletal:  Normal gait. Normal digits and nails. Normal strength and tone, no atrophy, and no abnormal movement. Skin:  No rash, no lesions, no ulcers. Skin warm, normal turgor, without induration or nodules. Neuro:  A and OX4, fluent speech, cranial nerves normal 2-12.     Psych: Anxious affect      Lab Results   Component Value Date/Time    WBC 7.8 09/07/2022 12:11 PM    HGB 13.4 09/07/2022 12:11 PM    HCT 42.6 09/07/2022 12:11 PM    PLATELET 106 61/80/4005 12:11 PM    MCV 86.4 09/07/2022 12:11 PM     Lab Results   Component Value Date/Time    Sodium 137 09/07/2022 12:11 PM    Potassium 3.6 09/07/2022 12:11 PM    Chloride 103 09/07/2022 12:11 PM    CO2 28 09/07/2022 12:11 PM    Anion gap 6 09/07/2022 12:11 PM    Glucose 163 (H) 09/07/2022 12:11 PM    BUN 14 09/07/2022 12:11 PM    Creatinine 1.14 09/07/2022 12:11 PM    BUN/Creatinine ratio 12 09/07/2022 12:11 PM    GFR est AA >60 09/07/2022 12:11 PM    GFR est non-AA >60 09/07/2022 12:11 PM    Calcium 8.8 09/07/2022 12:11 PM     Lab Results   Component Value Date/Time    Cholesterol, total 182 02/11/2021 12:11 PM    HDL Cholesterol 37 (L) 02/11/2021 12:11 PM    LDL, calculated 108 (H) 02/11/2021 12:11 PM    VLDL, calculated 37 02/11/2021 12:11 PM    Triglyceride 210 (H) 02/11/2021 12:11 PM     Lab Results   Component Value Date/Time    TSH 0.93 07/30/2022 02:59 PM     Lab Results   Component Value Date/Time    Hemoglobin A1c 5.4 08/30/2022 02:04 PM     No results found for: VITD3, Janas Pippins, XQVID, VD3RIA                Assessment and Plan:   1. Essential hypertension  With LVH  Will see cards  On amlodipine 10mg. Felt dizzy on amlodipine 10mg with valsartan 160mg combo  I prescribed valsartan 40mg to take in addition to amlodipine 10mg         2. Anxiety  -Discussed the physiologic changes with anxiety. Discussed chemical imbalance. I advised him to be consistent with BuSpar use and take 5 TID   Meds failed: effexor, fluoxetine and celexa  Saw therapist  Panic attacks are leading to ER visits has lorazepam for severe attacks. Discussed avoiding ER visits        3.  Dizziness: Normal neurologic exam.  I am referring him to Dr. Alireza Patel  to evaluate him for dizziness    Follow up in 4 weeks     Jd Moreno MD

## 2022-09-13 NOTE — ED TRIAGE NOTES
Pt thinks he's having a panic attack. Pt took ativan 25 min ago and feels better. Pt states he was paying bills and making phone calls today that make have caused a panic attack.

## 2022-09-13 NOTE — PROGRESS NOTES
1. \"Have you been to the ER, urgent care clinic since your last visit? Hospitalized since your last visit? \" Yes [facility] for anxiety attack    2. \"Have you seen or consulted any other health care providers outside of the [address] [place] [address] since your last visit? \" No     3. For patients aged 39-70: Has the patient had a colonoscopy / FIT/ Cologuard? NA - based on age      If the patient is female:    4. For patients aged 41-77: Has the patient had a mammogram within the past 2 years? NA - based on age or sex      11. For patients aged 21-65: Has the patient had a pap smear?  NA - based on age or sex

## 2022-09-14 ENCOUNTER — PATIENT MESSAGE (OUTPATIENT)
Dept: INTERNAL MEDICINE CLINIC | Age: 37
End: 2022-09-14

## 2022-09-14 DIAGNOSIS — E55.9 VITAMIN D DEFICIENCY: Primary | ICD-10-CM

## 2022-09-14 DIAGNOSIS — R79.89 LOW TESTOSTERONE LEVEL IN FEMALE: ICD-10-CM

## 2022-09-14 DIAGNOSIS — E53.8 VITAMIN B12 DEFICIENCY: ICD-10-CM

## 2022-09-17 ENCOUNTER — HOSPITAL ENCOUNTER (EMERGENCY)
Age: 37
Discharge: HOME | End: 2022-09-17
Attending: EMERGENCY MEDICINE
Payer: COMMERCIAL

## 2022-09-17 VITALS
BODY MASS INDEX: 36.92 KG/M2 | TEMPERATURE: 99.1 F | RESPIRATION RATE: 16 BRPM | WEIGHT: 250 LBS | SYSTOLIC BLOOD PRESSURE: 170 MMHG | OXYGEN SATURATION: 99 % | HEART RATE: 85 BPM | DIASTOLIC BLOOD PRESSURE: 112 MMHG

## 2022-09-17 DIAGNOSIS — F41.1 ANXIETY STATE: Primary | ICD-10-CM

## 2022-09-17 LAB
ALBUMIN SERPL-MCNC: 3.7 G/DL (ref 3.5–5)
ALBUMIN/GLOB SERPL: 0.9 {RATIO} (ref 1.1–2.2)
ALP SERPL-CCNC: 81 U/L (ref 45–117)
ALT SERPL-CCNC: 36 U/L (ref 12–78)
ANION GAP SERPL CALC-SCNC: 6 MMOL/L (ref 5–15)
APPEARANCE UR: CLEAR
AST SERPL-CCNC: 24 U/L (ref 15–37)
ATRIAL RATE: 100 BPM
BACTERIA URNS QL MICRO: NEGATIVE /HPF
BASOPHILS # BLD: 0 K/UL (ref 0–0.1)
BASOPHILS NFR BLD: 1 % (ref 0–1)
BILIRUB SERPL-MCNC: 0.6 MG/DL (ref 0.2–1)
BILIRUB UR QL: NEGATIVE
BUN SERPL-MCNC: 6 MG/DL (ref 6–20)
BUN/CREAT SERPL: 5 (ref 12–20)
CALCIUM SERPL-MCNC: 9 MG/DL (ref 8.5–10.1)
CALCULATED P AXIS, ECG09: 50 DEGREES
CALCULATED R AXIS, ECG10: 39 DEGREES
CALCULATED T AXIS, ECG11: -9 DEGREES
CHLORIDE SERPL-SCNC: 103 MMOL/L (ref 97–108)
CO2 SERPL-SCNC: 30 MMOL/L (ref 21–32)
COLOR UR: ABNORMAL
COMMENT, HOLDF: NORMAL
CREAT SERPL-MCNC: 1.12 MG/DL (ref 0.7–1.3)
DIAGNOSIS, 93000: NORMAL
DIFFERENTIAL METHOD BLD: NORMAL
EOSINOPHIL # BLD: 0 K/UL (ref 0–0.4)
EOSINOPHIL NFR BLD: 1 % (ref 0–7)
EPITH CASTS URNS QL MICRO: ABNORMAL /LPF
ERYTHROCYTE [DISTWIDTH] IN BLOOD BY AUTOMATED COUNT: 12.3 % (ref 11.5–14.5)
GLOBULIN SER CALC-MCNC: 4.2 G/DL (ref 2–4)
GLUCOSE SERPL-MCNC: 141 MG/DL (ref 65–100)
GLUCOSE UR STRIP.AUTO-MCNC: NEGATIVE MG/DL
HCT VFR BLD AUTO: 42.8 % (ref 36.6–50.3)
HGB BLD-MCNC: 13.6 G/DL (ref 12.1–17)
HGB UR QL STRIP: NEGATIVE
HYALINE CASTS URNS QL MICRO: ABNORMAL /LPF (ref 0–2)
IMM GRANULOCYTES # BLD AUTO: 0 K/UL (ref 0–0.04)
IMM GRANULOCYTES NFR BLD AUTO: 0 % (ref 0–0.5)
KETONES UR QL STRIP.AUTO: NEGATIVE MG/DL
LEUKOCYTE ESTERASE UR QL STRIP.AUTO: ABNORMAL
LYMPHOCYTES # BLD: 1.3 K/UL (ref 0.8–3.5)
LYMPHOCYTES NFR BLD: 31 % (ref 12–49)
MCH RBC QN AUTO: 26.4 PG (ref 26–34)
MCHC RBC AUTO-ENTMCNC: 31.8 G/DL (ref 30–36.5)
MCV RBC AUTO: 83.1 FL (ref 80–99)
MONOCYTES # BLD: 0.4 K/UL (ref 0–1)
MONOCYTES NFR BLD: 10 % (ref 5–13)
NEUTS SEG # BLD: 2.5 K/UL (ref 1.8–8)
NEUTS SEG NFR BLD: 57 % (ref 32–75)
NITRITE UR QL STRIP.AUTO: NEGATIVE
NRBC # BLD: 0 K/UL (ref 0–0.01)
NRBC BLD-RTO: 0 PER 100 WBC
P-R INTERVAL, ECG05: 146 MS
PH UR STRIP: 8.5 [PH] (ref 5–8)
PLATELET # BLD AUTO: 208 K/UL (ref 150–400)
PMV BLD AUTO: 12.4 FL (ref 8.9–12.9)
POTASSIUM SERPL-SCNC: 4.2 MMOL/L (ref 3.5–5.1)
PROT SERPL-MCNC: 7.9 G/DL (ref 6.4–8.2)
PROT UR STRIP-MCNC: NEGATIVE MG/DL
Q-T INTERVAL, ECG07: 328 MS
QRS DURATION, ECG06: 102 MS
QTC CALCULATION (BEZET), ECG08: 423 MS
RBC # BLD AUTO: 5.15 M/UL (ref 4.1–5.7)
RBC #/AREA URNS HPF: ABNORMAL /HPF (ref 0–5)
SAMPLES BEING HELD,HOLD: NORMAL
SODIUM SERPL-SCNC: 139 MMOL/L (ref 136–145)
SP GR UR REFRACTOMETRY: 1.01 (ref 1–1.03)
TROPONIN-HIGH SENSITIVITY: 10 NG/L (ref 0–76)
TROPONIN-HIGH SENSITIVITY: 9 NG/L (ref 0–76)
UR CULT HOLD, URHOLD: NORMAL
UROBILINOGEN UR QL STRIP.AUTO: 0.2 EU/DL (ref 0.2–1)
VENTRICULAR RATE, ECG03: 100 BPM
WBC # BLD AUTO: 4.2 K/UL (ref 4.1–11.1)
WBC URNS QL MICRO: ABNORMAL /HPF (ref 0–4)

## 2022-09-17 PROCEDURE — 80053 COMPREHEN METABOLIC PANEL: CPT

## 2022-09-17 PROCEDURE — 85025 COMPLETE CBC W/AUTO DIFF WBC: CPT

## 2022-09-17 PROCEDURE — 93005 ELECTROCARDIOGRAM TRACING: CPT

## 2022-09-17 PROCEDURE — 99284 EMERGENCY DEPT VISIT MOD MDM: CPT

## 2022-09-17 PROCEDURE — 81001 URINALYSIS AUTO W/SCOPE: CPT

## 2022-09-17 PROCEDURE — 96374 THER/PROPH/DIAG INJ IV PUSH: CPT

## 2022-09-17 PROCEDURE — 74011250636 HC RX REV CODE- 250/636: Performed by: NURSE PRACTITIONER

## 2022-09-17 PROCEDURE — 84484 ASSAY OF TROPONIN QUANT: CPT

## 2022-09-17 PROCEDURE — 36415 COLL VENOUS BLD VENIPUNCTURE: CPT

## 2022-09-17 RX ORDER — LORAZEPAM 2 MG/ML
1 INJECTION INTRAMUSCULAR ONCE
Status: COMPLETED | OUTPATIENT
Start: 2022-09-17 | End: 2022-09-17

## 2022-09-17 RX ADMIN — LORAZEPAM 1 MG: 2 INJECTION INTRAMUSCULAR; INTRAVENOUS at 10:58

## 2022-09-17 RX ADMIN — SODIUM CHLORIDE 1000 ML: 9 INJECTION, SOLUTION INTRAVENOUS at 10:52

## 2022-09-17 NOTE — Clinical Note
[address]  [facility] EMERGENCY DEPT  [address] [place] [id number]  [phone]    Work/School Note    Date: 9/17/2022    To Whom It May concern:      Hai Myles was seen and treated today in the emergency room by the following provider(s):  Attending Provider: Conor Jones MD  Nurse Practitioner: Keshia Aguiar NP. aHi Myles is excused from work/school on 09/17/22. He is clear to return to work/school on 09/18/22.         Sincerely,          Ruby Holliday NP

## 2022-09-17 NOTE — Clinical Note
[address]  [facility] EMERGENCY DEPT  [address] [phone]  [phone]    Work/School Note    Date: 9/17/2022    To Whom It May concern:      Vernell Medellin was seen and treated today in the emergency room by the following provider(s):  Attending Provider: Taya Queen MD  Nurse Practitioner: Carina Ruvalcaba NP. Vernell Medellin is excused from work/school on 09/17/22. He is clear to return to work/school on 09/18/22.         Sincerely,          Jeris Holstein, NP

## 2022-09-17 NOTE — ED TRIAGE NOTES
Pt to ED for c/o dizziness when ambulating x1 week with palpitations after starting vegan diet x1 week. Denies N/V/D, fever, chills, chest pain, SOB, urinary sx.  Pt appears anxious during triage     PMH: HTN, anxiety     Took lorazepam PTA and daily BP med

## 2022-09-17 NOTE — ED PROVIDER NOTES
81-year-old male with past medical history of anxiety, panic attacks, sleep apnea, gout, and hypertension presents to the ER today for evaluation of panic attack, palpitations and dizziness when ambulating x1 week with palpitations after starting vegan diet x1 week ago. Patient states since he started the vegan diet he has felt better but recently has started to feel like he is going to pass out. States that he is rapidly lost 8 pounds. Also states he has a history of hypertension and feels as though it is high despite the use of his amlodipine. Last dose this morning. Also adds he has taken an ativan this morning for anxiety. Patient feels as though the more he thinks about his symptoms, the more he feels like he will pass out. Denies any chest pain, shortness of breath, nausea or vomiting, fever or chills. Is scheduled to see cardiology and PCP as an outpatient this week. There are no further complaints at this time.      Charlette Hays MD  Past Medical History:  No date: Angioedema      Comment:  ace induced asthma   No date: Cold-induced asthma  No date: Gout  No date: Hypertension  No date: JOS (obstructive sleep apnea)  Past Surgical History:  No date: HX ADENOIDECTOMY           Past Medical History:   Diagnosis Date    Angioedema     ace induced asthma     Cold-induced asthma     Gout     Hypertension     JOS (obstructive sleep apnea)        Past Surgical History:   Procedure Laterality Date    HX ADENOIDECTOMY           Family History:   Problem Relation Age of Onset    Sleep Apnea Mother     Hypertension Mother     Hypertension Father        Social History     Socioeconomic History    Marital status: SINGLE     Spouse name: Not on file    Number of children: Not on file    Years of education: Not on file    Highest education level: Not on file   Occupational History    Not on file   Tobacco Use    Smoking status: Never    Smokeless tobacco: Never   Vaping Use    Vaping Use: Never used   Substance and Sexual Activity    Alcohol use: No    Drug use: Yes     Comment: CBD    Sexual activity: Yes     Partners: Female   Other Topics Concern    Not on file   Social History Narrative    Not on file     Social Determinants of Health     Financial Resource Strain: Not on file   Food Insecurity: Not on file   Transportation Needs: Not on file   Physical Activity: Not on file   Stress: Not on file   Social Connections: Not on file   Intimate Partner Violence: Not on file   Housing Stability: Not on file         ALLERGIES: Lisinopril, Pcn [penicillins], and Venlafaxine    Review of Systems   Constitutional:  Positive for fatigue. Negative for activity change, appetite change, chills and fever. HENT:  Negative for congestion, ear discharge, ear pain, sinus pressure, sinus pain, sore throat and trouble swallowing. Eyes:  Negative for photophobia, pain, redness, itching and visual disturbance. Respiratory:  Negative for chest tightness and shortness of breath. Cardiovascular:  Positive for palpitations. Negative for chest pain. Gastrointestinal:  Negative for abdominal distention, abdominal pain, nausea and vomiting. Endocrine: Negative. Genitourinary:  Negative for difficulty urinating, frequency and urgency. Musculoskeletal:  Negative for back pain, neck pain and neck stiffness. Skin:  Negative for color change, pallor, rash and wound. Allergic/Immunologic: Negative. Neurological:  Negative for dizziness, syncope, weakness and headaches. Hematological:  Does not bruise/bleed easily. Psychiatric/Behavioral:  Negative for behavioral problems. The patient is nervous/anxious. Vitals:    09/17/22 1028   BP: (!) 166/108   Pulse: 100   Resp: 20   Temp: 99.1 °F (37.3 °C)   SpO2: 100%   Weight: 113.4 kg (250 lb)            Physical Exam  Vitals and nursing note reviewed. Constitutional:       General: He is not in acute distress. Appearance: Normal appearance. He is well-developed.  He is not ill-appearing. HENT:      Head: Normocephalic and atraumatic. Right Ear: External ear normal.      Left Ear: External ear normal.      Nose: Nose normal. No congestion. Mouth/Throat:      Mouth: Mucous membranes are moist.   Eyes:      General:         Right eye: No discharge. Left eye: No discharge. Conjunctiva/sclera: Conjunctivae normal.      Pupils: Pupils are equal, round, and reactive to light. Neck:      Vascular: No JVD. Trachea: No tracheal deviation. Cardiovascular:      Rate and Rhythm: Normal rate and regular rhythm. Pulses: Normal pulses. Heart sounds:     No gallop. Pulmonary:      Effort: Pulmonary effort is normal. No respiratory distress. Breath sounds: Normal breath sounds. Chest:      Chest wall: No tenderness. Abdominal:      General: Bowel sounds are normal. There is no distension. Palpations: Abdomen is soft. Tenderness: There is no abdominal tenderness. There is no guarding or rebound. Genitourinary:     Comments: Negative    Musculoskeletal:         General: No tenderness. Normal range of motion. Cervical back: Normal range of motion and neck supple. Skin:     General: Skin is warm and dry. Capillary Refill: Capillary refill takes less than 2 seconds. Coloration: Skin is not pale. Findings: No erythema or rash. Neurological:      General: No focal deficit present. Mental Status: He is alert and oriented to person, place, and time. Motor: No weakness. Coordination: Coordination normal.   Psychiatric:         Behavior: Behavior normal.         Thought Content: Thought content normal.         Judgment: Judgment normal.      Comments: Anxiety          MDM  Number of Diagnoses or Management Options  Anxiety state: established and worsening  Diagnosis management comments: Differential diagnosis includes palpitations, anxiety, electrolyte imbalance and others.     After physical assessment and review of laboratory data, patient was discharged home and will follow up with PCP. Return to the emergency room with worsening symptoms. Patient in agreement with plan of care. Amount and/or Complexity of Data Reviewed  Clinical lab tests: ordered and reviewed         ED EKG interpretation:10:50 AM  Rhythm: normal sinus rhythm; and regular . Rate (approx.): 100; Axis: normal; P wave: normal; QRS interval: normal ; ST/T wave: normal; Other findings: normal. This EKG was interpreted by Norman Edgar MD,ED Provider. Labs Reviewed   METABOLIC PANEL, COMPREHENSIVE - Abnormal; Notable for the following components:       Result Value    Glucose 141 (*)     BUN/Creatinine ratio 5 (*)     Globulin 4.2 (*)     A-G Ratio 0.9 (*)     All other components within normal limits   URINALYSIS W/ RFLX MICROSCOPIC - Abnormal; Notable for the following components:    pH (UA) 8.5 (*)     Leukocyte Esterase TRACE (*)     All other components within normal limits   URINE CULTURE HOLD SAMPLE   SAMPLES BEING HELD   CBC WITH AUTOMATED DIFF   TROPONIN-HIGH SENSITIVITY   TROPONIN-HIGH SENSITIVITY   *UA&MICRO CHARGE BAT       No results found. 3:43 PM  Pt has been reexamined. Pt has no new complaints, changes or physical findings. Care plan outlined and precautions discussed. All available results were reviewed with pt. All medications were reviewed with pt. All of pt's questions and concerns were addressed. Pt agrees to F/U as instructed and agrees to return to ED upon further deterioration. Pt is ready to go home. Jose F Garza NP    Please note that this dictation was completed with [employer], the computer voice recognition software. Quite often unanticipated grammatical, syntax, homophones, and other interpretive errors are inadvertently transcribed by the computer software. Please disregard these errors. Please excuse any errors that have escaped final proofreading. Thank you.     Procedures

## 2022-09-18 ENCOUNTER — HOSPITAL ENCOUNTER (EMERGENCY)
Age: 37
Discharge: HOME | End: 2022-09-18
Attending: STUDENT IN AN ORGANIZED HEALTH CARE EDUCATION/TRAINING PROGRAM
Payer: MEDICAID

## 2022-09-18 ENCOUNTER — APPOINTMENT (OUTPATIENT)
Dept: GENERAL RADIOLOGY | Age: 37
End: 2022-09-18
Attending: EMERGENCY MEDICINE
Payer: MEDICAID

## 2022-09-18 VITALS
TEMPERATURE: 98.1 F | SYSTOLIC BLOOD PRESSURE: 145 MMHG | DIASTOLIC BLOOD PRESSURE: 95 MMHG | HEART RATE: 94 BPM | OXYGEN SATURATION: 98 % | RESPIRATION RATE: 16 BRPM

## 2022-09-18 DIAGNOSIS — R00.2 PALPITATIONS: Primary | ICD-10-CM

## 2022-09-18 LAB
ANION GAP SERPL CALC-SCNC: 3 MMOL/L (ref 5–15)
BASOPHILS # BLD: 0 K/UL (ref 0–0.1)
BASOPHILS NFR BLD: 1 % (ref 0–1)
BUN SERPL-MCNC: 13 MG/DL (ref 6–20)
BUN/CREAT SERPL: 11 (ref 12–20)
CALCIUM SERPL-MCNC: 9 MG/DL (ref 8.5–10.1)
CHLORIDE SERPL-SCNC: 106 MMOL/L (ref 97–108)
CO2 SERPL-SCNC: 31 MMOL/L (ref 21–32)
COMMENT, HOLDF: NORMAL
CREAT SERPL-MCNC: 1.21 MG/DL (ref 0.7–1.3)
D DIMER PPP FEU-MCNC: 0.32 MG/L FEU (ref 0–0.65)
DIFFERENTIAL METHOD BLD: NORMAL
EOSINOPHIL # BLD: 0.1 K/UL (ref 0–0.4)
EOSINOPHIL NFR BLD: 2 % (ref 0–7)
ERYTHROCYTE [DISTWIDTH] IN BLOOD BY AUTOMATED COUNT: 12.4 % (ref 11.5–14.5)
GLUCOSE SERPL-MCNC: 139 MG/DL (ref 65–100)
HCT VFR BLD AUTO: 42.9 % (ref 36.6–50.3)
HGB BLD-MCNC: 13.6 G/DL (ref 12.1–17)
IMM GRANULOCYTES # BLD AUTO: 0 K/UL (ref 0–0.04)
IMM GRANULOCYTES NFR BLD AUTO: 0 % (ref 0–0.5)
LYMPHOCYTES # BLD: 2 K/UL (ref 0.8–3.5)
LYMPHOCYTES NFR BLD: 36 % (ref 12–49)
MAGNESIUM SERPL-MCNC: 2 MG/DL (ref 1.6–2.4)
MCH RBC QN AUTO: 27.1 PG (ref 26–34)
MCHC RBC AUTO-ENTMCNC: 31.7 G/DL (ref 30–36.5)
MCV RBC AUTO: 85.5 FL (ref 80–99)
MONOCYTES # BLD: 0.4 K/UL (ref 0–1)
MONOCYTES NFR BLD: 8 % (ref 5–13)
NEUTS SEG # BLD: 2.9 K/UL (ref 1.8–8)
NEUTS SEG NFR BLD: 53 % (ref 32–75)
NRBC # BLD: 0 K/UL (ref 0–0.01)
NRBC BLD-RTO: 0 PER 100 WBC
PLATELET # BLD AUTO: 211 K/UL (ref 150–400)
PMV BLD AUTO: 12.8 FL (ref 8.9–12.9)
POTASSIUM SERPL-SCNC: 3.9 MMOL/L (ref 3.5–5.1)
RBC # BLD AUTO: 5.02 M/UL (ref 4.1–5.7)
SAMPLES BEING HELD,HOLD: NORMAL
SODIUM SERPL-SCNC: 140 MMOL/L (ref 136–145)
TROPONIN-HIGH SENSITIVITY: 10 NG/L (ref 0–76)
TSH SERPL DL<=0.05 MIU/L-ACNC: 0.79 UIU/ML (ref 0.36–3.74)
WBC # BLD AUTO: 5.5 K/UL (ref 4.1–11.1)

## 2022-09-18 PROCEDURE — 84443 ASSAY THYROID STIM HORMONE: CPT

## 2022-09-18 PROCEDURE — 99285 EMERGENCY DEPT VISIT HI MDM: CPT

## 2022-09-18 PROCEDURE — 84484 ASSAY OF TROPONIN QUANT: CPT

## 2022-09-18 PROCEDURE — 74011250636 HC RX REV CODE- 250/636: Performed by: EMERGENCY MEDICINE

## 2022-09-18 PROCEDURE — 85025 COMPLETE CBC W/AUTO DIFF WBC: CPT

## 2022-09-18 PROCEDURE — 85379 FIBRIN DEGRADATION QUANT: CPT

## 2022-09-18 PROCEDURE — 74011250637 HC RX REV CODE- 250/637: Performed by: EMERGENCY MEDICINE

## 2022-09-18 PROCEDURE — 96360 HYDRATION IV INFUSION INIT: CPT

## 2022-09-18 PROCEDURE — 83735 ASSAY OF MAGNESIUM: CPT

## 2022-09-18 PROCEDURE — 71045 X-RAY EXAM CHEST 1 VIEW: CPT

## 2022-09-18 PROCEDURE — 93005 ELECTROCARDIOGRAM TRACING: CPT

## 2022-09-18 PROCEDURE — 80048 BASIC METABOLIC PNL TOTAL CA: CPT

## 2022-09-18 PROCEDURE — 36415 COLL VENOUS BLD VENIPUNCTURE: CPT

## 2022-09-18 RX ORDER — LORAZEPAM 1 MG/1
1 TABLET ORAL ONCE
Status: COMPLETED | OUTPATIENT
Start: 2022-09-18 | End: 2022-09-18

## 2022-09-18 RX ADMIN — LORAZEPAM 1 MG: 1 TABLET ORAL at 14:54

## 2022-09-18 RX ADMIN — SODIUM CHLORIDE 1000 ML: 9 INJECTION, SOLUTION INTRAVENOUS at 14:55

## 2022-09-18 NOTE — ED NOTES
Patient discharged by provider with this writer in the room at same time. Patient verbalized understanding of discharge instructions and to keep his appointment with Cardiovascular. Patient was stable and ambulatory at time.

## 2022-09-18 NOTE — ED PROVIDER NOTES
37M w/ hx HTN, obesity, asthma p/w 2d palpitations. Pt reports heart racing palpitations. Pt notes that he is anxious and may be having a panic attack. Was seen earlier for similar at which time valsartan was added to his amlodipine. Pt continued to feel palpitations so returned. No chest pain or dyspnea. No N/V/D, rectal bleeding or urinary symptoms. Last used marijuana 2weeks ago, no other drugs or etoh. Recently started a vegan diet and taking nutritional supplements. Denies any prior cardiac hx but has cardiology appointment next week. No recent surgeries, hospitalizations, travel, hx of malignancy, exogenous estrogen use, hemoptysis, LE pain/swelling, or hx of PE/DVT.              Past Medical History:   Diagnosis Date    Angioedema     ace induced asthma     Cold-induced asthma     Gout     Hypertension     JOS (obstructive sleep apnea)        Past Surgical History:   Procedure Laterality Date    HX ADENOIDECTOMY           Family History:   Problem Relation Age of Onset    Sleep Apnea Mother     Hypertension Mother     Hypertension Father        Social History     Socioeconomic History    Marital status: SINGLE     Spouse name: Not on file    Number of children: Not on file    Years of education: Not on file    Highest education level: Not on file   Occupational History    Not on file   Tobacco Use    Smoking status: Never    Smokeless tobacco: Never   Vaping Use    Vaping Use: Never used   Substance and Sexual Activity    Alcohol use: No    Drug use: Yes     Comment: CBD    Sexual activity: Yes     Partners: Female   Other Topics Concern    Not on file   Social History Narrative    Not on file     Social Determinants of Health     Financial Resource Strain: Not on file   Food Insecurity: Not on file   Transportation Needs: Not on file   Physical Activity: Not on file   Stress: Not on file   Social Connections: Not on file   Intimate Partner Violence: Not on file   Housing Stability: Not on file ALLERGIES: Lisinopril, Pcn [penicillins], and Venlafaxine    Review of Systems   Constitutional:  Negative for chills, diaphoresis and fever. HENT:  Negative for facial swelling, mouth sores, nosebleeds, trouble swallowing and voice change. Eyes:  Negative for pain and visual disturbance. Respiratory:  Negative for apnea, cough, shortness of breath, wheezing and stridor. Cardiovascular:  Positive for palpitations. Negative for chest pain and leg swelling. Gastrointestinal:  Negative for abdominal distention, abdominal pain, blood in stool, diarrhea, nausea and vomiting. Genitourinary:  Negative for difficulty urinating, dysuria, flank pain, hematuria, scrotal swelling and testicular pain. Musculoskeletal:  Negative for joint swelling. Skin:  Negative for color change and rash. Allergic/Immunologic: Negative for immunocompromised state. Neurological:  Negative for dizziness, syncope and light-headedness. Hematological:  Does not bruise/bleed easily. Psychiatric/Behavioral:  Negative for agitation and behavioral problems. Vitals:    09/18/22 1307 09/18/22 1526   BP: (!) 167/103 (!) 145/95   Pulse: (!) 119 94   Resp: 22 16   Temp: 98.1 °F (36.7 °C)    SpO2: 98% 98%            Physical Exam  Vitals and nursing note reviewed. Constitutional:       General: He is not in acute distress. Appearance: Normal appearance. He is not ill-appearing or toxic-appearing. HENT:      Head: Normocephalic and atraumatic. Right Ear: External ear normal.      Left Ear: External ear normal.      Nose: Nose normal.      Mouth/Throat:      Mouth: Mucous membranes are moist.      Pharynx: Oropharynx is clear. No oropharyngeal exudate or posterior oropharyngeal erythema. Eyes:      General: No scleral icterus. Extraocular Movements: Extraocular movements intact. Conjunctiva/sclera: Conjunctivae normal.      Pupils: Pupils are equal, round, and reactive to light.    Cardiovascular: Rate and Rhythm: Regular rhythm. Tachycardia present. Pulses: Normal pulses. Heart sounds: No murmur heard. No friction rub. No gallop. Pulmonary:      Effort: Pulmonary effort is normal. No respiratory distress. Breath sounds: Normal breath sounds. No stridor. No wheezing, rhonchi or rales. Abdominal:      General: Bowel sounds are normal. There is no distension. Palpations: Abdomen is soft. Tenderness: There is no abdominal tenderness. There is no guarding or rebound. Musculoskeletal:         General: No deformity. Normal range of motion. Cervical back: Normal range of motion and neck supple. No rigidity. Right lower leg: No edema. Left lower leg: No edema. Skin:     General: Skin is warm. Capillary Refill: Capillary refill takes less than 2 seconds. Coloration: Skin is not jaundiced. Neurological:      General: No focal deficit present. Mental Status: He is alert. Cranial Nerves: No cranial nerve deficit. Sensory: No sensory deficit. Motor: No weakness. Coordination: Coordination normal.   Psychiatric:         Mood and Affect: Mood normal.         Behavior: Behavior normal.         Thought Content:  Thought content normal.         Judgment: Judgment normal.        EKG Interpretation   ST, narrow QRS, nl intervals, no VIDA/STD/TWI  (EKG tracing interpreted by ED physician)    LABORATORY TESTS:  Admission on 09/18/2022, Discharged on 09/18/2022   Component Date Value Ref Range Status    WBC 09/18/2022 5.5  4.1 - 11.1 K/uL Final    RBC 09/18/2022 5.02  4.10 - 5.70 M/uL Final    HGB 09/18/2022 13.6  12.1 - 17.0 g/dL Final    HCT 09/18/2022 42.9  36.6 - 50.3 % Final    MCV 09/18/2022 85.5  80.0 - 99.0 FL Final    MCH 09/18/2022 27.1  26.0 - 34.0 PG Final    MCHC 09/18/2022 31.7  30.0 - 36.5 g/dL Final    RDW 09/18/2022 12.4  11.5 - 14.5 % Final    PLATELET 26/77/0796 979  150 - 400 K/uL Final    MPV 09/18/2022 12.8  8.9 - 12.9 FL Final    NRBC 09/18/2022 0.0  0  WBC Final    ABSOLUTE NRBC 09/18/2022 0.00  0.00 - 0.01 K/uL Final    NEUTROPHILS 09/18/2022 53  32 - 75 % Final    LYMPHOCYTES 09/18/2022 36  12 - 49 % Final    MONOCYTES 09/18/2022 8  5 - 13 % Final    EOSINOPHILS 09/18/2022 2  0 - 7 % Final    BASOPHILS 09/18/2022 1  0 - 1 % Final    IMMATURE GRANULOCYTES 09/18/2022 0  0.0 - 0.5 % Final    ABS. NEUTROPHILS 09/18/2022 2.9  1.8 - 8.0 K/UL Final    ABS. LYMPHOCYTES 09/18/2022 2.0  0.8 - 3.5 K/UL Final    ABS. MONOCYTES 09/18/2022 0.4  0.0 - 1.0 K/UL Final    ABS. EOSINOPHILS 09/18/2022 0.1  0.0 - 0.4 K/UL Final    ABS. BASOPHILS 09/18/2022 0.0  0.0 - 0.1 K/UL Final    ABS. IMM. GRANS. 09/18/2022 0.0  0.00 - 0.04 K/UL Final    DF 09/18/2022 AUTOMATED    Final    Sodium 09/18/2022 140  136 - 145 mmol/L Final    Potassium 09/18/2022 3.9  3.5 - 5.1 mmol/L Final    Chloride 09/18/2022 106  97 - 108 mmol/L Final    CO2 09/18/2022 31  21 - 32 mmol/L Final    Anion gap 09/18/2022 3 (A) 5 - 15 mmol/L Final    Glucose 09/18/2022 139 (A) 65 - 100 mg/dL Final    BUN 09/18/2022 13  6 - 20 MG/DL Final    Creatinine 09/18/2022 1.21  0.70 - 1.30 MG/DL Final    BUN/Creatinine ratio 09/18/2022 11 (A) 12 - 20   Final    GFR est AA 09/18/2022 >60  >60 ml/min/1.73m2 Final    GFR est non-AA 09/18/2022 >60  >60 ml/min/1.73m2 Final    Calcium 09/18/2022 9.0  8.5 - 10.1 MG/DL Final    Troponin-High Sensitivity 09/18/2022 10  0 - 76 ng/L Final    Comment: A HS troponin value change of (+ or -) 50% or more below the 99th percentile, in a 1/2/3 hr interval represents a significant change. Clinical correcation is recommended. A HS troponin value change of (+ or -) 20% or above the 99th percentile, in a 1/2/3 hr interval represents a significant change. Clinical correlation is recommended.   99th Percentile:   Women: 0-51 ng/L                                                                Men:   0-76 ng/L      Magnesium 09/18/2022 2.0  1.6 - 2.4 mg/dL Final    D-dimer 09/18/2022 0.32  0.00 - 0.65 mg/L FEU Final    Comment: (NOTE)  The combination of a low pre-test probability based on Wells criteria  and a D-Dimer result below the cutoff value of 0.5 mg/L increases the   negative predictive value for DVT to %. TSH 09/18/2022 0.79  0.36 - 3.74 uIU/mL Final    Comment:      Due to TSH heterogeneity, both structurally and degree of glycosylation, monoclonal antibodies used in the TSH assay may not accurately quantitate TSH. Therefore, this result should be correlated with clinical findings as well as with other assessments of thyroid function, e.g., free T4, free T3.      SAMPLES BEING HELD 09/18/2022 1SST   Final    COMMENT 09/18/2022 Add-on orders for these samples will be processed based on acceptable specimen integrity and analyte stability, which may vary by analyte. Final       IMAGING RESULTS:  XR CHEST SNGL V   Final Result      No acute process. MEDICATIONS GIVEN:  Medications   LORazepam (ATIVAN) tablet 1 mg (1 mg Oral Given 9/18/22 9694)   sodium chloride 0.9 % bolus infusion 1,000 mL (0 mL IntraVENous IV Completed 9/18/22 1538)       PROGRESS NOTE:   4:35 PM Patient's symptoms have improved after treatment    CONSULTS:  none    IMPRESSION:  1. Palpitations        PLAN:  - Discharge    Jesus Kim MD      MDM  Number of Diagnoses or Management Options  Palpitations  Diagnosis management comments: 37M w/ hx HTN, obesity, asthma p/w 2d palpitations. Pt nontoxic appearing, afebrile, tachy 110s but stable BP (very anxious), no resp distress. Pt seen here yesterday for similar and was started on another BP med.  Ddx  includes cardiogenic (AV block, dysrythmia, cardiomyopathy, less likely ACS) vs electrolyte/metabolic vs severe anemia/GIB vs infectious process vs hypovolemia/dehydration vs vasovagal/neurogenic vs seizure vs polypharmacy/tox, less likely acute vestibular syndrome or acute intracranial process [facility], [facility], CVA) based on nonfocal neuro exam and no AMS. Ordered Cxr, EKG, labs. Give IVF and anxiolytic. Monitor and reassess. 1555 Pt feeling better. HR improved now 90s. CXR unremarkable. EKG ST w/o ischemic changes, dysrythmia or signs of heart block. Labs unremarkable including normal hgb and lytes. Trop neg. TSH is WNL. Low risk for PE by wells and nataly is neg. Advised pt that should resume a normal diet (instead of new vegan diet). Also advised that should follow up with cardiology for further evaluation of palpitations. Has upcoming appointment scheduled. Patient given specific return precautions and explained signs/symptoms for which to come back to ED immediately but otherwise advised to f/u w/ PCP over next 2-3days.            Amount and/or Complexity of Data Reviewed  Clinical lab tests: ordered and reviewed  Tests in the radiology section of CPT®: ordered and reviewed  Tests in the medicine section of CPT®: ordered and reviewed  Independent visualization of images, tracings, or specimens: yes    Risk of Complications, Morbidity, and/or Mortality  Presenting problems: moderate  Diagnostic procedures: moderate  Management options: moderate           Procedures

## 2022-09-18 NOTE — ED TRIAGE NOTES
Pt to ED for \"low heart rate\" pt reports HR went from 100 to 90, 85, 75, 65 then 55 after sitting down on home pulse ox. Also reports palpitations all day. Seen yesterday for palpitations and dizziness. Pt extremely anxious during triage process stating \"am I going into heart failure, or going to die, my normal resting heart rate is 60-70 never have I seen it in the 50s and I took off the pulse ox right away when I saw 50 because I didn't want to see it go any lower\". Pt currently putting his own pulse ox on during triage and becoming increasingly anxious. Pt took his valsartan 40mg and amlodipine 10mgtoday.

## 2022-09-19 ENCOUNTER — OFFICE VISIT (OUTPATIENT)
Dept: CARDIOLOGY CLINIC | Age: 37
End: 2022-09-19
Payer: MEDICAID

## 2022-09-19 VITALS
SYSTOLIC BLOOD PRESSURE: 148 MMHG | HEIGHT: 69 IN | OXYGEN SATURATION: 98 % | DIASTOLIC BLOOD PRESSURE: 80 MMHG | WEIGHT: 267 LBS | RESPIRATION RATE: 16 BRPM | BODY MASS INDEX: 39.55 KG/M2 | HEART RATE: 88 BPM

## 2022-09-19 DIAGNOSIS — E66.01 OBESITY, MORBID (HCC): ICD-10-CM

## 2022-09-19 DIAGNOSIS — I10 BENIGN ESSENTIAL HTN: ICD-10-CM

## 2022-09-19 DIAGNOSIS — R00.2 HEART PALPITATIONS: Primary | ICD-10-CM

## 2022-09-19 PROCEDURE — 99214 OFFICE O/P EST MOD 30 MIN: CPT | Performed by: INTERNAL MEDICINE

## 2022-09-19 NOTE — LETTER
Patient:  Juan Nielson   YOB: 1985  Date of Visit: 9/19/2022      Dear Po Craig MD  [address]  [facility] [address]  [facility]  V[address] [facility] [address]:    Mr. Bryon Trevino is a 41 yo M patient of Dr. Lowell Urias with essential hypertension, referred here after an ER visit the other day. I am seeing him because Dr. Naa Calixto is not in the office at this time. He has been having palpitations for the last few days and was concerned it could be something dangerous. He says when he was feeling the palpitations he was not getting shortness of breath, lightheaded or dizzy. Workup in the emergency room was overall unrevealing. He did have an echocardiogram a few weeks ago and this was overall normal.  When he does have the palpitations, it feels like his heart is \"racing\". He thought he might be having a panic attack. He did recently start a vegan diet and does note he thinks he had some lightheadedness due to this over the weekend. We talked about staying well hydrated. He is compensated on exam.  Assessment and Plan:   1. Palpitations. Will obtain a loop monitor to evaluate for possible arrhythmia. Otherwise, will have him follow back with Dr. Naa Calixto in a few weeks when the monitor has resulted. 2. Essential hypertension. Blood pressure is controlled. 3. Obesity. Working on weight loss. No restriction to exercise and activity. If you have questions, please do not hesitate to call me.      Sincerely,      Jessica Carlos MD

## 2022-09-19 NOTE — PROGRESS NOTES
[address] [address] [place]: [place]  [phone]    History of Present Illness:   Mr. Alessia Pozo is a 41 yo M patient of Dr. Lyndon Solomon with essential hypertension, referred here after an ER visit the other day. I am seeing him because Dr. Neeraj Crowder is not in the office at this time. He has been having palpitations for the last few days and was concerned it could be something dangerous. He says when he was feeling the palpitations he was not getting shortness of breath, lightheaded or dizzy. Workup in the emergency room was overall unrevealing. He did have an echocardiogram a few weeks ago and this was overall normal.  When he does have the palpitations, it feels like his heart is \"racing\". He thought he might be having a panic attack. He did recently start a vegan diet and does note he thinks he had some lightheadedness due to this over the weekend. We talked about staying well hydrated. He is compensated on exam.  Assessment and Plan:   1. Palpitations. Will obtain a loop monitor to evaluate for possible arrhythmia. Otherwise, will have him follow back with Dr. Neeraj Crowder in a few weeks when the monitor has resulted. 2. Essential hypertension. Blood pressure is controlled. 3. Obesity. Working on weight loss. No restriction to exercise and activity. He  has a past medical history of Angioedema, Cold-induced asthma, Gout, Hypertension, and JOS (obstructive sleep apnea). All other systems negative except as above. PE  Vitals:    09/19/22 1037   BP: (!) 148/80   Pulse: 88   Resp: 16   SpO2: 98%   Weight: 267 lb (121.1 kg)   Height: 5' 9\" (1.753 m)    Body mass index is 39.43 kg/m².   General appearance - alert, well appearing, and in no distress  Mental status - affect appropriate to mood  Eyes - sclera anicteric, moist mucous membranes  Neck - supple, no JVD  Chest - clear to auscultation, no wheezes, rales or rhonchi  Heart - normal rate, regular rhythm, normal S1, S2, no murmurs, rubs, clicks or gallops  Abdomen - soft, nontender, nondistended, no masses or organomegaly  Back exam - full range of motion, no tenderness  Neurological - no focal deficits  Extremities - peripheral pulses normal, no pedal edema      Recent Labs:  Lab Results   Component Value Date/Time    Cholesterol, total 182 02/11/2021 12:11 PM    HDL Cholesterol 37 (L) 02/11/2021 12:11 PM    LDL, calculated 108 (H) 02/11/2021 12:11 PM    Triglyceride 210 (H) 02/11/2021 12:11 PM     Lab Results   Component Value Date/Time    Creatinine 1.21 09/18/2022 01:21 PM     Lab Results   Component Value Date/Time    BUN 13 09/18/2022 01:21 PM     Lab Results   Component Value Date/Time    Potassium 3.9 09/18/2022 01:21 PM     Lab Results   Component Value Date/Time    Hemoglobin A1c 5.4 08/30/2022 02:04 PM     Lab Results   Component Value Date/Time    HGB 13.6 09/18/2022 01:21 PM     Lab Results   Component Value Date/Time    PLATELET 608 80/21/0380 01:21 PM       Reviewed:  Past Medical History:   Diagnosis Date    Angioedema     ace induced asthma     Cold-induced asthma     Gout     Hypertension     JOS (obstructive sleep apnea)      Social History     Tobacco Use   Smoking Status Never   Smokeless Tobacco Never     Social History     Substance and Sexual Activity   Alcohol Use No     Allergies   Allergen Reactions    Lisinopril Swelling     Pt has sever swelling of the throat and tongue. Pcn [Penicillins] Other (comments)     Allergic as a child      Venlafaxine Palpitations       Current Outpatient Medications   Medication Sig    busPIRone (BUSPAR) 5 mg tablet Take 1 Tablet by mouth three (3) times daily (with meals). LORazepam (ATIVAN) 0.5 mg tablet Take 1 Tablet by mouth daily as needed for Anxiety. valsartan (DIOVAN) 40 mg tablet Take 1 Tablet by mouth daily. amLODIPine (NORVASC) 10 mg tablet Take 1 Tablet by mouth daily.     hydrOXYzine pamoate (VISTARIL) 25 mg capsule Take 1 Capsule by mouth three (3) times daily as needed for Anxiety for up to 14 days. cpap machine kit by Does Not Apply route. No current facility-administered medications for this visit.        Malathi Schaefer MD  [address] [facility] [place]  [address] [facility] [address]  [address]

## 2022-09-20 ENCOUNTER — TELEPHONE (OUTPATIENT)
Dept: CARDIOLOGY CLINIC | Age: 37
End: 2022-09-20

## 2022-09-20 NOTE — TELEPHONE ENCOUNTER
Enrolled with [employer] - Ordered and being shipped to patient's home address on file. ETA within 5-7 business days. Message  Received: ROSARIO Hutson Dr would like for this patient to get a 1 week loop for palps. . he is a patient of Dr Marva Zavaleta. .. Thank you!

## 2022-09-21 LAB
ATRIAL RATE: 110 BPM
CALCULATED P AXIS, ECG09: 54 DEGREES
CALCULATED R AXIS, ECG10: 49 DEGREES
CALCULATED T AXIS, ECG11: -15 DEGREES
DIAGNOSIS, 93000: NORMAL
P-R INTERVAL, ECG05: 150 MS
Q-T INTERVAL, ECG07: 318 MS
QRS DURATION, ECG06: 98 MS
QTC CALCULATION (BEZET), ECG08: 430 MS
VENTRICULAR RATE, ECG03: 110 BPM

## 2022-09-21 NOTE — PROGRESS NOTES
[address] [facility] [place]. [place], [address]   Tel.  [phone]   Fax. [phone] [facility], [address] [facility]   Tel.  [phone]   Fax. [phone] [address] [facility] [address] [place]    Tel.  [phone]   Fax. [phone]     Susan Reyes (: 1985) is a 40 y.o. male, new patient, seen for positive airway pressure follow-up and evaluation. He was last seen by Dr. Rylee Rosas on , prior notes reviewed in detail. Home sleep test 2017 showed AHI of 23/hr with a lowest SaO2 of 55%. He is seen today for follow up. ASSESSMENT/PLAN:    ICD-10-CM ICD-9-CM    1. JOS (obstructive sleep apnea)  G47.33 327.23 AMB SUPPLY ORDER      2. Primary hypertension  I10 401.9       3. BMI 39.0-39.9,adult  Z68.39 V85.39           AHI = 23 (2017). On APAP :  4-20 cmH2O. Set up ? Gaston Founds He is adherent with PAP therapy and PAP continues to benefit patient and remains necessary for control of his sleep apnea. Sleep Apnea - He has remained on his PAP device without issue. Supply order placed. Orders Placed This Encounter    AMB SUPPLY ORDER     Diagnosis: (G47.33) JOS (obstructive sleep apnea)  (primary encounter diagnosis)     Replacement Supplies for Positive Airway Pressure Therapy Device:   Duration of need: 99 months.  Nasal Pillows Combo Mask (Replace) 2 per month.  Nasal Pillows (Replace) 2 per month.  Nasal Cushion (Replace) 2 per month.  Nasal Interface Mask 1 every 3 months.  Headgear 1 every 6 months.  Positive Airway Pressure chinstrap 1 every 6 months.  Tubing with heating element 1 every 3 months.  Filter(s) Disposable 2 per month.  Filter(s) Non-Disposable 1 every 6 months. .   [address] [facility] for Humidifier (Replace) 1 every 6 months. Ketan Rodriguez [facility] NPI: [id number]    Electronically signed.  Date:- 22       * Counseling was provided regarding the importance of regular PAP use with emphasis on ensuring sufficient total sleep time, proper sleep hygiene, and safe driving. * Re-enforced proper and regular cleaning for the device. * He was asked to contact our office for any problems regarding PAP therapy. 2. Hypertension -  continue on his current regimen, he will continue to monitor his BP and follow up with his PMD for reevaluation/adjustment of medications if warranted. I have reviewed the relationship between hypertension as it relates to sleep-disordered breathing. 3. Recommended a dedicated weight loss program through appropriate diet and exercise regimen as significant weight reduction has been shown to reduce severity of obstructive sleep apnea. SUBJECTIVE/OBJECTIVE:    He  is seen today for follow up on PAP device and reports no problems using the device. The following concerns identified:    Drowsiness no Problems exhaling no   Snoring no Forget to put on no   Mask Comfortable yes Can't fall asleep no   Dry Mouth no Mask falls off no   Air Leaking no Frequent awakenings no     He admits that his sleep has improved on PAP therapy using nasal mask and heated tubing. Review of device download indicated:  Auto pressure: 4-20 cmH2O; Max Pressure: 13.5 cmH2O;  95th percentile Pressure: 12.1 cmH2O   95th Percentile Leak: 34.4 L/Min     % Used Days >= 4 hours: 93.  Avg hours used:  7 hours 19 minutes. Therapy Apnea Index averaged over PAP use: 2.5 /hr which reflects improved sleep breathing condition. [place] Sleepiness Score: (P) 4 and Modified F.O.S.Q. Score Total / 2: (P) 20 which reflects improved sleep quality over therapy time. Sleep Review of Systems: notable for Negative difficulty falling asleep; Negative awakenings at night; Negative early morning headaches; Negative memory problems; Negative concentration issues;  Negative chest pain; Negative shortness of breath; Negative significant joint pain at night; Negative significant muscle pain at night; Negative rashes or itching; Negative heartburn; Negative significant mood issues    Visit Vitals  /82 (BP 1 Location: Left upper arm, BP Patient Position: Sitting)   Pulse (!) 109   Ht 5' 9\" (1.753 m)   Wt 269 lb (122 kg)   SpO2 99%   BMI 39.72 kg/m²     Neck circ. in \"inches\": 17.5    General:   Alert, oriented, not in acute distress   Eyes:  Anicteric Sclerae; no obvious strabismus   Nose:  No obvious nasal septum deviation    Neck:   Midline trachea   Chest/Lungs:  Symmetrical lung expansion, clear lung fields on auscultation    CVS:  Normal rate, regular rhythm,  no JVD   Extremities:  No obvious rashes, no edema    Neuro:  No focal deficits; No obvious tremor    Psych:  Normal affect,  normal countenance     Patient's phone number [phone] (home)  was reviewed and confirmed for accuracy. He gives permission for messages regarding results and appointments to be left at that number. On this date 09/22/2022 I have spent 30 minutes reviewing previous notes, test results and face to face with the patient discussing the diagnosis and importance of compliance with the treatment plan as well as documenting on the day of the visit. An electronic signature was used to authenticate this note.     -- Ezekiel Haque NP, [facility]  09/22/22

## 2022-09-21 NOTE — TELEPHONE ENCOUNTER
Anand Vogel 9/16/2022 8:15 AM EDT    Please advise  ----- Message -----  From: Zeyad Click  Sent: 9/15/2022 10:11 PM EDT  To: Ran Lopez  Subject: 4 week follow up     I would like to have a vitamin deficiency test and Testosterone test done at the lab please.

## 2022-09-22 ENCOUNTER — TELEPHONE (OUTPATIENT)
Dept: CARDIOLOGY CLINIC | Age: 37
End: 2022-09-22

## 2022-09-22 ENCOUNTER — OFFICE VISIT (OUTPATIENT)
Dept: SLEEP MEDICINE | Age: 37
End: 2022-09-22
Payer: MEDICAID

## 2022-09-22 VITALS
DIASTOLIC BLOOD PRESSURE: 82 MMHG | BODY MASS INDEX: 39.84 KG/M2 | SYSTOLIC BLOOD PRESSURE: 138 MMHG | WEIGHT: 269 LBS | HEIGHT: 69 IN | HEART RATE: 109 BPM | OXYGEN SATURATION: 99 %

## 2022-09-22 DIAGNOSIS — I10 PRIMARY HYPERTENSION: ICD-10-CM

## 2022-09-22 DIAGNOSIS — G47.33 OSA (OBSTRUCTIVE SLEEP APNEA): Primary | ICD-10-CM

## 2022-09-22 PROCEDURE — 99203 OFFICE O/P NEW LOW 30 MIN: CPT | Performed by: NURSE PRACTITIONER

## 2022-09-22 NOTE — TELEPHONE ENCOUNTER
Patient called and stated that he was looking at his Ekg from the ED and it said it was abnormal EKG. This made him worry and he was still having palps. I let him know that he saw Dr Velia Yap after his visit and Dr Felix Sinha was evaluating him with a monitor that should be at his home in the next day or two.   I let him know that while he is being monitored it will notify us if there is a critical or serious EKG and I also let him know that he should go to the ED if he is having chest pain the radiates, SOB, sweating, patient stated that he understands

## 2022-09-24 ENCOUNTER — APPOINTMENT (OUTPATIENT)
Dept: GENERAL RADIOLOGY | Age: 37
End: 2022-09-24
Attending: EMERGENCY MEDICINE
Payer: COMMERCIAL

## 2022-09-24 ENCOUNTER — HOSPITAL ENCOUNTER (EMERGENCY)
Age: 37
Discharge: HOME | End: 2022-09-24
Attending: EMERGENCY MEDICINE
Payer: COMMERCIAL

## 2022-09-24 VITALS
RESPIRATION RATE: 18 BRPM | SYSTOLIC BLOOD PRESSURE: 155 MMHG | HEIGHT: 69 IN | OXYGEN SATURATION: 98 % | DIASTOLIC BLOOD PRESSURE: 96 MMHG | TEMPERATURE: 98.7 F | WEIGHT: 267 LBS | BODY MASS INDEX: 39.55 KG/M2 | HEART RATE: 88 BPM

## 2022-09-24 DIAGNOSIS — F41.1 ANXIETY STATE: Primary | ICD-10-CM

## 2022-09-24 DIAGNOSIS — F41.0 PANIC ATTACK: ICD-10-CM

## 2022-09-24 LAB
ALBUMIN SERPL-MCNC: 3.8 G/DL (ref 3.5–5)
ALBUMIN/GLOB SERPL: 1 {RATIO} (ref 1.1–2.2)
ALP SERPL-CCNC: 83 U/L (ref 45–117)
ALT SERPL-CCNC: 37 U/L (ref 12–78)
ANION GAP SERPL CALC-SCNC: 4 MMOL/L (ref 5–15)
AST SERPL-CCNC: 26 U/L (ref 15–37)
BASOPHILS # BLD: 0.1 K/UL (ref 0–0.1)
BASOPHILS NFR BLD: 1 % (ref 0–1)
BILIRUB SERPL-MCNC: 0.4 MG/DL (ref 0.2–1)
BNP SERPL-MCNC: 20 PG/ML
BUN SERPL-MCNC: 12 MG/DL (ref 6–20)
BUN/CREAT SERPL: 9 (ref 12–20)
CALCIUM SERPL-MCNC: 8.8 MG/DL (ref 8.5–10.1)
CHLORIDE SERPL-SCNC: 107 MMOL/L (ref 97–108)
CO2 SERPL-SCNC: 29 MMOL/L (ref 21–32)
COMMENT, HOLDF: NORMAL
CREAT SERPL-MCNC: 1.35 MG/DL (ref 0.7–1.3)
DIFFERENTIAL METHOD BLD: NORMAL
EOSINOPHIL # BLD: 0.3 K/UL (ref 0–0.4)
EOSINOPHIL NFR BLD: 4 % (ref 0–7)
ERYTHROCYTE [DISTWIDTH] IN BLOOD BY AUTOMATED COUNT: 12.9 % (ref 11.5–14.5)
GLOBULIN SER CALC-MCNC: 3.8 G/DL (ref 2–4)
GLUCOSE SERPL-MCNC: 148 MG/DL (ref 65–100)
HCT VFR BLD AUTO: 40.4 % (ref 36.6–50.3)
HGB BLD-MCNC: 12.9 G/DL (ref 12.1–17)
IMM GRANULOCYTES # BLD AUTO: 0 K/UL (ref 0–0.04)
IMM GRANULOCYTES NFR BLD AUTO: 0 % (ref 0–0.5)
LYMPHOCYTES # BLD: 2.9 K/UL (ref 0.8–3.5)
LYMPHOCYTES NFR BLD: 38 % (ref 12–49)
MCH RBC QN AUTO: 27.2 PG (ref 26–34)
MCHC RBC AUTO-ENTMCNC: 31.9 G/DL (ref 30–36.5)
MCV RBC AUTO: 85.1 FL (ref 80–99)
MONOCYTES # BLD: 0.6 K/UL (ref 0–1)
MONOCYTES NFR BLD: 7 % (ref 5–13)
NEUTS SEG # BLD: 3.7 K/UL (ref 1.8–8)
NEUTS SEG NFR BLD: 49 % (ref 32–75)
NRBC # BLD: 0 K/UL (ref 0–0.01)
NRBC BLD-RTO: 0 PER 100 WBC
PLATELET # BLD AUTO: 199 K/UL (ref 150–400)
PMV BLD AUTO: 12.8 FL (ref 8.9–12.9)
POTASSIUM SERPL-SCNC: 3.8 MMOL/L (ref 3.5–5.1)
PROT SERPL-MCNC: 7.6 G/DL (ref 6.4–8.2)
RBC # BLD AUTO: 4.75 M/UL (ref 4.1–5.7)
SAMPLES BEING HELD,HOLD: NORMAL
SODIUM SERPL-SCNC: 140 MMOL/L (ref 136–145)
TROPONIN-HIGH SENSITIVITY: 9 NG/L (ref 0–76)
WBC # BLD AUTO: 7.5 K/UL (ref 4.1–11.1)

## 2022-09-24 PROCEDURE — 71046 X-RAY EXAM CHEST 2 VIEWS: CPT

## 2022-09-24 PROCEDURE — 93005 ELECTROCARDIOGRAM TRACING: CPT

## 2022-09-24 PROCEDURE — 84484 ASSAY OF TROPONIN QUANT: CPT

## 2022-09-24 PROCEDURE — 83880 ASSAY OF NATRIURETIC PEPTIDE: CPT

## 2022-09-24 PROCEDURE — 85025 COMPLETE CBC W/AUTO DIFF WBC: CPT

## 2022-09-24 PROCEDURE — 99285 EMERGENCY DEPT VISIT HI MDM: CPT

## 2022-09-24 PROCEDURE — 80053 COMPREHEN METABOLIC PANEL: CPT

## 2022-09-24 PROCEDURE — 74011250637 HC RX REV CODE- 250/637: Performed by: EMERGENCY MEDICINE

## 2022-09-24 RX ORDER — LORAZEPAM 1 MG/1
1 TABLET ORAL
Status: COMPLETED | OUTPATIENT
Start: 2022-09-24 | End: 2022-09-24

## 2022-09-24 RX ADMIN — LORAZEPAM 1 MG: 1 TABLET ORAL at 18:40

## 2022-09-24 NOTE — ED PROVIDER NOTES
EMERGENCY DEPARTMENT HISTORY AND PHYSICAL EXAM      Date: 9/24/2022  Patient Name: Juan M Benito    History of Presenting Illness     Chief Complaint   Patient presents with    Dizziness    Palpitations     PT arrives via wheelchair to triage, reports palpitations and dizziness starting about 30 minutes ago. Patient reports he [place] when he had sudden onset of weakness and nausea. Patient reports hx of panic attacks       History Provided By: Patient    HPI: Juan M Benito, 40 y.o. male presents to the ED with cc of palpitations. Patient states he has a history of anxiety as well as panic attacks. Patient states that he takes Ativan half milligram as needed. He states that he supposed to be on BuSpar 3 times a day however he has been taking it sporadically and not as it is prescribed. Discussed with him with these medications it sometimes can take several months of therapy to get to a baseline drug level to help prevent further panic attacks. He states that his symptoms were severe in nature broke out into a sweat had nausea as well as chest pain and discomfort and palpitations. He states there was no inciting event it just came on. He denies any recent illness to include fever chills, cough or cold symptoms. Here in the ED he states he still feeling palpitations and anxious however is not having any chest discomfort or shortness of breath currently. He denies any current nausea. He denies any recent leg pain or swelling. There are no other complaints, changes, or physical findings at this time. PCP: Arpita العراقي MD    No current facility-administered medications on file prior to encounter. Current Outpatient Medications on File Prior to Encounter   Medication Sig Dispense Refill    busPIRone (BUSPAR) 5 mg tablet Take 1 Tablet by mouth three (3) times daily (with meals).  90 Tablet 1    LORazepam (ATIVAN) 0.5 mg tablet Take 1 Tablet by mouth daily as needed for Anxiety. 20 Tablet 1    valsartan (DIOVAN) 40 mg tablet Take 1 Tablet by mouth daily. 30 Tablet 2    amLODIPine (NORVASC) 10 mg tablet Take 1 Tablet by mouth daily. 30 Tablet 5    cpap machine kit by Does Not Apply route. Past History     Past Medical History:  Past Medical History:   Diagnosis Date    Angioedema     ace induced asthma     Cold-induced asthma     Gout     Hypertension     JOS (obstructive sleep apnea)        Past Surgical History:  Past Surgical History:   Procedure Laterality Date    HX ADENOIDECTOMY         Family History:  Family History   Problem Relation Age of Onset    Sleep Apnea Mother     Hypertension Mother     Hypertension Father        Social History:  Social History     Tobacco Use    Smoking status: Never    Smokeless tobacco: Never   Vaping Use    Vaping Use: Never used   Substance Use Topics    Alcohol use: No    Drug use: Yes     Comment: CBD       Allergies: Allergies   Allergen Reactions    Lisinopril Swelling     Pt has sever swelling of the throat and tongue. Pcn [Penicillins] Other (comments)     Allergic as a child      Venlafaxine Palpitations         Review of Systems   Review of Systems   Constitutional:  Positive for diaphoresis. Negative for appetite change, chills, fatigue and fever. HENT: Negative. Negative for congestion, rhinorrhea, sinus pressure and sore throat. Eyes: Negative. Respiratory:  Positive for shortness of breath. Negative for cough, choking, chest tightness and wheezing. Cardiovascular:  Positive for chest pain and palpitations. Negative for leg swelling. Gastrointestinal:  Positive for nausea. Negative for abdominal pain, constipation, diarrhea and vomiting. Endocrine: Negative. Genitourinary: Negative. Negative for difficulty urinating, dysuria, flank pain and urgency. Musculoskeletal: Negative. Skin: Negative. Neurological: Negative.   Negative for dizziness, speech difficulty, weakness, light-headedness, numbness and headaches. Psychiatric/Behavioral: Negative. All other systems reviewed and are negative. Physical Exam   Physical Exam  Vitals and nursing note reviewed. Constitutional:       General: He is not in acute distress. Appearance: He is well-developed. He is obese. He is not diaphoretic. HENT:      Head: Normocephalic and atraumatic. Mouth/Throat:      Pharynx: No oropharyngeal exudate. Eyes:      Extraocular Movements: Extraocular movements intact. Conjunctiva/sclera: Conjunctivae normal.      Pupils: Pupils are equal, round, and reactive to light. Neck:      Vascular: No JVD. Trachea: No tracheal deviation. Cardiovascular:      Rate and Rhythm: Regular rhythm. Tachycardia present. Heart sounds: Normal heart sounds. No murmur heard. Pulmonary:      Effort: Pulmonary effort is normal. No respiratory distress. Breath sounds: Normal breath sounds. No stridor. No wheezing or rales. Abdominal:      General: There is no distension. Palpations: Abdomen is soft. Tenderness: There is no abdominal tenderness. There is no guarding or rebound. Musculoskeletal:         General: No tenderness. Normal range of motion. Cervical back: Normal range of motion and neck supple. Skin:     General: Skin is warm and dry. Neurological:      Mental Status: He is alert and oriented to person, place, and time. Cranial Nerves: No cranial nerve deficit.       Comments: No gross motor or sensory deficits    Psychiatric:         Behavior: Behavior normal.      Comments: anxious       Diagnostic Study Results     Labs -     Recent Results (from the past 12 hour(s))   EKG, 12 LEAD, INITIAL    Collection Time: 09/24/22  5:33 PM   Result Value Ref Range    Ventricular Rate 122 BPM    Atrial Rate 122 BPM    P-R Interval 144 ms    QRS Duration 94 ms    Q-T Interval 308 ms    QTC Calculation (Bezet) 438 ms    Calculated P Axis 50 degrees    Calculated R Axis 26 degrees    Calculated T Axis 22 degrees    Diagnosis       Sinus tachycardia  When compared with ECG of 18-SEP-2022 13:19,  premature ventricular complexes are no longer present     CBC WITH AUTOMATED DIFF    Collection Time: 09/24/22  5:45 PM   Result Value Ref Range    WBC 7.5 4.1 - 11.1 K/uL    RBC 4.75 4.10 - 5.70 M/uL    HGB 12.9 12.1 - 17.0 g/dL    HCT 40.4 36.6 - 50.3 %    MCV 85.1 80.0 - 99.0 FL    MCH 27.2 26.0 - 34.0 PG    MCHC 31.9 30.0 - 36.5 g/dL    RDW 12.9 11.5 - 14.5 %    PLATELET 422 577 - 286 K/uL    MPV 12.8 8.9 - 12.9 FL    NRBC 0.0 0  WBC    ABSOLUTE NRBC 0.00 0.00 - 0.01 K/uL    NEUTROPHILS 49 32 - 75 %    LYMPHOCYTES 38 12 - 49 %    MONOCYTES 7 5 - 13 %    EOSINOPHILS 4 0 - 7 %    BASOPHILS 1 0 - 1 %    IMMATURE GRANULOCYTES 0 0.0 - 0.5 %    ABS. NEUTROPHILS 3.7 1.8 - 8.0 K/UL    ABS. LYMPHOCYTES 2.9 0.8 - 3.5 K/UL    ABS. MONOCYTES 0.6 0.0 - 1.0 K/UL    ABS. EOSINOPHILS 0.3 0.0 - 0.4 K/UL    ABS. BASOPHILS 0.1 0.0 - 0.1 K/UL    ABS. IMM. GRANS. 0.0 0.00 - 0.04 K/UL    DF AUTOMATED     TROPONIN-HIGH SENSITIVITY    Collection Time: 09/24/22  5:45 PM   Result Value Ref Range    Troponin-High Sensitivity 9 0 - 76 ng/L   METABOLIC PANEL, COMPREHENSIVE    Collection Time: 09/24/22  5:45 PM   Result Value Ref Range    Sodium 140 136 - 145 mmol/L    Potassium 3.8 3.5 - 5.1 mmol/L    Chloride 107 97 - 108 mmol/L    CO2 29 21 - 32 mmol/L    Anion gap 4 (L) 5 - 15 mmol/L    Glucose 148 (H) 65 - 100 mg/dL    BUN 12 6 - 20 MG/DL    Creatinine 1.35 (H) 0.70 - 1.30 MG/DL    BUN/Creatinine ratio 9 (L) 12 - 20      GFR est AA >60 >60 ml/min/1.73m2    GFR est non-AA 60 (L) >60 ml/min/1.73m2    Calcium 8.8 8.5 - 10.1 MG/DL    Bilirubin, total 0.4 0.2 - 1.0 MG/DL    ALT (SGPT) 37 12 - 78 U/L    AST (SGOT) 26 15 - 37 U/L    Alk.  phosphatase 83 45 - 117 U/L    Protein, total 7.6 6.4 - 8.2 g/dL    Albumin 3.8 3.5 - 5.0 g/dL    Globulin 3.8 2.0 - 4.0 g/dL    A-G Ratio 1.0 (L) 1.1 - 2.2     NT-PRO BNP    Collection Time: 09/24/22  5:45 PM   Result Value Ref Range    NT pro-BNP 20 <125 PG/ML   SAMPLES BEING HELD    Collection Time: 09/24/22  5:45 PM   Result Value Ref Range    SAMPLES BEING HELD PST     COMMENT        Add-on orders for these samples will be processed based on acceptable specimen integrity and analyte stability, which may vary by analyte. Radiologic Studies -   XR CHEST PA LAT   Final Result    No acute cardiopulmonary disease radiographically. .  . CT Results  (Last 48 hours)      None          CXR Results  (Last 48 hours)                 09/24/22 1810  XR CHEST PA LAT Final result    Impression:   No acute cardiopulmonary disease radiographically. .  . Narrative:  INDICATION:  Chest Pain. EXAM: 2 VIEW CHEST RADIOGRAPH. COMPARISON: 9/18/2022. FINDINGS: Frontal and lateral views of the chest show clear lungs. . The heart,   mediastinum and pulmonary vasculature are normal.  The bony thorax is   unremarkable for age. ..                   Medical Decision Making   I am the first provider for this patient. I reviewed the vital signs, available nursing notes, past medical history, past surgical history, family history and social history. Vital Signs-Reviewed the patient's vital signs. Patient Vitals for the past 12 hrs:   Temp Pulse Resp BP SpO2   09/24/22 2051 -- 88 -- (!) 152/103 --   09/24/22 1736 98.7 °F (37.1 °C) (!) 115 18 (!) 172/111 98 %       EKG interpretation: (Preliminary)  Sinus tach rate 122, normal axis/ME/QRS, no acute ST changes. Records Reviewed: Nursing Notes, Old Medical Records, Previous electrocardiograms, Previous Radiology Studies, and Previous Laboratory Studies  Patient is followed by Dr. Renee Gitelman for heart palpitations. Patient does have a history of generalized anxiety disorder and panic attacks.   Patient with prior echo 8/3/2022 EF 55 to 60% with mildly increased ventricular wall thickness otherwise unremarkable    Provider Notes (Medical Decision Making):   Panic attack, anxiety disorder, electrolyte abnormality, low suspicion ACS    ED Course:   Initial assessment performed. The patients presenting problems have been discussed, and they are in agreement with the care plan formulated and outlined with them. I have encouraged them to ask questions as they arise throughout their visit. Discussed with patient the importance of taking his BuSpar as prescribed. In order for the medication to work typically most people need to be on it for 3 to 4 months before they start to see significant improvement. Continue to use Ativan on an as-needed basis only. 7:47 PM  Patient feeling better with 1 mg p.o. of Ativan. Current delay in disposition secondary to lab instrumentation down. Patient's troponin negative. Will discharge home. Patient is also been concerned somewhat of his blood pressure patient been started on amlodipine recently but had stopped taking it because he thought it was causing palpitations. I discussed with him it should not be likely that that was causing him problems and issues and he needs to get back on it for better blood pressure control. Disposition:    DC- Adult Discharges: All of the diagnostic tests were reviewed and questions answered. Diagnosis, care plan and treatment options were discussed. The patient understands the instructions and will follow up as directed. The patients results have been reviewed with them. They have been counseled regarding their diagnosis. The patient verbally convey understanding and agreement of the signs, symptoms, diagnosis, treatment and prognosis and additionally agrees to follow up as recommended with their PCP in 24 - 48 hours. They also agree with the care-plan and convey that all of their questions have been answered.   I have also put together some discharge instructions for them that include: 1) educational information regarding their diagnosis, 2) how to care for their diagnosis at home, as well a 3) list of reasons why they would want to return to the ED prior to their follow-up appointment, should their condition change. DISCHARGE PLAN:  1. Current Discharge Medication List      No med changes    2. Follow-up Information    None     PCP    3. Return to ED if worse     Diagnosis     Clinical Impression:   1. Anxiety state    2. Panic attack        Attestations:    Libertad Wells, DO        Please note that this dictation was completed with [employer], the computer voice recognition software. Quite often unanticipated grammatical, syntax, homophones, and other interpretive errors are inadvertently transcribed by the computer software. Please disregard these errors. Please excuse any errors that have escaped final proofreading. Thank you.

## 2022-09-25 LAB
ATRIAL RATE: 122 BPM
CALCULATED P AXIS, ECG09: 50 DEGREES
CALCULATED R AXIS, ECG10: 26 DEGREES
CALCULATED T AXIS, ECG11: 22 DEGREES
DIAGNOSIS, 93000: NORMAL
P-R INTERVAL, ECG05: 144 MS
Q-T INTERVAL, ECG07: 308 MS
QRS DURATION, ECG06: 94 MS
QTC CALCULATION (BEZET), ECG08: 438 MS
VENTRICULAR RATE, ECG03: 122 BPM

## 2022-09-29 ENCOUNTER — OFFICE VISIT (OUTPATIENT)
Dept: CARDIOLOGY CLINIC | Age: 37
End: 2022-09-29
Payer: COMMERCIAL

## 2022-09-29 ENCOUNTER — TELEPHONE (OUTPATIENT)
Dept: CARDIOLOGY CLINIC | Age: 37
End: 2022-09-29

## 2022-09-29 VITALS
OXYGEN SATURATION: 98 % | DIASTOLIC BLOOD PRESSURE: 100 MMHG | HEART RATE: 86 BPM | SYSTOLIC BLOOD PRESSURE: 148 MMHG | HEIGHT: 69 IN | WEIGHT: 273.4 LBS | BODY MASS INDEX: 40.49 KG/M2

## 2022-09-29 DIAGNOSIS — I10 PRIMARY HYPERTENSION: Primary | ICD-10-CM

## 2022-09-29 DIAGNOSIS — G47.33 OSA (OBSTRUCTIVE SLEEP APNEA): ICD-10-CM

## 2022-09-29 DIAGNOSIS — E66.01 OBESITY, MORBID (HCC): ICD-10-CM

## 2022-09-29 PROCEDURE — 93000 ELECTROCARDIOGRAM COMPLETE: CPT | Performed by: INTERNAL MEDICINE

## 2022-09-29 PROCEDURE — 99214 OFFICE O/P EST MOD 30 MIN: CPT | Performed by: INTERNAL MEDICINE

## 2022-09-29 RX ORDER — AMLODIPINE AND VALSARTAN 5; 160 MG/1; MG/1
1 TABLET ORAL DAILY
Qty: 90 TABLET | Refills: 3 | Status: SHIPPED | OUTPATIENT
Start: 2022-09-29 | End: 2022-10-10 | Stop reason: ALTCHOICE

## 2022-09-29 RX ORDER — METOPROLOL SUCCINATE 25 MG/1
25 TABLET, EXTENDED RELEASE ORAL DAILY
Qty: 90 TABLET | Refills: 3 | Status: SHIPPED | OUTPATIENT
Start: 2022-09-29 | End: 2022-10-03

## 2022-09-29 NOTE — PROGRESS NOTES
Nathalie Roberts MD, MS, [facility] [place]            HISTORY OF PRESENT ILLNESS:    Ashley Mcdermott is a 40 y.o. male referred for palpitations.    ++ anxiety, started after COVID vaccine. Had near panic attack here in waiting room. ED visit 7/26 HTN, CP, anxiety attacks. Diagnosed HTN 10 years ago. Long history of anxiety, Patient reports has had increase anxiety over the last 1 to 2 days. He does have a long history of this and has been followed by Dr. Daun Schlatter.  Patient was told and prescribed BuSpar but he has not started this because he was concerned he could interact with his hypertensive medications. Saw Cardiology last Fall - VCS - monitor placed - no echo. No appetite, little week - may be new meds. Discussed echo - continue current valsartan/amlodipine. Buspar BID, fluoxetine BID, valsartan/amlodipine. Asked that he use the hydroxyzine and xanax less if possible. Saw urgently for palpitations - Dr. Rissa Cary - holter placed, he is wearing. Tingling in feet. SUMMARY:   Problem List  Date Reviewed: 9/29/2022            Codes Class Noted    Palpitations ICD-10-CM: R00.2  ICD-9-CM: 785.1  7/29/2022        Obesity, morbid ([address] ICD-10-CM: E66.01  ICD-9-CM: 278.01  3/29/2018        Chronic bilateral low back pain with sciatica ICD-10-CM: M54.40, G89.29  ICD-9-CM: 724.2, 724.3, 338.29  3/29/2018        JOS (obstructive sleep apnea) ICD-10-CM: G47.33  ICD-9-CM: 327.23  Unknown        Hypertension ICD-10-CM: I10  ICD-9-CM: 401.9  Unknown        Cold-induced asthma ICD-10-CM: J45.909  ICD-9-CM: 493.10  Unknown        Gout ICD-10-CM: M10.9  ICD-9-CM: 274.9  Unknown           Current Outpatient Medications on File Prior to Visit   Medication Sig    busPIRone (BUSPAR) 5 mg tablet Take 1 Tablet by mouth three (3) times daily (with meals). LORazepam (ATIVAN) 0.5 mg tablet Take 1 Tablet by mouth daily as needed for Anxiety. cpap machine kit by Does Not Apply route.      No current facility-administered medications on file prior to visit. CARDIOLOGY STUDIES TO DATE:  No results found for this visit on 09/29/22. Chief Complaint   Patient presents with    Follow-up    Palpitations     weakness       CARDIAC ROS:   positive for fatigue, dyspnea on exertion    Past Medical History:   Diagnosis Date    Angioedema     ace induced asthma     Cold-induced asthma     Gout     Hypertension     JOS (obstructive sleep apnea)        Family History   Problem Relation Age of Onset    Sleep Apnea Mother     Hypertension Mother     Hypertension Father        Social History     Socioeconomic History    Marital status: SINGLE     Spouse name: Not on file    Number of children: Not on file    Years of education: Not on file    Highest education level: Not on file   Occupational History    Not on file   Tobacco Use    Smoking status: Never    Smokeless tobacco: Never   Vaping Use    Vaping Use: Never used   Substance and Sexual Activity    Alcohol use: No    Drug use: Yes     Comment: CBD    Sexual activity: Yes     Partners: Female   Other Topics Concern    Not on file   Social History Narrative    Not on file     Social Determinants of Health     Financial Resource Strain: Not on file   Food Insecurity: Not on file   Transportation Needs: Not on file   Physical Activity: Not on file   Stress: Not on file   Social Connections: Not on file   Intimate Partner Violence: Not on file   Housing Stability: Not on file        GENERAL ROS:  A comprehensive review of systems was negative except for that written in the HPI.     Visit Vitals  BP (!) 148/100 (BP 1 Location: Right arm, BP Patient Position: Sitting)   Pulse 86   Ht 5' 9\" (1.753 m)   Wt 273 lb 6.4 oz (124 kg)   SpO2 98%   BMI 40.37 kg/m²       Wt Readings from Last 3 Encounters:   09/29/22 273 lb 6.4 oz (124 kg)   09/24/22 267 lb (121.1 kg)   09/22/22 269 lb (122 kg)            BP Readings from Last 3 Encounters:   09/29/22 (!) 148/100 09/24/22 (!) 155/96   09/22/22 138/82       PHYSICAL EXAM  General appearance: alert, cooperative, no distress, appears stated age  Neck: supple, symmetrical, trachea midline, no adenopathy, thyroid: not enlarged, symmetric, no tenderness/mass/nodules, no carotid bruit, and no JVD  Lungs: clear to auscultation bilaterally  Heart: regular rate and rhythm, S1, S2 normal, no murmur, click, rub or gallop  Extremities: extremities normal, atraumatic, no cyanosis or edema    Lab Results   Component Value Date/Time    Cholesterol, total 182 02/11/2021 12:11 PM    Cholesterol, total 229 (H) 08/11/2014 02:45 PM    HDL Cholesterol 37 (L) 02/11/2021 12:11 PM    LDL, calculated 108 (H) 02/11/2021 12:11 PM    Triglyceride 210 (H) 02/11/2021 12:11 PM       ASSESSMENT    ICD-10-CM ICD-9-CM    1. Primary hypertension  I10 401.9 AMB POC EKG ROUTINE W/ 12 LEADS, INTER & REP      amLODIPine-valsartan (EXFORGE) 5-160 mg per tablet      metoprolol succinate (TOPROL-XL) 25 mg XL tablet      2. Obesity, morbid ([facility] [address]  E66.01 278.01       3. JOS (obstructive sleep apnea)  G47.33 327.23           Encounter Diagnoses   Name Primary? Primary hypertension Yes    Obesity, morbid (HCC)     JOS (obstructive sleep apnea)      Orders Placed This Encounter    AMB POC EKG ROUTINE W/ 12 LEADS, INTER & REP    amLODIPine-valsartan (EXFORGE) 5-160 mg per tablet    metoprolol succinate (TOPROL-XL) 25 mg XL tablet           Mercy Jimenez MD  9/29/2022        [address]   [address] [facility] [address]  [address]  [phone] (F)    [address]  [facility] [address]  [phone] (P)  [phone] (F)    ATTENTION:   This medical record was transcribed using an electronic medical records/speech recognition system. Although proofread, it may and can contain electronic, spelling and other errors. Corrections may be executed at a later time. Please feel free to contact us for any clarifications as needed.

## 2022-09-29 NOTE — PROGRESS NOTES
Per Dr. Jameel Alston in about 2 weeks (around 10/13/2022).   Stop valsartan  Stop amlodipne  Start combo exforge (valsartan amlodipne)  Start Toprol 25mg

## 2022-09-29 NOTE — TELEPHONE ENCOUNTER
Patient called on today saying he is experiencing body weakness primarily in the legs. Patient is on amlodipine, valsartan, and buspirone and feels as if he is having a reaction of some sort, he also has been experiencing palpitations that wake him out of his sleep. Patient has been scheduled per Rodrick Harmon For today at 1pm with provider.     Callback#: [phone]

## 2022-09-29 NOTE — PROGRESS NOTES
Chief Complaint   Patient presents with    Follow-up    Palpitations     weakness       Vitals:    09/29/22 1305 09/29/22 1320   BP: (!) 150/90 (!) 148/100   BP 1 Location: Left arm Right arm   BP Patient Position: Sitting Sitting   Pulse: 86    Height: 5' 9\" (1.753 m)    Weight: 273 lb 6.4 oz (124 kg)    SpO2: 98%          Chest pain: after eating/ sometimes after walking    SOB: no    Palpitations: laying down and being active. When BP is up. Dizziness: yes, sleep and being active. Not all the time. Swelling: no    Refills:       Have you been to the ER, urgent care clinic since your last visit? Hospitalized since your last visit? 9-24-22 Dizziness & Palp    Have you sen or consulted other health care providers outside of the [facility] system since your last visit? (Include any pap smears or colon screening.)        9-26-22 Holter.

## 2022-10-03 ENCOUNTER — DOCUMENTATION ONLY (OUTPATIENT)
Dept: SOCIAL WORK | Age: 37
End: 2022-10-03

## 2022-10-03 ENCOUNTER — OFFICE VISIT (OUTPATIENT)
Dept: BEHAVIORAL/MENTAL HEALTH CLINIC | Age: 37
End: 2022-10-03
Payer: COMMERCIAL

## 2022-10-03 VITALS
SYSTOLIC BLOOD PRESSURE: 146 MMHG | WEIGHT: 274 LBS | HEIGHT: 69 IN | BODY MASS INDEX: 40.58 KG/M2 | OXYGEN SATURATION: 98 % | HEART RATE: 87 BPM | RESPIRATION RATE: 16 BRPM | TEMPERATURE: 98 F | DIASTOLIC BLOOD PRESSURE: 88 MMHG

## 2022-10-03 DIAGNOSIS — F40.10 SOCIAL ANXIETY DISORDER: ICD-10-CM

## 2022-10-03 DIAGNOSIS — F41.1 GAD (GENERALIZED ANXIETY DISORDER): Primary | ICD-10-CM

## 2022-10-03 DIAGNOSIS — F41.9 ANXIETY: ICD-10-CM

## 2022-10-03 PROCEDURE — 99204 OFFICE O/P NEW MOD 45 MIN: CPT | Performed by: NURSE PRACTITIONER

## 2022-10-03 RX ORDER — PROPRANOLOL HYDROCHLORIDE 10 MG/1
10 TABLET ORAL 2 TIMES DAILY
Qty: 60 TABLET | Refills: 1 | Status: SHIPPED | OUTPATIENT
Start: 2022-10-03

## 2022-10-03 NOTE — PROGRESS NOTES
Chief Complaint   Patient presents with    New Patient     Visit Vitals  BP (!) 146/88 (BP 1 Location: Left upper arm, BP Patient Position: Sitting, BP Cuff Size: Large adult)   Pulse 87   Temp 98 °F (36.7 °C) (Temporal)   Resp 16   Ht 5' 9\" (1.753 m)   Wt 124.3 kg (274 lb)   SpO2 98%   BMI 40.46 kg/m²   Provider reviewed medications and depression screening.

## 2022-10-03 NOTE — PROGRESS NOTES
CHIEF COMPLAINT:  Jonathan Bradley is a 40 y.o. male and was seen today to establish psychiatric care. HPI:    Beka Anaya reports the following psychiatric symptoms:  depression and anxiety. The symptoms have been present for 1 year and are of moderate/high severity. The symptoms occur intermittently. Associated symptoms include  anxiety, anxiety attacks, avoidance of crowds, difficulty sleeping, fear of dying, fear of going crazy, fear of impending doom, and poor concentration. Patient reports having panic attacks after receiving his covid vaccine last August. He states he was nervous as a teenager when publicly speaking or around new people, but he has never had panic attacks in the past. He also reports having difficulty with focus and attention in high school. Currently he is having panic attacks several times a week. Beverley Rogers He is taking Ativan  0.5 mg daily as needed for anxiety and symptoms have improved. Panic attacks are often triggered by feeling effects of high blood pressure and being around crowds. He has a medical hx of HTN. He is being followed by PCP. Was recently seen in ER for anxiety. EKG and lab work found within normal limits. He denies past hospitalizations, past traumas, sully, psychosis, or suicidal ideations. Precipitating factors include health, relationships, and crowds. Alleviating factors include meditating and dietary changes.      PAST HISTORY:  Psychiatric:  Past Psychiatric Hospitalization:  denies   Past Outpatient Providers:  denies   Past Psychiatric Medications: denies        Medical:  Active Ambulatory Problems     Diagnosis Date Noted    JOS (obstructive sleep apnea)     Hypertension     Cold-induced asthma     Gout     Obesity, morbid ([address] 03/29/2018    Chronic bilateral low back pain with sciatica 03/29/2018    Palpitations 07/29/2022     Resolved Ambulatory Problems     Diagnosis Date Noted    No Resolved Ambulatory Problems     Past Medical History:   Diagnosis Date Angioedema      Substance Use:   Social History     Socioeconomic History    Marital status: SINGLE   Tobacco Use    Smoking status: Never    Smokeless tobacco: Never   Vaping Use    Vaping Use: Never used   Substance and Sexual Activity    Alcohol use: No    Drug use: Yes     Comment: CBD    Sexual activity: Yes     Partners: Female     Social:  Marital Status:  and recently engaged   Children: twins, daughters 15 and 3   Educational Level:  high school   Work History:    Legal History: denies   Pertinent Childhood History: denies abuse   Grew up [facility], with younger sister      Family:   No family history of mental or substance use history reported. Family history of medical problems reviewed:   Family History   Problem Relation Age of Onset    Sleep Apnea Mother     Hypertension Mother     Hypertension Father           MEDICATIONS:  Current Outpatient Medications   Medication Sig Dispense Refill    propranoloL (INDERAL) 10 mg tablet Take 1 Tablet by mouth two (2) times a day. 60 Tablet 1    amLODIPine-valsartan (EXFORGE) 5-160 mg per tablet Take 1 Tablet by mouth daily. 90 Tablet 3    LORazepam (ATIVAN) 0.5 mg tablet Take 1 Tablet by mouth daily as needed for Anxiety. 20 Tablet 1    cpap machine kit by Does Not Apply route. ALLERGIES:  Allergies   Allergen Reactions    Lisinopril Swelling     Pt has sever swelling of the throat and tongue. Pcn [Penicillins] Other (comments)     Allergic as a child      Venlafaxine Palpitations       REVIEW OF SYSTEMS:  Psychiatric:  anxiety    Appetite:no change from normal   Sleep: poor with DIMS (difficulty initiating & maintaining sleep)   Cardio: denies   Neuro: denies   Pt reports the following:  none  All other systems reviewed and are considered negative.     MENTAL STATUS EXAM:     Orientation oriented to time, place and person   Vital Signs (BP,Pulse, Temp) See below (reviewed)   Gait and Station Within normal limits   Abnormal Muscular Movements/Tone/Behavior No EPS, no Tardive Dyskinesia, no abnormal muscular movements; wnl tone   Relations cooperative   General Appearance:  age appropriate and within normal Limits   Language No aphasia or dysarthria   Speech:  normal pitch and normal volume   Thought Processes logical, wnl rate of thoughts, good abstract reasoning and computation   Thought Associations within normal limits   Thought Content free of hallucinations   Suicidal Ideations none   Homicidal Ideations none   Mood:  anxious   Affect:  anxious   Memory recent  adequate   Memory remote:  adequate   Concentration/Attention:  impaired   Fund of Knowledge Fair/average   Insight:  good   Reliability good   Judgment:  good     VITALS:     Visit Vitals  BP (!) 146/88 (BP 1 Location: Left upper arm, BP Patient Position: Sitting, BP Cuff Size: Large adult)   Pulse 87   Temp 98 °F (36.7 °C) (Temporal)   Resp 16   Ht 5' 9\" (1.753 m)   Wt 124.3 kg (274 lb)   SpO2 98%   BMI 40.46 kg/m²       PERTINENT DATA:  No visits with results within 2 Day(s) from this visit. Latest known visit with results is:   Orders Only on 09/26/2022   Component Date Value Ref Range Status    Vitamin D 25-Hydroxy 09/26/2022 25.8 (A)  30 - 100 ng/mL Final    Testosterone 09/26/2022 274  264 - 916 ng/dL Final    Free testosterone (Direct) 09/26/2022 8.6 (A)  8.7 - 25.1 pg/mL Final    Vitamin B12 09/26/2022 466  193 - 986 pg/mL Final       XR Results (most recent):  Results from Hospital Encounter encounter on 09/24/22    XR CHEST PA LAT    Narrative  INDICATION:  Chest Pain. EXAM: 2 VIEW CHEST RADIOGRAPH. COMPARISON: 9/18/2022. FINDINGS: Frontal and lateral views of the chest show clear lungs. . The heart,  mediastinum and pulmonary vasculature are normal.  The bony thorax is  unremarkable for age. ..    Impression  No acute cardiopulmonary disease radiographically. .  . MEDICAL DECISION MAKING:  Problems addressed today:     ICD-10-CM ICD-9-CM    1.  ERIC (generalized anxiety disorder)  F41.1 300.02 propranoloL (INDERAL) 10 mg tablet      2. Anxiety  F41.9 300.00       3. Social anxiety disorder  F40.10 300.23           DD:  MDD, BDII     Assessment:   Rosmery Cevallos is a 40 y.o. male and presents to outpatient face to face appt with complaints of anxiety and panic attacks. Patient referred by PCP Dr. Channing King. During interview patient is alert and oriented, cooperative, dressed well, appearing well groomed, has good eye contact and pleasant. Patient is concerned that anxiety symptoms have increased within the last year. He is currently taking Ativan 0.5 mg daily PRN. Anxiety was so severe 2 months ago, he was fired from work. 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 Reviewed his past medical hx, psych hx, and social hx. Educated patient on diagnosis, hyper arousal, and benzodiazepines. Recommended therapy. Patient would benefit from couples therapy as well as he has a lot of unresolved unforgiveness towards partner. Overall he is improving with dietary changes and medication. Discussed Propranolol, side effects and risks vs benefits. Reinforced positive coping strategies. Will start Propranolol 10 mg BID for anxiety. Will d/c metoprolol as patent reports he has not taken it. Discussed PRN use of Ativan. Patient acknowledges and understand safety guidelines. BP is elevated. Patient is being followed by PCP. He has already starting working on [employer]. He has not eaten meat in several weeks and plan to maintain a vegan diet. Treatment plan an goals reviewed with patient. Will continue to monitor. Plan:   1. Medication:      Current Outpatient Medications   Medication Sig Dispense Refill    propranoloL (INDERAL) 10 mg tablet Take 1 Tablet by mouth two (2) times a day. 60 Tablet 1    amLODIPine-valsartan (EXFORGE) 5-160 mg per tablet Take 1 Tablet by mouth daily.  90 Tablet 3    LORazepam (ATIVAN) 0.5 mg tablet Take 1 Tablet by mouth daily as needed for Anxiety. 20 Tablet 1    cpap machine kit by Does Not Apply route. Medication changes made today: Start Propranolol 10 mg po BID   2. Counseling and coordination of care including instructions for treatment, risks/benefits, risk factor reduction and patient/family education. He agrees with the plan. Patient instructed to call with any side effects, questions or issues. 3. Collateral information  4. Individual therapy   5. Monitor VS and appropriate labs  6. Request records       Follow-up and Dispositions    Return in about 6 weeks (around 11/14/2022) for med/management .               10/3/2022  Bernardino Chavez NP

## 2022-10-03 NOTE — PATIENT INSTRUCTIONS
Who Switch Off my Brain- Felecia Leaf   Complex PTSD from Surviving to [address]    Words of wisdom  When something bad happens to you, you have 3 choices:  Let it DEFINE YOU,  Let it DESTROY YOU,   OR YOU CAN LET IT STRENGTHEN YOU  Life is like a camera, so:                                Just focus on the important  Capture the good times  Develop from the negatives  And if things don't turn out, take another shot     Learn from the mistakes of others, you can't live long enough to make them all yourself. Always put yourself in others' shoes, if it hurts you, then it probably hurts the other person to. The happiest of people don't necessarily have the best of everything, they just make the most of everything they have and that comes along their way. Life is 10% of what happens to you and 90 % of how you respond to it.      Pleases include the following foods in your diet for mood:  Drink GREEN TEA as often as you can but stop the caffeinated ones before 6 pm  Omega 3 Fatty Acids/Fish oil/ or flax seeds, citrus fruits, coral, turmeric, chocolates  Sunflower seeds, Pumpkin seeds, Almonds,   Oatmeal, Eggs, grapes, yogurt, Probiotics (like Activia)  Vit C, E, D every few days,  Exercise at least 20 minutes/day (walk 10,000 steps)

## 2022-10-03 NOTE — PROGRESS NOTES
Attempted to reach client for scheduled virtual visit. Sent several texts via [employer] platform--no response. Client also did not respond to staff's attempt at check in. It appears in record that he had an appt with another provider just prior, so unclear if that conflicted with this time. Tried to call pt and it went to voicemail--encouraged him to reschedule appt.

## 2022-10-04 ENCOUNTER — TELEPHONE (OUTPATIENT)
Dept: BEHAVIORAL/MENTAL HEALTH CLINIC | Age: 37
End: 2022-10-04

## 2022-10-04 NOTE — TELEPHONE ENCOUNTER
Patient stated he went to  medication Inderal and was informed by Pharmacist that the medication can lower his heart rate. Patient asked if it is ok for him to take both Inderal and his blood pressure medication. Spoke with provider who stated she will contact patient's Pharmacy and follow up with patient tomorrow.

## 2022-10-05 NOTE — TELEPHONE ENCOUNTER
Educated patient on propanolol use. Patient expressed an understanding and had no further questions.

## 2022-10-06 ENCOUNTER — TELEPHONE (OUTPATIENT)
Dept: BEHAVIORAL/MENTAL HEALTH CLINIC | Age: 37
End: 2022-10-06

## 2022-10-06 NOTE — TELEPHONE ENCOUNTER
Patient states he took propranolol at 12pm and ever since he has been feeling weak with little to no energy, and has an upset stomach. He is requesting a call back and can be reached at [phone].

## 2022-10-07 ENCOUNTER — HOSPITAL ENCOUNTER (EMERGENCY)
Age: 37
Discharge: HOME | End: 2022-10-07
Attending: EMERGENCY MEDICINE
Payer: COMMERCIAL

## 2022-10-07 ENCOUNTER — TELEPHONE (OUTPATIENT)
Dept: BEHAVIORAL/MENTAL HEALTH CLINIC | Age: 37
End: 2022-10-07

## 2022-10-07 ENCOUNTER — TELEPHONE (OUTPATIENT)
Dept: INTERNAL MEDICINE CLINIC | Age: 37
End: 2022-10-07

## 2022-10-07 VITALS
WEIGHT: 273.37 LBS | DIASTOLIC BLOOD PRESSURE: 90 MMHG | HEART RATE: 73 BPM | OXYGEN SATURATION: 98 % | TEMPERATURE: 97.8 F | RESPIRATION RATE: 14 BRPM | HEIGHT: 69 IN | SYSTOLIC BLOOD PRESSURE: 150 MMHG | BODY MASS INDEX: 40.49 KG/M2

## 2022-10-07 DIAGNOSIS — R00.2 PALPITATIONS: Primary | ICD-10-CM

## 2022-10-07 DIAGNOSIS — R07.89 ATYPICAL CHEST PAIN: ICD-10-CM

## 2022-10-07 DIAGNOSIS — T88.7XXA MEDICATION SIDE EFFECT: ICD-10-CM

## 2022-10-07 LAB
ALBUMIN SERPL-MCNC: 4.1 G/DL (ref 3.5–5)
ALBUMIN/GLOB SERPL: 1.1 {RATIO} (ref 1.1–2.2)
ALP SERPL-CCNC: 80 U/L (ref 45–117)
ALT SERPL-CCNC: 34 U/L (ref 12–78)
ANION GAP SERPL CALC-SCNC: 3 MMOL/L (ref 5–15)
AST SERPL-CCNC: 26 U/L (ref 15–37)
ATRIAL RATE: 82 BPM
BASOPHILS # BLD: 0.1 K/UL (ref 0–0.1)
BASOPHILS NFR BLD: 1 % (ref 0–1)
BILIRUB SERPL-MCNC: 0.7 MG/DL (ref 0.2–1)
BUN SERPL-MCNC: 13 MG/DL (ref 6–20)
BUN/CREAT SERPL: 11 (ref 12–20)
CALCIUM SERPL-MCNC: 9.1 MG/DL (ref 8.5–10.1)
CALCULATED P AXIS, ECG09: 55 DEGREES
CALCULATED R AXIS, ECG10: 25 DEGREES
CALCULATED T AXIS, ECG11: 3 DEGREES
CHLORIDE SERPL-SCNC: 107 MMOL/L (ref 97–108)
CO2 SERPL-SCNC: 30 MMOL/L (ref 21–32)
CREAT SERPL-MCNC: 1.14 MG/DL (ref 0.7–1.3)
DIAGNOSIS, 93000: NORMAL
DIFFERENTIAL METHOD BLD: NORMAL
EOSINOPHIL # BLD: 0.3 K/UL (ref 0–0.4)
EOSINOPHIL NFR BLD: 5 % (ref 0–7)
ERYTHROCYTE [DISTWIDTH] IN BLOOD BY AUTOMATED COUNT: 13 % (ref 11.5–14.5)
GLOBULIN SER CALC-MCNC: 3.6 G/DL (ref 2–4)
GLUCOSE SERPL-MCNC: 108 MG/DL (ref 65–100)
HCT VFR BLD AUTO: 42.1 % (ref 36.6–50.3)
HGB BLD-MCNC: 13.2 G/DL (ref 12.1–17)
IMM GRANULOCYTES # BLD AUTO: 0 K/UL (ref 0–0.04)
IMM GRANULOCYTES NFR BLD AUTO: 0 % (ref 0–0.5)
LYMPHOCYTES # BLD: 2.9 K/UL (ref 0.8–3.5)
LYMPHOCYTES NFR BLD: 42 % (ref 12–49)
MAGNESIUM SERPL-MCNC: 2.4 MG/DL (ref 1.6–2.4)
MCH RBC QN AUTO: 27.1 PG (ref 26–34)
MCHC RBC AUTO-ENTMCNC: 31.4 G/DL (ref 30–36.5)
MCV RBC AUTO: 86.4 FL (ref 80–99)
MONOCYTES # BLD: 0.7 K/UL (ref 0–1)
MONOCYTES NFR BLD: 10 % (ref 5–13)
NEUTS SEG # BLD: 2.9 K/UL (ref 1.8–8)
NEUTS SEG NFR BLD: 42 % (ref 32–75)
NRBC # BLD: 0 K/UL (ref 0–0.01)
NRBC BLD-RTO: 0 PER 100 WBC
P-R INTERVAL, ECG05: 162 MS
PLATELET # BLD AUTO: 205 K/UL (ref 150–400)
PMV BLD AUTO: 12.7 FL (ref 8.9–12.9)
POTASSIUM SERPL-SCNC: 3.9 MMOL/L (ref 3.5–5.1)
PROT SERPL-MCNC: 7.7 G/DL (ref 6.4–8.2)
Q-T INTERVAL, ECG07: 350 MS
QRS DURATION, ECG06: 98 MS
QTC CALCULATION (BEZET), ECG08: 408 MS
RBC # BLD AUTO: 4.87 M/UL (ref 4.1–5.7)
SODIUM SERPL-SCNC: 140 MMOL/L (ref 136–145)
TROPONIN-HIGH SENSITIVITY: 11 NG/L (ref 0–76)
VENTRICULAR RATE, ECG03: 82 BPM
WBC # BLD AUTO: 6.9 K/UL (ref 4.1–11.1)

## 2022-10-07 PROCEDURE — 84484 ASSAY OF TROPONIN QUANT: CPT

## 2022-10-07 PROCEDURE — 99284 EMERGENCY DEPT VISIT MOD MDM: CPT

## 2022-10-07 PROCEDURE — 74011250636 HC RX REV CODE- 250/636: Performed by: EMERGENCY MEDICINE

## 2022-10-07 PROCEDURE — 80053 COMPREHEN METABOLIC PANEL: CPT

## 2022-10-07 PROCEDURE — 93005 ELECTROCARDIOGRAM TRACING: CPT

## 2022-10-07 PROCEDURE — 96374 THER/PROPH/DIAG INJ IV PUSH: CPT

## 2022-10-07 PROCEDURE — 83735 ASSAY OF MAGNESIUM: CPT

## 2022-10-07 PROCEDURE — 85025 COMPLETE CBC W/AUTO DIFF WBC: CPT

## 2022-10-07 PROCEDURE — 74011250637 HC RX REV CODE- 250/637: Performed by: EMERGENCY MEDICINE

## 2022-10-07 PROCEDURE — 36415 COLL VENOUS BLD VENIPUNCTURE: CPT

## 2022-10-07 RX ORDER — ACETAMINOPHEN 500 MG
1000 TABLET ORAL
Status: COMPLETED | OUTPATIENT
Start: 2022-10-07 | End: 2022-10-07

## 2022-10-07 RX ORDER — KETOROLAC TROMETHAMINE 30 MG/ML
30 INJECTION, SOLUTION INTRAMUSCULAR; INTRAVENOUS
Status: COMPLETED | OUTPATIENT
Start: 2022-10-07 | End: 2022-10-07

## 2022-10-07 RX ADMIN — KETOROLAC TROMETHAMINE 30 MG: 30 INJECTION, SOLUTION INTRAMUSCULAR at 05:38

## 2022-10-07 RX ADMIN — ACETAMINOPHEN 1000 MG: 500 TABLET ORAL at 05:38

## 2022-10-07 NOTE — ED PROVIDER NOTES
31-year-old male presenting ER with report of palpitations and worsening anxiety. Patient reports that he does have anxiety. Recently had his medication changed from buspirone to propranolol. Patient also takes amlodipine and was taking valsartan but stopped taking it due to concern that his blood pressure may get too low with the addition of the propranolol. After starting the propranolol after the second day patient reporting feeling slightly fatigued and is concerned that his heart rate is lower than it normally is. Reports that his heart rate is normally in the upper 80s and 90s and when he gets up and walks around he jumps up to the 100s. However he is finding that his resting heart rate has been in the 60s and 70s and only jumps up into the 80s when he walks around. This concerned him. Patient also reporting some mild chest pain in the left chest describes a squeezing sensation and feels like there is a mass in his chest wall that he can push and palpate tenderness. Thought it might be a muscle spasm. There is no shortness of breath. There is no numbness no weakness. Denies any syncope. Intermittent slight dizziness.        Past Medical History:   Diagnosis Date    Angioedema     ace induced asthma     Cold-induced asthma     Gout     Hypertension     JOS (obstructive sleep apnea)        Past Surgical History:   Procedure Laterality Date    HX ADENOIDECTOMY           Family History:   Problem Relation Age of Onset    Sleep Apnea Mother     Hypertension Mother     Hypertension Father        Social History     Socioeconomic History    Marital status: SINGLE     Spouse name: Not on file    Number of children: Not on file    Years of education: Not on file    Highest education level: Not on file   Occupational History    Not on file   Tobacco Use    Smoking status: Never    Smokeless tobacco: Never   Vaping Use    Vaping Use: Never used   Substance and Sexual Activity    Alcohol use: No    Drug use: Yes     Comment: CBD    Sexual activity: Yes     Partners: Female   Other Topics Concern    Not on file   Social History Narrative    Not on file     Social Determinants of Health     Financial Resource Strain: Not on file   Food Insecurity: Not on file   Transportation Needs: Not on file   Physical Activity: Not on file   Stress: Not on file   Social Connections: Not on file   Intimate Partner Violence: Not on file   Housing Stability: Not on file         ALLERGIES: Lisinopril, Pcn [penicillins], and Venlafaxine    Review of Systems   Constitutional:  Positive for fatigue. Negative for chills and fever. HENT:  Negative for congestion and sore throat. Eyes:  Negative for pain. Respiratory:  Negative for shortness of breath. Cardiovascular:  Positive for chest pain and palpitations. Gastrointestinal:  Negative for abdominal pain, diarrhea, nausea and vomiting. Genitourinary:  Negative for dysuria and flank pain. Musculoskeletal:  Negative for back pain and neck pain. Skin:  Negative for rash. Neurological:  Positive for dizziness and headaches. Negative for tremors, seizures, syncope and weakness. Psychiatric/Behavioral:  The patient is nervous/anxious. All other systems reviewed and are negative. Vitals:    10/07/22 0437 10/07/22 0445   BP: (!) 169/107 (!) 161/104   Pulse: 81 81   Resp: 16 17   Temp: 98.6 °F (37 °C)    SpO2: 100% 98%   Weight: 124 kg (273 lb 5.9 oz)    Height: 5' 9\" (1.753 m)             Physical Exam  Vitals and nursing note reviewed. Constitutional:       Appearance: He is well-developed. HENT:      Head: Normocephalic and atraumatic. Nose: Nose normal.      Mouth/Throat:      Mouth: Mucous membranes are moist.   Eyes:      Conjunctiva/sclera: Conjunctivae normal.   Cardiovascular:      Rate and Rhythm: Normal rate and regular rhythm. Pulses: Normal pulses. Heart sounds: No murmur heard.   Pulmonary:      Effort: Pulmonary effort is normal. No respiratory distress. Breath sounds: Normal breath sounds. Abdominal:      General: Bowel sounds are normal.      Palpations: Abdomen is soft. Tenderness: There is no abdominal tenderness. Musculoskeletal:         General: Normal range of motion. Cervical back: Normal range of motion and neck supple. Skin:     General: Skin is warm. Capillary Refill: Capillary refill takes less than 2 seconds. Findings: No rash. Neurological:      Mental Status: He is alert and oriented to person, place, and time. Comments: No gross motor or sensory deficits        MDM  Number of Diagnoses or Management Options  Atypical chest pain  Medication side effect  Palpitations  Diagnosis management comments: Patient presenting ER with symptoms seem most consistent with anxiety and possible medication side effects with addition of propranolol. Atypical chest pain with no other true associated symptoms of ACS. EKG and troponin not concerning for ACS. Patient labs and electrolytes unremarkable. Advised that he follow-up with his primary care provider       Amount and/or Complexity of Data Reviewed  Clinical lab tests: reviewed      ED Course as of 10/07/22 0650   [facility] Oct 07, 2022   5067 EKG: Normal sinus rhythm with sinus arrhythmia with normal intervals, normal axis. No ST elevation or depressions.   EKG interpreted by Tayla Claros MD   [ZD]      ED Course User Index  [ZD] Erin Betancourt MD       Procedures          Recent Results (from the past 24 hour(s))   EKG, 12 LEAD, INITIAL    Collection Time: 10/07/22  4:32 AM   Result Value Ref Range    Ventricular Rate 82 BPM    Atrial Rate 82 BPM    P-R Interval 162 ms    QRS Duration 98 ms    Q-T Interval 350 ms    QTC Calculation (Bezet) 408 ms    Calculated P Axis 55 degrees    Calculated R Axis 25 degrees    Calculated T Axis 3 degrees    Diagnosis       Normal sinus rhythm with sinus arrhythmia  Minimal voltage criteria for LVH, may be normal variant ( [place]ettleton product )  Borderline ECG  When compared with ECG of 24-SEP-2022 17:33,  Vent. rate has decreased BY  40 BPM     CBC WITH AUTOMATED DIFF    Collection Time: 10/07/22  4:49 AM   Result Value Ref Range    WBC 6.9 4.1 - 11.1 K/uL    RBC 4.87 4.10 - 5.70 M/uL    HGB 13.2 12.1 - 17.0 g/dL    HCT 42.1 36.6 - 50.3 %    MCV 86.4 80.0 - 99.0 FL    MCH 27.1 26.0 - 34.0 PG    MCHC 31.4 30.0 - 36.5 g/dL    RDW 13.0 11.5 - 14.5 %    PLATELET 731 396 - 865 K/uL    MPV 12.7 8.9 - 12.9 FL    NRBC 0.0 0  WBC    ABSOLUTE NRBC 0.00 0.00 - 0.01 K/uL    NEUTROPHILS 42 32 - 75 %    LYMPHOCYTES 42 12 - 49 %    MONOCYTES 10 5 - 13 %    EOSINOPHILS 5 0 - 7 %    BASOPHILS 1 0 - 1 %    IMMATURE GRANULOCYTES 0 0.0 - 0.5 %    ABS. NEUTROPHILS 2.9 1.8 - 8.0 K/UL    ABS. LYMPHOCYTES 2.9 0.8 - 3.5 K/UL    ABS. MONOCYTES 0.7 0.0 - 1.0 K/UL    ABS. EOSINOPHILS 0.3 0.0 - 0.4 K/UL    ABS. BASOPHILS 0.1 0.0 - 0.1 K/UL    ABS. IMM. GRANS. 0.0 0.00 - 0.04 K/UL    DF AUTOMATED     METABOLIC PANEL, COMPREHENSIVE    Collection Time: 10/07/22  4:49 AM   Result Value Ref Range    Sodium 140 136 - 145 mmol/L    Potassium 3.9 3.5 - 5.1 mmol/L    Chloride 107 97 - 108 mmol/L    CO2 30 21 - 32 mmol/L    Anion gap 3 (L) 5 - 15 mmol/L    Glucose 108 (H) 65 - 100 mg/dL    BUN 13 6 - 20 MG/DL    Creatinine 1.14 0.70 - 1.30 MG/DL    BUN/Creatinine ratio 11 (L) 12 - 20      eGFR >60 >60 ml/min/1.73m2    Calcium 9.1 8.5 - 10.1 MG/DL    Bilirubin, total 0.7 0.2 - 1.0 MG/DL    ALT (SGPT) 34 12 - 78 U/L    AST (SGOT) 26 15 - 37 U/L    Alk.  phosphatase 80 45 - 117 U/L    Protein, total 7.7 6.4 - 8.2 g/dL    Albumin 4.1 3.5 - 5.0 g/dL    Globulin 3.6 2.0 - 4.0 g/dL    A-G Ratio 1.1 1.1 - 2.2     MAGNESIUM    Collection Time: 10/07/22  4:49 AM   Result Value Ref Range    Magnesium 2.4 1.6 - 2.4 mg/dL   TROPONIN-HIGH SENSITIVITY    Collection Time: 10/07/22  4:49 AM   Result Value Ref Range    Troponin-High Sensitivity 11 0 - 76 ng/L       No results found.

## 2022-10-07 NOTE — TELEPHONE ENCOUNTER
**TRIAGE**    Received call from patient  Two pt identifiers confirmed. Patient states he is having high blood pressure, went to ER today was told he was having anxiety. Just started propranolol and feels this is making him weak  Patient also states he feels like he has a knot in chest that ER was not concerned about    He does not check his blood pressures at home, state the automatic cuff is too small.     Patient wants an appt as soon as possible  Offered and accepted in office appt for  Monday 10/10/22 with Dr. Harrell Ready at 10 am    Patient advised to return to ER is his blood pressure worsens or if knot to chest worsens    States understanding

## 2022-10-07 NOTE — TELEPHONE ENCOUNTER
Patient called stating he was at the [facility] ED this morning due to a panic attack. Patient states he woke up early this morning and felt weak and dizzy (denies syncope). He checked his pulse, which was in the upper 50's to low 60's. Patient states he became very concerned. As the morning progressed he was still weak and began to feel nauseated. His pulse was fluctuating between 60's-80's, which also concerned him, so he decided to go to the ED to be evaluated. See notes in CC. Patient's concern this morning is that the propanolol is making him weak and nauseated. He is worried it will drop his pulse too much along with dropping his blood pressure. Patient stated he stopped taking one of his bp meds (not sure which) when he started taking the propanolol for fear his pulse will drop. Educated patient on how bp medication works on the body and how he should take all medications as directed. Encouraged patient to schedule a weekly, if not twice weekly  appointment with his PCP to do bp checks. Explained to patient that he could be symptomatic because his bp is elevated. Patient stated it was not easy getting in with his PCP, so he was encouraged to have weekly checks at an urgent care. Patient expressed an understanding. He is still very concerned about taking propanolol. That it will drop his pulse too much. Will send message to provider.

## 2022-10-07 NOTE — TELEPHONE ENCOUNTER
If Dr Beryl Melissa has anything sooner than scheduled on the 18th please call pt with new date/time.

## 2022-10-09 ENCOUNTER — APPOINTMENT (OUTPATIENT)
Dept: CT IMAGING | Age: 37
End: 2022-10-09
Attending: EMERGENCY MEDICINE
Payer: COMMERCIAL

## 2022-10-09 ENCOUNTER — HOSPITAL ENCOUNTER (EMERGENCY)
Age: 37
Discharge: HOME | End: 2022-10-09
Attending: STUDENT IN AN ORGANIZED HEALTH CARE EDUCATION/TRAINING PROGRAM
Payer: COMMERCIAL

## 2022-10-09 VITALS
OXYGEN SATURATION: 99 % | HEART RATE: 68 BPM | DIASTOLIC BLOOD PRESSURE: 96 MMHG | TEMPERATURE: 97.8 F | SYSTOLIC BLOOD PRESSURE: 151 MMHG | RESPIRATION RATE: 15 BRPM

## 2022-10-09 DIAGNOSIS — R91.1 INCIDENTAL PULMONARY NODULE: ICD-10-CM

## 2022-10-09 DIAGNOSIS — R10.12 ABDOMINAL PAIN, LUQ (LEFT UPPER QUADRANT): Primary | ICD-10-CM

## 2022-10-09 LAB
ALBUMIN SERPL-MCNC: 3.8 G/DL (ref 3.5–5)
ALBUMIN/GLOB SERPL: 1 {RATIO} (ref 1.1–2.2)
ALP SERPL-CCNC: 80 U/L (ref 45–117)
ALT SERPL-CCNC: 37 U/L (ref 12–78)
ANION GAP SERPL CALC-SCNC: 4 MMOL/L (ref 5–15)
APPEARANCE UR: CLEAR
AST SERPL-CCNC: 29 U/L (ref 15–37)
BACTERIA URNS QL MICRO: NEGATIVE /HPF
BASOPHILS # BLD: 0.1 K/UL (ref 0–0.1)
BASOPHILS NFR BLD: 1 % (ref 0–1)
BILIRUB SERPL-MCNC: 0.5 MG/DL (ref 0.2–1)
BILIRUB UR QL: NEGATIVE
BUN SERPL-MCNC: 14 MG/DL (ref 6–20)
BUN/CREAT SERPL: 13 (ref 12–20)
CALCIUM SERPL-MCNC: 9.1 MG/DL (ref 8.5–10.1)
CHLORIDE SERPL-SCNC: 106 MMOL/L (ref 97–108)
CO2 SERPL-SCNC: 30 MMOL/L (ref 21–32)
COLOR UR: NORMAL
COMMENT, HOLDF: NORMAL
CREAT SERPL-MCNC: 1.04 MG/DL (ref 0.7–1.3)
DIFFERENTIAL METHOD BLD: ABNORMAL
EOSINOPHIL # BLD: 0.5 K/UL (ref 0–0.4)
EOSINOPHIL NFR BLD: 8 % (ref 0–7)
EPITH CASTS URNS QL MICRO: NORMAL /LPF
ERYTHROCYTE [DISTWIDTH] IN BLOOD BY AUTOMATED COUNT: 13.1 % (ref 11.5–14.5)
GLOBULIN SER CALC-MCNC: 3.7 G/DL (ref 2–4)
GLUCOSE SERPL-MCNC: 92 MG/DL (ref 65–100)
GLUCOSE UR STRIP.AUTO-MCNC: NEGATIVE MG/DL
HCT VFR BLD AUTO: 41.3 % (ref 36.6–50.3)
HGB BLD-MCNC: 13.2 G/DL (ref 12.1–17)
HGB UR QL STRIP: NEGATIVE
HYALINE CASTS URNS QL MICRO: NORMAL /LPF (ref 0–2)
IMM GRANULOCYTES # BLD AUTO: 0 K/UL (ref 0–0.04)
IMM GRANULOCYTES NFR BLD AUTO: 0 % (ref 0–0.5)
KETONES UR QL STRIP.AUTO: NEGATIVE MG/DL
LEUKOCYTE ESTERASE UR QL STRIP.AUTO: NEGATIVE
LIPASE SERPL-CCNC: 131 U/L (ref 73–393)
LYMPHOCYTES # BLD: 1.9 K/UL (ref 0.8–3.5)
LYMPHOCYTES NFR BLD: 30 % (ref 12–49)
MCH RBC QN AUTO: 27.3 PG (ref 26–34)
MCHC RBC AUTO-ENTMCNC: 32 G/DL (ref 30–36.5)
MCV RBC AUTO: 85.3 FL (ref 80–99)
MONOCYTES # BLD: 0.6 K/UL (ref 0–1)
MONOCYTES NFR BLD: 9 % (ref 5–13)
NEUTS SEG # BLD: 3.4 K/UL (ref 1.8–8)
NEUTS SEG NFR BLD: 52 % (ref 32–75)
NITRITE UR QL STRIP.AUTO: NEGATIVE
NRBC # BLD: 0 K/UL (ref 0–0.01)
NRBC BLD-RTO: 0 PER 100 WBC
PH UR STRIP: 6 [PH] (ref 5–8)
PLATELET # BLD AUTO: 218 K/UL (ref 150–400)
PMV BLD AUTO: 12.8 FL (ref 8.9–12.9)
POTASSIUM SERPL-SCNC: 4.6 MMOL/L (ref 3.5–5.1)
PROT SERPL-MCNC: 7.5 G/DL (ref 6.4–8.2)
PROT UR STRIP-MCNC: NEGATIVE MG/DL
RBC # BLD AUTO: 4.84 M/UL (ref 4.1–5.7)
RBC #/AREA URNS HPF: NORMAL /HPF (ref 0–5)
SAMPLES BEING HELD,HOLD: NORMAL
SODIUM SERPL-SCNC: 140 MMOL/L (ref 136–145)
SP GR UR REFRACTOMETRY: 1.01 (ref 1–1.03)
UR CULT HOLD, URHOLD: NORMAL
UROBILINOGEN UR QL STRIP.AUTO: 0.2 EU/DL (ref 0.2–1)
WBC # BLD AUTO: 6.5 K/UL (ref 4.1–11.1)
WBC URNS QL MICRO: NORMAL /HPF (ref 0–4)

## 2022-10-09 PROCEDURE — 81001 URINALYSIS AUTO W/SCOPE: CPT

## 2022-10-09 PROCEDURE — 99285 EMERGENCY DEPT VISIT HI MDM: CPT

## 2022-10-09 PROCEDURE — 83690 ASSAY OF LIPASE: CPT

## 2022-10-09 PROCEDURE — 74177 CT ABD & PELVIS W/CONTRAST: CPT

## 2022-10-09 PROCEDURE — 85025 COMPLETE CBC W/AUTO DIFF WBC: CPT

## 2022-10-09 PROCEDURE — 36415 COLL VENOUS BLD VENIPUNCTURE: CPT

## 2022-10-09 PROCEDURE — 80053 COMPREHEN METABOLIC PANEL: CPT

## 2022-10-09 PROCEDURE — 74011000636 HC RX REV CODE- 636: Performed by: STUDENT IN AN ORGANIZED HEALTH CARE EDUCATION/TRAINING PROGRAM

## 2022-10-09 RX ADMIN — IOPAMIDOL 100 ML: 755 INJECTION, SOLUTION INTRAVENOUS at 14:38

## 2022-10-09 NOTE — Clinical Note
[address]  [facility] EMERGENCY DEPT  [phone]  [phone]    Work/School Note    Date: 10/9/2022    To Whom It May concern:    Marco Koroma was seen and treated today in the emergency room by the following provider(s):  Attending Provider: Lexii Johnson DO  Nurse Practitioner: Vini English NP. Marco Koroma is excused from work/school on 10/9/2022 through 10/11/2022. He is medically clear to return to work/school on 10/12/2022.          Sincerely,          Kevin Calderon NP

## 2022-10-09 NOTE — ED TRIAGE NOTES
Pt to ED for c/o \"lump\" on chest which he describes to be a \"knot\". L sided upper abd pain x2 days.  Denies cp, sob, N/V/D

## 2022-10-09 NOTE — ED PROVIDER NOTES
Abdominal Pain   Patient is a 60-year-old male with past medical history significant for anxiety/panic disorders, asthma, gout, hypertension and sleep apnea who returns to the ED concerned about a palpable tender left chest wall area nodule and left upper quadrant abdominal pain with gurgling for several days. He was evaluated here 2 days ago with c/o chest pain and had normal test results. He denies any falls, direct injury or blunt trauma. He states that he has been attempting to eat a vegan diet and be healthier. Denies any fever, difficulty breathing, difficulty swallowing, SOB or chest pain. Denies any nausea, vomiting or diarrhea. Denies cold symptoms, headache, neck pain, visual changes, focal weakness or rash. He denies any falls or  direct chest wall injury. Old charts reviewed.         Past Medical History:   Diagnosis Date    Angioedema     ace induced asthma     Cold-induced asthma     Gout     Hypertension     JOS (obstructive sleep apnea)        Past Surgical History:   Procedure Laterality Date    HX ADENOIDECTOMY           Family History:   Problem Relation Age of Onset    Sleep Apnea Mother     Hypertension Mother     Hypertension Father        Social History     Socioeconomic History    Marital status: SINGLE     Spouse name: Not on file    Number of children: Not on file    Years of education: Not on file    Highest education level: Not on file   Occupational History    Not on file   Tobacco Use    Smoking status: Never    Smokeless tobacco: Never   Vaping Use    Vaping Use: Never used   Substance and Sexual Activity    Alcohol use: No    Drug use: Yes     Comment: CBD    Sexual activity: Yes     Partners: Female   Other Topics Concern    Not on file   Social History Narrative    Not on file     Social Determinants of Health     Financial Resource Strain: Not on file   Food Insecurity: Not on file   Transportation Needs: Not on file   Physical Activity: Not on file   Stress: Not on file Social Connections: Not on file   Intimate Partner Violence: Not on file   Housing Stability: Not on file         ALLERGIES: Lisinopril, Pcn [penicillins], and Venlafaxine    Review of Systems   Gastrointestinal:  Positive for abdominal pain. Vitals:    10/09/22 1315   BP: (!) 155/108   Pulse: 73   Resp: 16   Temp: 97.8 °F (36.6 °C)   SpO2: 99%            Physical Exam  Vitals and nursing note reviewed. Constitutional:       General: He is not in acute distress. Appearance: He is well-developed. He is not ill-appearing, toxic-appearing or diaphoretic. Comments: Black male; non smoker   HENT:      Head: Normocephalic. Cardiovascular:      Rate and Rhythm: Normal rate and regular rhythm. Pulmonary:      Effort: Pulmonary effort is normal.      Breath sounds: Normal breath sounds. Comments: Localized tender palpable area left breast/chest wall soft tissue; no fluctuance, no erythema, no bruising, skin integrity is intact without any color change; good neurovascular sensation; bilateral breast tissue appear symmetrical.  Chest:      Chest wall: Tenderness present. Abdominal:      General: Bowel sounds are normal. There is no distension. There are no signs of injury. Palpations: Abdomen is soft. Tenderness: There is abdominal tenderness in the left upper quadrant. There is no guarding or rebound. Negative signs include Ayon's sign and McBurney's sign. Hernia: No hernia is present. Skin:     General: Skin is warm and dry. Findings: No erythema or rash. Neurological:      General: No focal deficit present. Mental Status: He is alert and oriented to person, place, and time. MDM     Amount and/or Complexity of Data Reviewed  Clinical lab tests: reviewed           Procedures    Labs Reviewed   CBC WITH AUTOMATED DIFF - Abnormal; Notable for the following components:       Result Value    EOSINOPHILS 8 (*)     ABS.  EOSINOPHILS 0.5 (*)     All other components within normal limits   METABOLIC PANEL, COMPREHENSIVE - Abnormal; Notable for the following components:    Anion gap 4 (*)     A-G Ratio 1.0 (*)     All other components within normal limits   URINE CULTURE HOLD SAMPLE   SAMPLES BEING HELD   URINALYSIS W/MICROSCOPIC   LIPASE     CT ABD PELV W CONT    Result Date: 10/9/2022  No acute process Incidental pulmonary micronodule     Discussed plan of care with Dr. Jasmin Keene. Patient has been reexamined. He was given referrals to gastroenterology, pulmonology and his primary care for further evaluation and treatment. He was given copies of his CT report and labs along with the follow-up recommendations. No prescriptions were written. 3:53 PM  Patient's results  and plan of care have been reviewed with him and his family member present. Patient and/or family have verbally conveyed their understanding and agreement of the patient's signs, symptoms, diagnosis, treatment and prognosis and additionally agree to follow up as recommended or return to the Emergency Room should their condition change prior to follow-up. Discharge instructions have also been provided to the patient with some educational information regarding his diagnosis as well a list of reasons why he would want to return to the ER prior to his follow-up appointment should his condition change. Christina Scott NP

## 2022-10-09 NOTE — Clinical Note
[address]  [facility] EMERGENCY DEPT  [address] [id number]  [phone]    Work/School Note    Date: 10/9/2022    To Whom It May concern:    Monica Villarreal was seen and treated today in the emergency room by the following provider(s):  Attending Provider: Rashawn Salmon DO  Nurse Practitioner: Mamadou Ordoñez NP. Monica Villarreal is excused from work/school on 10/9/2022 through 10/11/2022. He is medically clear to return to work/school on 10/12/2022.          Sincerely,          Varghsee Flood NP

## 2022-10-09 NOTE — Clinical Note
[address]  [facility] EMERGENCY DEPT  [address] [id number]  [phone]    Work/School Note    Date: 10/9/2022    To Whom It May concern:    Dennis Orta was seen and treated today in the emergency room by the following provider(s):  Attending Provider: Zahida Cunha DO  Nurse Practitioner: Tay Cadet NP. Dennis Orta is excused from work/school on 10/9/2022 through 10/11/2022. He is medically clear to return to work/school on 10/12/2022.          Sincerely,          Christina Scott NP

## 2022-10-10 ENCOUNTER — APPOINTMENT (OUTPATIENT)
Dept: ULTRASOUND IMAGING | Age: 37
End: 2022-10-10
Attending: INTERNAL MEDICINE

## 2022-10-10 ENCOUNTER — APPOINTMENT (OUTPATIENT)
Dept: GENERAL RADIOLOGY | Age: 37
End: 2022-10-10
Attending: EMERGENCY MEDICINE
Payer: COMMERCIAL

## 2022-10-10 ENCOUNTER — HOSPITAL ENCOUNTER (EMERGENCY)
Age: 37
Discharge: HOME | End: 2022-10-10
Attending: EMERGENCY MEDICINE
Payer: COMMERCIAL

## 2022-10-10 ENCOUNTER — PATIENT MESSAGE (OUTPATIENT)
Dept: CARDIOLOGY CLINIC | Age: 37
End: 2022-10-10

## 2022-10-10 ENCOUNTER — OFFICE VISIT (OUTPATIENT)
Dept: INTERNAL MEDICINE CLINIC | Age: 37
End: 2022-10-10
Attending: INTERNAL MEDICINE
Payer: COMMERCIAL

## 2022-10-10 ENCOUNTER — APPOINTMENT (OUTPATIENT)
Dept: MAMMOGRAPHY | Age: 37
End: 2022-10-10
Attending: INTERNAL MEDICINE

## 2022-10-10 VITALS
DIASTOLIC BLOOD PRESSURE: 78 MMHG | WEIGHT: 265 LBS | HEART RATE: 100 BPM | RESPIRATION RATE: 18 BRPM | HEIGHT: 69 IN | BODY MASS INDEX: 39.25 KG/M2 | TEMPERATURE: 97 F | SYSTOLIC BLOOD PRESSURE: 143 MMHG | OXYGEN SATURATION: 97 %

## 2022-10-10 VITALS
WEIGHT: 268.08 LBS | HEART RATE: 92 BPM | DIASTOLIC BLOOD PRESSURE: 83 MMHG | OXYGEN SATURATION: 100 % | BODY MASS INDEX: 39.71 KG/M2 | RESPIRATION RATE: 16 BRPM | TEMPERATURE: 98.9 F | HEIGHT: 69 IN | SYSTOLIC BLOOD PRESSURE: 146 MMHG

## 2022-10-10 DIAGNOSIS — F41.9 ANXIETY: ICD-10-CM

## 2022-10-10 DIAGNOSIS — G47.33 OSA (OBSTRUCTIVE SLEEP APNEA): ICD-10-CM

## 2022-10-10 DIAGNOSIS — R00.2 PALPITATIONS: ICD-10-CM

## 2022-10-10 DIAGNOSIS — I10 ESSENTIAL HYPERTENSION: Primary | ICD-10-CM

## 2022-10-10 DIAGNOSIS — N62 GYNECOMASTIA: ICD-10-CM

## 2022-10-10 DIAGNOSIS — K21.9 GASTROESOPHAGEAL REFLUX DISEASE WITHOUT ESOPHAGITIS: ICD-10-CM

## 2022-10-10 DIAGNOSIS — F41.1 ANXIETY STATE: Primary | ICD-10-CM

## 2022-10-10 DIAGNOSIS — R07.89 ATYPICAL CHEST PAIN: ICD-10-CM

## 2022-10-10 LAB
ALBUMIN SERPL-MCNC: 4 G/DL (ref 3.5–5)
ALBUMIN/GLOB SERPL: 1 {RATIO} (ref 1.1–2.2)
ALP SERPL-CCNC: 79 U/L (ref 45–117)
ALT SERPL-CCNC: 37 U/L (ref 12–78)
ANION GAP SERPL CALC-SCNC: 8 MMOL/L (ref 5–15)
AST SERPL-CCNC: 22 U/L (ref 15–37)
ATRIAL RATE: 97 BPM
BASOPHILS # BLD: 0.1 K/UL (ref 0–0.1)
BASOPHILS NFR BLD: 1 % (ref 0–1)
BILIRUB SERPL-MCNC: 0.5 MG/DL (ref 0.2–1)
BNP SERPL-MCNC: 8 PG/ML
BUN SERPL-MCNC: 14 MG/DL (ref 6–20)
BUN/CREAT SERPL: 12 (ref 12–20)
CALCIUM SERPL-MCNC: 9.2 MG/DL (ref 8.5–10.1)
CALCULATED P AXIS, ECG09: 53 DEGREES
CALCULATED R AXIS, ECG10: 34 DEGREES
CALCULATED T AXIS, ECG11: 13 DEGREES
CHLORIDE SERPL-SCNC: 104 MMOL/L (ref 97–108)
CO2 SERPL-SCNC: 28 MMOL/L (ref 21–32)
CREAT SERPL-MCNC: 1.2 MG/DL (ref 0.7–1.3)
DIAGNOSIS, 93000: NORMAL
DIFFERENTIAL METHOD BLD: NORMAL
EOSINOPHIL # BLD: 0.3 K/UL (ref 0–0.4)
EOSINOPHIL NFR BLD: 5 % (ref 0–7)
ERYTHROCYTE [DISTWIDTH] IN BLOOD BY AUTOMATED COUNT: 13 % (ref 11.5–14.5)
GLOBULIN SER CALC-MCNC: 4 G/DL (ref 2–4)
GLUCOSE SERPL-MCNC: 121 MG/DL (ref 65–100)
HCT VFR BLD AUTO: 42.7 % (ref 36.6–50.3)
HGB BLD-MCNC: 13.5 G/DL (ref 12.1–17)
IMM GRANULOCYTES # BLD AUTO: 0 K/UL (ref 0–0.04)
IMM GRANULOCYTES NFR BLD AUTO: 0 % (ref 0–0.5)
LYMPHOCYTES # BLD: 1.7 K/UL (ref 0.8–3.5)
LYMPHOCYTES NFR BLD: 28 % (ref 12–49)
MCH RBC QN AUTO: 27 PG (ref 26–34)
MCHC RBC AUTO-ENTMCNC: 31.6 G/DL (ref 30–36.5)
MCV RBC AUTO: 85.4 FL (ref 80–99)
MONOCYTES # BLD: 0.5 K/UL (ref 0–1)
MONOCYTES NFR BLD: 8 % (ref 5–13)
NEUTS SEG # BLD: 3.6 K/UL (ref 1.8–8)
NEUTS SEG NFR BLD: 58 % (ref 32–75)
NRBC # BLD: 0 K/UL (ref 0–0.01)
NRBC BLD-RTO: 0 PER 100 WBC
P-R INTERVAL, ECG05: 150 MS
PLATELET # BLD AUTO: 209 K/UL (ref 150–400)
PMV BLD AUTO: 12.8 FL (ref 8.9–12.9)
POTASSIUM SERPL-SCNC: 4.4 MMOL/L (ref 3.5–5.1)
PROT SERPL-MCNC: 8 G/DL (ref 6.4–8.2)
Q-T INTERVAL, ECG07: 334 MS
QRS DURATION, ECG06: 94 MS
QTC CALCULATION (BEZET), ECG08: 424 MS
RBC # BLD AUTO: 5 M/UL (ref 4.1–5.7)
SODIUM SERPL-SCNC: 140 MMOL/L (ref 136–145)
TROPONIN-HIGH SENSITIVITY: 10 NG/L (ref 0–76)
VENTRICULAR RATE, ECG03: 97 BPM
WBC # BLD AUTO: 6.1 K/UL (ref 4.1–11.1)

## 2022-10-10 PROCEDURE — 80053 COMPREHEN METABOLIC PANEL: CPT

## 2022-10-10 PROCEDURE — 36415 COLL VENOUS BLD VENIPUNCTURE: CPT

## 2022-10-10 PROCEDURE — 84484 ASSAY OF TROPONIN QUANT: CPT

## 2022-10-10 PROCEDURE — 74011250637 HC RX REV CODE- 250/637: Performed by: EMERGENCY MEDICINE

## 2022-10-10 PROCEDURE — 99214 OFFICE O/P EST MOD 30 MIN: CPT | Performed by: INTERNAL MEDICINE

## 2022-10-10 PROCEDURE — 83880 ASSAY OF NATRIURETIC PEPTIDE: CPT

## 2022-10-10 PROCEDURE — 99285 EMERGENCY DEPT VISIT HI MDM: CPT

## 2022-10-10 PROCEDURE — 93005 ELECTROCARDIOGRAM TRACING: CPT

## 2022-10-10 PROCEDURE — 71046 X-RAY EXAM CHEST 2 VIEWS: CPT

## 2022-10-10 PROCEDURE — 85025 COMPLETE CBC W/AUTO DIFF WBC: CPT

## 2022-10-10 RX ORDER — DIAZEPAM 5 MG/1
5 TABLET ORAL
Status: COMPLETED | OUTPATIENT
Start: 2022-10-10 | End: 2022-10-10

## 2022-10-10 RX ORDER — SUCRALFATE 1 G/1
1 TABLET ORAL 4 TIMES DAILY
Qty: 15 TABLET | Refills: 0 | Status: SHIPPED | OUTPATIENT
Start: 2022-10-10 | End: 2022-10-14

## 2022-10-10 RX ORDER — SUCRALFATE 1 G/1
1 TABLET ORAL 4 TIMES DAILY
Qty: 15 TABLET | Refills: 0 | Status: SHIPPED | OUTPATIENT
Start: 2022-10-10 | End: 2022-10-10 | Stop reason: SDUPTHER

## 2022-10-10 RX ORDER — AMLODIPINE BESYLATE 10 MG/1
10 TABLET ORAL DAILY
COMMUNITY
End: 2022-10-18

## 2022-10-10 RX ORDER — SERTRALINE HYDROCHLORIDE 25 MG/1
12.5 TABLET, FILM COATED ORAL DAILY
Qty: 30 TABLET | Refills: 1 | Status: SHIPPED | OUTPATIENT
Start: 2022-10-10 | End: 2022-10-18

## 2022-10-10 RX ORDER — FAMOTIDINE 20 MG/1
20 TABLET, FILM COATED ORAL DAILY
Qty: 60 TABLET | Refills: 1 | Status: SHIPPED | OUTPATIENT
Start: 2022-10-10 | End: 2022-10-14

## 2022-10-10 RX ORDER — VALSARTAN 40 MG/1
40 TABLET ORAL DAILY
COMMUNITY
End: 2022-10-14

## 2022-10-10 RX ADMIN — DIAZEPAM 5 MG: 5 TABLET ORAL at 16:48

## 2022-10-10 NOTE — ED NOTES
Patient questioning RN about dose of valium, thinks that nurse gave med to wrong person. Nurse reassured patient that med was given correctly. Patient continues to question RN about med, claiming that I gave medication recklessly. Patient once again reassured that correct name, birthday, and medication were all verified prior to administration.

## 2022-10-10 NOTE — PROGRESS NOTES
Mr. Leda Patel is presenting to follow up     CC:  ED Follow-up and Anxiety       HPI:    Mr. Maria Del Carmen Mckenzie has a history of hypertension and gout. He started to experience anxiety symptoms in 2021 summer. He associates the onset of anxiety after getting his present COVID shot   that his personality has changed from being calm and is now hyper and excitable. The anxiety has made him more pessimistic  Saw Dr. Nickie Lai  and advised to take BuSpar 5 mg to 3 times a day  He felt the medication was not working and rarely takes it. I saw him July 26th and advised to take buspar 10mg BID and fluoxetine . Had sweats with fluoxetine and stopped     He reports feeling hot and dizzy and having tingling episodes. He stopped buspar   Multiple ER visits for anxiety with chest pain     Troponin negative    Tried celexa and tried effexor and did not tolerate it   He saw therapist   He found the therapy interaction helpful   Referred him to psychiatrist who started him on propanolol 10mg \" I Feel funny after I take it \"      HTN   BP has been high and has LVH on EKG and TTE   Taking valsartan 40mg and amlodipine 10mg daily   Saw cardiologist        Lung nodule seen on most recent ER visit for breast concerns he is worried he may have breast cancer and lung cancer possibly  He notes a small bump in his left breast    Having difficulty with digesting food feels tightness and girglging in abdomen     Review of systems:  Constitutional: negative for fever, chills, weight loss, night sweats   10 systems reviewed and negative other than HPI    Past Medical History:   Diagnosis Date    Angioedema     ace induced asthma     Cold-induced asthma     Gout     Hypertension     JOS (obstructive sleep apnea)         Past Surgical History:   Procedure Laterality Date    HX ADENOIDECTOMY         Allergies   Allergen Reactions    Lisinopril Swelling     Pt has sever swelling of the throat and tongue.     Pcn [Penicillins] Other (comments) Allergic as a child      Venlafaxine Palpitations       No current facility-administered medications on file prior to visit. Current Outpatient Medications on File Prior to Visit   Medication Sig Dispense Refill    amLODIPine (NORVASC) 10 mg tablet Take 10 mg by mouth daily. valsartan (DIOVAN) 40 mg tablet Take 40 mg by mouth daily. propranoloL (INDERAL) 10 mg tablet Take 1 Tablet by mouth two (2) times a day. 60 Tablet 1    LORazepam (ATIVAN) 0.5 mg tablet Take 1 Tablet by mouth daily as needed for Anxiety. 20 Tablet 1    cpap machine kit by Does Not Apply route. family history includes Hypertension in his father and mother; Sleep Apnea in his mother.     Social History     Socioeconomic History    Marital status: SINGLE     Spouse name: Not on file    Number of children: Not on file    Years of education: Not on file    Highest education level: Not on file   Occupational History    Not on file   Tobacco Use    Smoking status: Never    Smokeless tobacco: Never   Vaping Use    Vaping Use: Never used   Substance and Sexual Activity    Alcohol use: No    Drug use: Yes     Comment: CBD    Sexual activity: Yes     Partners: Female   Other Topics Concern    Not on file   Social History Narrative    Not on file     Social Determinants of Health     Financial Resource Strain: Low Risk     Difficulty of Paying Living Expenses: Not hard at all   Food Insecurity: No Food Insecurity    Worried About Running Out of Food in the Last Year: Never true    Ran Out of Food in the Last Year: Never true   Transportation Needs: Not on file   Physical Activity: Not on file   Stress: Not on file   Social Connections: Not on file   Intimate Partner Violence: Not on file   Housing Stability: Not on file       Visit Vitals  BP (!) 143/78   Pulse 100   Temp 97 °F (36.1 °C) (Temporal)   Resp 18   Ht 5' 9\" (1.753 m)   Wt 265 lb (120.2 kg)   SpO2 97%   BMI 39.13 kg/m²     General:  Well appearing male no acute distress  HEENT:   PERRL,normal conjunctiva. External ear and canals normal, TMs normal.  Hearing normal to voice. Nose without edema or discharge, normal septum. Lips, teeth, gums normal.  Oropharynx: no erythema, no exudates, no lesions, normal tongue. Neck:  Supple. Thyroid normal size, nontender, without nodules. No carotid bruit. No masses or lymphadenopathy  Respiratory: no respiratory distress,  no wheezing, no rhonchi, no rales. No chest wall tenderness. Cardiovascular:  RRR, normal S1S2, no murmur. Gastrointestinal: normal bowel sounds, soft, nontender, without masses. No hepatosplenomegaly. 1 cm nodule on left breast near nipple. No nipple discharge. Area is tender  Extremities +2 pulses, no edema, normal sensation   Musculoskeletal:  Normal gait. Normal digits and nails. Normal strength and tone, no atrophy, and no abnormal movement. Skin:  No rash, no lesions, no ulcers. Skin warm, normal turgor, without induration or nodules. Neuro:  A and OX4, fluent speech, cranial nerves normal 2-12. Sensation normal to light touch.   DTR symmetrical  Psych: Very anxious affect    Lab Results   Component Value Date/Time    WBC 6.1 10/10/2022 01:30 PM    HGB 13.5 10/10/2022 01:30 PM    HCT 42.7 10/10/2022 01:30 PM    PLATELET 483 90/83/1404 01:30 PM    MCV 85.4 10/10/2022 01:30 PM     Lab Results   Component Value Date/Time    Sodium 140 10/10/2022 01:30 PM    Potassium 4.4 10/10/2022 01:30 PM    Chloride 104 10/10/2022 01:30 PM    CO2 28 10/10/2022 01:30 PM    Anion gap 8 10/10/2022 01:30 PM    Glucose 121 (H) 10/10/2022 01:30 PM    BUN 14 10/10/2022 01:30 PM    Creatinine 1.20 10/10/2022 01:30 PM    BUN/Creatinine ratio 12 10/10/2022 01:30 PM    GFR est AA >60 09/24/2022 05:45 PM    GFR est non-AA 60 (L) 09/24/2022 05:45 PM    Calcium 9.2 10/10/2022 01:30 PM     Lab Results   Component Value Date/Time    Cholesterol, total 182 02/11/2021 12:11 PM    HDL Cholesterol 37 (L) 02/11/2021 12:11 PM    LDL, calculated 108 (H) 02/11/2021 12:11 PM    VLDL, calculated 37 02/11/2021 12:11 PM    Triglyceride 210 (H) 02/11/2021 12:11 PM     Lab Results   Component Value Date/Time    TSH 0.79 09/18/2022 01:21 PM     Lab Results   Component Value Date/Time    Hemoglobin A1c 5.4 08/30/2022 02:04 PM     Lab Results   Component Value Date/Time    Vitamin D 25-Hydroxy 25.8 (L) 09/26/2022 10:38 AM                   Assessment and Plan:     1. Essential hypertension  Close to goal continue amlodipine 10 and valsartan 40 mg    2. JOS (obstructive sleep apnea)  Uses CPAP    3. Anxiety  This has been debilitating, we have tried multiple medications and has seen psychiatrist.  He is currently taking propanolol 10mg which helps with some symptoms but he continues to feel anxious all the time   Meds tried: Lexapro, Effexor, BuSpar, celexa   I recommend starting  - sertraline (ZOLOFT) 25 mg tablet; Take 0.5 Tablets by mouth daily. Dispense: 30 Tablet; Refill: 1    4. Gastroesophageal reflux disease without esophagitis  He is having GERD symptoms which are likely triggered by anxiety as well  - famotidine (PEPCID) 20 mg tablet; Take 1 Tablet by mouth daily. Dispense: 60 Tablet; Refill: 1    5. Gynecomastia  -Likely benign but he is very anxious about this will obtain mammogram and ultrasound  - US BREAST AXILLA LT; Future  - ALIREZA MAMMO LT DX INCL CAD; Future    6. 2mm lung nodule: Incidentally imaged on CT chest I reassured patient this is not a malignancy  Follow-up and Dispositions    Return in about 2 weeks (around 10/24/2022) for anxiety.           Blanca Bhagat MD

## 2022-10-10 NOTE — DISCHARGE INSTRUCTIONS
[address] [facility],  [address] Providers    Accepts Insured Patients Only:  [facility]  [address]       [phone]   Near the corner of [facility] and [address] in the near [facility]. Accepts most insurance including Medicaid/Medicare. No psychiatry. On the [facility] bus line. [phone]  [address] [facility] ([address]  [address] [place]. [address] [facility], [address] [place])     [phone]   Accepts most major insurances. Psychiatry available. Some DBT groups. [facility])    [phone]   Mixture of psychologists and psychiatrists. They do not accept Medicaid or Medicare. [facility]  [address] [place] [address]       [phone]   Mixture of clinical social workers and psychologists. Sliding Scale/Financial Aid/Differing Payment Options  [facility]  [address] [facility])      [phone]   Our own Harry Brannon and Ashli Weldon. Variety of treatment options, including DBT. [address]       [phone]   Provides a variety of group and individual counseling options. Insurance, Medicaid, Medicare and sliding scale      Medicaid/Medicare providers in the [address] area  [address] [place] [address]. [address]       [phone]    [facility]         [address]       [phone]    [place]  [address], [address]    [phone] ex.  [address] [facility]     [phone]    [facility]      [phone]      Services for patients without Medicaid, [employer] or Insurance  The  [address]       [phone]   See handout in separate folder    [address]

## 2022-10-10 NOTE — PROGRESS NOTES
1. \"Have you been to the ER, urgent care clinic since your last visit? Hospitalized since your last visit? \" Yes [facility] for GI and Heart concerns    2. \"Have you seen or consulted any other health care providers outside of the [address] [place] [address] since your last visit? \" No     3. For patients aged 39-70: Has the patient had a colonoscopy / FIT/ Cologuard? NA - based on age      If the patient is female:    4. For patients aged 41-77: Has the patient had a mammogram within the past 2 years? NA - based on age or sex      11. For patients aged 21-65: Has the patient had a pap smear?  NA - based on age or sex

## 2022-10-11 ENCOUNTER — TELEPHONE (OUTPATIENT)
Dept: BEHAVIORAL/MENTAL HEALTH CLINIC | Age: 37
End: 2022-10-11

## 2022-10-11 NOTE — TELEPHONE ENCOUNTER
Per provider this nurse contacted patient regarding zoloft fill. Patient asked if it was safe to take with all his other medications. Explained to patient that provider reviewed notes and approved the 25mg 1/2 tab daily,or patient can dc. Explained to patient that provider would like to do a 15 minute med check on Friday 10/14 virtually to discuss in more detail. Patient asked several times if he should take a whole tablet. Explained to patient the provider approved 1/2 tablet or dc. Explained to patient that 12.5mg was not a therapeutic dose. Patient asked to speak with provider,which it was explained that provider is with patient, but this nurse will pass a message to her. Patient is worried it will affect his pulse. Explained side effects of medication. Asked patient if he was taking the propanolol as directed. Patient stated he stopped taking the medication because he fears it will drop his pulse too much. Patient states he keeps a pulse/ox  with him at all time. Patient explained that he was playing basket ball. He sat down to rest and noticed his resting heart rate was in the 80's. He felt that was too low, so he dc the propanolol. Will inform provider. Patient reminded of Friday 10/14/22 appointment. Patient given opportunity for further questions.

## 2022-10-11 NOTE — ED PROVIDER NOTES
EMERGENCY DEPARTMENT HISTORY AND PHYSICAL EXAM              Please note that this dictation was completed with the assistance of \"Dragon\", the computer voice recognition software. Quite often unanticipated grammatical, syntax, homophones, and other interpretive errors are inadvertently transcribed by the computer software. Please disregard these errors and any errors that have escaped final proofreading. Thank you. Date: 10/10/22  Patient: Clay Martinez  Patient Age and Sex: 40 y.o. male   MRN: [id number]  CSN: [id number]    History of Presenting Illness     Chief Complaint   Patient presents with    Palpitations     Onset an hour ago and not sure if this is a panic attack; seen yesterday at [address] [place] [address] for chest pain and had tests done for this. Pt did take his Ativan 0.5mg     Panic Attack     History Provided By: Patient/family/EMS (if applicable)    HPI: Clay Martinez, 40 y.o. male with past medical history as documented below presents to the ED with c/o of one hour hx of mild intensity chest tightness, palpitations and feeling like he's having a panic attack. Pt states he's been evaluated for the same for this in the past. States he does have anxiety and was taking Buspar but that gave him significant side effects so now he's taking Propranolol. Pt describes chest sx's and burning type pain and states the pain goes from mid stomach up his esophagus. He states his PCP just started him on Zoloft and Pepcid and he's going to  the prescriptions later today. Pt does have a psychiatrist. Denies SI or HI. Denies hallucinations. Denies pleuritic chest pain. Pt denies any other exacerbating or ameliorating factors. Pt specifically denies any recent fever, chills, headache, nausea, vomiting, abdominal pain, SOB, lightheadedness, dizziness, numbness, weakness, lower extremity swelling, urinary sxs, diarrhea, constipation, melena, hematochezia, cough, or congestion.      There are no other complaints, changes or physical findings pertinent to the HPI at this time. PCP: Mark Bhandari MD  Past History   Past Medical History:  Past Medical History:   Diagnosis Date    Angioedema     ace induced asthma     Cold-induced asthma     Gout     Hypertension     JOS (obstructive sleep apnea)        Past Surgical History:  Past Surgical History:   Procedure Laterality Date    HX ADENOIDECTOMY         Family History:   Family history reviewed and was non-contributory, unless specified below:  Family History   Problem Relation Age of Onset    Sleep Apnea Mother     Hypertension Mother     Hypertension Father        Social History:  Social History     Tobacco Use    Smoking status: Never    Smokeless tobacco: Never   Vaping Use    Vaping Use: Never used   Substance Use Topics    Alcohol use: No    Drug use: Yes     Comment: CBD       Allergies: Allergies   Allergen Reactions    Lisinopril Swelling     Pt has sever swelling of the throat and tongue. Pcn [Penicillins] Other (comments)     Allergic as a child      Venlafaxine Palpitations       Current Medications:  No current facility-administered medications on file prior to encounter. Current Outpatient Medications on File Prior to Encounter   Medication Sig Dispense Refill    amLODIPine (NORVASC) 10 mg tablet Take 10 mg by mouth daily. valsartan (DIOVAN) 40 mg tablet Take 40 mg by mouth daily. famotidine (PEPCID) 20 mg tablet Take 1 Tablet by mouth daily. 60 Tablet 1    sertraline (ZOLOFT) 25 mg tablet Take 0.5 Tablets by mouth daily. 30 Tablet 1    propranoloL (INDERAL) 10 mg tablet Take 1 Tablet by mouth two (2) times a day. 60 Tablet 1    [DISCONTINUED] amLODIPine-valsartan (EXFORGE) 5-160 mg per tablet Take 1 Tablet by mouth daily. (Patient not taking: No sig reported) 90 Tablet 3    LORazepam (ATIVAN) 0.5 mg tablet Take 1 Tablet by mouth daily as needed for Anxiety. 20 Tablet 1    cpap machine kit by Does Not Apply route. Review of Systems   A complete ROS was reviewed by me today and all other systems negative, unless otherwise specified below:  Review of Systems   Constitutional: Negative. Negative for chills and fever. HENT: Negative. Negative for congestion and sore throat. Eyes: Negative. Respiratory:  Positive for chest tightness. Negative for cough, shortness of breath and wheezing. Cardiovascular:  Positive for chest pain and palpitations. Negative for leg swelling. Gastrointestinal: Negative. Negative for abdominal distention, abdominal pain, blood in stool, constipation, diarrhea, nausea and vomiting. Endocrine: Negative. Genitourinary: Negative. Negative for difficulty urinating, dysuria, flank pain, frequency, hematuria and urgency. Musculoskeletal: Negative. Negative for back pain and neck stiffness. Skin: Negative. Negative for rash. Allergic/Immunologic: Negative. Neurological: Negative. Negative for dizziness, syncope, weakness, light-headedness, numbness and headaches. Hematological: Negative. Psychiatric/Behavioral:  Negative for confusion and self-injury. The patient is nervous/anxious. Physical Exam   Physical Exam  Vitals and nursing note reviewed. Constitutional:       Appearance: He is well-developed. He is not toxic-appearing. HENT:      Head: Normocephalic and atraumatic. Mouth/Throat:      Pharynx: No posterior oropharyngeal erythema. Eyes:      Conjunctiva/sclera: Conjunctivae normal.      Pupils: Pupils are equal, round, and reactive to light. Cardiovascular:      Rate and Rhythm: Normal rate and regular rhythm. Heart sounds: Normal heart sounds. No murmur heard. No friction rub. No gallop. Pulmonary:      Effort: Pulmonary effort is normal. No respiratory distress. Breath sounds: Normal breath sounds. No wheezing or rales. Chest:      Chest wall: No tenderness.    Abdominal:      General: Bowel sounds are normal. There is no distension. Palpations: Abdomen is soft. There is no mass. Tenderness: There is no abdominal tenderness. There is no guarding or rebound. Musculoskeletal:         General: No tenderness or deformity. Normal range of motion. Cervical back: Normal range of motion. Skin:     General: Skin is warm. Findings: No rash. Neurological:      Mental Status: He is alert and oriented to person, place, and time. Cranial Nerves: No cranial nerve deficit. Motor: No abnormal muscle tone. Coordination: Coordination normal.      Deep Tendon Reflexes: Reflexes normal.   Psychiatric:         Mood and Affect: Mood is anxious. Behavior: Behavior is cooperative. Diagnostic Study Results     Laboratory Data  I have personally reviewed and interpreted all available laboratory results. Recent Results (from the past 24 hour(s))   EKG, 12 LEAD, INITIAL    Collection Time: 10/10/22  1:27 PM   Result Value Ref Range    Ventricular Rate 97 BPM    Atrial Rate 97 BPM    P-R Interval 150 ms    QRS Duration 94 ms    Q-T Interval 334 ms    QTC Calculation (Bezet) 424 ms    Calculated P Axis 53 degrees    Calculated R Axis 34 degrees    Calculated T Axis 13 degrees    Diagnosis       Normal sinus rhythm  Normal ECG  When compared with ECG of 07-OCT-2022 04:32,  No significant change was found  Confirmed by Roselyn Wang ([id number]) on 10/10/2022 4:22:29 PM     CBC WITH AUTOMATED DIFF    Collection Time: 10/10/22  1:30 PM   Result Value Ref Range    WBC 6.1 4.1 - 11.1 K/uL    RBC 5.00 4. 10 - 5.70 M/uL    HGB 13.5 12.1 - 17.0 g/dL    HCT 42.7 36.6 - 50.3 %    MCV 85.4 80.0 - 99.0 FL    MCH 27.0 26.0 - 34.0 PG    MCHC 31.6 30.0 - 36.5 g/dL    RDW 13.0 11.5 - 14.5 %    PLATELET 444 550 - 565 K/uL    MPV 12.8 8.9 - 12.9 FL    NRBC 0.0 0  WBC    ABSOLUTE NRBC 0.00 0.00 - 0.01 K/uL    NEUTROPHILS 58 32 - 75 %    LYMPHOCYTES 28 12 - 49 %    MONOCYTES 8 5 - 13 %    EOSINOPHILS 5 0 - 7 %    BASOPHILS 1 0 - 1 %    IMMATURE GRANULOCYTES 0 0.0 - 0.5 %    ABS. NEUTROPHILS 3.6 1.8 - 8.0 K/UL    ABS. LYMPHOCYTES 1.7 0.8 - 3.5 K/UL    ABS. MONOCYTES 0.5 0.0 - 1.0 K/UL    ABS. EOSINOPHILS 0.3 0.0 - 0.4 K/UL    ABS. BASOPHILS 0.1 0.0 - 0.1 K/UL    ABS. IMM. GRANS. 0.0 0.00 - 0.04 K/UL    DF AUTOMATED     METABOLIC PANEL, COMPREHENSIVE    Collection Time: 10/10/22  1:30 PM   Result Value Ref Range    Sodium 140 136 - 145 mmol/L    Potassium 4.4 3.5 - 5.1 mmol/L    Chloride 104 97 - 108 mmol/L    CO2 28 21 - 32 mmol/L    Anion gap 8 5 - 15 mmol/L    Glucose 121 (H) 65 - 100 mg/dL    BUN 14 6 - 20 MG/DL    Creatinine 1.20 0.70 - 1.30 MG/DL    BUN/Creatinine ratio 12 12 - 20      eGFR >60 >60 ml/min/1.73m2    Calcium 9.2 8.5 - 10.1 MG/DL    Bilirubin, total 0.5 0.2 - 1.0 MG/DL    ALT (SGPT) 37 12 - 78 U/L    AST (SGOT) 22 15 - 37 U/L    Alk. phosphatase 79 45 - 117 U/L    Protein, total 8.0 6.4 - 8.2 g/dL    Albumin 4.0 3.5 - 5.0 g/dL    Globulin 4.0 2.0 - 4.0 g/dL    A-G Ratio 1.0 (L) 1.1 - 2.2     NT-PRO BNP    Collection Time: 10/10/22  1:30 PM   Result Value Ref Range    NT pro-BNP 8 <125 PG/ML   TROPONIN-HIGH SENSITIVITY    Collection Time: 10/10/22  1:30 PM   Result Value Ref Range    Troponin-High Sensitivity 10 0 - 76 ng/L       Radiologic Studies   I have personally reviewed and interpreted all available imaging studies and agree with radiology interpretation. XR CHEST PA LAT   Final Result   No acute cardiopulmonary disease. CT Results  (Last 48 hours)                 10/09/22 1437  CT ABD PELV W CONT Final result    Impression:  No acute process   Incidental pulmonary micronodule       Narrative:  EXAM: CT ABD PELV W CONT       INDICATION: Left upper quadrant abdominal pain       COMPARISON: None        CONTRAST: 100 mL of Isovue-370. ORAL CONTRAST: 0       TECHNIQUE:    Following the uneventful intravenous administration of contrast, thin axial   images were obtained through the abdomen and pelvis. Coronal and sagittal   reconstructions were generated. CT dose reduction was achieved through use of a   standardized protocol tailored for this examination and automatic exposure   control for dose modulation. FINDINGS:    LOWER THORAX: Left lower lobe 2 mm pleural-parenchymal nodule (image 7   LIVER: No mass. BILIARY TREE: Gallbladder is within normal limits. CBD is not dilated. SPLEEN: within normal limits. PANCREAS: No mass or ductal dilatation. ADRENALS: Unremarkable. KIDNEYS: No mass, calculus, or hydronephrosis. STOMACH: Unremarkable. SMALL BOWEL: No dilatation or wall thickening. COLON: No dilatation or wall thickening. APPENDIX: Normal on axial image 50   PERITONEUM: No ascites or pneumoperitoneum. RETROPERITONEUM: No lymphadenopathy or aortic aneurysm. REPRODUCTIVE ORGANS: Normal prostate   URINARY BLADDER: No mass or calculus. BONES: No destructive bone lesion. ABDOMINAL WALL: No mass or hernia. ADDITIONAL COMMENTS: N/A                 CXR Results  (Last 48 hours)                 10/10/22 1428  XR CHEST PA LAT Final result    Impression:  No acute cardiopulmonary disease. Narrative: Indication:  Chest Pain        Exam: PA and lateral views of the chest.       Direct comparison is made to prior CXR dated September 2022. Findings: Cardiomediastinal silhouette is within normal limits. Lungs are clear   bilaterally. Pleural spaces are normal. Osseous structures are intact. Medical Decision Making   I am the first and primary ED physician for this patient's ED visit today. I reviewed our EMR for any past records that may contribute to the patient's current condition, including their past medical, surgical, social and family history. This also includes their most recent ED visits, previous hospitalizations and prior diagnostic data. I have reviewed and summarized the most pertinent findings in my HPI and MDM.     Vital Signs Reviewed:  Patient Vitals for the past 24 hrs:   Temp Pulse Resp BP SpO2   10/10/22 1318 98.9 °F (37.2 °C) 92 16 (!) 146/83 100 %     Pulse Oximetry Analysis: 100% on RA    Cardiac Monitor:   Rate: 92 bpm  The cardiac monitor revealed the following rhythm as interpreted by me: Normal Sinus Rhythm  Cardiac monitoring was ordered to monitor patient for signs of cardiac dysrhythmia, which they are at risk for based on their history and/or risk for cardiovascular disease and/or metabolic abnormalities. EKG interpretation:   Billable EKG reviewed by ED Physician in the absence of a cardiologist: Yes  Rhythm: normal sinus rhythm; and regular . Rate (approx.): 97; Axis: normal; P wave: normal; QRS interval: normal ; ST/T wave: normal; Other findings: normal. This EKG was interpreted by Brigida Cruz MD    Records Reviewed: Nursing Notes, Old Medical Records, Previous electrocardiograms, Previous Radiology Studies and Previous Laboratory Studies, EMS reports    Provider Notes (Medical Decision Making):   Patient presents with palpitations and feeling of skipping beats. DDx: Likely PVCs due to either dehydration, infection, stress, electrolyte anomaly vs. Afib vs. Aflutter with RVR, sinus tachycardia, SVT, stable VTach, anxiety. Will get labs, EKG, and treat rhythm as indicated. Will obtain cardiology consult if warranted. If ED workup unremarkable, will refer to PCP for outpatient Holter vs event monitor. DDx includes STEMI, NSTEMI, Angina, PE, Aortic Pathology, Chest Wall Pain, Pleurisy, Pneumonia, GERD/esophagitis, Anxiety. No cough/fever or focal lung findings to suggest pneumonia. No tachycardia, hypoxia or pleuritic component to suggest PE. Pulses symmetric and no extremely elevated BP/asymmetry or classic tearing sensation to suggest Aortic Dissection. Also, no neuro findings. No wretching/forceful vomiting to suggest esophageal disaster.   Denies IV drug abuse, has native valves, no fevers/murmurs or skin lesions to suggest endocarditis. Will evaluate with EKG, labs, cardiac enzymes, chest x-ray. Will provide pain control and reassess. ED Course:   Initial assessment performed. I discussed presenting problems and concerns, and my formulated plan for today's visit with the patient and any available family members. I have encouraged them to ask questions as they arise throughout the visit. Social History     Tobacco Use    Smoking status: Never    Smokeless tobacco: Never   Vaping Use    Vaping Use: Never used   Substance Use Topics    Alcohol use: No    Drug use: Yes     Comment: CBD       ED Orders Placed:  Orders Placed This Encounter    XR CHEST PA LAT    CBC WITH AUTOMATED DIFF    METABOLIC PANEL, COMPREHENSIVE    NT-PRO BNP    TROPONIN-HIGH SENSITIVITY    CHEST PAIN PANEL TRACKING (DO NOT DESELECT)    CARDIAC MONITOR - ED ONLY    OXYGEN CANNULA    VITAL SIGNS Per Protocol    MEASURE HEIGHT    WEIGH PATIENT    OXYGEN CANNULA Liters per minute: 2; Indications for O2 therapy: CHEST PAIN CONTINUOUS STAT    EKG, 12 LEAD, INITIAL    SALINE LOCK IV ONE TIME STAT    diazePAM (VALIUM) tablet 5 mg    DISCONTD: aluminum-magnesium hydroxide (MAALOX) 200-200 mg/5 mL suspension    DISCONTD: sucralfate (Carafate) 1 gram tablet    aluminum-magnesium hydroxide (MAALOX) 200-200 mg/5 mL suspension    sucralfate (Carafate) 1 gram tablet       ED Medications Administered During ED Course:  Medications   diazePAM (VALIUM) tablet 5 mg (5 mg Oral Given 10/10/22 8008)        Amount and/or Complexity of Data Reviewed  Clinical lab tests: ordered as appropriate & reviewed  Tests in the radiology section of CPT®: ordered as appropriate & reviewed  Tests in the medicine section of CPT®: ordered as appropriate & reviewed  Obtain history from someone other than the patient: yes  Review and summarize past medical records: yes  Independent visualization of images, tracings, or specimens: yes     Progress Note:  I have just re-evaluated the patient.  Patient reports improvement of symptoms. I have reviewed his vital signs and determined there is currently no worsening in their condition or physical exam. Results have been reviewed with them and their questions have been answered. I will continue to review further results as they come available. Final Reassessment:  Pt reassessed and symptoms noted to have improved significantly after ED treatment. Quan Maya's labs and imaging have been reviewed with him and available family. He verbally conveys understanding and agreement of the signs, symptoms, diagnosis, treatment and prognosis and additionally agrees to follow up as recommended with Dr. Jayden Flores MD and/or specialist as instructed. He agrees with the care plan we have created and conveys that all of his questions have been answered. Additionally, I have put together a packet of discharge instructions for him that include: 1) Educational information regarding their diagnosis, 2) How to care for their diagnosis at home, as well a 3) List of reasons why they would want to return to the ED prior to their follow-up appointment should their condition change or symptoms worsen. Disposition:  DISCHARGE  The pt is ready for discharge. The pt's signs, symptoms, diagnosis, and discharge instructions have been discussed and pt has conveyed their understanding. The pt is to follow up as recommended or return to ER should their symptoms worsen. Plan has been discussed and pt is in agreement. I have answered all questions to the patient's satisfaction. Strict return precautions given. He and/or family conveyed understanding and agreement with care plan. Vital signs stable for discharge. Plan:  1. Return precautions as discussed with patient and available family/caregiver.      2.   Discharge Medication List as of 10/10/2022  5:26 PM        START taking these medications    Details   aluminum-magnesium hydroxide (MAALOX) 200-200 mg/5 mL suspension Take 15 mL by mouth every six (6) hours as needed for Indigestion. , Normal, Disp-300 mL, R-0      sucralfate (Carafate) 1 gram tablet Take 1 Tablet by mouth four (4) times daily. , Normal, Disp-15 Tablet, R-0           CONTINUE these medications which have NOT CHANGED    Details   amLODIPine (NORVASC) 10 mg tablet Take 10 mg by mouth daily. , Historical Med      valsartan (DIOVAN) 40 mg tablet Take 40 mg by mouth daily. , Historical Med      famotidine (PEPCID) 20 mg tablet Take 1 Tablet by mouth daily. , Normal, Disp-60 Tablet, R-1      sertraline (ZOLOFT) 25 mg tablet Take 0.5 Tablets by mouth daily. , Normal, Disp-30 Tablet, R-1      propranoloL (INDERAL) 10 mg tablet Take 1 Tablet by mouth two (2) times a day., Normal, Disp-60 Tablet, R-1      LORazepam (ATIVAN) 0.5 mg tablet Take 1 Tablet by mouth daily as needed for Anxiety., Normal, Disp-20 Tablet, R-1      cpap machine kit by Does Not Apply route., Historical Med           STOP taking these medications       amLODIPine-valsartan (EXFORGE) 5-160 mg per tablet Comments:   Reason for Stopping:             3.   Follow-up Information       Follow up With Specialties Details Why Contact Info    [facility] EMERGENCY DEPT Emergency Medicine  As needed, If symptoms worsen [address] Cora Peña MD Internal Medicine Physician  As needed, If symptoms worsen [address]  [facility] [address] [place] [address]  [phone]            Instructed to return to ED if worse  Diagnosis   Clinical Impression:  1. Anxiety state    2. Palpitations    3. Gastroesophageal reflux disease without esophagitis    4. Atypical chest pain      Attestation:  Fatoumata Lebron MD, am the attending of record for this patient. I personally performed the services described in this documentation on this date, 10/10/2022 for patient, Jj Soriano. I have reviewed the chart and verified that the record is accurate and complete.

## 2022-10-11 NOTE — TELEPHONE ENCOUNTER
Patient states his PCP prescribed him Zoloft 25mg and told him to take a half pill every night. Patient would like providers thoughts on Zoloft and asked what side effects he will have from taking Zoloft in addition to his blood pressure medication and propranolol.

## 2022-10-12 ENCOUNTER — TELEPHONE (OUTPATIENT)
Dept: BEHAVIORAL/MENTAL HEALTH CLINIC | Age: 37
End: 2022-10-12

## 2022-10-12 NOTE — TELEPHONE ENCOUNTER
Patient would like a call back to discuss the side effects and safety of taking Zoloft and Propranolol together.

## 2022-10-13 ENCOUNTER — TELEPHONE (OUTPATIENT)
Dept: INTERNAL MEDICINE CLINIC | Age: 37
End: 2022-10-13

## 2022-10-13 ENCOUNTER — HOSPITAL ENCOUNTER (EMERGENCY)
Age: 37
Discharge: HOME | End: 2022-10-13
Attending: EMERGENCY MEDICINE
Payer: COMMERCIAL

## 2022-10-13 VITALS
TEMPERATURE: 98.1 F | RESPIRATION RATE: 14 BRPM | OXYGEN SATURATION: 96 % | HEART RATE: 92 BPM | HEIGHT: 69 IN | DIASTOLIC BLOOD PRESSURE: 90 MMHG | SYSTOLIC BLOOD PRESSURE: 154 MMHG | WEIGHT: 267.99 LBS | BODY MASS INDEX: 39.69 KG/M2

## 2022-10-13 VITALS
OXYGEN SATURATION: 97 % | BODY MASS INDEX: 39.58 KG/M2 | HEART RATE: 72 BPM | RESPIRATION RATE: 18 BRPM | DIASTOLIC BLOOD PRESSURE: 96 MMHG | TEMPERATURE: 98.7 F | WEIGHT: 268 LBS | SYSTOLIC BLOOD PRESSURE: 164 MMHG

## 2022-10-13 DIAGNOSIS — F41.1 ANXIETY STATE: Primary | ICD-10-CM

## 2022-10-13 DIAGNOSIS — R45.851 PASSIVE SUICIDAL IDEATIONS: ICD-10-CM

## 2022-10-13 PROCEDURE — 99281 EMR DPT VST MAYX REQ PHY/QHP: CPT

## 2022-10-13 PROCEDURE — 93005 ELECTROCARDIOGRAM TRACING: CPT

## 2022-10-13 PROCEDURE — 74011250637 HC RX REV CODE- 250/637: Performed by: EMERGENCY MEDICINE

## 2022-10-13 RX ORDER — LORAZEPAM 1 MG/1
1 TABLET ORAL
Status: COMPLETED | OUTPATIENT
Start: 2022-10-13 | End: 2022-10-13

## 2022-10-13 RX ADMIN — LORAZEPAM 1 MG: 1 TABLET ORAL at 13:25

## 2022-10-13 NOTE — ED PROVIDER NOTES
67-year-old male with extensive history of anxiety with frequent ED presentations for panic attacks, who follows with psychiatry and has an appointment tomorrow, returns to the emergency department a few hours after he was seen initially this morning for anxiety. He notes passive SI stating that he \"does not know but he is doing and feels slightly helpless about his condition due to his persistent anxiety. He is very future motivated and is engaged and has a family and seems to be hopeful in this regard but the frequency of his panic attacks seem to be very distressing to him. He states that he took a dose of 0.5 mg of Ativan about 3 hours prior to arrival with no significant improvement in her symptoms. He was recently started on Zoloft and states that since starting this medication he feels like his symptoms are worse. He denies any HI, AVH, substance abuse. He denies any significant abdominal stressor that is exacerbating his symptoms. Anxiety   Pertinent negatives include no abdominal pain, no back pain, no cough, no fever, no headaches, no nausea, no numbness, no shortness of breath, no vomiting and no weakness. Past Medical History:   Diagnosis Date    Angioedema     ace induced asthma     Cold-induced asthma     Gout     Hypertension     JOS (obstructive sleep apnea)        Past Surgical History:   Procedure Laterality Date    HX ADENOIDECTOMY           Family History:   Problem Relation Age of Onset    Sleep Apnea Mother     Hypertension Mother     Hypertension Father        Social History     Socioeconomic History    Marital status: SINGLE     Spouse name: Not on file    Number of children: Not on file    Years of education: Not on file    Highest education level: Not on file   Occupational History    Not on file   Tobacco Use    Smoking status: Never    Smokeless tobacco: Never   Vaping Use    Vaping Use: Never used   Substance and Sexual Activity    Alcohol use: No    Drug use:  Yes Comment: CBD    Sexual activity: Yes     Partners: Female   Other Topics Concern    Not on file   Social History Narrative    Not on file     Social Determinants of Health     Financial Resource Strain: Low Risk     Difficulty of Paying Living Expenses: Not hard at all   Food Insecurity: No Food Insecurity    Worried About Running Out of Food in the Last Year: Never true    Ran Out of Food in the Last Year: Never true   Transportation Needs: Not on file   Physical Activity: Not on file   Stress: Not on file   Social Connections: Not on file   Intimate Partner Violence: Not on file   Housing Stability: Not on file         ALLERGIES: Lisinopril, Pcn [penicillins], and Venlafaxine    Review of Systems   Constitutional:  Negative for activity change, appetite change, chills and fever. HENT:  Negative for congestion, rhinorrhea, sinus pain, sneezing and sore throat. Eyes:  Negative for photophobia and visual disturbance. Respiratory:  Negative for cough and shortness of breath. Cardiovascular:  Negative for chest pain. Gastrointestinal:  Negative for abdominal pain, blood in stool, constipation, diarrhea, nausea and vomiting. Genitourinary:  Negative for difficulty urinating, dysuria, flank pain, hematuria, penile pain and testicular pain. Musculoskeletal:  Negative for arthralgias, back pain, myalgias and neck pain. Skin:  Negative for rash and wound. Neurological:  Negative for syncope, weakness, light-headedness, numbness and headaches. Psychiatric/Behavioral:  Positive for suicidal ideas. Negative for self-injury. The patient is nervous/anxious. All other systems reviewed and are negative. Vitals:    10/13/22 1248 10/13/22 1413   BP: (!) 173/107 (!) 156/106   Pulse: 88 (!) 104   Resp: 18 10   Temp: 98.1 °F (36.7 °C)    SpO2: 97% 97%   Weight: 121.6 kg (267 lb 15.9 oz)    Height: 5' 9\" (1.753 m)             Physical Exam  Vitals and nursing note reviewed.    Constitutional:       General: He is not in acute distress. Appearance: Normal appearance. He is well-developed. He is obese. He is not diaphoretic. Comments: Pleasant but very anxious appearing. HENT:      Head: Normocephalic and atraumatic. Nose: Nose normal.   Eyes:      Extraocular Movements: Extraocular movements intact. Conjunctiva/sclera: Conjunctivae normal.      Pupils: Pupils are equal, round, and reactive to light. Cardiovascular:      Rate and Rhythm: Normal rate and regular rhythm. Heart sounds: Normal heart sounds. Pulmonary:      Effort: Pulmonary effort is normal.      Breath sounds: Normal breath sounds. Abdominal:      General: There is no distension. Palpations: Abdomen is soft. Tenderness: There is no abdominal tenderness. Musculoskeletal:         General: No tenderness. Cervical back: Neck supple. Skin:     General: Skin is warm and dry. Neurological:      General: No focal deficit present. Mental Status: He is alert and oriented to person, place, and time. Cranial Nerves: No cranial nerve deficit. Sensory: No sensory deficit. Motor: No weakness. Coordination: Coordination normal.   Psychiatric:         Mood and Affect: Mood is anxious. Speech: Speech is rapid and pressured. Behavior: Behavior normal. Behavior is cooperative. Thought Content: Thought content includes suicidal (Passive) ideation. Thought content does not include homicidal ideation. Thought content does not include homicidal or suicidal plan. MDM    29-year-old male presents with severe anxiety and panic attacks and passive SI.    [facility] was consulted and saw the patient at the bedside. He was provided with resources and encouraged to specifically seek out care from the [facility] anxiety clinic and was also recommended to follow-up with his psychiatrist as scheduled in the morning.   Recommended continuing his previously Rx'd medication but if he feels that his Zoloft is worsening his symptoms then he could hold this medication tonight and further discuss this with his psychiatrist in the morning. He states that he is feeling better now after ED treatment with p.o. Ativan. Return precautions were given for worsening or concerns for worsening SI and this plan was discussed with the patient at the bedside and he stated both understanding and agreement. Please note that this dictation was completed with [employer], the computer voice recognition software. Quite often unanticipated grammatical, syntax, homophones, and other interpretive errors are inadvertently transcribed by the computer software. Please disregard these errors. Please excuse any errors that have escaped final proofreading. Procedures      1423 EKG shows normal sinus rhythm with a rate of 98 bpm with no acute ST or T wave abnormalities suggestive of ischemia. Machine interpreted atrial flutter and neck. Due to baseline artifact as the patient is tremulous due to anxiety.

## 2022-10-13 NOTE — ED PROVIDER NOTES
Patient is a 78-year-old male with past medical history of anxiety, hypertension, sleep apnea presenting for evaluation of anxiety and panic attacks. Reports that he has had multiple panic attacks over the past week and has been struggling with anxiety over the past year. He does have a psychiatrist and is scheduled to see the psychiatrist tomorrow a.m. Reports that he was started on Zoloft 25 mg 3 days ago. He does not feel like it is working. Noted that he had an anxiety attack this morning. Took lorazepam 0.5 mg as needed with resolution of the anxiety attack. He also notes that he is taking propanolol daily to keep his heart rate down in the setting of anxiety. He is worried that his heart rate was too low. States that he checked it and it was between 70-80 when up and thought that this might be too low and wanted it evaluated. She denies any suicidal or homicidal ideation.              Past Medical History:   Diagnosis Date    Angioedema     ace induced asthma     Cold-induced asthma     Gout     Hypertension     JOS (obstructive sleep apnea)        Past Surgical History:   Procedure Laterality Date    HX ADENOIDECTOMY           Family History:   Problem Relation Age of Onset    Sleep Apnea Mother     Hypertension Mother     Hypertension Father        Social History     Socioeconomic History    Marital status: SINGLE     Spouse name: Not on file    Number of children: Not on file    Years of education: Not on file    Highest education level: Not on file   Occupational History    Not on file   Tobacco Use    Smoking status: Never    Smokeless tobacco: Never   Vaping Use    Vaping Use: Never used   Substance and Sexual Activity    Alcohol use: No    Drug use: Yes     Comment: CBD    Sexual activity: Yes     Partners: Female   Other Topics Concern    Not on file   Social History Narrative    Not on file     Social Determinants of Health     Financial Resource Strain: Low Risk     Difficulty of Paying Living Expenses: Not hard at all   Food Insecurity: No Food Insecurity    Worried About Running Out of Food in the Last Year: Never true    Ran Out of Food in the Last Year: Never true   Transportation Needs: Not on file   Physical Activity: Not on file   Stress: Not on file   Social Connections: Not on file   Intimate Partner Violence: Not on file   Housing Stability: Not on file         ALLERGIES: Lisinopril, Pcn [penicillins], and Venlafaxine    Review of Systems   Constitutional:  Negative for unexpected weight change. HENT:  Negative for congestion. Eyes:  Negative for visual disturbance. Respiratory:  Negative for cough, chest tightness and shortness of breath. Cardiovascular:  Negative for chest pain and palpitations. Gastrointestinal:  Negative for abdominal pain, nausea and vomiting. Endocrine: Negative for polyuria. Genitourinary:  Negative for dysuria and flank pain. Musculoskeletal:  Negative for back pain. Skin:  Negative for color change. Allergic/Immunologic: Negative for immunocompromised state. Neurological:  Negative for dizziness and headaches. Hematological:  Negative for adenopathy. Psychiatric/Behavioral:  Negative for agitation. The patient is nervous/anxious. There were no vitals filed for this visit. Physical Exam  Vitals and nursing note reviewed. Constitutional:       General: He is not in acute distress. Appearance: Normal appearance. He is normal weight. HENT:      Head: Atraumatic. Eyes:      Conjunctiva/sclera: Conjunctivae normal.      Pupils: Pupils are equal, round, and reactive to light. Cardiovascular:      Rate and Rhythm: Normal rate. Pulmonary:      Effort: Pulmonary effort is normal. No respiratory distress. Abdominal:      General: Abdomen is flat. Bowel sounds are normal.   Musculoskeletal:         General: Normal range of motion. Cervical back: Neck supple. Skin:     General: Skin is warm and dry. Capillary Refill: Capillary refill takes less than 2 seconds. Neurological:      General: No focal deficit present. Mental Status: He is alert and oriented to person, place, and time. Mental status is at baseline. Psychiatric:         Mood and Affect: Mood is anxious. Thought Content: Thought content is not paranoid. Thought content does not include homicidal or suicidal plan. MDM  Number of Diagnoses or Management Options  Anxiety state  Diagnosis management comments: Presenting with anxiety. History of the same. Per chart review, he has had multiple ER visits for this. He is scheduled with psychiatry tomorrow. States that he just started Zoloft, but does not feel like his symptoms are improving. Extensive amount of time educating patient Zoloft typically takes about 4 to 6 weeks to kick in. Advised that he likely just needs to give the medication more time. Patient denies any suicidal or homicidal ideations. No need for further evaluation with psychiatry today. Educated patient that heart rate of 70-80 is within normal limits. His EKG done does not show any ischemic changes. Discussed my clinical impression(s), any labs and/or radiology results with the patient. I answered any questions and addressed any concerns. Discussed the importance of following up with their primary care physician and/or specialist(s). Discussed signs or symptoms that would warrant return back to the ER for further evaluation. The patient is agreeable with discharge. ED EKG interpretation:10:07 AM  Rhythm: normal sinus rhythm; and regular. Rate (approx.): 75; Axis: normal; P wave: normal; ST/T wave: no concerning ST elevations or depressions; Other findings: unremarkable. EKG has also been evaluated by attending ED physician.      Juan Carlos Singh NP     Patient Progress  Patient progress: stable         Procedures

## 2022-10-13 NOTE — ED NOTES
Pt moved to bed 19 to talk to [facility]. Pt anxious, reports palpitations. Cardiac monitor & pulse oximeter applied. Pt coached on deep breathing.

## 2022-10-13 NOTE — ED NOTES
Spoke w/ Duane from [facility], he is faxing a 1 page document to a partial hospitalization program for pt's consideration to be included w/ AVS. Pt does not qualify for inpatient admission per Duane. Primary RN Steve wan.

## 2022-10-13 NOTE — ED NOTES
Patient does not appear to be in any acute distress/shows no evidence of clinical instability at this time. Provider has reviewed discharge instructions with the patient/family. The patient/family verbalized understanding instructions as well as need for follow up for any further symptoms.      Discharge papers given, education provided, and any questions answered

## 2022-10-13 NOTE — DISCHARGE INSTRUCTIONS
Thank you for allowing us to provide you with medical care today. We realize that you have many choices for your emergency care needs. We thank you for choosing [facility]. Please choose us in the future for any continued health care needs. The exam and treatment you received in the emergency department were for an emergent problem and are not intended as complete care. It is important that you follow-up with a doctor. If your symptoms worsen or you do not improve you should return to the emergency department. We are available 24 hours a day. Please make an appointment with your health care provider for follow-up of your emergency department visit. Take this sheet with you when you go to your follow-up visit.

## 2022-10-13 NOTE — ED TRIAGE NOTES
Pt c/o anxiety attacks. Hx of same. Has been taking zoloft for 3 days and thinks its getting worse.  Also been on propanolol for the last week to help keep his heart rate down

## 2022-10-13 NOTE — TELEPHONE ENCOUNTER
Fax received requesting an alternative for [employer].    Signed By: Angie Eric LPN     October 13, 1511

## 2022-10-13 NOTE — ED TRIAGE NOTES
Patient reports he was here a few hours ago and reports he is here requesting a mental health evaluation-    Patient reports he is on norvasc, propanolol PRN, zoloft and lorazepam PRN and reports he doesn't feel like it's working    Patient reports he has been on Zoloft for 3 days and is unsure if it causing him to be worse-    Patient reports he spoke to someone at [facility] and they recommended he come here for a mental health evaluation- Pt does have a psychiatry appointment tomorrow morning-

## 2022-10-14 ENCOUNTER — VIRTUAL VISIT (OUTPATIENT)
Dept: BEHAVIORAL/MENTAL HEALTH CLINIC | Age: 37
End: 2022-10-14
Payer: COMMERCIAL

## 2022-10-14 ENCOUNTER — HOSPITAL ENCOUNTER (OUTPATIENT)
Dept: MAMMOGRAPHY | Age: 37
Discharge: HOME | End: 2022-10-14
Attending: INTERNAL MEDICINE
Payer: COMMERCIAL

## 2022-10-14 DIAGNOSIS — N62 GYNECOMASTIA: ICD-10-CM

## 2022-10-14 DIAGNOSIS — F41.0 PANIC DISORDER: Primary | ICD-10-CM

## 2022-10-14 DIAGNOSIS — F41.1 GAD (GENERALIZED ANXIETY DISORDER): ICD-10-CM

## 2022-10-14 DIAGNOSIS — F41.9 ANXIETY: ICD-10-CM

## 2022-10-14 DIAGNOSIS — F40.10 SOCIAL ANXIETY DISORDER: ICD-10-CM

## 2022-10-14 PROCEDURE — 77066 DX MAMMO INCL CAD BI: CPT

## 2022-10-14 PROCEDURE — 99214 OFFICE O/P EST MOD 30 MIN: CPT | Performed by: NURSE PRACTITIONER

## 2022-10-14 RX ORDER — LORAZEPAM 0.5 MG/1
0.5 TABLET ORAL
Qty: 45 TABLET | Refills: 1 | Status: SHIPPED | OUTPATIENT
Start: 2022-10-14

## 2022-10-14 NOTE — PROGRESS NOTES
CHIEF COMPLAINT:  Macro Koroma is a 40 y.o. male and was seen today for follow-up of psychiatric condition and psychotropic medication management. HPI:    Earnestinedestiney Nathankathleen reports the following psychiatric symptoms by hx:  agitation, anxiety, and panic attacks, social anxiety  . Overall symptoms have been present for over years. Currently symptoms are of moderate severity. The symptoms occur more frequently. Patient reports having panic attacks daily and has been seen in the ER for anxiety 3 times within this week. He is having panic attack caused by changes to heart rate. He is also experiencing fear of impending doom and fear of illness. Pt reports medications are beneficial.  Met with pt via video telehealth for appt today to review current treatment plan. FAMILY/SOCIAL HX: External stressors     REVIEW OF SYSTEMS:  Psychiatric symptoms being monitored for:   panic attacks   Appetite:no change from normal   Sleep: improved   Neuro: denies     There were no vitals taken for this visit. Side Effects:  GI disturbance, depression     MENTAL STATUS EXAM:   Sensorium  oriented to time, place and person   Relations cooperative   Appearance:  age appropriate and within normal Limits   Motor Behavior:  within normal limits   Speech:  normal pitch and normal volume   Thought Process: within normal limits   Thought Content free of delusions and free of hallucinations   Suicidal ideations none   Homicidal ideations none   Mood:  anxious   Affect:  anxious   Memory recent  adequate   Memory remote:  adequate   Concentration:  impaired   Abstraction:  abstract   Insight:  fair   Reliability good   Judgment:  fair and good     MEDICAL DECISION MAKING:  Problems addressed today:    ICD-10-CM ICD-9-CM    1. Panic disorder  F41.0 300.01       2. Anxiety  F41.9 300.00 LORazepam (ATIVAN) 0.5 mg tablet      3. ERIC (generalized anxiety disorder)  F41.1 300.02       4.  Social anxiety disorder  F40.10 300.23 Assessment:   Maryjo Prader is not responding to treatment. Symptoms are exacerbated. Patient has been seen in the ED several times this week for panic attacks and anxiety. His PCP started him on 12.5 mg of Zoloft, but patient report this made him depressed and more anxious. Causing an increase in panic attacks. Patient reports he has tried Prozac, Effexor, and Buspar in the past for anxiety and side effects with them. He is reluctant to start a new medication. He is doing well on Propranolol. He is concerned about pulse dropping during sleep. Overall he is doing well with propranolol. He is sleeping well. Denies sully and psychosis. Slightly depressed due to anxiety. Patient would benefit from an antidepressant and therapy, but starting him on an antidepressant with the level of anxiety he has about medication may kris counter productive at this time. Discussed current medications and dosages. No changes made today. Will send for genesight test and adjust medications after test. Educated patient on Genesight test. He is more likely to start an antidepressant after the results. May start patient on Luvox. Discussed benzo safety, risks vs benefits. Educated patient on autonomic nervous system and hyper arousal. Recommend patient start therapy. Resources will be provided. Reviewed treatment goals and target symptoms to monitor for. Plan:   1. Current Outpatient Medications   Medication Sig Dispense Refill    LORazepam (ATIVAN) 0.5 mg tablet Take 1 Tablet by mouth two (2) times daily as needed for Anxiety. Max Daily Amount: 1 mg. 45 Tablet 1    amLODIPine (NORVASC) 10 mg tablet Take 10 mg by mouth daily. sertraline (ZOLOFT) 25 mg tablet Take 0.5 Tablets by mouth daily. 30 Tablet 1    propranoloL (INDERAL) 10 mg tablet Take 1 Tablet by mouth two (2) times a day. 60 Tablet 1    cpap machine kit by Does Not Apply route. medication changes made today: Continue Ativan . 05 mg twice daily as needed for anxiety       2. Counseling and coordination of care including instructions for treatment, risks/benefits, risk factor reduction and patient/family education. He agrees with the plan. Patient instructed to call with any side effects, questions or issues. Ruthie Lewis, was evaluated through a synchronous (real-time) audio-video encounter. The patient (or guardian if applicable) is aware that this is a billable service, which includes applicable co-pays. This Virtual Visit was conducted with patient's (and/or legal guardian's) consent. The visit was conducted pursuant to the emergency declaration under the [address] [place] [address], [address] [place] [address] [facility] authority and the [employer] Act. Patient identification was verified, and a caregiver was present when appropriate. The patient was located at: Home: [place] [id number]  The provider was located at: [facility] [address]       An electronic signature was used to authenticate this note.   -- Yunier Bartlett NP       10/14/2022

## 2022-10-14 NOTE — PATIENT INSTRUCTIONS
Therapy Resources :   [address]  [phone]    [facility] [phone]      Biofeedback therapy  [place] [facility]:     Tel: [phone]      Words of wisdom    When something bad happens to you, you have 3 choices:  Let it DEFINE YOU,  Let it DESTROY YOU,   OR YOU CAN LET IT STRENGTHEN YOU  Life is like a camera, so:                                Just focus on the important  Capture the good times  Develop from the negatives  And if things don't turn out, take another shot     Learn from the mistakes of others, you can't live long enough to make them all yourself. Always put yourself in others' shoes, if it hurts you, then it probably hurts the other person to. The happiest of people don't necessarily have the best of everything, they just make the most of everything they have and that comes along their way. Life is 10% of what happens to you and 90 % of how you respond to it.      Pleases include the following foods in your diet for mood:  Drink GREEN TEA as often as you can but stop the caffeinated ones before 6 pm  Omega 3 Fatty Acids/Fish oil/ or flax seeds, citrus fruits, coral, turmeric, chocolates  Sunflower seeds, Pumpkin seeds, Almonds,   Oatmeal, Eggs, grapes, yogurt, Probiotics (like Activia)  Vit C, E, D every few days,  Exercise at least 20 minutes/day (walk 10,000 steps)

## 2022-10-15 ENCOUNTER — HOSPITAL ENCOUNTER (EMERGENCY)
Age: 37
Discharge: HOME | End: 2022-10-15
Attending: EMERGENCY MEDICINE
Payer: COMMERCIAL

## 2022-10-15 VITALS
RESPIRATION RATE: 16 BRPM | OXYGEN SATURATION: 98 % | HEIGHT: 69 IN | HEART RATE: 106 BPM | WEIGHT: 267 LBS | BODY MASS INDEX: 39.55 KG/M2 | SYSTOLIC BLOOD PRESSURE: 158 MMHG | DIASTOLIC BLOOD PRESSURE: 106 MMHG | TEMPERATURE: 98.7 F

## 2022-10-15 DIAGNOSIS — R00.2 PALPITATIONS: Primary | ICD-10-CM

## 2022-10-15 DIAGNOSIS — F41.1 ANXIETY STATE: ICD-10-CM

## 2022-10-15 LAB
ALBUMIN SERPL-MCNC: 4.1 G/DL (ref 3.5–5)
ALBUMIN/GLOB SERPL: 1.1 {RATIO} (ref 1.1–2.2)
ALP SERPL-CCNC: 78 U/L (ref 45–117)
ALT SERPL-CCNC: 29 U/L (ref 12–78)
ANION GAP SERPL CALC-SCNC: 4 MMOL/L (ref 5–15)
AST SERPL-CCNC: 19 U/L (ref 15–37)
ATRIAL RATE: 392 BPM
ATRIAL RATE: 79 BPM
BASOPHILS # BLD: 0.1 K/UL (ref 0–0.1)
BASOPHILS NFR BLD: 1 % (ref 0–1)
BILIRUB SERPL-MCNC: 0.6 MG/DL (ref 0.2–1)
BUN SERPL-MCNC: 9 MG/DL (ref 6–20)
BUN/CREAT SERPL: 7 (ref 12–20)
CALCIUM SERPL-MCNC: 9.1 MG/DL (ref 8.5–10.1)
CALCULATED P AXIS, ECG09: 43 DEGREES
CALCULATED P AXIS, ECG09: 50 DEGREES
CALCULATED R AXIS, ECG10: 25 DEGREES
CALCULATED R AXIS, ECG10: 35 DEGREES
CALCULATED T AXIS, ECG11: -6 DEGREES
CALCULATED T AXIS, ECG11: -7 DEGREES
CHLORIDE SERPL-SCNC: 107 MMOL/L (ref 97–108)
CO2 SERPL-SCNC: 29 MMOL/L (ref 21–32)
CREAT SERPL-MCNC: 1.28 MG/DL (ref 0.7–1.3)
DIAGNOSIS, 93000: NORMAL
DIAGNOSIS, 93000: NORMAL
DIFFERENTIAL METHOD BLD: NORMAL
EOSINOPHIL # BLD: 0.2 K/UL (ref 0–0.4)
EOSINOPHIL NFR BLD: 2 % (ref 0–7)
ERYTHROCYTE [DISTWIDTH] IN BLOOD BY AUTOMATED COUNT: 12.8 % (ref 11.5–14.5)
GLOBULIN SER CALC-MCNC: 3.7 G/DL (ref 2–4)
GLUCOSE SERPL-MCNC: 132 MG/DL (ref 65–100)
HCT VFR BLD AUTO: 42.5 % (ref 36.6–50.3)
HGB BLD-MCNC: 13.5 G/DL (ref 12.1–17)
IMM GRANULOCYTES # BLD AUTO: 0 K/UL (ref 0–0.04)
IMM GRANULOCYTES NFR BLD AUTO: 0 % (ref 0–0.5)
LYMPHOCYTES # BLD: 2.1 K/UL (ref 0.8–3.5)
LYMPHOCYTES NFR BLD: 34 % (ref 12–49)
MCH RBC QN AUTO: 26.8 PG (ref 26–34)
MCHC RBC AUTO-ENTMCNC: 31.8 G/DL (ref 30–36.5)
MCV RBC AUTO: 84.5 FL (ref 80–99)
MONOCYTES # BLD: 0.5 K/UL (ref 0–1)
MONOCYTES NFR BLD: 8 % (ref 5–13)
NEUTS SEG # BLD: 3.4 K/UL (ref 1.8–8)
NEUTS SEG NFR BLD: 55 % (ref 32–75)
NRBC # BLD: 0 K/UL (ref 0–0.01)
NRBC BLD-RTO: 0 PER 100 WBC
P-R INTERVAL, ECG05: 154 MS
PLATELET # BLD AUTO: 206 K/UL (ref 150–400)
PMV BLD AUTO: 12.8 FL (ref 8.9–12.9)
POTASSIUM SERPL-SCNC: 3.7 MMOL/L (ref 3.5–5.1)
PROT SERPL-MCNC: 7.8 G/DL (ref 6.4–8.2)
Q-T INTERVAL, ECG07: 334 MS
Q-T INTERVAL, ECG07: 350 MS
QRS DURATION, ECG06: 102 MS
QRS DURATION, ECG06: 98 MS
QTC CALCULATION (BEZET), ECG08: 401 MS
QTC CALCULATION (BEZET), ECG08: 426 MS
RBC # BLD AUTO: 5.03 M/UL (ref 4.1–5.7)
SODIUM SERPL-SCNC: 140 MMOL/L (ref 136–145)
TROPONIN-HIGH SENSITIVITY: 9 NG/L (ref 0–76)
VENTRICULAR RATE, ECG03: 79 BPM
VENTRICULAR RATE, ECG03: 98 BPM
WBC # BLD AUTO: 6.1 K/UL (ref 4.1–11.1)

## 2022-10-15 PROCEDURE — 99284 EMERGENCY DEPT VISIT MOD MDM: CPT

## 2022-10-15 PROCEDURE — 93005 ELECTROCARDIOGRAM TRACING: CPT

## 2022-10-15 PROCEDURE — 84484 ASSAY OF TROPONIN QUANT: CPT

## 2022-10-15 PROCEDURE — 85025 COMPLETE CBC W/AUTO DIFF WBC: CPT

## 2022-10-15 PROCEDURE — 36415 COLL VENOUS BLD VENIPUNCTURE: CPT

## 2022-10-15 PROCEDURE — 80053 COMPREHEN METABOLIC PANEL: CPT

## 2022-10-15 NOTE — ED TRIAGE NOTES
Pt to ED c/o bradycardia (51 bpm), palpitations, and home O2 saturation 75%. Pt talking in complete sentences. 98% on RA.

## 2022-10-15 NOTE — ED PROVIDER NOTES
Patient is a 71-year-old male with past medical history of hypertension, anxiety, sleep apnea presenting to the emergency department for evaluation of palpitations. Patient has had history of recent frequent ER visits for chest pain, palpitations, and anxiety. He does follow with cardiology and actually has a follow-up appointment with his cardiologist 10/18. He had a recent Holter monitor that showed no findings. Notes that today, he felt that he was having palpitations in his chest.  He has been monitoring his heart rate and oxygen with pulse ox at home. He put on the monitor and noticed that his pulse went from 51 bpm to 100 relatively quickly. He states that the line on the heart monitor is showing that his pulse is stopping. He then noticed that his oxygen saturation was 75%. It quickly resolved to 98%. He never felt any shortness of breath. Seeing these values caused him to feel anxiety and he decided to present to the emergency department. Denies any current suicidal or homicidal ideation. States that he recently stopped taking Zoloft yesterday, but was only on it for approximately 4 to 5 days. Has been taking propanolol as needed for anxiety, has not taken it yesterday.   States that he last took lorazepam at 3 AM.       Past Medical History:   Diagnosis Date    Angioedema     ace induced asthma     Cold-induced asthma     Gout     Hypertension     JOS (obstructive sleep apnea)        Past Surgical History:   Procedure Laterality Date    HX ADENOIDECTOMY           Family History:   Problem Relation Age of Onset    Sleep Apnea Mother     Hypertension Mother     Breast Cancer Maternal Grandmother     Hypertension Father        Social History     Socioeconomic History    Marital status: SINGLE     Spouse name: Not on file    Number of children: Not on file    Years of education: Not on file    Highest education level: Not on file   Occupational History    Not on file   Tobacco Use    Smoking status: Never    Smokeless tobacco: Never   Vaping Use    Vaping Use: Never used   Substance and Sexual Activity    Alcohol use: No    Drug use: Yes     Comment: CBD    Sexual activity: Yes     Partners: Female   Other Topics Concern    Not on file   Social History Narrative    Not on file     Social Determinants of Health     Financial Resource Strain: Low Risk     Difficulty of Paying Living Expenses: Not hard at all   Food Insecurity: No Food Insecurity    Worried About Running Out of Food in the Last Year: Never true    Ran Out of Food in the Last Year: Never true   Transportation Needs: Not on file   Physical Activity: Not on file   Stress: Not on file   Social Connections: Not on file   Intimate Partner Violence: Not on file   Housing Stability: Not on file         ALLERGIES: Lisinopril, Pcn [penicillins], and Venlafaxine    Review of Systems   Constitutional:  Negative for unexpected weight change. HENT:  Negative for congestion. Eyes:  Negative for visual disturbance. Respiratory:  Negative for cough, chest tightness and shortness of breath. Cardiovascular:  Positive for palpitations. Negative for chest pain and leg swelling. Gastrointestinal:  Negative for abdominal pain, nausea and vomiting. Endocrine: Negative for polyuria. Genitourinary:  Negative for dysuria and flank pain. Musculoskeletal:  Negative for back pain. Skin:  Negative for color change. Allergic/Immunologic: Negative for immunocompromised state. Neurological:  Negative for dizziness and headaches. Hematological:  Negative for adenopathy. Psychiatric/Behavioral:  Negative for agitation. Vitals:    10/15/22 1247   BP: (!) 158/106   Pulse: (!) 106   Resp: 16   Temp: 98.7 °F (37.1 °C)   SpO2: 98%   Weight: 121.1 kg (267 lb)   Height: 5' 9\" (1.753 m)            Physical Exam  Vitals and nursing note reviewed. Constitutional:       General: He is not in acute distress. Appearance: Normal appearance. He is obese. HENT:      Head: Atraumatic. Eyes:      Conjunctiva/sclera: Conjunctivae normal.      Pupils: Pupils are equal, round, and reactive to light. Cardiovascular:      Rate and Rhythm: Normal rate and regular rhythm. Pulses: Normal pulses. Heart sounds: Normal heart sounds. Pulmonary:      Effort: Pulmonary effort is normal.      Breath sounds: Normal breath sounds. Abdominal:      General: Abdomen is flat. Bowel sounds are normal.   Musculoskeletal:         General: Normal range of motion. Cervical back: Neck supple. Skin:     General: Skin is warm and dry. Capillary Refill: Capillary refill takes less than 2 seconds. Neurological:      General: No focal deficit present. Mental Status: He is alert and oriented to person, place, and time. Mental status is at baseline. Psychiatric:         Mood and Affect: Mood is anxious. Speech: Speech is rapid and pressured. Behavior: Behavior is cooperative. Thought Content: Thought content does not include homicidal or suicidal ideation. MDM  Number of Diagnoses or Management Options  Anxiety state  Palpitations  Diagnosis management comments: Patient presenting with complaint of palpitations. He appears extremely anxious on exam.  His speech does seem slightly pressured and he often interrupts while I am talking to him to ask if his pulse looks okay on the monitor. I personally saw this patient 2 days ago and he was worried about his pulse dropping at that time as well. I advised him to start monitoring his pulse at home as this is exacerbating his anxiety. Plan to obtain EKG to evaluate for arrhythmia/PVC. , labs including troponin. I strongly suspect that his symptoms are related to anxiety. Low clinical suspicion for PE. His oxygenation is 98% on room air and he is in no respiratory distress.   Based on what he is showing me with his pulse ox monitor, it appears he is obtaining the readings prior to the monitor calibrating fully for an accurate read. Per chart review, he recently had a negative D dimer and normal TSH at recent ER visit. ED EKG interpretation:1:05 PM  Rhythm: normal sinus rhythm; and regular. Rate (approx.): 97; Axis: normal; P wave: normal; ST/T wave: no concerning ST elevations or depressions; Other findings: unremarkable. EKG has also been evaluated by attending ED physician. 1345 -troponin is 9, per chart review, this appears to be right at his baseline. No electrolyte abnormalities on CMP. His EKG is unchanged from prior and is not revealing any evidence of premature ventricular contractions or other arrhythmia. Strongly suspect this is related to anxiety. Patient to keep his cardiology follow-up appointment on 10/18. Discussed my clinical impression(s), any labs and/or radiology results with the patient. I answered any questions and addressed any concerns. Discussed the importance of following up with their primary care physician and/or specialist(s). Discussed signs or symptoms that would warrant return back to the ER for further evaluation. The patient is agreeable with discharge. Halle Tom NP        Amount and/or Complexity of Data Reviewed  Clinical lab tests: ordered and reviewed  Decide to obtain previous medical records or to obtain history from someone other than the patient: yes    Patient Progress  Patient progress: stable    ED Course as of 10/15/22 1350   Sat Oct 15, 2022   1304 EKG, 12 LEAD, INITIAL  ED EKG interpretation:  Rhythm: normal sinus rhythm; and regular . Rate (approx.): 97; Axis: normal; P wave: normal; QRS interval: normal ; ST/T wave: non-specific changes; Other findings: abnormal ekg.  This EKG was interpreted by Sheridan Davis MD,ED Provider.  Earl Serrano      ED Course User Index  [CH] Marcellus Scott MD       Procedures

## 2022-10-15 NOTE — DISCHARGE INSTRUCTIONS
Thank you for allowing us to provide you with medical care today. We realize that you have many choices for your emergency care needs. We thank you for choosing [address] [place] [address]. Please choose us in the future for any continued health care needs. The exam and treatment you received in the emergency department were for an emergent problem and are not intended as complete care. It is important that you follow-up with a doctor. If your symptoms worsen or you do not improve you should return to the emergency department. We are available 24 hours a day. Please make an appointment with your health care provider for follow-up of your emergency department visit. Take this sheet with you when you go to your follow-up visit.

## 2022-10-17 LAB
ATRIAL RATE: 97 BPM
CALCULATED P AXIS, ECG09: 55 DEGREES
CALCULATED R AXIS, ECG10: 44 DEGREES
CALCULATED T AXIS, ECG11: -9 DEGREES
DIAGNOSIS, 93000: NORMAL
P-R INTERVAL, ECG05: 152 MS
Q-T INTERVAL, ECG07: 334 MS
QRS DURATION, ECG06: 96 MS
QTC CALCULATION (BEZET), ECG08: 424 MS
VENTRICULAR RATE, ECG03: 97 BPM

## 2022-10-18 ENCOUNTER — OFFICE VISIT (OUTPATIENT)
Dept: CARDIOLOGY CLINIC | Age: 37
End: 2022-10-18
Payer: COMMERCIAL

## 2022-10-18 VITALS
HEIGHT: 69 IN | SYSTOLIC BLOOD PRESSURE: 150 MMHG | BODY MASS INDEX: 39.07 KG/M2 | HEART RATE: 80 BPM | OXYGEN SATURATION: 97 % | WEIGHT: 263.8 LBS | DIASTOLIC BLOOD PRESSURE: 100 MMHG

## 2022-10-18 DIAGNOSIS — I10 PRIMARY HYPERTENSION: ICD-10-CM

## 2022-10-18 DIAGNOSIS — G47.33 OSA (OBSTRUCTIVE SLEEP APNEA): Primary | ICD-10-CM

## 2022-10-18 DIAGNOSIS — F41.1 GAD (GENERALIZED ANXIETY DISORDER): ICD-10-CM

## 2022-10-18 DIAGNOSIS — R00.2 PALPITATIONS: ICD-10-CM

## 2022-10-18 DIAGNOSIS — F41.0 PANIC DISORDER: ICD-10-CM

## 2022-10-18 PROCEDURE — 99214 OFFICE O/P EST MOD 30 MIN: CPT | Performed by: INTERNAL MEDICINE

## 2022-10-18 RX ORDER — AMLODIPINE AND VALSARTAN 5; 160 MG/1; MG/1
1 TABLET ORAL DAILY
Qty: 90 TABLET | Refills: 3
Start: 2022-10-18

## 2022-10-18 NOTE — PROGRESS NOTES
Shahrzad Salinas MD, MS, [facility] [place]            HISTORY OF PRESENT ILLNESS:    Clay Martinez is a 40 y.o. male referred for palpitations.    ++ anxiety, started after COVID vaccine. Had near panic attack here in waiting room. ED visit 7/26 HTN, CP, anxiety attacks. Diagnosed HTN 10 years ago. Long history of anxiety, Patient reports has had increase anxiety over the last weeks, months. He does have a long history of this and has been followed by Dr. Crissy Bridges.  Patient was told and prescribed BuSpar but he has not started this because he was concerned he could interact with his hypertensive medications. Saw Cardiology last Fall - VCS - monitor placed - no echo. No appetite, little week - may be new meds. Discussed echo - continue current valsartan/amlodipine. Buspar BID, fluoxetine BID, valsartan/amlodipine. Asked that he use the hydroxyzine and xanax less if possible. Saw urgently for palpitations - Dr. Keiry Julian - holter placed, he is wearing. Tingling in feet. Saw Psychiatry - taking propranolol once a day. Zoloft made him depressed, dizzy, felt off - went to ED - psych consult over computer. Did a DNA test for medication optimization. Is off valsartan. BP is elevated. Advised to restart valsartan 160/amlodipine 5.   Can take propranolol twice a day - seems to help him.      7 day monitor reviewed - only three days of monitored     SUMMARY:   Problem List  Date Reviewed: 10/14/2022            Codes Class Noted    Panic disorder ICD-10-CM: F41.0  ICD-9-CM: 300.01  10/14/2022        ERIC (generalized anxiety disorder) ICD-10-CM: F41.1  ICD-9-CM: 300.02  10/14/2022        Social anxiety disorder ICD-10-CM: F40.10  ICD-9-CM: 300.23  10/14/2022        Palpitations ICD-10-CM: R00.2  ICD-9-CM: 785.1  7/29/2022        Obesity, morbid ([address] ICD-10-CM: E66.01  ICD-9-CM: 278.01  3/29/2018        Chronic bilateral low back pain with sciatica ICD-10-CM: M54.40, G89.29  ICD-9-CM: 724.2, 724.3, 338.29  3/29/2018        JOS (obstructive sleep apnea) ICD-10-CM: G47.33  ICD-9-CM: 327.23  Unknown        Hypertension ICD-10-CM: I10  ICD-9-CM: 401.9  Unknown        Cold-induced asthma ICD-10-CM: J45.909  ICD-9-CM: 493.10  Unknown        Gout ICD-10-CM: M10.9  ICD-9-CM: 274.9  Unknown           Current Outpatient Medications on File Prior to Visit   Medication Sig    LORazepam (ATIVAN) 0.5 mg tablet Take 1 Tablet by mouth two (2) times daily as needed for Anxiety. Max Daily Amount: 1 mg. amLODIPine (NORVASC) 10 mg tablet Take 10 mg by mouth daily. sertraline (ZOLOFT) 25 mg tablet Take 0.5 Tablets by mouth daily. propranoloL (INDERAL) 10 mg tablet Take 1 Tablet by mouth two (2) times a day. cpap machine kit by Does Not Apply route. No current facility-administered medications on file prior to visit. CARDIOLOGY STUDIES TO DATE:  No results found for this visit on 10/18/22. No chief complaint on file.       CARDIAC ROS:   positive for fatigue, dyspnea on exertion    Past Medical History:   Diagnosis Date    Angioedema     ace induced asthma     Cold-induced asthma     Gout     Hypertension     JOS (obstructive sleep apnea)        Family History   Problem Relation Age of Onset    Sleep Apnea Mother     Hypertension Mother     Breast Cancer Maternal Grandmother     Hypertension Father        Social History     Socioeconomic History    Marital status: SINGLE     Spouse name: Not on file    Number of children: Not on file    Years of education: Not on file    Highest education level: Not on file   Occupational History    Not on file   Tobacco Use    Smoking status: Never    Smokeless tobacco: Never   Vaping Use    Vaping Use: Never used   Substance and Sexual Activity    Alcohol use: No    Drug use: Yes     Comment: CBD    Sexual activity: Yes     Partners: Female   Other Topics Concern    Not on file   Social History Narrative    Not on file     Social Determinants of Health     Financial Resource Strain: Low Risk     Difficulty of Paying Living Expenses: Not hard at all   Food Insecurity: No Food Insecurity    Worried About Running Out of Food in the Last Year: Never true    Ran Out of Food in the Last Year: Never true   Transportation Needs: Not on file   Physical Activity: Not on file   Stress: Not on file   Social Connections: Not on file   Intimate Partner Violence: Not on file   Housing Stability: Not on file        GENERAL ROS:  A comprehensive review of systems was negative except for that written in the HPI. There were no vitals taken for this visit. Wt Readings from Last 3 Encounters:   10/15/22 267 lb (121.1 kg)   10/13/22 267 lb 15.9 oz (121.6 kg)   10/13/22 268 lb (121.6 kg)            BP Readings from Last 3 Encounters:   10/15/22 (!) 158/106   10/13/22 (!) 154/90   10/13/22 (!) 164/96       PHYSICAL EXAM  General appearance: alert, cooperative, no distress, appears stated age  Neck: supple, symmetrical, trachea midline, no adenopathy, thyroid: not enlarged, symmetric, no tenderness/mass/nodules, no carotid bruit, and no JVD  Lungs: clear to auscultation bilaterally  Heart: regular rate and rhythm, S1, S2 normal, no murmur, click, rub or gallop  Extremities: extremities normal, atraumatic, no cyanosis or edema    Lab Results   Component Value Date/Time    Cholesterol, total 182 02/11/2021 12:11 PM    Cholesterol, total 229 (H) 08/11/2014 02:45 PM    HDL Cholesterol 37 (L) 02/11/2021 12:11 PM    LDL, calculated 108 (H) 02/11/2021 12:11 PM    Triglyceride 210 (H) 02/11/2021 12:11 PM       ASSESSMENT    ICD-10-CM ICD-9-CM    1. JOS (obstructive sleep apnea)  G47.33 327.23       2. Primary hypertension  I10 401.9       3. Panic disorder  F41.0 300.01       4. ERIC (generalized anxiety disorder)  F41.1 300.02       5. Palpitations  R00.2 785.1             Encounter Diagnoses   Name Primary?     JOS (obstructive sleep apnea) Yes    Primary hypertension     Panic disorder     ERIC (generalized anxiety disorder)     Palpitations        No orders of the defined types were placed in this encounter. Mickey Nash MD  10/18/2022        [address]   [address] [facility], [address] [place] [address]  [phone] (F)    [address] [id number] [address]  [phone] (P)  [phone] (F)    ATTENTION:   This medical record was transcribed using an electronic medical records/speech recognition system. Although proofread, it may and can contain electronic, spelling and other errors. Corrections may be executed at a later time. Please feel free to contact us for any clarifications as needed.

## 2022-10-18 NOTE — PROGRESS NOTES
Chief Complaint   Patient presents with    Follow-up     2wks  Educate on pulse OX          Vitals:    10/18/22 0858 10/18/22 0912   BP: (!) 160/102 (!) 150/100   BP 1 Location: Left arm Right arm   Pulse: 80    Height: 5' 9\" (1.753 m)    Weight: 263 lb 12.8 oz (119.7 kg)    SpO2: 97%          Chest pain: center- L side of chest after eating. SOB: no    Palpitations: in upper diaphragm     Dizziness: from getting up from sitting     Swelling: no    Refills:       Have you been to the ER, urgent care clinic since your last visit? Hospitalized since your last visit? 10-17-22    Have you sen or consulted other health care providers outside of the [facility] system since your last visit?  (Include any pap smears or colon screening.)          Reaction to Zoloft

## 2022-11-10 DIAGNOSIS — F41.9 ANXIETY: ICD-10-CM

## 2022-11-10 RX ORDER — LORAZEPAM 0.5 MG/1
0.5 TABLET ORAL
Qty: 45 TABLET | Refills: 1 | Status: SHIPPED | OUTPATIENT
Start: 2022-11-10

## 2022-11-10 NOTE — TELEPHONE ENCOUNTER
Last visit: 10.14.22  Next visit: 11.16.22    ;  10/14/2022 10/14/2022 1  Lorazepam 0.5 Mg Tablet 45.00 24 [place]   09/28/2022 09/13/2022 2 Lorazepam 0.5 Mg Tablet 20.00 20 Ra Ellie

## 2022-11-15 ENCOUNTER — APPOINTMENT (OUTPATIENT)
Dept: GENERAL RADIOLOGY | Age: 37
End: 2022-11-15
Attending: STUDENT IN AN ORGANIZED HEALTH CARE EDUCATION/TRAINING PROGRAM
Payer: COMMERCIAL

## 2022-11-15 ENCOUNTER — HOSPITAL ENCOUNTER (EMERGENCY)
Age: 37
Discharge: HOME | End: 2022-11-15
Attending: STUDENT IN AN ORGANIZED HEALTH CARE EDUCATION/TRAINING PROGRAM
Payer: COMMERCIAL

## 2022-11-15 ENCOUNTER — APPOINTMENT (OUTPATIENT)
Dept: CT IMAGING | Age: 37
End: 2022-11-15
Attending: STUDENT IN AN ORGANIZED HEALTH CARE EDUCATION/TRAINING PROGRAM
Payer: COMMERCIAL

## 2022-11-15 VITALS
DIASTOLIC BLOOD PRESSURE: 93 MMHG | HEART RATE: 95 BPM | SYSTOLIC BLOOD PRESSURE: 156 MMHG | TEMPERATURE: 98.8 F | RESPIRATION RATE: 16 BRPM | OXYGEN SATURATION: 99 %

## 2022-11-15 DIAGNOSIS — R07.89 ATYPICAL CHEST PAIN: Primary | ICD-10-CM

## 2022-11-15 DIAGNOSIS — I10 HYPERTENSION, UNSPECIFIED TYPE: ICD-10-CM

## 2022-11-15 LAB
ALBUMIN SERPL-MCNC: 3.8 G/DL (ref 3.5–5)
ALBUMIN/GLOB SERPL: 1 {RATIO} (ref 1.1–2.2)
ALP SERPL-CCNC: 81 U/L (ref 45–117)
ALT SERPL-CCNC: 30 U/L (ref 12–78)
ANION GAP SERPL CALC-SCNC: 2 MMOL/L (ref 5–15)
AST SERPL-CCNC: 21 U/L (ref 15–37)
BASOPHILS # BLD: 0 K/UL (ref 0–0.1)
BASOPHILS NFR BLD: 0 % (ref 0–1)
BILIRUB SERPL-MCNC: 0.9 MG/DL (ref 0.2–1)
BUN SERPL-MCNC: 12 MG/DL (ref 6–20)
BUN/CREAT SERPL: 11 (ref 12–20)
CALCIUM SERPL-MCNC: 9.1 MG/DL (ref 8.5–10.1)
CHLORIDE SERPL-SCNC: 106 MMOL/L (ref 97–108)
CO2 SERPL-SCNC: 30 MMOL/L (ref 21–32)
CREAT SERPL-MCNC: 1.08 MG/DL (ref 0.7–1.3)
D DIMER PPP FEU-MCNC: 0.79 MG/L FEU (ref 0–0.65)
DIFFERENTIAL METHOD BLD: ABNORMAL
EOSINOPHIL # BLD: 0.2 K/UL (ref 0–0.4)
EOSINOPHIL NFR BLD: 4 % (ref 0–7)
ERYTHROCYTE [DISTWIDTH] IN BLOOD BY AUTOMATED COUNT: 12.6 % (ref 11.5–14.5)
GLOBULIN SER CALC-MCNC: 3.9 G/DL (ref 2–4)
GLUCOSE SERPL-MCNC: 115 MG/DL (ref 65–100)
HCT VFR BLD AUTO: 43.1 % (ref 36.6–50.3)
HGB BLD-MCNC: 13.7 G/DL (ref 12.1–17)
IMM GRANULOCYTES # BLD AUTO: 0 K/UL
IMM GRANULOCYTES NFR BLD AUTO: 0 %
INR PPP: 1 (ref 0.9–1.1)
LYMPHOCYTES # BLD: 1.8 K/UL (ref 0.8–3.5)
LYMPHOCYTES NFR BLD: 35 % (ref 12–49)
MCH RBC QN AUTO: 27.2 PG (ref 26–34)
MCHC RBC AUTO-ENTMCNC: 31.8 G/DL (ref 30–36.5)
MCV RBC AUTO: 85.7 FL (ref 80–99)
MONOCYTES # BLD: 0.2 K/UL (ref 0–1)
MONOCYTES NFR BLD: 4 % (ref 5–13)
NEUTS SEG # BLD: 2.8 K/UL (ref 1.8–8)
NEUTS SEG NFR BLD: 57 % (ref 32–75)
NRBC # BLD: 0 K/UL (ref 0–0.01)
NRBC BLD-RTO: 0 PER 100 WBC
PLATELET # BLD AUTO: 185 K/UL (ref 150–400)
PLATELET COMMENTS,PCOM: ABNORMAL
POTASSIUM SERPL-SCNC: 3.9 MMOL/L (ref 3.5–5.1)
PROT SERPL-MCNC: 7.7 G/DL (ref 6.4–8.2)
PROTHROMBIN TIME: 10.6 SEC (ref 9–11.1)
RBC # BLD AUTO: 5.03 M/UL (ref 4.1–5.7)
RBC MORPH BLD: ABNORMAL
SODIUM SERPL-SCNC: 138 MMOL/L (ref 136–145)
TROPONIN-HIGH SENSITIVITY: 11 NG/L (ref 0–76)
TROPONIN-HIGH SENSITIVITY: 12 NG/L (ref 0–76)
WBC # BLD AUTO: 5 K/UL (ref 4.1–11.1)

## 2022-11-15 PROCEDURE — 85379 FIBRIN DEGRADATION QUANT: CPT

## 2022-11-15 PROCEDURE — 93005 ELECTROCARDIOGRAM TRACING: CPT

## 2022-11-15 PROCEDURE — 71275 CT ANGIOGRAPHY CHEST: CPT

## 2022-11-15 PROCEDURE — 71046 X-RAY EXAM CHEST 2 VIEWS: CPT

## 2022-11-15 PROCEDURE — 85025 COMPLETE CBC W/AUTO DIFF WBC: CPT

## 2022-11-15 PROCEDURE — 99285 EMERGENCY DEPT VISIT HI MDM: CPT

## 2022-11-15 PROCEDURE — 36415 COLL VENOUS BLD VENIPUNCTURE: CPT

## 2022-11-15 PROCEDURE — 84484 ASSAY OF TROPONIN QUANT: CPT

## 2022-11-15 PROCEDURE — 85610 PROTHROMBIN TIME: CPT

## 2022-11-15 PROCEDURE — 74011000636 HC RX REV CODE- 636: Performed by: STUDENT IN AN ORGANIZED HEALTH CARE EDUCATION/TRAINING PROGRAM

## 2022-11-15 PROCEDURE — 80053 COMPREHEN METABOLIC PANEL: CPT

## 2022-11-15 RX ADMIN — IOPAMIDOL 100 ML: 755 INJECTION, SOLUTION INTRAVENOUS at 08:53

## 2022-11-15 NOTE — ED NOTES
Patient given discharge instructions per provider and verbalized understanding. Patient ambulatory from ED to exit with self as  of vehicle.

## 2022-11-15 NOTE — ED PROVIDER NOTES
Chief Complaint   Patient presents with    Chest Pain     This is a 44-year-old male with hypertension presenting with intermittent left sided chest pain for the last 2 days, characterized as a sensation of tightness with associated heaviness in the left upper extremity. Occurs both at rest and with exertion. This particular episode today woke him up from sleep at 0300, he felt like he might have been experiencing a panic attack and so took Ativan prescribed by his primary physician at 0400 with some improvement in his symptoms. Denies any associated fevers, cough, shortness of breath, or recent illness. No abdominal pain or vomiting. No syncope. Reports intermittent pain in his left upper back but does not characterize this as retrosternal pain. No history of tobacco use. No immediately family history of coronary disease. Denies any history of venous thromboembolism, lower extremity pain or edema. No other systemic complaints. Review of Systems   Constitutional:  Negative for fever. HENT:  Negative for facial swelling. Eyes:  Negative for redness. Respiratory:  Negative for shortness of breath. Cardiovascular:  Positive for chest pain. Negative for leg swelling. Gastrointestinal:  Negative for abdominal pain and vomiting. Genitourinary:  Negative for difficulty urinating. Musculoskeletal:  Positive for back pain. Neurological:  Negative for syncope. Psychiatric/Behavioral:  Negative for confusion.         Past Medical History:   Diagnosis Date    Angioedema     ace induced asthma     Cold-induced asthma     Gout     Hypertension     JOS (obstructive sleep apnea)        Past Surgical History:   Procedure Laterality Date    HX ADENOIDECTOMY           Family History:   Problem Relation Age of Onset    Sleep Apnea Mother     Hypertension Mother     Breast Cancer Maternal Grandmother     Hypertension Father        Social History     Socioeconomic History    Marital status: SINGLE Spouse name: Not on file    Number of children: Not on file    Years of education: Not on file    Highest education level: Not on file   Occupational History    Not on file   Tobacco Use    Smoking status: Never    Smokeless tobacco: Never   Vaping Use    Vaping Use: Never used   Substance and Sexual Activity    Alcohol use: No    Drug use: Yes     Comment: CBD    Sexual activity: Yes     Partners: Female   Other Topics Concern    Not on file   Social History Narrative    Not on file     Social Determinants of Health     Financial Resource Strain: Low Risk     Difficulty of Paying Living Expenses: Not hard at all   Food Insecurity: No Food Insecurity    Worried About Running Out of Food in the Last Year: Never true    Ran Out of Food in the Last Year: Never true   Transportation Needs: Not on file   Physical Activity: Not on file   Stress: Not on file   Social Connections: Not on file   Intimate Partner Violence: Not on file   Housing Stability: Not on file         ALLERGIES: Lisinopril, Pcn [penicillins], and Venlafaxine      Vitals:    11/15/22 0734 11/15/22 0830 11/15/22 0849   BP: (!) 170/104  (!) 156/93   Pulse: 95     Resp: 16     Temp: 98.8 °F (37.1 °C)     SpO2: 99% 99%        Physical exam  General:  Awake and alert, NAD  HEENT:  NC/AT, equal pupils, moist mucous membranes  Neck:   Normal inspection, full range of motion  Cardiac:  RRR, no murmurs  Respiratory:  Clear bilaterally, no wheezes, rales, rhonchi  Abdomen:  Soft and nontender, nondistended  Extremities: Warm and well perfused, no peripheral edema, no calf tenderness  Neuro:  Moving all extremities symmetrically without gross motor deficit  Skin:   No rashes, ecchymoses, or pallor      Recent Results (from the past 12 hour(s))   CBC WITH AUTOMATED DIFF    Collection Time: 11/15/22  7:41 AM   Result Value Ref Range    WBC 5.0 4.1 - 11.1 K/uL    RBC 5.03 4. 10 - 5.70 M/uL    HGB 13.7 12.1 - 17.0 g/dL    HCT 43.1 36.6 - 50.3 %    MCV 85.7 80.0 - 99.0 FL    MCH 27.2 26.0 - 34.0 PG    MCHC 31.8 30.0 - 36.5 g/dL    RDW 12.6 11.5 - 14.5 %    PLATELET 091 108 - 050 K/uL    NRBC 0.0 0  WBC    ABSOLUTE NRBC 0.00 0.00 - 0.01 K/uL    NEUTROPHILS 57 32 - 75 %    LYMPHOCYTES 35 12 - 49 %    MONOCYTES 4 (L) 5 - 13 %    EOSINOPHILS 4 0 - 7 %    BASOPHILS 0 0 - 1 %    IMMATURE GRANULOCYTES 0 %    ABS. NEUTROPHILS 2.8 1.8 - 8.0 K/UL    ABS. LYMPHOCYTES 1.8 0.8 - 3.5 K/UL    ABS. MONOCYTES 0.2 0.0 - 1.0 K/UL    ABS. EOSINOPHILS 0.2 0.0 - 0.4 K/UL    ABS. BASOPHILS 0.0 0.0 - 0.1 K/UL    ABS. IMM. GRANS. 0.0 K/UL    DF MANUAL      PLATELET COMMENTS Large Platelets      RBC COMMENTS NORMOCYTIC, NORMOCHROMIC     METABOLIC PANEL, COMPREHENSIVE    Collection Time: 11/15/22  7:41 AM   Result Value Ref Range    Sodium 138 136 - 145 mmol/L    Potassium 3.9 3.5 - 5.1 mmol/L    Chloride 106 97 - 108 mmol/L    CO2 30 21 - 32 mmol/L    Anion gap 2 (L) 5 - 15 mmol/L    Glucose 115 (H) 65 - 100 mg/dL    BUN 12 6 - 20 MG/DL    Creatinine 1.08 0.70 - 1.30 MG/DL    BUN/Creatinine ratio 11 (L) 12 - 20      eGFR >60 >60 ml/min/1.73m2    Calcium 9.1 8.5 - 10.1 MG/DL    Bilirubin, total 0.9 0.2 - 1.0 MG/DL    ALT (SGPT) 30 12 - 78 U/L    AST (SGOT) 21 15 - 37 U/L    Alk.  phosphatase 81 45 - 117 U/L    Protein, total 7.7 6.4 - 8.2 g/dL    Albumin 3.8 3.5 - 5.0 g/dL    Globulin 3.9 2.0 - 4.0 g/dL    A-G Ratio 1.0 (L) 1.1 - 2.2     TROPONIN-HIGH SENSITIVITY    Collection Time: 11/15/22  7:41 AM   Result Value Ref Range    Troponin-High Sensitivity 12 0 - 76 ng/L   D DIMER    Collection Time: 11/15/22  7:41 AM   Result Value Ref Range    D-dimer 0.79 (H) 0.00 - 0.65 mg/L FEU   PROTHROMBIN TIME + INR    Collection Time: 11/15/22  7:41 AM   Result Value Ref Range    INR 1.0 0.9 - 1.1      Prothrombin time 10.6 9.0 - 11.1 sec   TROPONIN-HIGH SENSITIVITY    Collection Time: 11/15/22  9:23 AM   Result Value Ref Range    Troponin-High Sensitivity 11 0 - 76 ng/L      XR CHEST PA LAT    Result Date: 11/15/2022  Normal PA and lateral chest views. CTA CHEST W OR W WO CONT    Result Date: 11/15/2022  No pulmonary embolism, no acute airspace disease. Procedures - none unless documented below    EKG as interpreted by me:  Normal sinus rhythm at a rate of 95, normal axis and intervals, grossly no ST or T-wave changes suggesting acute ischemia. ED course: Labs, EKG and imaging reviewed. The patient stratifies into the low risk category based on HEART score criteria and based on our institutional HS troponin algorithm effectively rules out for AMI. I have a lower suspicion for acute coronary ischemia given the diagnostics above. Given positive dimer proceeded with CTA chest ultimately negative for pulmonary embolism. Doubt aortic dissection clinically. Continues to be well appearing with stable VS after reassessment, pressures improving without intervention. I think he can be safely discharged home to pursue further risk stratification as an outpatient given all of the above, will have him follow up in the office with his established cardiologist Carmen Lentz) for provocative testing. Will discharge home.     Impression: Atypical chest pain  Disposition: Discharge home

## 2022-11-16 ENCOUNTER — PATIENT MESSAGE (OUTPATIENT)
Dept: CARDIOLOGY CLINIC | Age: 37
End: 2022-11-16

## 2022-11-16 ENCOUNTER — HOSPITAL ENCOUNTER (EMERGENCY)
Age: 37
Discharge: HOME | End: 2022-11-16
Attending: EMERGENCY MEDICINE
Payer: COMMERCIAL

## 2022-11-16 VITALS
DIASTOLIC BLOOD PRESSURE: 90 MMHG | HEIGHT: 69 IN | SYSTOLIC BLOOD PRESSURE: 150 MMHG | WEIGHT: 262 LBS | TEMPERATURE: 96.9 F | BODY MASS INDEX: 38.8 KG/M2 | HEART RATE: 81 BPM | RESPIRATION RATE: 16 BRPM | OXYGEN SATURATION: 99 %

## 2022-11-16 DIAGNOSIS — F41.1 ANXIETY STATE: Primary | ICD-10-CM

## 2022-11-16 DIAGNOSIS — R53.83 FATIGUE, UNSPECIFIED TYPE: ICD-10-CM

## 2022-11-16 LAB
APPEARANCE UR: CLEAR
BACTERIA URNS QL MICRO: NEGATIVE /HPF
BILIRUB UR QL: NEGATIVE
COLOR UR: NORMAL
EPITH CASTS URNS QL MICRO: NORMAL /LPF
GLUCOSE UR STRIP.AUTO-MCNC: NEGATIVE MG/DL
HGB UR QL STRIP: NEGATIVE
KETONES UR QL STRIP.AUTO: NEGATIVE MG/DL
LEUKOCYTE ESTERASE UR QL STRIP.AUTO: NEGATIVE
NITRITE UR QL STRIP.AUTO: NEGATIVE
PH UR STRIP: 6 [PH] (ref 5–8)
PROT UR STRIP-MCNC: NEGATIVE MG/DL
RBC #/AREA URNS HPF: NORMAL /HPF (ref 0–5)
SP GR UR REFRACTOMETRY: 1.01 (ref 1–1.03)
UR CULT HOLD, URHOLD: NORMAL
UROBILINOGEN UR QL STRIP.AUTO: 0.2 EU/DL (ref 0.2–1)
WBC URNS QL MICRO: NORMAL /HPF (ref 0–4)

## 2022-11-16 PROCEDURE — 99283 EMERGENCY DEPT VISIT LOW MDM: CPT

## 2022-11-16 PROCEDURE — 81001 URINALYSIS AUTO W/SCOPE: CPT

## 2022-11-16 NOTE — ED NOTES
Pt was discharged and given verbal instructions by Dr Joe Harrison. Pt did not wait for written instructions.

## 2022-11-16 NOTE — ED PROVIDER NOTES
78-year-old male with fatigue. Patient has a history of anxiety disorder with 2 visits to medical attention yesterday. 1 to an ER with a negative work-up for chest pain and discharge, and another to the urgent care for trace amounts of hematuria and a potential left ear infection. He was started on an antibiotic yesterday. Patient has no left ear pain. The surprise of the ear infection raises suspicion of the accuracy of diagnosis from yesterday. He is here for second opinion on the urine in the ER. The history is provided by the patient. Other  This is a recurrent problem. The current episode started more than 2 days ago. The problem occurs constantly. The problem has not changed since onset. Pertinent negatives include no chest pain, no abdominal pain, no headaches and no shortness of breath. Nothing aggravates the symptoms. Nothing relieves the symptoms. He has tried nothing for the symptoms.       Past Medical History:   Diagnosis Date    Angioedema     ace induced asthma     Anxiety     Cold-induced asthma     Gout     Hypertension     JOS (obstructive sleep apnea)        Past Surgical History:   Procedure Laterality Date    HX ADENOIDECTOMY           Family History:   Problem Relation Age of Onset    Sleep Apnea Mother     Hypertension Mother     Breast Cancer Maternal Grandmother     Hypertension Father        Social History     Socioeconomic History    Marital status: SINGLE     Spouse name: Not on file    Number of children: Not on file    Years of education: Not on file    Highest education level: Not on file   Occupational History    Not on file   Tobacco Use    Smoking status: Never    Smokeless tobacco: Never   Vaping Use    Vaping Use: Never used   Substance and Sexual Activity    Alcohol use: No    Drug use: Yes     Comment: CBD    Sexual activity: Yes     Partners: Female   Other Topics Concern    Not on file   Social History Narrative    Not on file     Social Determinants of Health Financial Resource Strain: Low Risk     Difficulty of Paying Living Expenses: Not hard at all   Food Insecurity: No Food Insecurity    Worried About Running Out of Food in the Last Year: Never true    Ran Out of Food in the Last Year: Never true   Transportation Needs: Not on file   Physical Activity: Not on file   Stress: Not on file   Social Connections: Not on file   Intimate Partner Violence: Not on file   Housing Stability: Not on file         ALLERGIES: Lisinopril, Pcn [penicillins], and Venlafaxine    Review of Systems   Constitutional:  Positive for fatigue. Negative for chills and fever. HENT:  Negative for congestion, rhinorrhea, sneezing and sore throat. Eyes:  Negative for redness and visual disturbance. Respiratory:  Negative for shortness of breath. Cardiovascular:  Negative for chest pain and leg swelling. Gastrointestinal:  Negative for abdominal pain, nausea and vomiting. Genitourinary:  Negative for difficulty urinating and frequency. Musculoskeletal:  Negative for back pain, myalgias and neck stiffness. Skin:  Negative for rash. Neurological:  Negative for dizziness, syncope, weakness and headaches. Hematological:  Negative for adenopathy. All other systems reviewed and are negative. Vitals:    11/16/22 0753   BP: (!) 162/94   Pulse: 77   Resp: 16   Temp: 96.9 °F (36.1 °C)   SpO2: 98%   Weight: 118.8 kg (262 lb)   Height: 5' 9\" (1.753 m)            Physical Exam  Vitals and nursing note reviewed. Constitutional:       Appearance: Normal appearance. He is well-developed. HENT:      Head: Normocephalic and atraumatic. Cardiovascular:      Rate and Rhythm: Normal rate and regular rhythm. Pulses: Normal pulses. Heart sounds: Normal heart sounds. Pulmonary:      Effort: Pulmonary effort is normal. No respiratory distress. Breath sounds: Normal breath sounds. Chest:      Chest wall: No tenderness.    Abdominal:      General: Bowel sounds are normal. Palpations: Abdomen is soft. Tenderness: There is no abdominal tenderness. There is no guarding or rebound. Musculoskeletal:      Cervical back: Full passive range of motion without pain, normal range of motion and neck supple. Skin:     General: Skin is warm and dry. Findings: No erythema or rash. Neurological:      Mental Status: He is alert and oriented to person, place, and time. Psychiatric:         Speech: Speech normal.         Behavior: Behavior normal.         Thought Content: Thought content normal.         Judgment: Judgment normal.        MDM  Number of Diagnoses or Management Options  Diagnosis management comments: Left ear exam is normal today. We will do a urinalysis today. Patient is very anxious on questioning very suspicious of medical diagnoses from previous visits. He cannot explain why he does not trust the diagnosis and treatment plan of previous physicians yesterday.            Procedures

## 2022-11-16 NOTE — ED TRIAGE NOTES
Pt was wheeled to the treatment area pt states \"I went to [address] yesterday with chest pain and left arm pain they checked my heart and did a CT and that was negative. I went to [facility] last night because I was feeling weak nauseated and I had seen blood in my urine. She checked the urine and she looked in my ears and said the left ear was red and swollen flu and covid were negative. She said she thought I had a UTI and an ear infection and gave me antibiotic Azithromycin. \" Pt appears anxious in no distress.

## 2022-11-17 LAB
ATRIAL RATE: 95 BPM
CALCULATED P AXIS, ECG09: 45 DEGREES
CALCULATED R AXIS, ECG10: 23 DEGREES
CALCULATED T AXIS, ECG11: 5 DEGREES
DIAGNOSIS, 93000: NORMAL
P-R INTERVAL, ECG05: 148 MS
Q-T INTERVAL, ECG07: 340 MS
QRS DURATION, ECG06: 92 MS
QTC CALCULATION (BEZET), ECG08: 427 MS
VENTRICULAR RATE, ECG03: 95 BPM

## 2022-11-17 NOTE — TELEPHONE ENCOUNTER
Scheduled patient for 11/23 at 11am- My Chart msg sent to confirm. From: Dennis Orta  To: Kenneth Jesus MD  Sent: 11/16/2022  3:39 PM EST  Subject: Appointment Request    Appointment Request From: Dennis Orta    With Provider: Kenneth Jesus MD [[facility]]    Preferred Date Range: Any    Preferred Times: Any Time    Reason for visit: Request an Appointment    Comments:  I would like a stress test or 2nd echo PLEASE!

## 2022-11-18 ENCOUNTER — PATIENT MESSAGE (OUTPATIENT)
Dept: CARDIOLOGY CLINIC | Age: 37
End: 2022-11-18

## 2022-11-18 DIAGNOSIS — I10 PRIMARY HYPERTENSION: ICD-10-CM

## 2022-11-18 RX ORDER — AMLODIPINE AND VALSARTAN 5; 160 MG/1; MG/1
1 TABLET ORAL DAILY
Qty: 90 TABLET | Refills: 3 | OUTPATIENT
Start: 2022-11-18

## 2022-11-18 NOTE — TELEPHONE ENCOUNTER
Refill per VO of Dr. Delvis Rodgers  Last appt: 10/18/2022  Future Appointments   Date Time Provider Portia Steele   11/23/2022 11:00 AM MD ESTELA Nails  AMB   12/9/2022  1:30 PM Michel Thibodeaux, RAYRAY [facility] BS AMB   1/23/2023  9:20 AM MD ESTELA Nails  AMB   9/28/2023  3:10 PM Easton Corea, RAYRAY [facility] BS AMB       Requested Prescriptions     Refused Prescriptions Disp Refills    amLODIPine-valsartan (EXFORGE) 5-160 mg per tablet 90 Tablet 3     Sig: Take 1 Tablet by mouth daily.      Refused By: Eleazar Leo     Reason for Refusal: Patient has requested refill too soon

## 2022-11-18 NOTE — TELEPHONE ENCOUNTER
Called and spoke with patient after 2 identifiers obtained. Confirmed with patient that he does have refills available at his pharmacy and will need to make contact with them for a refill. Patient asked when this was refilled (10/18/22 LOV 90 with 3 refills). Discussed a follow up appointment to see what time worked better for him. Patient requested the 1pm time and call ended. My chart Msg sent to ask patient to please arrive 12:45pm for 1pm appointment.

## 2022-11-22 ENCOUNTER — DOCUMENTATION ONLY (OUTPATIENT)
Dept: CARDIOLOGY CLINIC | Age: 37
End: 2022-11-22

## 2022-11-22 NOTE — PROGRESS NOTES
From: Shamir Pinon LPN   Sent: 30/67/7415   5:41 PM EST   To: See Gordon MD   Subject: update                                           Patient did not seem himself on the phone was almost paranoid and agitated. Patient didn't understand the msg sent earlier that refills were available at his pharmacy and asked when we sent it (10/18/22 at [facility]) he said oh that must be from the other doc from before I must of just had some left. I thought we were original prescribing and if that is the case he was not taking the correct med. Patient ended call abruptly after confirming he wanted 1pm appointment and did not allow for me to tell him to arrive 1245pm so I sent my chart msg.

## 2022-12-02 ENCOUNTER — PATIENT MESSAGE (OUTPATIENT)
Dept: INTERNAL MEDICINE CLINIC | Age: 37
End: 2022-12-02

## 2022-12-02 ENCOUNTER — HOSPITAL ENCOUNTER (EMERGENCY)
Age: 37
Discharge: HOME | End: 2022-12-02
Attending: STUDENT IN AN ORGANIZED HEALTH CARE EDUCATION/TRAINING PROGRAM
Payer: COMMERCIAL

## 2022-12-02 VITALS
TEMPERATURE: 98 F | RESPIRATION RATE: 17 BRPM | SYSTOLIC BLOOD PRESSURE: 150 MMHG | HEART RATE: 97 BPM | DIASTOLIC BLOOD PRESSURE: 106 MMHG | OXYGEN SATURATION: 99 %

## 2022-12-02 DIAGNOSIS — F41.1 GAD (GENERALIZED ANXIETY DISORDER): ICD-10-CM

## 2022-12-02 DIAGNOSIS — F41.9 ANXIETY: ICD-10-CM

## 2022-12-02 DIAGNOSIS — I10 PRIMARY HYPERTENSION: Primary | ICD-10-CM

## 2022-12-02 DIAGNOSIS — R00.2 PALPITATIONS: Primary | ICD-10-CM

## 2022-12-02 LAB
ANION GAP SERPL CALC-SCNC: 4 MMOL/L (ref 5–15)
ATRIAL RATE: 112 BPM
BASOPHILS # BLD: 0.1 K/UL (ref 0–0.1)
BASOPHILS NFR BLD: 1 % (ref 0–1)
BUN SERPL-MCNC: 18 MG/DL (ref 6–20)
BUN/CREAT SERPL: 14 (ref 12–20)
CALCIUM SERPL-MCNC: 9.2 MG/DL (ref 8.5–10.1)
CALCULATED P AXIS, ECG09: 58 DEGREES
CALCULATED R AXIS, ECG10: 78 DEGREES
CALCULATED T AXIS, ECG11: 4 DEGREES
CHLORIDE SERPL-SCNC: 106 MMOL/L (ref 97–108)
CO2 SERPL-SCNC: 29 MMOL/L (ref 21–32)
COMMENT, HOLDF: NORMAL
CREAT SERPL-MCNC: 1.32 MG/DL (ref 0.7–1.3)
DIAGNOSIS, 93000: NORMAL
DIFFERENTIAL METHOD BLD: NORMAL
EOSINOPHIL # BLD: 0.1 K/UL (ref 0–0.4)
EOSINOPHIL NFR BLD: 2 % (ref 0–7)
ERYTHROCYTE [DISTWIDTH] IN BLOOD BY AUTOMATED COUNT: 12.4 % (ref 11.5–14.5)
GLUCOSE SERPL-MCNC: 174 MG/DL (ref 65–100)
HCT VFR BLD AUTO: 44.4 % (ref 36.6–50.3)
HGB BLD-MCNC: 13.9 G/DL (ref 12.1–17)
IMM GRANULOCYTES # BLD AUTO: 0 K/UL (ref 0–0.04)
IMM GRANULOCYTES NFR BLD AUTO: 0 % (ref 0–0.5)
LYMPHOCYTES # BLD: 1.7 K/UL (ref 0.8–3.5)
LYMPHOCYTES NFR BLD: 33 % (ref 12–49)
MAGNESIUM SERPL-MCNC: 2.1 MG/DL (ref 1.6–2.4)
MCH RBC QN AUTO: 27.1 PG (ref 26–34)
MCHC RBC AUTO-ENTMCNC: 31.3 G/DL (ref 30–36.5)
MCV RBC AUTO: 86.5 FL (ref 80–99)
MONOCYTES # BLD: 0.4 K/UL (ref 0–1)
MONOCYTES NFR BLD: 8 % (ref 5–13)
NEUTS SEG # BLD: 2.9 K/UL (ref 1.8–8)
NEUTS SEG NFR BLD: 56 % (ref 32–75)
NRBC # BLD: 0 K/UL (ref 0–0.01)
NRBC BLD-RTO: 0 PER 100 WBC
P-R INTERVAL, ECG05: 152 MS
PLATELET # BLD AUTO: 192 K/UL (ref 150–400)
PMV BLD AUTO: 12.9 FL (ref 8.9–12.9)
POTASSIUM SERPL-SCNC: 3.9 MMOL/L (ref 3.5–5.1)
Q-T INTERVAL, ECG07: 322 MS
QRS DURATION, ECG06: 94 MS
QTC CALCULATION (BEZET), ECG08: 439 MS
RBC # BLD AUTO: 5.13 M/UL (ref 4.1–5.7)
SAMPLES BEING HELD,HOLD: NORMAL
SODIUM SERPL-SCNC: 139 MMOL/L (ref 136–145)
TROPONIN-HIGH SENSITIVITY: 11 NG/L (ref 0–76)
TROPONIN-HIGH SENSITIVITY: 14 NG/L (ref 0–76)
VENTRICULAR RATE, ECG03: 112 BPM
WBC # BLD AUTO: 5.3 K/UL (ref 4.1–11.1)

## 2022-12-02 PROCEDURE — 93005 ELECTROCARDIOGRAM TRACING: CPT

## 2022-12-02 PROCEDURE — 84484 ASSAY OF TROPONIN QUANT: CPT

## 2022-12-02 PROCEDURE — 80048 BASIC METABOLIC PNL TOTAL CA: CPT

## 2022-12-02 PROCEDURE — 74011250637 HC RX REV CODE- 250/637: Performed by: STUDENT IN AN ORGANIZED HEALTH CARE EDUCATION/TRAINING PROGRAM

## 2022-12-02 PROCEDURE — 96360 HYDRATION IV INFUSION INIT: CPT

## 2022-12-02 PROCEDURE — 85025 COMPLETE CBC W/AUTO DIFF WBC: CPT

## 2022-12-02 PROCEDURE — 83735 ASSAY OF MAGNESIUM: CPT

## 2022-12-02 PROCEDURE — 74011250636 HC RX REV CODE- 250/636: Performed by: STUDENT IN AN ORGANIZED HEALTH CARE EDUCATION/TRAINING PROGRAM

## 2022-12-02 PROCEDURE — 99284 EMERGENCY DEPT VISIT MOD MDM: CPT

## 2022-12-02 PROCEDURE — 36415 COLL VENOUS BLD VENIPUNCTURE: CPT

## 2022-12-02 RX ORDER — PROPRANOLOL HYDROCHLORIDE 10 MG/1
10 TABLET ORAL 2 TIMES DAILY
Qty: 60 TABLET | Refills: 1 | Status: SHIPPED | OUTPATIENT
Start: 2022-12-02

## 2022-12-02 RX ORDER — LORAZEPAM 0.5 MG/1
0.5 TABLET ORAL
Status: COMPLETED | OUTPATIENT
Start: 2022-12-02 | End: 2022-12-02

## 2022-12-02 RX ORDER — LORAZEPAM 0.5 MG/1
0.5 TABLET ORAL
Qty: 45 TABLET | Refills: 1 | Status: SHIPPED | OUTPATIENT
Start: 2022-12-02

## 2022-12-02 RX ADMIN — LORAZEPAM 0.5 MG: 0.5 TABLET ORAL at 09:37

## 2022-12-02 RX ADMIN — SODIUM CHLORIDE 1000 ML: 9 INJECTION, SOLUTION INTRAVENOUS at 09:37

## 2022-12-02 NOTE — ED NOTES
Patient discharged by provider. Patient verbalized understanding of discharge instructions. Educated patient on the use of his medications and that they are safe to take as prescribed by his prescribing doctor. Patient stated that he would trust his doctors and try to take as prescribed. Patient was ambulatory and stable.

## 2022-12-02 NOTE — TELEPHONE ENCOUNTER
Next appointment: 12.09.22    : 22 day supply  11/10/2022 11/10/2022 1 Lorazepam 0.5 Mg Tablet 45.00 [address] [place]

## 2022-12-02 NOTE — DISCHARGE INSTRUCTIONS
Continue taking your medications as prescribed. Please call your primary care doctor for ER follow-up visit. Return to the emergency department for any new or worsening symptoms.
Speaking Coherently

## 2022-12-02 NOTE — ED PROVIDER NOTES
HPI     Date of Service:  12/2/2022    Patient:  Clay Martinez    Chief Complaint:  Anxiety       HPI:  Clay Martinez is a 40 y.o.  male with a past medical history of HTN, anxiety who presents for evaluation of palpitations. Patient reports this morning he received \"bad news\" about a family member passing away. States after that he began to feel anxious, having chest pain and felt his heart racing. States he subsequently then checked his apple watch which showed that his heart rate was only 55 which he felt was very low and concern to him as his heart rate is usually elevated when he has anxiety. States his chest pain has now resolved. States currently he is having a racing heart. He did take Ativan 0.5 mg approximately 45 minutes prior to arrival.  He denies any associated shortness of breath. No lightheadedness, syncope. No other recent illness.       Past Medical History:   Diagnosis Date    Angioedema     ace induced asthma     Anxiety     Cold-induced asthma     Gout     Hypertension     JOS (obstructive sleep apnea)        Past Surgical History:   Procedure Laterality Date    HX ADENOIDECTOMY           Family History:   Problem Relation Age of Onset    Sleep Apnea Mother     Hypertension Mother     Breast Cancer Maternal Grandmother     Hypertension Father        Social History     Socioeconomic History    Marital status: SINGLE     Spouse name: Not on file    Number of children: Not on file    Years of education: Not on file    Highest education level: Not on file   Occupational History    Not on file   Tobacco Use    Smoking status: Never    Smokeless tobacco: Never   Vaping Use    Vaping Use: Never used   Substance and Sexual Activity    Alcohol use: No    Drug use: Yes     Comment: CBD    Sexual activity: Yes     Partners: Female   Other Topics Concern    Not on file   Social History Narrative    Not on file     Social Determinants of Health     Financial Resource Strain: Low Risk Difficulty of Paying Living Expenses: Not hard at all   Food Insecurity: No Food Insecurity    Worried About Running Out of Food in the Last Year: Never true    Ran Out of Food in the Last Year: Never true   Transportation Needs: Not on file   Physical Activity: Not on file   Stress: Not on file   Social Connections: Not on file   Intimate Partner Violence: Not on file   Housing Stability: Not on file         ALLERGIES: Lisinopril, Pcn [penicillins], and Venlafaxine    Review of Systems   Constitutional:  Negative for chills and fever. HENT:  Negative for congestion and rhinorrhea. Eyes:  Negative for discharge and redness. Respiratory:  Negative for cough and shortness of breath. Cardiovascular:  Positive for chest pain and palpitations. Negative for leg swelling. Gastrointestinal:  Negative for abdominal pain, diarrhea, nausea and vomiting. Musculoskeletal:  Negative for back pain and neck stiffness. Skin:  Negative for rash and wound. Allergic/Immunologic: Negative for environmental allergies and food allergies. Neurological:  Positive for headaches. Negative for syncope and speech difficulty. Psychiatric/Behavioral:  Negative for agitation and confusion. Vitals:    12/02/22 0740 12/02/22 0741   BP: (!) 184/107    Pulse: (!) 112 (!) 108   Resp: 16    Temp: 98 °F (36.7 °C)    SpO2: 100% 99%            Physical Exam  Vitals and nursing note reviewed. Constitutional:       General: He is in acute distress. Appearance: Normal appearance. He is not ill-appearing or toxic-appearing. HENT:      Head: Normocephalic and atraumatic. Eyes:      General: No scleral icterus. Extraocular Movements: Extraocular movements intact. Conjunctiva/sclera: Conjunctivae normal.   Cardiovascular:      Rate and Rhythm: Regular rhythm. Tachycardia present. Pulses: Normal pulses. Pulmonary:      Effort: Pulmonary effort is normal. No respiratory distress.       Breath sounds: Normal breath sounds. Abdominal:      General: Abdomen is flat. Palpations: Abdomen is soft. Tenderness: There is no abdominal tenderness. Musculoskeletal:         General: Normal range of motion. Right lower leg: No edema. Left lower leg: No edema. Skin:     General: Skin is warm and dry. Capillary Refill: Capillary refill takes less than 2 seconds. Neurological:      General: No focal deficit present. Mental Status: He is alert and oriented to person, place, and time. Psychiatric:         Mood and Affect: Affect normal. Mood is anxious. Behavior: Behavior normal.        MDM  Number of Diagnoses or Management Options  Palpitations  Diagnosis management comments:     DECISION MAKING:  Ruthie Lewis is a 40 y.o. male who comes in as above. On arrival patient is tachycardic with a heart rate of 112 bpm and blood pressure is elevated at 184/107. Otherwise stable. On my examination he appears mildly anxious, otherwise well-appearing and nontoxic. EKG on arrival shows sinus tachycardia but otherwise no acute findings. Patient will be given an additional dose of Ativan. IV fluids for hydration. Labs are without leukocytosis or anemia. Electrolytes are stable. Mild creatinine elevation at 1.32, IV fluids were administered. Glucose is elevated at 174. High-sensitivity troponin within normal limits at 14 and is actually down trended to 11 on repeat delta troponin. On reevaluation, patient endorses feeling much improved. HR improved to 97 and /106. He was encouraged on ER follow-up with primary care doctor and strict return precautions. He verbalized understanding and was discharged. Amount and/or Complexity of Data Reviewed  Clinical lab tests: reviewed      ED Course as of 12/02/22 0816   [facility] Dec 02, 2022   0803 EKG, 12 LEAD, INITIAL  ECG at 7:40 AM, interpreted by me: Sinus tachycardia, rate 112 bpm.  Normal axis. Normal intervals. No ST elevations. Nonspecific T wave abnormality. [SH]      ED Course User Index  [SH] Pascale Magallanes DO       Procedures      LABS:  Recent Results (from the past 6 hour(s))   SAMPLES BEING HELD    Collection Time: 12/02/22  7:40 AM   Result Value Ref Range    SAMPLES BEING HELD 1DRK GRN,1PST,1LAV     COMMENT        Add-on orders for these samples will be processed based on acceptable specimen integrity and analyte stability, which may vary by analyte. CBC WITH AUTOMATED DIFF    Collection Time: 12/02/22  7:40 AM   Result Value Ref Range    WBC 5.3 4.1 - 11.1 K/uL    RBC 5.13 4.10 - 5.70 M/uL    HGB 13.9 12.1 - 17.0 g/dL    HCT 44.4 36.6 - 50.3 %    MCV 86.5 80.0 - 99.0 FL    MCH 27.1 26.0 - 34.0 PG    MCHC 31.3 30.0 - 36.5 g/dL    RDW 12.4 11.5 - 14.5 %    PLATELET 843 277 - 406 K/uL    MPV 12.9 8.9 - 12.9 FL    NRBC 0.0 0  WBC    ABSOLUTE NRBC 0.00 0.00 - 0.01 K/uL    NEUTROPHILS 56 32 - 75 %    LYMPHOCYTES 33 12 - 49 %    MONOCYTES 8 5 - 13 %    EOSINOPHILS 2 0 - 7 %    BASOPHILS 1 0 - 1 %    IMMATURE GRANULOCYTES 0 0.0 - 0.5 %    ABS. NEUTROPHILS 2.9 1.8 - 8.0 K/UL    ABS. LYMPHOCYTES 1.7 0.8 - 3.5 K/UL    ABS. MONOCYTES 0.4 0.0 - 1.0 K/UL    ABS. EOSINOPHILS 0.1 0.0 - 0.4 K/UL    ABS. BASOPHILS 0.1 0.0 - 0.1 K/UL    ABS. IMM.  GRANS. 0.0 0.00 - 0.04 K/UL    DF AUTOMATED     METABOLIC PANEL, BASIC    Collection Time: 12/02/22  7:40 AM   Result Value Ref Range    Sodium 139 136 - 145 mmol/L    Potassium 3.9 3.5 - 5.1 mmol/L    Chloride 106 97 - 108 mmol/L    CO2 29 21 - 32 mmol/L    Anion gap 4 (L) 5 - 15 mmol/L    Glucose 174 (H) 65 - 100 mg/dL    BUN 18 6 - 20 MG/DL    Creatinine 1.32 (H) 0.70 - 1.30 MG/DL    BUN/Creatinine ratio 14 12 - 20      eGFR >60 >60 ml/min/1.73m2    Calcium 9.2 8.5 - 10.1 MG/DL   MAGNESIUM    Collection Time: 12/02/22  7:40 AM   Result Value Ref Range    Magnesium 2.1 1.6 - 2.4 mg/dL   TROPONIN-HIGH SENSITIVITY    Collection Time: 12/02/22  7:40 AM   Result Value Ref Range Troponin-High Sensitivity 14 0 - 76 ng/L   TROPONIN-HIGH SENSITIVITY    Collection Time: 12/02/22  9:38 AM   Result Value Ref Range    Troponin-High Sensitivity 11 0 - 76 ng/L        IMAGING:  No orders to display         Medications During Visit:  Medications   LORazepam (ATIVAN) tablet 0.5 mg (0.5 mg Oral Given 12/2/22 0937)   sodium chloride 0.9 % bolus infusion 1,000 mL (1,000 mL IntraVENous New Bag 12/2/22 0937)         IMPRESSION:  1. Palpitations        DISPOSITION:  Discharged      Current Discharge Medication List           Follow-up Information       Follow up With Specialties Details Why Contact Info    Josef Reed MD Internal Medicine Physician Schedule an appointment as soon as possible for a visit   [address] [place] [address]  [phone]      [facility] EMERGENCY DEPT Emergency Medicine  If symptoms worsen [address] [place] [address]  [phone]              The patient is asked to follow-up with their primary care provider in the next several days. They are to call tomorrow for an appointment. The patient is asked to return promptly for any increased concerns or worsening of symptoms. They can return to this emergency department or any other emergency department.     Marla Villafana DO

## 2022-12-02 NOTE — ED TRIAGE NOTES
Pt to ED for c/o anxiety attack and palpitations since this morning. Pt reports \"bad phone call this morning\" check his HR on his watch which showed \"HR 55\" and said \"my heart rate is going too slow I am having a panic attack it should be high. Pt taking propanolol and ativan as needed.  Pt took ativan 0.5mg PTA

## 2022-12-06 ENCOUNTER — CLINICAL SUPPORT (OUTPATIENT)
Dept: INTERNAL MEDICINE CLINIC | Age: 37
End: 2022-12-06
Payer: COMMERCIAL

## 2022-12-06 ENCOUNTER — HOSPITAL ENCOUNTER (EMERGENCY)
Age: 37
Discharge: HOME | End: 2022-12-06
Attending: EMERGENCY MEDICINE
Payer: COMMERCIAL

## 2022-12-06 ENCOUNTER — PATIENT MESSAGE (OUTPATIENT)
Dept: INTERNAL MEDICINE CLINIC | Age: 37
End: 2022-12-06

## 2022-12-06 VITALS
SYSTOLIC BLOOD PRESSURE: 192 MMHG | WEIGHT: 274.25 LBS | HEART RATE: 87 BPM | TEMPERATURE: 98.9 F | RESPIRATION RATE: 19 BRPM | HEIGHT: 69 IN | DIASTOLIC BLOOD PRESSURE: 106 MMHG | OXYGEN SATURATION: 98 % | BODY MASS INDEX: 40.62 KG/M2

## 2022-12-06 VITALS — HEART RATE: 72 BPM | SYSTOLIC BLOOD PRESSURE: 139 MMHG | DIASTOLIC BLOOD PRESSURE: 95 MMHG

## 2022-12-06 DIAGNOSIS — R07.89 ATYPICAL CHEST PAIN: Primary | ICD-10-CM

## 2022-12-06 DIAGNOSIS — I10 PRIMARY HYPERTENSION: Primary | ICD-10-CM

## 2022-12-06 PROCEDURE — 99283 EMERGENCY DEPT VISIT LOW MDM: CPT

## 2022-12-06 PROCEDURE — 93005 ELECTROCARDIOGRAM TRACING: CPT

## 2022-12-06 PROCEDURE — 99211 OFF/OP EST MAY X REQ PHY/QHP: CPT | Performed by: INTERNAL MEDICINE

## 2022-12-06 RX ORDER — ACETAMINOPHEN 500 MG
TABLET ORAL
Qty: 1 KIT | Refills: 0 | Status: CANCELLED | OUTPATIENT
Start: 2022-12-06

## 2022-12-06 NOTE — ED NOTES
The patient was discharged home in stable condition. The patient is alert and oriented, in no respiratory distress. The patient's diagnosis, condition and treatment were explained. The patient expressed understanding. No prescriptions given. No work/school note given. A discharge plan has been developed. A  was not involved in the process. Aftercare instructions were given. Pt ambulatory out of the ED.

## 2022-12-06 NOTE — PROGRESS NOTES
Patient came to the office to have his Bp checked. Patients states that he feels like the medication for his Bp is not working. Patient did not take his medication this morning for his bp. Patient states that when he does take the Bp medication that he feels dizzy. Patient states that he did take his propranoloL and LORazepam this morning. Patient wants to know if you want to change his BP medicaiton? Patient also needs an Blood Pressure kit/.

## 2022-12-06 NOTE — ED TRIAGE NOTES
Sharp, tight, left sided chest pain that began 40 minutes PTA, similar pain has been intermittent over last three months. Pt is currently pain free. Pt has hx of anxiety and took ativan 0.5mg this morning for anxiety. Anxiety began 1+ years ago after receiving Covid vaccine. Pt had PCP appointment this AM and felt fine.

## 2022-12-06 NOTE — ED PROVIDER NOTES
61-year-old male presents from home with report of chest pain. States he had transient sharp twinges of pain in his left chest wall earlier today. No symptoms at this time. Denies any associated symptoms like cough, nausea, sweating, shortness of breath. He has had symptoms like this off and on for the past few months. States that he has been trying to get better blood pressure control with his primary care doctor whom he saw earlier today. Denies any cough, vomiting, fevers. No other complaints at this time. No history of cardiac disease.        Past Medical History:   Diagnosis Date    Angioedema     ace induced asthma     Anxiety     Cold-induced asthma     Gout     Hypertension     JOS (obstructive sleep apnea)        Past Surgical History:   Procedure Laterality Date    HX ADENOIDECTOMY           Family History:   Problem Relation Age of Onset    Sleep Apnea Mother     Hypertension Mother     Breast Cancer Maternal Grandmother     Hypertension Father        Social History     Socioeconomic History    Marital status: SINGLE     Spouse name: Not on file    Number of children: Not on file    Years of education: Not on file    Highest education level: Not on file   Occupational History    Not on file   Tobacco Use    Smoking status: Never    Smokeless tobacco: Never   Vaping Use    Vaping Use: Never used   Substance and Sexual Activity    Alcohol use: No    Drug use: Yes     Comment: CBD    Sexual activity: Yes     Partners: Female   Other Topics Concern    Not on file   Social History Narrative    Not on file     Social Determinants of Health     Financial Resource Strain: Low Risk     Difficulty of Paying Living Expenses: Not hard at all   Food Insecurity: No Food Insecurity    Worried About Running Out of Food in the Last Year: Never true    Ran Out of Food in the Last Year: Never true   Transportation Needs: Not on file   Physical Activity: Not on file   Stress: Not on file   Social Connections: Not on file   Intimate Partner Violence: Not on file   Housing Stability: Not on file         ALLERGIES: Lisinopril, Pcn [penicillins], and Venlafaxine    Review of Systems   Constitutional:  Negative for diaphoresis and fever. HENT:  Negative for facial swelling. Eyes:  Negative for visual disturbance. Respiratory:  Negative for cough. Cardiovascular:  Negative for chest pain. Gastrointestinal:  Negative for abdominal pain. Genitourinary:  Negative for dysuria. Musculoskeletal:  Negative for joint swelling. Skin:  Negative for rash. Neurological:  Negative for headaches. Hematological:  Negative for adenopathy. Psychiatric/Behavioral:  Negative for suicidal ideas. Vitals:    12/06/22 1220   BP: (!) 192/106   Pulse: 87   Resp: 19   Temp: 98.9 °F (37.2 °C)   SpO2: 99%   Weight: 124.4 kg (274 lb 4 oz)   Height: 5' 9\" (1.753 m)            Physical Exam  Vitals and nursing note reviewed. Constitutional:       General: He is not in acute distress. Appearance: He is well-developed. He is not ill-appearing. HENT:      Head: Normocephalic and atraumatic. Eyes:      Pupils: Pupils are equal, round, and reactive to light. Cardiovascular:      Rate and Rhythm: Normal rate. Pulmonary:      Effort: Pulmonary effort is normal. No respiratory distress. Abdominal:      General: There is no distension. Musculoskeletal:         General: Normal range of motion. Cervical back: Normal range of motion and neck supple. Skin:     General: Skin is warm and dry. Neurological:      Mental Status: He is alert and oriented to person, place, and time. MDM  Number of Diagnoses or Management Options  Atypical chest pain  Diagnosis management comments: Assessment: Patient resting comfortably at this time with normal vital signs. No symptoms at this time to report. Symptoms pain has resolved. EKG was normal sinus rhythm. Patient resting comfortably in no distress.   On review of his record, this is the 13th or 14th ER visit over the past 3 months with complaints of anxiety, chest discomfort, among others. I do not see any evidence of acute coronary syndrome or other dangerous abnormality. His normal vital signs make PE or other dangerous abnormality unlikely. I think he stable for discharge home to follow-up with his primary care doctor. Amount and/or Complexity of Data Reviewed  Tests in the medicine section of CPT®: reviewed      ED Course as of 12/06/22 1227   Tue Dec 06, 2022   1218 EKG, 12 LEAD, INITIAL  ED EKG interpretation:  Rhythm: normal sinus rhythm. Rate (approx.): 84. Axis: normal.  ST segment:  No concerning ST elevations or depressions. This EKG was interpreted by Navjot Fuentes MD,ED Provider.      [JM]      ED Course User Index  [JM] Stacey Arenas MD       Procedures

## 2022-12-07 LAB
ATRIAL RATE: 84 BPM
CALCULATED P AXIS, ECG09: 61 DEGREES
CALCULATED R AXIS, ECG10: 40 DEGREES
CALCULATED T AXIS, ECG11: -5 DEGREES
DIAGNOSIS, 93000: NORMAL
P-R INTERVAL, ECG05: 158 MS
Q-T INTERVAL, ECG07: 338 MS
QRS DURATION, ECG06: 96 MS
QTC CALCULATION (BEZET), ECG08: 399 MS
VENTRICULAR RATE, ECG03: 84 BPM

## 2022-12-09 ENCOUNTER — VIRTUAL VISIT (OUTPATIENT)
Dept: BEHAVIORAL/MENTAL HEALTH CLINIC | Age: 37
End: 2022-12-09
Payer: COMMERCIAL

## 2022-12-09 DIAGNOSIS — F32.A DEPRESSION, UNSPECIFIED DEPRESSION TYPE: ICD-10-CM

## 2022-12-09 DIAGNOSIS — F41.1 GAD (GENERALIZED ANXIETY DISORDER): Primary | ICD-10-CM

## 2022-12-09 RX ORDER — ESCITALOPRAM OXALATE 10 MG/1
10 TABLET ORAL DAILY
Qty: 30 TABLET | Refills: 1 | Status: SHIPPED | OUTPATIENT
Start: 2022-12-09

## 2022-12-09 NOTE — PATIENT INSTRUCTIONS
Learning About Anxiety Disorders  What are anxiety disorders? Anxiety disorders are a type of medical problem. They cause severe anxiety. When you feel anxious, you feel that something bad is about to happen. This feeling interferes with your life. These disorders include:  Generalized anxiety disorder. You feel worried and stressed about many everyday events and activities. This goes on for several months and disrupts your life on most days. Panic disorder. You have repeated panic attacks. A panic attack is a sudden, intense fear or anxiety. It may make you feel short of breath. Your heart may pound. Social anxiety disorder. You feel very anxious about what you will say or do in front of people. For example, you may be scared to talk or eat in public. This problem affects your daily life. Phobias. You are very scared of a specific object, situation, or activity. For example, you may fear spiders, high places, or small spaces. What are the symptoms? Generalized anxiety disorder  Symptoms may include:  Feeling worried and stressed about many things almost every day. Feeling tired or irritable. You may have a hard time concentrating. Having headaches or muscle aches. Having a hard time getting to sleep or staying asleep. Panic disorder  You may have repeated panic attacks when there is no reason for feeling afraid. You may change your daily activities because you worry that you will have another attack. Symptoms may include:  Intense fear, terror, or anxiety. Trouble breathing or very fast breathing. Chest pain or tightness. A heartbeat that races or is not regular. Social anxiety disorder  Symptoms may include:  Fear about a social situation, such as eating in front of others or speaking in public. You may worry a lot. Or you may be afraid that something bad will happen. Anxiety that can cause you to blush, sweat, and feel shaky.   A heartbeat that is faster than normal.  A hard time focusing. Phobias  Symptoms may include:  More fear than most people of being around an object, being in a situation, or doing an activity. You might also be stressed about the chance of being around the thing you fear. Worry about losing control, panicking, fainting, or having physical symptoms like a faster heartbeat when you are around the situation or object. How are these disorders treated? Anxiety disorders can be treated with medicines or counseling. A combination of both may be used. Medicines may include:  Antidepressants. These may help your symptoms by keeping chemicals in your brain in balance. Benzodiazepines. These may give you short-term relief of your symptoms. Some people use cognitive-behavioral therapy. A therapist helps you learn to change stressful or bad thoughts into helpful thoughts. Lead a healthy lifestyle  A healthy lifestyle may help you feel better. Get at least 30 minutes of exercise on most days of the week. Walking is a good choice. Eat a healthy diet. Include fruits, vegetables, lean proteins, and whole grains in your diet each day. Try to go to bed at the same time every night. Try for 8 hours of sleep a night. Find ways to manage stress. Try relaxation exercises. Avoid alcohol and illegal drugs. Follow-up care is a key part of your treatment and safety. Be sure to make and go to all appointments, and call your doctor if you are having problems. It's also a good idea to know your test results and keep a list of the medicines you take. Where can you learn more? Go to http://www.gray.com/  Enter U380 in the search box to learn more about \"Learning About Anxiety Disorders. \"  Current as of: February 9, 2022               Content Version: 13.4  © 4666-5818 [employer], [employer]. Care instructions adapted under license by [employer] (which disclaims liability or warranty for this information).  If you have questions about a medical condition or this instruction, always ask your healthcare professional. [place] [address] any warranty or liability for your use of this information.

## 2022-12-09 NOTE — PROGRESS NOTES
CHIEF COMPLAINT:  Melba Heard is a 40 y.o. male and was seen today for follow-up of psychiatric condition and psychotropic medication management. HPI:    Cruz Dixon reports the following psychiatric symptoms by hx:  depression and anxiety. Overall symptoms have been present for years. Currently symptoms are  of moderate severity. The symptoms occur daily. Patient reports in improved, but he continues to struggle with breakthrough depression and anxiety. Panic attacks are less frequent. Pt reports medications are beneficial. Met with pt via video telehealth for appt today  to review current treatment plan. FAMILY/SOCIAL HX: External stressors      REVIEW OF SYSTEMS:  Psychiatric symptoms being monitored for:   panic attacks   Appetite:no change from normal   Sleep: improved   Neuro: denies     There were no vitals taken for this visit. Side Effects:  none    MENTAL STATUS EXAM:   Sensorium  oriented to time, place and person   Relations cooperative   Appearance:  age appropriate and within normal Limits   Motor Behavior:  within normal limits   Speech:  normal pitch and normal volume   Thought Process: within normal limits   Thought Content free of delusions and free of hallucinations   Suicidal ideations none   Homicidal ideations none   Mood:  Anxious/depressed   Affect:  anxious   Memory recent  adequate   Memory remote:  adequate   Concentration:  impaired   Abstraction:  abstract   Insight:  fair   Reliability good   Judgment:  fair and good     MEDICAL DECISION MAKING:  Problems addressed today:    ICD-10-CM ICD-9-CM    1. ERIC (generalized anxiety disorder)  F41.1 300.02 REFERRAL TO NEUROPSYCHOLOGY            Assessment:   Cruz Dixon is responding to treatment. Anxiety symptoms are improved, but he continues to struggle with depressed mood intermittently. He is also concerned he may have ADHD. Will send him for neurospcyh testing . Reviewed [employer] testing.  Discussed current medications and dosages. Will start Lexapro 10 mg, risks vs benefits and side effects reviewed . Reviewed treatment goals and target symptoms to monitor for. Plan:   1. Current Outpatient Medications   Medication Sig Dispense Refill    propranoloL (INDERAL) 10 mg tablet Take 1 Tablet by mouth two (2) times a day. 60 Tablet 1    LORazepam (ATIVAN) 0.5 mg tablet Take 1 Tablet by mouth two (2) times daily as needed for Anxiety. Max Daily Amount: 1 mg. 45 Tablet 1    amLODIPine-valsartan (EXFORGE) 5-160 mg per tablet Take 1 Tablet by mouth daily. 90 Tablet 3    cpap machine kit by Does Not Apply route. medication changes made today: Lexapro 10 mg po daily     2. Counseling and coordination of care including instructions for treatment, risks/benefits, risk factor reduction and patient/family education. He agrees with the plan. Patient instructed to call with any side effects, questions or issues. Tilford Nageotte, was evaluated through a synchronous (real-time) audio-video encounter. The patient (or guardian if applicable) is aware that this is a billable service, which includes applicable co-pays. This Virtual Visit was conducted with patient's (and/or legal guardian's) consent. The visit was conducted pursuant to the emergency declaration under the [address] [place] [address], [address] [place] [address] authority and the [employer] General Act. Patient identification was verified, and a caregiver was present when appropriate. The patient was located at: Home: [place] [id number]  The provider was located at: [facility] [address]       An electronic signature was used to authenticate this note.   -- Shannon Santos NP         12/9/2022

## 2022-12-11 ENCOUNTER — HOSPITAL ENCOUNTER (EMERGENCY)
Age: 37
Discharge: ANOTHER HOSPITAL | End: 2022-12-11
Attending: STUDENT IN AN ORGANIZED HEALTH CARE EDUCATION/TRAINING PROGRAM
Payer: COMMERCIAL

## 2022-12-11 VITALS
OXYGEN SATURATION: 98 % | TEMPERATURE: 98.2 F | SYSTOLIC BLOOD PRESSURE: 164 MMHG | WEIGHT: 274 LBS | BODY MASS INDEX: 40.58 KG/M2 | DIASTOLIC BLOOD PRESSURE: 97 MMHG | HEART RATE: 77 BPM | RESPIRATION RATE: 18 BRPM | HEIGHT: 69 IN

## 2022-12-11 DIAGNOSIS — F41.1 ANXIETY STATE: ICD-10-CM

## 2022-12-11 DIAGNOSIS — R07.89 ATYPICAL CHEST PAIN: Primary | ICD-10-CM

## 2022-12-11 LAB — TROPONIN-HIGH SENSITIVITY: 11 NG/L (ref 0–76)

## 2022-12-11 PROCEDURE — 99284 EMERGENCY DEPT VISIT MOD MDM: CPT

## 2022-12-11 PROCEDURE — 36415 COLL VENOUS BLD VENIPUNCTURE: CPT

## 2022-12-11 PROCEDURE — 93005 ELECTROCARDIOGRAM TRACING: CPT

## 2022-12-11 PROCEDURE — 84484 ASSAY OF TROPONIN QUANT: CPT

## 2022-12-11 NOTE — ED PROVIDER NOTES
Osiris Turner is a 52-year-old male dating, hypertension presenting today secondary to chest pain. Patient reports that less than 1 hour prior to arrival he had sudden onset of chest pain described as a sharp stabbing pain to the left chest that did not radiate. It lasted about 10 to 15 minutes and improved after taking his Ativan and propranolol. He felt this pain started after an anxiety attack. He started Lexapro last night for the first time and had diarrhea following administration of the medication. He denies abdominal pain or vomiting. No shortness of breath. No focal weakness or numbness. No leg pain or leg swelling. No cough. No other complaints at this time. No active chest pain. No known cardiac disease.        Past Medical History:   Diagnosis Date    Angioedema     ace induced asthma     Anxiety     Cold-induced asthma     Gout     Hypertension     JOS (obstructive sleep apnea)        Past Surgical History:   Procedure Laterality Date    HX ADENOIDECTOMY           Family History:   Problem Relation Age of Onset    Sleep Apnea Mother     Hypertension Mother     Breast Cancer Maternal Grandmother     Hypertension Father        Social History     Socioeconomic History    Marital status: SINGLE     Spouse name: Not on file    Number of children: Not on file    Years of education: Not on file    Highest education level: Not on file   Occupational History    Not on file   Tobacco Use    Smoking status: Never    Smokeless tobacco: Never   Vaping Use    Vaping Use: Never used   Substance and Sexual Activity    Alcohol use: No    Drug use: Yes     Comment: CBD    Sexual activity: Yes     Partners: Female   Other Topics Concern    Not on file   Social History Narrative    Not on file     Social Determinants of Health     Financial Resource Strain: Low Risk     Difficulty of Paying Living Expenses: Not hard at all   Food Insecurity: No Food Insecurity    Worried About [address] in the Last Year: Never true    Ran Out of Food in the Last Year: Never true   Transportation Needs: Not on file   Physical Activity: Not on file   Stress: Not on file   Social Connections: Not on file   Intimate Partner Violence: Not on file   Housing Stability: Not on file         ALLERGIES: Lisinopril, Pcn [penicillins], and Venlafaxine    Review of Systems   Constitutional:  Negative for chills and fever. HENT:  Negative for congestion and sore throat. Eyes:  Negative for pain and redness. Respiratory:  Negative for cough and shortness of breath. Cardiovascular:  Positive for chest pain. Negative for palpitations. Gastrointestinal:  Positive for diarrhea. Negative for abdominal pain, nausea and vomiting. Genitourinary:  Negative for frequency and hematuria. Musculoskeletal:  Negative for back pain and neck pain. Skin:  Negative for rash and wound. Neurological:  Negative for dizziness and headaches. Hematological:  Does not bruise/bleed easily. Vitals:    12/11/22 0728 12/11/22 0731   BP:  (!) 164/97   Pulse: 74 77   Resp: 18 18   Temp: 98.2 °F (36.8 °C) 98.2 °F (36.8 °C)   SpO2:  98%   Weight:  124.3 kg (274 lb)   Height: 5' 9\" (1.753 m) 5' 9\" (1.753 m)            Physical Exam  Vitals and nursing note reviewed. Constitutional:       General: He is not in acute distress. Appearance: He is well-developed. HENT:      Head: Normocephalic and atraumatic. Eyes:      Conjunctiva/sclera: Conjunctivae normal.      Pupils: Pupils are equal, round, and reactive to light. Cardiovascular:      Rate and Rhythm: Normal rate and regular rhythm. Heart sounds: Normal heart sounds. No murmur heard. No friction rub. No gallop. Comments: Equal radial pulses  Pulmonary:      Effort: Pulmonary effort is normal. No respiratory distress. Breath sounds: Normal breath sounds. No wheezing or rales. Abdominal:      General: Bowel sounds are normal. There is no distension.       Palpations: Abdomen is soft. Tenderness: There is no abdominal tenderness. There is no guarding or rebound. Musculoskeletal:         General: Normal range of motion. Cervical back: Normal range of motion and neck supple. Skin:     General: Skin is warm and dry. Capillary Refill: Capillary refill takes less than 2 seconds. Findings: No rash. Neurological:      Mental Status: He is alert and oriented to person, place, and time. MDM     Amount and/or Complexity of Data Reviewed  Clinical lab tests: reviewed      ED Course as of 12/11/22 0939   Nayeli Garcia Dec 11, 2022   0930 EKG time 718  Normal sinus rhythm rate of 72 with normal axis, normal intervals, T wave inversions noted inferiorly but similar to prior EKG from 12/6/2022. No ST elevations or depressions [CC]      ED Course User Index  [CC] Duncan Reed DO       Procedures                 The patient is a 80-year-old male who presented today after having anxiety attack with a 15-minute episode of sharp left-sided chest pain that went away after he took his atenolol. Patient seen here several times over the past few weeks for chest pain. Troponin negative. EKG unchanged and without ischemic changes. Suspect anxiety is the underlying cause of his presentation. Low suspicion for dissection (given transient nature of symptoms), PE (given vital signs and transient nature of symptoms), pneumothorax/pneumonia (given normal lung sounds on exam). Patient feeling well. Hypertensive but otherwise stable vital signs. Okay for discharge home. ICD-10-CM ICD-9-CM    1. Atypical chest pain  R07.89 786.59       2.  Anxiety state  F41.1 300.00           Disposition: Discharge    Duncan Reed DO

## 2022-12-11 NOTE — ED TRIAGE NOTES
Pt states that he had panic attack this morning with associate with some chest pain. Pt states pain seems to have resolved.

## 2022-12-12 ENCOUNTER — OFFICE VISIT (OUTPATIENT)
Dept: CARDIOLOGY CLINIC | Age: 37
End: 2022-12-12
Payer: COMMERCIAL

## 2022-12-12 VITALS
DIASTOLIC BLOOD PRESSURE: 98 MMHG | SYSTOLIC BLOOD PRESSURE: 152 MMHG | HEART RATE: 73 BPM | WEIGHT: 268.4 LBS | OXYGEN SATURATION: 96 % | HEIGHT: 69 IN | BODY MASS INDEX: 39.75 KG/M2

## 2022-12-12 DIAGNOSIS — I10 PRIMARY HYPERTENSION: ICD-10-CM

## 2022-12-12 DIAGNOSIS — R07.9 CHEST PAIN, UNSPECIFIED TYPE: ICD-10-CM

## 2022-12-12 DIAGNOSIS — F40.10 SOCIAL ANXIETY DISORDER: ICD-10-CM

## 2022-12-12 DIAGNOSIS — E66.01 OBESITY, MORBID (HCC): Primary | ICD-10-CM

## 2022-12-12 DIAGNOSIS — R00.2 PALPITATIONS: ICD-10-CM

## 2022-12-12 PROCEDURE — 99214 OFFICE O/P EST MOD 30 MIN: CPT | Performed by: INTERNAL MEDICINE

## 2022-12-12 PROCEDURE — 3074F SYST BP LT 130 MM HG: CPT | Performed by: INTERNAL MEDICINE

## 2022-12-12 PROCEDURE — 3078F DIAST BP <80 MM HG: CPT | Performed by: INTERNAL MEDICINE

## 2022-12-12 RX ORDER — ACETAMINOPHEN 500 MG
1 TABLET ORAL DAILY
Qty: 1 KIT | Refills: 0 | Status: SHIPPED | OUTPATIENT
Start: 2022-12-12 | End: 2022-12-13

## 2022-12-12 RX ORDER — AMLODIPINE, VALSARTAN AND HYDROCHLOROTHIAZIDE 10; 320; 25 MG/1; MG/1; MG/1
1 TABLET ORAL DAILY
Qty: 90 TABLET | Refills: 3 | Status: SHIPPED | OUTPATIENT
Start: 2022-12-12

## 2022-12-12 NOTE — PROGRESS NOTES
Clay Rivero MD, MS, [facility]            HISTORY OF PRESENT ILLNESS:    Tilford Nageotte is a 40 y.o. male here for FU. Significant anxiety, social anxiety. Panic attacks. Thinks anxiety started after COVID vaccine. ED visit 7/26 HTN, CP, anxiety attacks. Diagnosed HTN 10 years ago. Long history of anxiety, Patient reports has had increase anxiety over the last weeks, months. He does have a long history of this and has been followed by Dr. Doc Aranda.  Patient was told and prescribed BuSpar but he has not started this because he was concerned he could interact with his hypertensive medications. Saw Cardiology last Fall - VCS - monitor placed - no echo. Holter and echo done here- unremarkable. Saw Psychiatry - taking propranolol once a day. Zoloft made him depressed, dizzy, felt off - went to ED - psych consult over computer. Did a DNA test for medication optimization. Is off valsartan. BP is elevated. Advised to starte tribenzor. Can take propranolol twice a day - seems to help him. Will arrange ETT. Started lexapro yesterday -  feel drowsy nauseous, fatigued, dizziy, diarrhea.       SUMMARY:   Problem List  Date Reviewed: 12/9/2022            Codes Class Noted    Panic disorder ICD-10-CM: F41.0  ICD-9-CM: 300.01  10/14/2022        ERIC (generalized anxiety disorder) ICD-10-CM: F41.1  ICD-9-CM: 300.02  10/14/2022        Social anxiety disorder ICD-10-CM: F40.10  ICD-9-CM: 300.23  10/14/2022        Palpitations ICD-10-CM: R00.2  ICD-9-CM: 785.1  7/29/2022        Obesity, morbid ([facility] [address] ICD-10-CM: E66.01  ICD-9-CM: 278.01  3/29/2018        Chronic bilateral low back pain with sciatica ICD-10-CM: M54.40, G89.29  ICD-9-CM: 724.2, 724.3, 338.29  3/29/2018        JOS (obstructive sleep apnea) ICD-10-CM: G47.33  ICD-9-CM: 327.23  Unknown        Hypertension ICD-10-CM: I10  ICD-9-CM: 401.9  Unknown        Cold-induced asthma ICD-10-CM: J45.909  ICD-9-CM: 493.10  Unknown Gout ICD-10-CM: M10.9  ICD-9-CM: 274.9  Unknown           Current Outpatient Medications on File Prior to Visit   Medication Sig    propranoloL (INDERAL) 10 mg tablet Take 1 Tablet by mouth two (2) times a day. (Patient taking differently: Take 10 mg by mouth two (2) times daily as needed.)    LORazepam (ATIVAN) 0.5 mg tablet Take 1 Tablet by mouth two (2) times daily as needed for Anxiety. Max Daily Amount: 1 mg. No current facility-administered medications on file prior to visit. CARDIOLOGY STUDIES TO DATE:  No results found for this visit on 12/12/22.        Chief Complaint   Patient presents with    Follow-up    Chest Pain    Shortness of Breath         CARDIAC ROS:   positive for fatigue, dyspnea on exertion    Past Medical History:   Diagnosis Date    Angioedema     ace induced asthma     Anxiety     Cold-induced asthma     Gout     Hypertension     JOS (obstructive sleep apnea)        Family History   Problem Relation Age of Onset    Sleep Apnea Mother     Hypertension Mother     Breast Cancer Maternal Grandmother     Hypertension Father        Social History     Socioeconomic History    Marital status: SINGLE     Spouse name: Not on file    Number of children: Not on file    Years of education: Not on file    Highest education level: Not on file   Occupational History    Not on file   Tobacco Use    Smoking status: Never    Smokeless tobacco: Never   Vaping Use    Vaping Use: Never used   Substance and Sexual Activity    Alcohol use: No    Drug use: Yes     Comment: CBD    Sexual activity: Yes     Partners: Female   Other Topics Concern    Not on file   Social History Narrative    Not on file     Social Determinants of Health     Financial Resource Strain: Low Risk     Difficulty of Paying Living Expenses: Not hard at all   Food Insecurity: No Food Insecurity    Worried About Running Out of Food in the Last Year: Never true    Ran Out of Food in the Last Year: Never true   Transportation Needs: Not on file   Physical Activity: Not on file   Stress: Not on file   Social Connections: Not on file   Intimate Partner Violence: Not on file   Housing Stability: Not on file        GENERAL ROS:  A comprehensive review of systems was negative except for that written in the HPI. Visit Vitals  BP (!) 152/98 (BP 1 Location: Right upper arm, BP Patient Position: Sitting)   Pulse 73   Ht 5' 9\" (1.753 m)   Wt 121.7 kg (268 lb 6.4 oz)   SpO2 96%   BMI 39.64 kg/m²         Wt Readings from Last 3 Encounters:   12/12/22 121.7 kg (268 lb 6.4 oz)   12/11/22 124.3 kg (274 lb)   12/06/22 124.4 kg (274 lb 4 oz)            BP Readings from Last 3 Encounters:   12/12/22 (!) 152/98   12/11/22 (!) 164/97   12/06/22 (!) 192/106       PHYSICAL EXAM  General appearance: alert, cooperative, no distress, appears stated age  Neck: supple, symmetrical, trachea midline, no adenopathy, thyroid: not enlarged, symmetric, no tenderness/mass/nodules, no carotid bruit, and no JVD  Lungs: clear to auscultation bilaterally  Heart: regular rate and rhythm, S1, S2 normal, no murmur, click, rub or gallop  Extremities: extremities normal, atraumatic, no cyanosis or edema    Lab Results   Component Value Date/Time    Cholesterol, total 182 02/11/2021 12:11 PM    Cholesterol, total 229 (H) 08/11/2014 02:45 PM    HDL Cholesterol 37 (L) 02/11/2021 12:11 PM    LDL, calculated 108 (H) 02/11/2021 12:11 PM    Triglyceride 210 (H) 02/11/2021 12:11 PM       ASSESSMENT    ICD-10-CM ICD-9-CM    1. Obesity, morbid ([facility] [address]  E66.01 278.01       2. Palpitations  R00.2 785.1       3. Social anxiety disorder  F40.10 300.23 EXERCISE CARDIAC STRESS TEST      4. Primary hypertension  I10 401.9 amLODIPine-Valsartan-HCTZ -25 mg tab      Blood Pressure Test Kit-Large kit      EXERCISE CARDIAC STRESS TEST      5. Chest pain, unspecified type  R07.9 786.50 EXERCISE CARDIAC STRESS TEST              Encounter Diagnoses   Name Primary?     Obesity, morbid ([facility] [address] Yes Palpitations     Social anxiety disorder     Primary hypertension     Chest pain, unspecified type          Orders Placed This Encounter    amLODIPine-Valsartan-HCTZ -25 mg tab    Blood Pressure Test Kit-Large kit             Mickey Nash MD  12/12/2022        [address]   [address]  [facility], [address]  [phone] (F)    [address] [place] [address]  [address]  [phone] (P)  [phone] (F)    ATTENTION:   This medical record was transcribed using an electronic medical records/speech recognition system. Although proofread, it may and can contain electronic, spelling and other errors. Corrections may be executed at a later time. Please feel free to contact us for any clarifications as needed.

## 2022-12-12 NOTE — PROGRESS NOTES
Per Dr. Roche Rm- ETT and follow up. Amlodipine/valsartan changed to Amlodipine/valsartan/HCTZ -25 daily. We were able to get ETT for tomorrow, instructions given to patient.

## 2022-12-12 NOTE — PROGRESS NOTES
Chief Complaint   Patient presents with    Follow-up    Chest Pain    Shortness of Breath     Vitals:    12/12/22 1454 12/12/22 1504   BP: (!) 162/104 (!) 152/98   BP 1 Location: Left upper arm Right upper arm   BP Patient Position: Sitting Sitting   Pulse: 73    Height: 5' 9\" (1.753 m)    Weight: 268 lb 6.4 oz (121.7 kg)    SpO2: 96%      Chest pain-on going   SOB-yes  Palpitations- last few days during panic attacks   Swelling in hands/feet denied   Dizziness with lexapro  Recent hospital stays-er [facility]  Refills denied     Lexapro- started the last 2 days and feeling awful and will not take after today. Taking ativan and propanolol prn. BP med not working. Hes not comfortable to work out. BP an issue and panic attack it goes up. Friday had appt Friday 140s/80. Diarrhea from lexapro; unable to sleep toss and turn. Walking from car- felt sick hot ans sweating. Wanting further testing.

## 2022-12-12 NOTE — TELEPHONE ENCOUNTER
Marleen Dany 12/12/2022 2:10 PM EST      ----- Message -----  From: Belem Hair  Sent: 12/9/2022 10:26 PM EST  To: [facility] Nurse Pool  Subject: Bp Medication     I have been taking this pull consistently for 2 months now every morning at 7am. I've been taking it the last 3 nights before bed per your instruction. I don't have a way to check my blood pressure to monitor it. But everytime I go to a check up or ER visit my blood pressure Is 180/100 range.

## 2022-12-13 ENCOUNTER — ANCILLARY PROCEDURE (OUTPATIENT)
Dept: CARDIOLOGY CLINIC | Age: 37
End: 2022-12-13
Payer: COMMERCIAL

## 2022-12-13 ENCOUNTER — OFFICE VISIT (OUTPATIENT)
Dept: CARDIOLOGY CLINIC | Age: 37
End: 2022-12-13

## 2022-12-13 VITALS
SYSTOLIC BLOOD PRESSURE: 128 MMHG | DIASTOLIC BLOOD PRESSURE: 90 MMHG | HEART RATE: 84 BPM | WEIGHT: 268 LBS | HEIGHT: 69 IN | BODY MASS INDEX: 39.69 KG/M2

## 2022-12-13 VITALS — HEIGHT: 69 IN | BODY MASS INDEX: 39.69 KG/M2 | WEIGHT: 268 LBS

## 2022-12-13 DIAGNOSIS — R07.9 CHEST PAIN, UNSPECIFIED TYPE: ICD-10-CM

## 2022-12-13 DIAGNOSIS — F40.10 SOCIAL ANXIETY DISORDER: ICD-10-CM

## 2022-12-13 DIAGNOSIS — I10 PRIMARY HYPERTENSION: ICD-10-CM

## 2022-12-13 DIAGNOSIS — E66.01 OBESITY, MORBID (HCC): Primary | ICD-10-CM

## 2022-12-13 LAB
ATRIAL RATE: 72 BPM
CALCULATED P AXIS, ECG09: 48 DEGREES
CALCULATED R AXIS, ECG10: 26 DEGREES
CALCULATED T AXIS, ECG11: -3 DEGREES
DIAGNOSIS, 93000: NORMAL
P-R INTERVAL, ECG05: 160 MS
Q-T INTERVAL, ECG07: 364 MS
QRS DURATION, ECG06: 96 MS
QTC CALCULATION (BEZET), ECG08: 398 MS
VENTRICULAR RATE, ECG03: 72 BPM

## 2022-12-13 PROCEDURE — 3074F SYST BP LT 130 MM HG: CPT | Performed by: INTERNAL MEDICINE

## 2022-12-13 PROCEDURE — 99214 OFFICE O/P EST MOD 30 MIN: CPT | Performed by: INTERNAL MEDICINE

## 2022-12-13 PROCEDURE — 93015 CV STRESS TEST SUPVJ I&R: CPT | Performed by: INTERNAL MEDICINE

## 2022-12-13 PROCEDURE — 3078F DIAST BP <80 MM HG: CPT | Performed by: INTERNAL MEDICINE

## 2022-12-13 RX ORDER — ACETAMINOPHEN 500 MG
1 TABLET ORAL DAILY
Qty: 1 KIT | Refills: 0 | Status: SHIPPED | OUTPATIENT
Start: 2022-12-13

## 2022-12-13 NOTE — PROGRESS NOTES
Chief Complaint   Patient presents with    Follow-up    Shortness of Breath    Chest Pain     Vitals:    12/13/22 1052 12/13/22 1053   BP: (!) 138/100 (!) 128/90   BP 1 Location: Left upper arm Right upper arm   BP Patient Position: Sitting Sitting   Pulse: 84    Height: 5' 9\" (1.753 m)    Weight: 268 lb (121.6 kg)        Chest pain / anxiety     SOB denied     Palpitations denied     Swelling in hands/feet denied     Dizziness denied     Recent hospital stays denied     Refills denied

## 2022-12-13 NOTE — PROGRESS NOTES
José Miguel Hernandez MD, MS, [facility]            HISTORY OF PRESENT ILLNESS:    Melba Heard is a 40 y.o. male here for FU. Significant anxiety, social anxiety. Panic attacks. Thinks anxiety started after COVID vaccine. ED visit 7/26 HTN, CP, anxiety attacks. Diagnosed HTN 10 years ago. Long history of anxiety, Patient reports has had increase anxiety over the last weeks, months. He does have a long history of this and has been followed by Dr. Bertin Crawley.  Patient was told and prescribed BuSpar but he has not started this because he was concerned he could interact with his hypertensive medications. Saw Cardiology last Fall - VCS - monitor placed - no echo. Holter and echo done here- unremarkable. Saw Psychiatry - taking propranolol once a day. Zoloft made him depressed, dizzy, felt off - went to ED - psych consult over computer. Did a DNA test for medication optimization. Is off valsartan. BP is elevated. Advised to starte tribenzor. Can take propranolol twice a day - seems to help him. Started lexapro yesterday -  feel drowsy nauseous, fatigued, dizziy, diarrhea. ETT today - eleavted BP - normal sihcemic EKG cahnges. His tribenzor is coming into the pharmacy today.     SUMMARY:   Problem List  Date Reviewed: 12/13/2022            Codes Class Noted    Panic disorder ICD-10-CM: F41.0  ICD-9-CM: 300.01  10/14/2022        ERIC (generalized anxiety disorder) ICD-10-CM: F41.1  ICD-9-CM: 300.02  10/14/2022        Social anxiety disorder ICD-10-CM: F40.10  ICD-9-CM: 300.23  10/14/2022        Palpitations ICD-10-CM: R00.2  ICD-9-CM: 785.1  7/29/2022        Obesity, morbid ([address] ICD-10-CM: E66.01  ICD-9-CM: 278.01  3/29/2018        Chronic bilateral low back pain with sciatica ICD-10-CM: M54.40, G89.29  ICD-9-CM: 724.2, 724.3, 338.29  3/29/2018        JOS (obstructive sleep apnea) ICD-10-CM: Q69.88  ICD-9-CM: 327.23  Unknown        Hypertension ICD-10-CM: I10  ICD-9-CM: 401.9  Unknown        Cold-induced asthma ICD-10-CM: 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 propranoloL (INDERAL) 10 mg tablet Take 1 Tablet by mouth two (2) times a day. (Patient taking differently: Take 10 mg by mouth two (2) times daily as needed.)    LORazepam (ATIVAN) 0.5 mg tablet Take 1 Tablet by mouth two (2) times daily as needed for Anxiety. Max Daily Amount: 1 mg. Blood Pressure Test Kit-Large kit 1 Units by Does Not Apply route daily. No current facility-administered medications on file prior to visit. CARDIOLOGY STUDIES TO DATE:  No results found for this visit on 12/13/22.        Chief Complaint   Patient presents with    Follow-up    Shortness of Breath    Chest Pain         CARDIAC ROS:   positive for fatigue, dyspnea on exertion    Past Medical History:   Diagnosis Date    Angioedema     ace induced asthma     Anxiety     Cold-induced asthma     Gout     Hypertension     JOS (obstructive sleep apnea)        Family History   Problem Relation Age of Onset    Sleep Apnea Mother     Hypertension Mother     Breast Cancer Maternal Grandmother     Hypertension Father        Social History     Socioeconomic History    Marital status: SINGLE     Spouse name: Not on file    Number of children: Not on file    Years of education: Not on file    Highest education level: Not on file   Occupational History    Not on file   Tobacco Use    Smoking status: Never    Smokeless tobacco: Never   Vaping Use    Vaping Use: Never used   Substance and Sexual Activity    Alcohol use: No    Drug use: Yes     Comment: CBD    Sexual activity: Yes     Partners: Female   Other Topics Concern    Not on file   Social History Narrative    Not on file     Social Determinants of Health     Financial Resource Strain: Low Risk     Difficulty of Paying Living Expenses: Not hard at all   Food Insecurity: No Food Insecurity    Worried About Running Out of Food in the Last Year: Never true    Ran Out of Food in the Last Year: Never true   Transportation Needs: Not on file   Physical Activity: Not on file   Stress: Not on file   Social Connections: Not on file   Intimate Partner Violence: Not on file   Housing Stability: Not on file        GENERAL ROS:  A comprehensive review of systems was negative except for that written in the HPI. Visit Vitals  BP (!) 128/90 (BP 1 Location: Right upper arm, BP Patient Position: Sitting)   Pulse 84   Ht 5' 9\" (1.753 m)   Wt 121.6 kg (268 lb)   BMI 39.58 kg/m²         Wt Readings from Last 3 Encounters:   12/13/22 121.6 kg (268 lb)   12/13/22 121.6 kg (268 lb)   12/12/22 121.7 kg (268 lb 6.4 oz)            BP Readings from Last 3 Encounters:   12/13/22 (!) 128/90   12/12/22 (!) 152/98   12/11/22 (!) 164/97       PHYSICAL EXAM  General appearance: alert, cooperative, no distress, appears stated age  Neck: supple, symmetrical, trachea midline, no adenopathy, thyroid: not enlarged, symmetric, no tenderness/mass/nodules, no carotid bruit, and no JVD  Lungs: clear to auscultation bilaterally  Heart: regular rate and rhythm, S1, S2 normal, no murmur, click, rub or gallop  Extremities: extremities normal, atraumatic, no cyanosis or edema    Lab Results   Component Value Date/Time    Cholesterol, total 182 02/11/2021 12:11 PM    Cholesterol, total 229 (H) 08/11/2014 02:45 PM    HDL Cholesterol 37 (L) 02/11/2021 12:11 PM    LDL, calculated 108 (H) 02/11/2021 12:11 PM    Triglyceride 210 (H) 02/11/2021 12:11 PM       ASSESSMENT    ICD-10-CM ICD-9-CM    1. Obesity, morbid ([facility]  E66.01 278.01       2. Social anxiety disorder  F40.10 300.23       3. Primary hypertension  I10 401.9                 Encounter Diagnoses   Name Primary?     Obesity, morbid ([facility] Yes    Social anxiety disorder Primary hypertension            No orders of the defined types were placed in this encounter. Jerome Fernandez MD  12/13/2022        [address] [place], [address] [place] [address]  [phone] (F)    [address]  [facility]  [phone] (P)  [phone] (F)    ATTENTION:   This medical record was transcribed using an electronic medical records/speech recognition system. Although proofread, it may and can contain electronic, spelling and other errors. Corrections may be executed at a later time. Please feel free to contact us for any clarifications as needed.

## 2022-12-14 LAB
STRESS ANGINA INDEX: 0
STRESS BASELINE DIAS BP: 104 MMHG
STRESS BASELINE HR: 102 BPM
STRESS BASELINE ST DEPRESSION: 0 MM
STRESS BASELINE SYS BP: 154 MMHG
STRESS ESTIMATED WORKLOAD: 10.4 METS
STRESS EXERCISE DUR MIN: 7 MIN
STRESS EXERCISE DUR SEC: 5 SEC
STRESS O2 SAT PEAK: 96 %
STRESS O2 SAT REST: 96 %
STRESS PEAK DIAS BP: 98 MMHG
STRESS PEAK SYS BP: 168 MMHG
STRESS PERCENT HR ACHIEVED: 94 %
STRESS POST PEAK HR: 173 BPM
STRESS RATE PRESSURE PRODUCT: NORMAL BPM*MMHG
STRESS SR DUKE TREADMILL SCORE: 7
STRESS ST DEPRESSION: 0 MM
STRESS TARGET HR: 184 BPM

## 2022-12-14 RX ORDER — BUSPIRONE HYDROCHLORIDE 5 MG/1
5 TABLET ORAL 2 TIMES DAILY
Qty: 60 TABLET | Refills: 1 | Status: SHIPPED | OUTPATIENT
Start: 2022-12-14

## 2022-12-18 ENCOUNTER — HOSPITAL ENCOUNTER (EMERGENCY)
Age: 37
Discharge: HOME | End: 2022-12-18
Attending: EMERGENCY MEDICINE
Payer: COMMERCIAL

## 2022-12-18 ENCOUNTER — APPOINTMENT (OUTPATIENT)
Dept: CT IMAGING | Age: 37
End: 2022-12-18
Attending: NURSE PRACTITIONER
Payer: COMMERCIAL

## 2022-12-18 VITALS
HEART RATE: 89 BPM | RESPIRATION RATE: 17 BRPM | WEIGHT: 261 LBS | TEMPERATURE: 98.2 F | SYSTOLIC BLOOD PRESSURE: 164 MMHG | DIASTOLIC BLOOD PRESSURE: 104 MMHG | BODY MASS INDEX: 38.66 KG/M2 | HEIGHT: 69 IN | OXYGEN SATURATION: 99 %

## 2022-12-18 DIAGNOSIS — K44.9 HIATAL HERNIA: Primary | ICD-10-CM

## 2022-12-18 DIAGNOSIS — R10.12 ABDOMINAL PAIN, LUQ (LEFT UPPER QUADRANT): ICD-10-CM

## 2022-12-18 DIAGNOSIS — Z86.59 HISTORY OF ANXIETY: ICD-10-CM

## 2022-12-18 LAB
ALBUMIN SERPL-MCNC: 3.8 G/DL (ref 3.5–5)
ALBUMIN/GLOB SERPL: 0.9 {RATIO} (ref 1.1–2.2)
ALP SERPL-CCNC: 90 U/L (ref 45–117)
ALT SERPL-CCNC: 34 U/L (ref 12–78)
ANION GAP SERPL CALC-SCNC: 4 MMOL/L (ref 5–15)
APPEARANCE UR: CLEAR
AST SERPL-CCNC: 17 U/L (ref 15–37)
BACTERIA URNS QL MICRO: NEGATIVE /HPF
BASOPHILS # BLD: 0.2 K/UL (ref 0–0.1)
BASOPHILS NFR BLD: 3 % (ref 0–1)
BILIRUB SERPL-MCNC: 0.4 MG/DL (ref 0.2–1)
BILIRUB UR QL: NEGATIVE
BUN SERPL-MCNC: 16 MG/DL (ref 6–20)
BUN/CREAT SERPL: 13 (ref 12–20)
CALCIUM SERPL-MCNC: 9.1 MG/DL (ref 8.5–10.1)
CHLORIDE SERPL-SCNC: 106 MMOL/L (ref 97–108)
CO2 SERPL-SCNC: 29 MMOL/L (ref 21–32)
COLOR UR: ABNORMAL
COMMENT, HOLDF: NORMAL
CREAT SERPL-MCNC: 1.2 MG/DL (ref 0.7–1.3)
DIFFERENTIAL METHOD BLD: ABNORMAL
EOSINOPHIL # BLD: 0.4 K/UL (ref 0–0.4)
EOSINOPHIL NFR BLD: 6 % (ref 0–7)
EPITH CASTS URNS QL MICRO: ABNORMAL /LPF
ERYTHROCYTE [DISTWIDTH] IN BLOOD BY AUTOMATED COUNT: 12.3 % (ref 11.5–14.5)
GLOBULIN SER CALC-MCNC: 4.2 G/DL (ref 2–4)
GLUCOSE SERPL-MCNC: 123 MG/DL (ref 65–100)
GLUCOSE UR STRIP.AUTO-MCNC: NEGATIVE MG/DL
HCT VFR BLD AUTO: 43.8 % (ref 36.6–50.3)
HGB BLD-MCNC: 13.9 G/DL (ref 12.1–17)
HGB UR QL STRIP: NEGATIVE
HYALINE CASTS URNS QL MICRO: ABNORMAL /LPF (ref 0–2)
IMM GRANULOCYTES # BLD AUTO: 0 K/UL (ref 0–0.04)
IMM GRANULOCYTES NFR BLD AUTO: 0 % (ref 0–0.5)
KETONES UR QL STRIP.AUTO: ABNORMAL MG/DL
LEUKOCYTE ESTERASE UR QL STRIP.AUTO: NEGATIVE
LIPASE SERPL-CCNC: 133 U/L (ref 73–393)
LYMPHOCYTES # BLD: 1.4 K/UL (ref 0.8–3.5)
LYMPHOCYTES NFR BLD: 21 % (ref 12–49)
MCH RBC QN AUTO: 26.7 PG (ref 26–34)
MCHC RBC AUTO-ENTMCNC: 31.7 G/DL (ref 30–36.5)
MCV RBC AUTO: 84.2 FL (ref 80–99)
MONOCYTES # BLD: 0.6 K/UL (ref 0–1)
MONOCYTES NFR BLD: 9 % (ref 5–13)
NEUTS SEG # BLD: 3.9 K/UL (ref 1.8–8)
NEUTS SEG NFR BLD: 61 % (ref 32–75)
NITRITE UR QL STRIP.AUTO: NEGATIVE
NRBC # BLD: 0 K/UL (ref 0–0.01)
NRBC BLD-RTO: 0 PER 100 WBC
PH UR STRIP: 5.5 [PH] (ref 5–8)
PLATELET # BLD AUTO: 212 K/UL (ref 150–400)
PMV BLD AUTO: 12.5 FL (ref 8.9–12.9)
POTASSIUM SERPL-SCNC: 4 MMOL/L (ref 3.5–5.1)
PROT SERPL-MCNC: 8 G/DL (ref 6.4–8.2)
PROT UR STRIP-MCNC: ABNORMAL MG/DL
RBC # BLD AUTO: 5.2 M/UL (ref 4.1–5.7)
RBC #/AREA URNS HPF: ABNORMAL /HPF (ref 0–5)
RBC MORPH BLD: ABNORMAL
SAMPLES BEING HELD,HOLD: NORMAL
SODIUM SERPL-SCNC: 139 MMOL/L (ref 136–145)
SP GR UR REFRACTOMETRY: 1.02 (ref 1–1.03)
TROPONIN-HIGH SENSITIVITY: 10 NG/L (ref 0–76)
UA: UC IF INDICATED,UAUC: ABNORMAL
UROBILINOGEN UR QL STRIP.AUTO: 0.2 EU/DL (ref 0.2–1)
WBC # BLD AUTO: 6.5 K/UL (ref 4.1–11.1)
WBC URNS QL MICRO: ABNORMAL /HPF (ref 0–4)

## 2022-12-18 PROCEDURE — 93005 ELECTROCARDIOGRAM TRACING: CPT

## 2022-12-18 PROCEDURE — 83690 ASSAY OF LIPASE: CPT

## 2022-12-18 PROCEDURE — 85025 COMPLETE CBC W/AUTO DIFF WBC: CPT

## 2022-12-18 PROCEDURE — 84484 ASSAY OF TROPONIN QUANT: CPT

## 2022-12-18 PROCEDURE — 81001 URINALYSIS AUTO W/SCOPE: CPT

## 2022-12-18 PROCEDURE — 99284 EMERGENCY DEPT VISIT MOD MDM: CPT

## 2022-12-18 PROCEDURE — 74176 CT ABD & PELVIS W/O CONTRAST: CPT

## 2022-12-18 PROCEDURE — 74011000250 HC RX REV CODE- 250: Performed by: NURSE PRACTITIONER

## 2022-12-18 PROCEDURE — 74011250637 HC RX REV CODE- 250/637: Performed by: NURSE PRACTITIONER

## 2022-12-18 PROCEDURE — 36415 COLL VENOUS BLD VENIPUNCTURE: CPT

## 2022-12-18 PROCEDURE — 80053 COMPREHEN METABOLIC PANEL: CPT

## 2022-12-18 RX ORDER — AMLODIPINE BESYLATE 5 MG/1
10 TABLET ORAL DAILY
Status: DISCONTINUED | OUTPATIENT
Start: 2022-12-18 | End: 2022-12-18 | Stop reason: HOSPADM

## 2022-12-18 RX ORDER — HYDROCHLOROTHIAZIDE 25 MG/1
25 TABLET ORAL DAILY
Status: DISCONTINUED | OUTPATIENT
Start: 2022-12-18 | End: 2022-12-18 | Stop reason: HOSPADM

## 2022-12-18 RX ORDER — VALSARTAN 80 MG/1
320 TABLET ORAL DAILY
Status: DISCONTINUED | OUTPATIENT
Start: 2022-12-18 | End: 2022-12-18 | Stop reason: HOSPADM

## 2022-12-18 RX ADMIN — ALUMINUM HYDROXIDE, MAGNESIUM HYDROXIDE, AND SIMETHICONE 40 ML: 200; 200; 20 SUSPENSION ORAL at 13:33

## 2022-12-18 RX ADMIN — HYDROCHLOROTHIAZIDE 25 MG: 25 TABLET ORAL at 13:30

## 2022-12-18 RX ADMIN — AMLODIPINE BESYLATE 10 MG: 5 TABLET ORAL at 13:30

## 2022-12-18 RX ADMIN — VALSARTAN 320 MG: 80 TABLET, FILM COATED ORAL at 13:31

## 2022-12-18 NOTE — ED TRIAGE NOTES
Pt arrives to the ER for complaints of left to mid abdominal pain that started around 0600 this morning. Pt reports that he has been tachycardia due to anxiety that is coming from the pain. PMH of severe anxiety and htn. Pt did not take medications today. Denies vomiting, shortness of breath or chest pain.

## 2022-12-18 NOTE — DISCHARGE INSTRUCTIONS
Please ensure that you are dilating your apple cider vinegar in the morning, you may mix with 1/4 cup to half cup of water to help the initial rush of acid on the stomach. You may consider taking over-the-counter omeprazole if you develop any gastric reflux, take daily for 14 days and schedule a follow-up appointment with GI. You may continue all previously prescribed medications. Return to the er with worsening of symptoms.

## 2022-12-18 NOTE — ED PROVIDER NOTES
60-year-old male with past medical history of angioedema, cold induced asthma, gout, hypertension, JOS, significant anxiety and social anxiety presents for evaluation of epigastric pain that radiates over to the left upper quadrant that started at 6 AM this morning. Patient states that he took one of his prescribed Ativan which seemed to help and then he checked his heart rate at home and noted that his heart rate was up in the 130s. Patient states that he is currently working, and feels a palpable knot in the epigastric region. Patient states that he did take his propranolol and BuSpar prior to arrival, however did not take his blood pressure medication which includes amlodipine, valsartan and HCTZ. Patient able to recall dose, denies fever, chills, cough, shortness of breath, chest pain, nausea, vomiting or diarrhea, denies difficulty urinating or dysuria. Patient states that over the last couple days he has been modifying his diet, increasing his greens intake and drinking fruit smoothies, states that he is moving his bowels without difficulty.        Past Medical History:   Diagnosis Date    Angioedema     ace induced asthma     Anxiety     Cold-induced asthma     Gout     Hypertension     JOS (obstructive sleep apnea)        Past Surgical History:   Procedure Laterality Date    HX ADENOIDECTOMY           Family History:   Problem Relation Age of Onset    Sleep Apnea Mother     Hypertension Mother     Breast Cancer Maternal Grandmother     Hypertension Father        Social History     Socioeconomic History    Marital status: SINGLE     Spouse name: Not on file    Number of children: Not on file    Years of education: Not on file    Highest education level: Not on file   Occupational History    Not on file   Tobacco Use    Smoking status: Never    Smokeless tobacco: Never   Vaping Use    Vaping Use: Never used   Substance and Sexual Activity    Alcohol use: No    Drug use: Yes     Comment: CBD    Sexual activity: Yes     Partners: Female   Other Topics Concern    Not on file   Social History Narrative    Not on file     Social Determinants of Health     Financial Resource Strain: Low Risk     Difficulty of Paying Living Expenses: Not hard at all   Food Insecurity: No Food Insecurity    Worried About Running Out of Food in the Last Year: Never true    Ran Out of Food in the Last Year: Never true   Transportation Needs: Not on file   Physical Activity: Not on file   Stress: Not on file   Social Connections: Not on file   Intimate Partner Violence: Not on file   Housing Stability: Not on file         ALLERGIES: Lisinopril, Pcn [penicillins], and Venlafaxine    Review of Systems   Constitutional:  Negative for chills and fever. HENT: Negative. Eyes:  Negative for visual disturbance. Respiratory:  Negative for cough and shortness of breath. Cardiovascular:  Negative for chest pain. Gastrointestinal:  Positive for abdominal pain. Negative for diarrhea, nausea and vomiting. Epigastric pain that radiates to the left upper quadrant. Genitourinary:  Negative for difficulty urinating and dysuria. Musculoskeletal: Negative. Neurological: Negative. Psychiatric/Behavioral:  The patient is nervous/anxious. Vitals:    12/18/22 1215 12/18/22 1245 12/18/22 1454   BP: (!) 169/124  (!) 164/104   Pulse: (!) 105 88 89   Resp: 18 16 17   Temp: 98.6 °F (37 °C)  98.2 °F (36.8 °C)   SpO2: 96% 99% 99%   Weight: 118.4 kg (261 lb)     Height: 5' 9\" (1.753 m)              Physical Exam  Vitals and nursing note reviewed. Constitutional:       Appearance: Normal appearance. HENT:      Head: Normocephalic. Nose: Nose normal.   Cardiovascular:      Rate and Rhythm: Normal rate. Pulmonary:      Effort: Pulmonary effort is normal. No respiratory distress. Breath sounds: Normal breath sounds. No wheezing. Abdominal:      General: Abdomen is protuberant.  Bowel sounds are normal. There is no distension. Palpations: Abdomen is soft. Tenderness: There is abdominal tenderness in the epigastric area and left upper quadrant. There is no right CVA tenderness or left CVA tenderness. Musculoskeletal:         General: Normal range of motion. Cervical back: Normal range of motion. Skin:     General: Skin is warm and dry. Capillary Refill: Capillary refill takes less than 2 seconds. Neurological:      Mental Status: He is alert and oriented to person, place, and time. Psychiatric:         Mood and Affect: Mood is anxious. MDM     Amount and/or Complexity of Data Reviewed  Clinical lab tests: reviewed  Tests in the radiology section of CPT®: reviewed      ED Course as of 12/18/22 1901   Sun Dec 18, 2022   1219 This EKG was interpreted by me at 1219. Sinus tachycardia at 113 bpm.  Normal axis. No significant ST segment abnormalities [JT]      ED Course User Index  [JT] Yolanda Nye MD       Procedures    VITAL SIGNS:  No data found. LABS:  No results found for this or any previous visit (from the past 6 hour(s)). IMAGING:  CT ABD PELV WO CONT   Final Result   1. No definite acute abnormality. 2.  Gastric wall thickening, correlate for gastritis. Small hiatal hernia. Medications During Visit:  Medications   mylanta/viscous lidocaine (GI COCKTAIL) (40 mL Oral Given 12/18/22 1333)         DECISION MAKING:  Belem Hair is a 40 y.o. male who comes in as above. Patients with significant history of anxiety presents after waking up this morning with epigastric pain that radiates to the left upper quadrant. Patient denies fever, chills, nausea, vomiting or diarrhea. Discussed plan with patient to check basic labs, urine and CT of the abdomen pelvis, will give GI cocktail and reevaluate.     Patient reevaluated discussed labs and imaging, no leukocytosis, no anemia urine with trace protein, trace ketones, no electrolyte imbalance and kidney function intact. Troponin 10. CT of the abdomen pelvis showing no definite acute abnormality, gastric wall thickening correlate for gastritis and a small hiatal hernia. Patient endorses mild improvement with GI cocktail, patient did not take his home BP meds, patient received hydrochlorothiazide, valsartan and amlodipine. Discussed option with patient to start omeprazole daily for 2 weeks, follow-up with GI, patient refusing the need at this time, states that his aching discomfort possibly just related to the small hiatal hernia. No incarceration noted, encourage patient to continue on his weight loss journey, follow-up with GI as needed and PCP. Patient given return precautions to the ER, verbalizes understanding and agrees with plan. IMPRESSION:  1. Hiatal hernia    2. History of anxiety    3. Abdominal pain, LUQ (left upper quadrant)        DISPOSITION:  Discharged      Discharge Medication List as of 12/18/2022  3:06 PM           Follow-up Information       Follow up With Specialties Details Why Contact Info    Vika Brock MD Internal Medicine Physician Schedule an appointment as soon as possible for a visit in 3 days  [address] [place] [address] 42      Rocio Martin MD Gastroenterology Schedule an appointment as soon as possible for a visit  Call and schedule a follow-up appointment for continued discomfort. [address] [facility]  [phone]                The patient is asked to follow-up with their primary care provider in the next several days. They are to call tomorrow for an appointment. The patient is asked to return promptly for any increased concerns or worsening of symptoms. They can return to this emergency department or any other emergency department.     Deanne Meyer NP  3:07 PM

## 2022-12-19 LAB
ATRIAL RATE: 113 BPM
CALCULATED P AXIS, ECG09: 56 DEGREES
CALCULATED R AXIS, ECG10: 31 DEGREES
CALCULATED T AXIS, ECG11: 13 DEGREES
DIAGNOSIS, 93000: NORMAL
P-R INTERVAL, ECG05: 150 MS
Q-T INTERVAL, ECG07: 310 MS
QRS DURATION, ECG06: 98 MS
QTC CALCULATION (BEZET), ECG08: 425 MS
VENTRICULAR RATE, ECG03: 113 BPM

## 2022-12-20 ENCOUNTER — HOSPITAL ENCOUNTER (EMERGENCY)
Age: 37
Discharge: HOME | End: 2022-12-20
Attending: EMERGENCY MEDICINE
Payer: COMMERCIAL

## 2022-12-20 VITALS
SYSTOLIC BLOOD PRESSURE: 152 MMHG | DIASTOLIC BLOOD PRESSURE: 85 MMHG | HEIGHT: 69 IN | HEART RATE: 87 BPM | OXYGEN SATURATION: 99 % | BODY MASS INDEX: 38.66 KG/M2 | RESPIRATION RATE: 17 BRPM | TEMPERATURE: 99.2 F | WEIGHT: 261 LBS

## 2022-12-20 DIAGNOSIS — M10.9 ACUTE GOUT OF LEFT ANKLE, UNSPECIFIED CAUSE: Primary | ICD-10-CM

## 2022-12-20 PROCEDURE — 99283 EMERGENCY DEPT VISIT LOW MDM: CPT

## 2022-12-20 PROCEDURE — 74011636637 HC RX REV CODE- 636/637: Performed by: EMERGENCY MEDICINE

## 2022-12-20 RX ORDER — PREDNISONE 50 MG/1
50 TABLET ORAL DAILY
Qty: 5 TABLET | Refills: 0 | Status: SHIPPED | OUTPATIENT
Start: 2022-12-20 | End: 2022-12-25

## 2022-12-20 RX ADMIN — PREDNISONE 50 MG: 20 TABLET ORAL at 06:51

## 2022-12-20 NOTE — ED NOTES
The patient was discharged home in stable condition. The patient is alert and oriented, in no respiratory distress. The patient's diagnosis, condition and treatment were explained. The patient expressed understanding. Prescriptions given/e-scribed to pharmacy. Work note given. A discharge plan has been developed. A  was not involved in the process. Aftercare instructions were given. Pt ambulatory out of the ED.

## 2022-12-20 NOTE — ED TRIAGE NOTES
Pt ambulates to treatment area with slight limp. Pt states that for the last 2 days his left ankle started bothering him. Pt states that the pain has progressively increased and the pain is in the ankle and travels up the leg.   Pt is concerned for gout

## 2022-12-20 NOTE — ED PROVIDER NOTES
Date of Service:  12/20/2022    Patient:  Scott Mir    Chief Complaint:  Ankle Pain       HPI:  Scott Mir is a 40 y.o.  male who presents for evaluation of ankle pain. Left ankle pain increasing over the last 3 days. Patient denies any trauma to the area. History of gout in his toe and in his knee, noting that this pain is the exact same. Recently placed on a diuretic that is known to cause gout.            Past Medical History:   Diagnosis Date    Angioedema     ace induced asthma     Anxiety     Cold-induced asthma     Gout     Hernia of abdominal wall     Hypertension     JOS (obstructive sleep apnea)        Past Surgical History:   Procedure Laterality Date    HX ADENOIDECTOMY           Family History:   Problem Relation Age of Onset    Sleep Apnea Mother     Hypertension Mother     Breast Cancer Maternal Grandmother     Hypertension Father        Social History     Socioeconomic History    Marital status: SINGLE     Spouse name: Not on file    Number of children: Not on file    Years of education: Not on file    Highest education level: Not on file   Occupational History    Not on file   Tobacco Use    Smoking status: Never    Smokeless tobacco: Never   Vaping Use    Vaping Use: Never used   Substance and Sexual Activity    Alcohol use: No    Drug use: Not Currently    Sexual activity: Yes     Partners: Female   Other Topics Concern    Not on file   Social History Narrative    Not on file     Social Determinants of Health     Financial Resource Strain: Low Risk     Difficulty of Paying Living Expenses: Not hard at all   Food Insecurity: No Food Insecurity    Worried About Running Out of Food in the Last Year: Never true    Ran Out of Food in the Last Year: Never true   Transportation Needs: Not on file   Physical Activity: Not on file   Stress: Not on file   Social Connections: Not on file   Intimate Partner Violence: Not on file   Housing Stability: Not on file         ALLERGIES: Lisinopril, Pcn [penicillins], and Venlafaxine    Review of Systems   All other systems reviewed and are negative. Vitals:    12/20/22 0617   BP: (!) 152/85   Pulse: 87   Resp: 17   Temp: 99.2 °F (37.3 °C)   SpO2: 99%   Weight: 118.4 kg (261 lb)   Height: 5' 9\" (1.753 m)            Physical Exam  Vitals and nursing note reviewed. Constitutional:       Appearance: Normal appearance. Cardiovascular:      Rate and Rhythm: Normal rate. Pulmonary:      Effort: Pulmonary effort is normal.   Abdominal:      General: Abdomen is flat. Musculoskeletal:         General: Swelling and tenderness (left ankle) present. Normal range of motion. Skin:     General: Skin is warm. Findings: No erythema. Neurological:      Mental Status: He is alert and oriented to person, place, and time. Psychiatric:         Mood and Affect: Mood normal.        MDM     VITAL SIGNS:  Patient Vitals for the past 4 hrs:   Temp Pulse Resp BP SpO2   12/20/22 0617 99.2 °F (37.3 °C) 87 17 (!) 152/85 99 %         LABS:  No results found for this or any previous visit (from the past 6 hour(s)). IMAGING:  No orders to display         Medications During Visit:  Medications   predniSONE (DELTASONE) tablet 50 mg (has no administration in time range)         DECISION MAKING:  Rudy Waldrop is a 40 y.o. male who comes in as above. Well-appearing patient. Tenderness to the left ankle/skin is very sensitive, feels like gout to the patient. We will treat for gout with steroids. Follow-up with PCP      IMPRESSION:  1. Acute gout of left ankle, unspecified cause        DISPOSITION:  Discharged      Current Discharge Medication List        START taking these medications    Details   predniSONE (DELTASONE) 50 mg tablet Take 1 Tablet by mouth daily for 5 days.   Qty: 5 Tablet, Refills: 0  Start date: 12/20/2022, End date: 12/25/2022              Follow-up Information       Follow up With Specialties Details Why Contact Info    Antoni Miguel MD Internal Medicine Physician Schedule an appointment as soon as possible for a visit   [address] [facility]  [phone]                The patient is asked to follow-up with their primary care provider in the next several days. They are to call tomorrow for an appointment. The patient is asked to return promptly for any increased concerns or worsening of symptoms. They can return to this emergency department or any other emergency department.       Procedures

## 2022-12-21 ENCOUNTER — HOSPITAL ENCOUNTER (EMERGENCY)
Age: 37
Discharge: HOME | End: 2022-12-21
Attending: STUDENT IN AN ORGANIZED HEALTH CARE EDUCATION/TRAINING PROGRAM
Payer: COMMERCIAL

## 2022-12-21 VITALS
BODY MASS INDEX: 40.52 KG/M2 | WEIGHT: 273.59 LBS | HEIGHT: 69 IN | SYSTOLIC BLOOD PRESSURE: 132 MMHG | RESPIRATION RATE: 20 BRPM | TEMPERATURE: 99.1 F | OXYGEN SATURATION: 98 % | DIASTOLIC BLOOD PRESSURE: 84 MMHG | HEART RATE: 86 BPM

## 2022-12-21 DIAGNOSIS — F41.0 PANIC ATTACK: Primary | ICD-10-CM

## 2022-12-21 PROCEDURE — 99282 EMERGENCY DEPT VISIT SF MDM: CPT

## 2022-12-21 NOTE — ED NOTES
Pt provided w/DC instructions. No new rx. Pt instructed to follow up with PCP. Pt verbalized understanding of DC instructions. Denied questions/concerns. Well appearing, ambulatory on DC w/strong, steady gait.

## 2022-12-21 NOTE — ED TRIAGE NOTES
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 Pt reports hx of anxiety. Sts he was pumping gas and began to have weakness in both arms, which has since resolved. Pt sts he had a Pox and saw that his pulse was 50 and oxygen level was 60% so he came to ED.   Pt sts he took ativan and propanolol prior to arrival.

## 2022-12-21 NOTE — ED PROVIDER NOTES
The patient is a 81-year-old male history of anxiety presenting today with suspected anxiety attack. Patient reports that he got some bad news this morning and shortly after he started to feel his anxiety rising up. He states that he was outside pumping gas and decided to check his pulse ox. His pulse ox was reading a heart rate of 50 with an oxygenation of 60%. He does note that his fingers were cold at the time. He says that after he saw these numbers his arms both felt weak. He took propanolol and Ativan prior to arrival.  Right now he is feeling much better and completely asymptomatic. Of note patient has been seen for anxiety/chest pain multiple times over the past several weeks.        Past Medical History:   Diagnosis Date    Angioedema     ace induced asthma     Anxiety     Cold-induced asthma     Gout     Hernia of abdominal wall     Hypertension     JOS (obstructive sleep apnea)        Past Surgical History:   Procedure Laterality Date    HX ADENOIDECTOMY           Family History:   Problem Relation Age of Onset    Sleep Apnea Mother     Hypertension Mother     Breast Cancer Maternal Grandmother     Hypertension Father        Social History     Socioeconomic History    Marital status: SINGLE     Spouse name: Not on file    Number of children: Not on file    Years of education: Not on file    Highest education level: Not on file   Occupational History    Not on file   Tobacco Use    Smoking status: Never    Smokeless tobacco: Never   Vaping Use    Vaping Use: Never used   Substance and Sexual Activity    Alcohol use: No    Drug use: Not Currently    Sexual activity: Yes     Partners: Female   Other Topics Concern    Not on file   Social History Narrative    Not on file     Social Determinants of Health     Financial Resource Strain: Low Risk     Difficulty of Paying Living Expenses: Not hard at all   Food Insecurity: No Food Insecurity    Worried About Running Out of Food in the Last Year: Never true Ran Out of Food in the Last Year: Never true   Transportation Needs: Not on file   Physical Activity: Not on file   Stress: Not on file   Social Connections: Not on file   Intimate Partner Violence: Not on file   Housing Stability: Not on file         ALLERGIES: Lisinopril, Pcn [penicillins], and Venlafaxine    Review of Systems   Constitutional:  Negative for chills and fever. HENT:  Negative for congestion and sore throat. Eyes:  Negative for pain and redness. Respiratory:  Negative for cough and shortness of breath. Cardiovascular:  Negative for chest pain and palpitations. Gastrointestinal:  Negative for abdominal pain, diarrhea, nausea and vomiting. Genitourinary:  Negative for frequency and hematuria. Musculoskeletal:  Negative for back pain and neck pain. Skin:  Negative for rash and wound. Neurological:  Positive for weakness. Negative for dizziness and headaches. Hematological:  Does not bruise/bleed easily. Psychiatric/Behavioral:  The patient is nervous/anxious. Vitals:    12/21/22 1119   BP: (!) 148/97   Pulse: 86   Resp: 22   Temp: 99.1 °F (37.3 °C)   SpO2: 100%   Weight: 124.1 kg (273 lb 9.5 oz)   Height: 5' 9\" (1.753 m)            Physical Exam  Vitals and nursing note reviewed. Constitutional:       General: He is not in acute distress. Appearance: He is well-developed. HENT:      Head: Normocephalic and atraumatic. Eyes:      Conjunctiva/sclera: Conjunctivae normal.      Pupils: Pupils are equal, round, and reactive to light. Cardiovascular:      Rate and Rhythm: Normal rate and regular rhythm. Heart sounds: Normal heart sounds. No murmur heard. No friction rub. No gallop. Comments: Equal radial, dp, and pt pulses  Pulmonary:      Effort: Pulmonary effort is normal. No respiratory distress. Breath sounds: Normal breath sounds. No wheezing or rales. Abdominal:      General: Bowel sounds are normal. There is no distension. Palpations: Abdomen is soft. Tenderness: There is no abdominal tenderness. There is no guarding or rebound. Musculoskeletal:         General: Normal range of motion. Cervical back: Normal range of motion and neck supple. Skin:     General: Skin is warm and dry. Capillary Refill: Capillary refill takes less than 2 seconds. Findings: No rash. Neurological:      General: No focal deficit present. Mental Status: He is alert and oriented to person, place, and time. Sensory: No sensory deficit. Motor: No weakness. Gait: Gait normal.        MDM         Procedures          Well-appearing 60-year-old male presents today with presumed anxiety attack. Patient states that he felt his arms getting weak after he still has pulse ox showing 60% oxygenation. I believe his 60% oxygenation level was a false reading as his fingertips were cold while he was outside pumping gas. Here he is saturating 100% on room air and in no acute distress. His lungs are clear. He is neurologically intact. He already feels much better after taking his propanolol and Ativan. He is okay for discharge home. ICD-10-CM ICD-9-CM    1.  Panic attack  F41.0 300.01           Disposition: Discharge    [address] [facility] DO Omero

## 2022-12-22 RX ORDER — AMLODIPINE AND VALSARTAN 10; 320 MG/1; MG/1
1 TABLET ORAL DAILY
Qty: 90 TABLET | Refills: 1 | Status: SHIPPED | OUTPATIENT
Start: 2022-12-22

## 2022-12-22 NOTE — TELEPHONE ENCOUNTER
Amalia Wyatt 12/22/2022 7:41 AM EST      ----- Message -----  From: Macy Phipps  Sent: 12/21/2022 9:21 PM EST  To: Masha Villagran Nurse Bowman  Subject: Selina Kan     I need my blood pressure meds to be reduced back to 10 mg amlodapine and low dose of valsartan WITHOUT the diuretic in it. It's too strong and making my kidneys hurt even though I drink 1 gallon of water a day. Blood pressure is reducing because I'm drinking more water , but the diuretic is to much and it caused a gout flare up.

## 2023-01-02 DIAGNOSIS — F41.9 ANXIETY: ICD-10-CM

## 2023-01-03 ENCOUNTER — APPOINTMENT (OUTPATIENT)
Dept: GENERAL RADIOLOGY | Age: 38
End: 2023-01-03
Attending: EMERGENCY MEDICINE
Payer: COMMERCIAL

## 2023-01-03 ENCOUNTER — HOSPITAL ENCOUNTER (EMERGENCY)
Age: 38
Discharge: HOME | End: 2023-01-03
Attending: EMERGENCY MEDICINE
Payer: COMMERCIAL

## 2023-01-03 VITALS
HEART RATE: 95 BPM | DIASTOLIC BLOOD PRESSURE: 91 MMHG | SYSTOLIC BLOOD PRESSURE: 149 MMHG | RESPIRATION RATE: 16 BRPM | OXYGEN SATURATION: 97 % | TEMPERATURE: 99.2 F | BODY MASS INDEX: 41.22 KG/M2 | WEIGHT: 279.1 LBS

## 2023-01-03 DIAGNOSIS — R06.00 DYSPNEA, UNSPECIFIED TYPE: Primary | ICD-10-CM

## 2023-01-03 DIAGNOSIS — F41.1 ANXIETY STATE: ICD-10-CM

## 2023-01-03 LAB
ALBUMIN SERPL-MCNC: 4 G/DL (ref 3.5–5)
ALBUMIN/GLOB SERPL: 0.9 {RATIO} (ref 1.1–2.2)
ALP SERPL-CCNC: 85 U/L (ref 45–117)
ALT SERPL-CCNC: 59 U/L (ref 12–78)
ANION GAP SERPL CALC-SCNC: 7 MMOL/L (ref 5–15)
AST SERPL-CCNC: 39 U/L (ref 15–37)
BASOPHILS # BLD: 0 K/UL (ref 0–0.1)
BASOPHILS NFR BLD: 0 % (ref 0–1)
BILIRUB SERPL-MCNC: 0.7 MG/DL (ref 0.2–1)
BNP SERPL-MCNC: 20 PG/ML (ref 0–125)
BUN SERPL-MCNC: 18 MG/DL (ref 6–20)
BUN/CREAT SERPL: 14 (ref 12–20)
CALCIUM SERPL-MCNC: 9.1 MG/DL (ref 8.5–10.1)
CHLORIDE SERPL-SCNC: 100 MMOL/L (ref 97–108)
CO2 SERPL-SCNC: 30 MMOL/L (ref 21–32)
CREAT SERPL-MCNC: 1.33 MG/DL (ref 0.7–1.3)
D DIMER PPP FEU-MCNC: 0.56 MG/L FEU (ref 0–0.65)
DIFFERENTIAL METHOD BLD: ABNORMAL
EOSINOPHIL # BLD: 0.2 K/UL (ref 0–0.4)
EOSINOPHIL NFR BLD: 2 % (ref 0–7)
ERYTHROCYTE [DISTWIDTH] IN BLOOD BY AUTOMATED COUNT: 13 % (ref 11.5–14.5)
GLOBULIN SER CALC-MCNC: 4.4 G/DL (ref 2–4)
GLUCOSE SERPL-MCNC: 113 MG/DL (ref 65–100)
HCT VFR BLD AUTO: 43.3 % (ref 36.6–50.3)
HGB BLD-MCNC: 13.8 G/DL (ref 12.1–17)
IMM GRANULOCYTES # BLD AUTO: 0 K/UL
IMM GRANULOCYTES NFR BLD AUTO: 0 %
LYMPHOCYTES # BLD: 1.8 K/UL (ref 0.8–3.5)
LYMPHOCYTES NFR BLD: 17 % (ref 12–49)
MCH RBC QN AUTO: 27.3 PG (ref 26–34)
MCHC RBC AUTO-ENTMCNC: 31.9 G/DL (ref 30–36.5)
MCV RBC AUTO: 85.7 FL (ref 80–99)
MONOCYTES # BLD: 0.6 K/UL (ref 0–1)
MONOCYTES NFR BLD: 6 % (ref 5–13)
NEUTS SEG # BLD: 8.1 K/UL (ref 1.8–8)
NEUTS SEG NFR BLD: 75 % (ref 32–75)
NRBC # BLD: 0 K/UL (ref 0–0.01)
NRBC BLD-RTO: 0 PER 100 WBC
PLATELET # BLD AUTO: 207 K/UL (ref 150–400)
PMV BLD AUTO: 12.6 FL (ref 8.9–12.9)
POTASSIUM SERPL-SCNC: 3.9 MMOL/L (ref 3.5–5.1)
PROT SERPL-MCNC: 8.4 G/DL (ref 6.4–8.2)
RBC # BLD AUTO: 5.05 M/UL (ref 4.1–5.7)
RBC MORPH BLD: ABNORMAL
SODIUM SERPL-SCNC: 137 MMOL/L (ref 136–145)
TROPONIN-HIGH SENSITIVITY: 13 NG/L (ref 0–76)
WBC # BLD AUTO: 10.7 K/UL (ref 4.1–11.1)

## 2023-01-03 PROCEDURE — 83880 ASSAY OF NATRIURETIC PEPTIDE: CPT

## 2023-01-03 PROCEDURE — 85025 COMPLETE CBC W/AUTO DIFF WBC: CPT

## 2023-01-03 PROCEDURE — 74011250637 HC RX REV CODE- 250/637: Performed by: EMERGENCY MEDICINE

## 2023-01-03 PROCEDURE — 84484 ASSAY OF TROPONIN QUANT: CPT

## 2023-01-03 PROCEDURE — 93005 ELECTROCARDIOGRAM TRACING: CPT

## 2023-01-03 PROCEDURE — 85379 FIBRIN DEGRADATION QUANT: CPT

## 2023-01-03 PROCEDURE — 71045 X-RAY EXAM CHEST 1 VIEW: CPT

## 2023-01-03 PROCEDURE — 99285 EMERGENCY DEPT VISIT HI MDM: CPT

## 2023-01-03 PROCEDURE — 36415 COLL VENOUS BLD VENIPUNCTURE: CPT

## 2023-01-03 PROCEDURE — 80053 COMPREHEN METABOLIC PANEL: CPT

## 2023-01-03 RX ORDER — LORAZEPAM 0.5 MG/1
0.5 TABLET ORAL
Qty: 45 TABLET | Refills: 1 | Status: SHIPPED | OUTPATIENT
Start: 2023-01-03

## 2023-01-03 RX ORDER — LORAZEPAM 1 MG/1
1 TABLET ORAL
Status: COMPLETED | OUTPATIENT
Start: 2023-01-03 | End: 2023-01-03

## 2023-01-03 RX ADMIN — LORAZEPAM 1 MG: 1 TABLET ORAL at 15:57

## 2023-01-03 NOTE — ED PROVIDER NOTES
41-year-old male with shortness of breath. He also has some abdominal burning. He has a history of anxiety and is very pressured and anxious today. No nausea vomiting or diarrhea. The history is provided by the patient. Shortness of Breath  This is a new problem. The current episode started less than 1 hour ago. The problem has not changed since onset. Pertinent negatives include no fever, no headaches, no coryza, no rhinorrhea, no sore throat, no ear pain, no neck pain, no cough, no sputum production, no PND, no orthopnea, no chest pain, no abdominal pain and no rash. The problem's precipitants include pollens. He has tried nothing for the symptoms. He has had No prior hospitalizations. Associated medical issues do not include asthma, COPD, pneumonia, chronic lung disease, PE or CAD.       Past Medical History:   Diagnosis Date    Angioedema     ace induced asthma     Anxiety     Cold-induced asthma     Gout     Hernia of abdominal wall     Hypertension     JOS (obstructive sleep apnea)        Past Surgical History:   Procedure Laterality Date    HX ADENOIDECTOMY           Family History:   Problem Relation Age of Onset    Sleep Apnea Mother     Hypertension Mother     Breast Cancer Maternal Grandmother     Hypertension Father        Social History     Socioeconomic History    Marital status: SINGLE     Spouse name: Not on file    Number of children: Not on file    Years of education: Not on file    Highest education level: Not on file   Occupational History    Not on file   Tobacco Use    Smoking status: Never    Smokeless tobacco: Never   Vaping Use    Vaping Use: Never used   Substance and Sexual Activity    Alcohol use: No    Drug use: Not Currently    Sexual activity: Yes     Partners: Female   Other Topics Concern    Not on file   Social History Narrative    Not on file     Social Determinants of Health     Financial Resource Strain: Low Risk     Difficulty of Paying Living Expenses: Not hard at all Food Insecurity: No Food Insecurity    Worried About Running Out of Food in the Last Year: Never true    Ran Out of Food in the Last Year: Never true   Transportation Needs: Not on file   Physical Activity: Not on file   Stress: Not on file   Social Connections: Not on file   Intimate Partner Violence: Not on file   Housing Stability: Not on file         ALLERGIES: Lisinopril, Pcn [penicillins], and Venlafaxine    Review of Systems   Constitutional:  Negative for fever. HENT:  Negative for ear pain, rhinorrhea and sore throat. Respiratory:  Positive for shortness of breath. Negative for cough and sputum production. Cardiovascular:  Negative for chest pain, orthopnea and PND. Gastrointestinal:  Negative for abdominal pain. Musculoskeletal:  Negative for neck pain. Skin:  Negative for rash. Neurological:  Negative for headaches. Vitals:    01/03/23 1310 01/03/23 1613   BP: (!) 163/99 (!) 149/91   Pulse: 95 95   Resp: 16    Temp: 99.2 °F (37.3 °C)    SpO2: 99% 97%   Weight: 126.6 kg (279 lb 1.6 oz)             Physical Exam  Vitals and nursing note reviewed. Constitutional:       Appearance: Normal appearance. He is well-developed. HENT:      Head: Normocephalic and atraumatic. Cardiovascular:      Rate and Rhythm: Normal rate and regular rhythm. Pulses: Normal pulses. Heart sounds: Normal heart sounds. Pulmonary:      Effort: Pulmonary effort is normal. No respiratory distress. Breath sounds: Normal breath sounds. Chest:      Chest wall: No tenderness. Abdominal:      General: Bowel sounds are normal.      Palpations: Abdomen is soft. Tenderness: There is no abdominal tenderness. There is no guarding or rebound. Musculoskeletal:      Cervical back: Full passive range of motion without pain, normal range of motion and neck supple. Skin:     General: Skin is warm and dry. Findings: No erythema or rash.    Neurological:      Mental Status: He is alert and oriented to person, place, and time. Psychiatric:         Speech: Speech normal.         Behavior: Behavior normal.         Thought Content: Thought content normal.         Judgment: Judgment normal.        Medical Decision Making  58-year-old male with vague chest abdomen and back and neck and head burning. He has some shortness of breath with all this as well. He is very anxious and has a history of anxiety and takes multiple medicines for his anxiety. Amount and/or Complexity of Data Reviewed  Labs: ordered. Details: CBC chemistries and troponin are negative today. Radiology: ordered. Details: Chest x-ray negative today. ECG/medicine tests: ordered. Details: EKG at 1308 read at 1312 shows normal sinus rhythm at 95 bpm with no ST elevation or ectopy. Risk  Prescription drug management. Risk Details: Patient has negative evaluation and much anxiety. He will be discharged home.            Procedures

## 2023-01-03 NOTE — ED NOTES
The patient was discharged home by Dr Rehana Laird in stable condition. The patient is alert and oriented, in no respiratory distress. The patient's diagnosis, condition and treatment were explained. The patient expressed understanding. No prescriptions given/e-scribed to pharmacy. No work/school note given. A discharge plan has been developed. A  was not involved in the process. Aftercare instructions were given. Pt ambulatory out of the ED with significant other.

## 2023-01-05 LAB
ATRIAL RATE: 95 BPM
CALCULATED P AXIS, ECG09: 50 DEGREES
CALCULATED R AXIS, ECG10: 25 DEGREES
CALCULATED T AXIS, ECG11: 2 DEGREES
DIAGNOSIS, 93000: NORMAL
P-R INTERVAL, ECG05: 154 MS
Q-T INTERVAL, ECG07: 336 MS
QRS DURATION, ECG06: 94 MS
QTC CALCULATION (BEZET), ECG08: 422 MS
VENTRICULAR RATE, ECG03: 95 BPM

## 2023-01-09 DIAGNOSIS — I10 PRIMARY HYPERTENSION: ICD-10-CM

## 2023-01-09 RX ORDER — AMLODIPINE AND VALSARTAN 10; 320 MG/1; MG/1
1 TABLET ORAL DAILY
Qty: 90 TABLET | Refills: 1 | Status: SHIPPED | OUTPATIENT
Start: 2023-01-09

## 2023-01-09 NOTE — TELEPHONE ENCOUNTER
Future Appointments:  Future Appointments   Date Time Provider Portia Steele   1/24/2023 11:00 AM Garry Baker MD [facility] BS AMB   9/28/2023  3:10 PM Henry Langford NP [facility] BS AMB        Last Appointment With Me:  10/10/2022     Requested Prescriptions     Pending Prescriptions Disp Refills    amLODIPine-valsartan (EXFORGE)  mg per tablet 90 Tablet 1     Sig: Take 1 Tablet by mouth daily.

## 2023-01-12 ENCOUNTER — PATIENT MESSAGE (OUTPATIENT)
Dept: CARDIOLOGY CLINIC | Age: 38
End: 2023-01-12

## 2023-01-12 NOTE — TELEPHONE ENCOUNTER
Spoke to patient after ID x2-   Patient is not monitoring his blood pressures at home and states insurance did not cover the machine. Patient states you can \"see my blood pressures from the last few visit one of them was 135/80\"(ER, behavioral, and IM). Patient reports his sodium level was also low during one of his visits (sodium within normal range for the last 4 labs draws in chart going back to [facility]). BP visible from those visits are higher which are also during anxiety attacks. Patient believes his diuretic is working yet it is too strong and caused him to wake one night with back and kidney pain. Patient wants amlodipine increased and valsartan decreased to 160 due to dizziness. Advised MD would like to view BP readings before changes are made. Patient will have his blood pressure checked daily x1 week and report them via my chart. Evelyn Vigil MD 1/12/2023  1:38 PM EST    Ask for log of BP readings. ----- Message -----  From: Artemio Melendez LPN  Sent: 3/97/8815  10:30 AM EST  To: Yen Martines MD  [facility] Ranger[facility]ilippa Patricia                                  ----- Message -----  From: Rasheed Hairston RN  Sent: 1/12/2023   9:16 AM EST  To: Teresita Wheeler LPN  Subject: FW: Rahel Palmer                                  ----- Message -----  From: Richi Pisano  Sent: 1/12/2023   9:14 AM EST  To: NRZAY Nurse Pool  Subject: Rahel Palmer                                    Good morning,  I need my  blood pressure meds to be reduced to its original mgs. I think the valsartan is too strong, causes daily dizziness. Please prescribe a higher dose of 5mg Amlodapine or 5mg Amlodapine/ low dose of Hydrochlorothiazide combo please. I really think the diuretic  is working too but it makes my kidneys and back hurt. Please have a nurse call. My new pharmacy is [facility]. ...

## 2023-01-17 RX ORDER — BUSPIRONE HYDROCHLORIDE 5 MG/1
5 TABLET ORAL 2 TIMES DAILY
Qty: 60 TABLET | Refills: 1 | Status: SHIPPED | OUTPATIENT
Start: 2023-01-17

## 2023-01-17 NOTE — TELEPHONE ENCOUNTER
Future Appointments:  Future Appointments   Date Time Provider Portia Steele   1/24/2023 11:00 AM Mariah Batres MD [facility] BS AMB   2/10/2023  9:00 AM Sahara Richards NP [facility] BS AMB   9/28/2023  3:10 PM Jose Higgins NP [facility] BS AMB        Last Appointment With Me:  10/10/2022     Requested Prescriptions     Pending Prescriptions Disp Refills    busPIRone (BUSPAR) 5 mg tablet 60 Tablet 1     Sig: Take 1 Tablet by mouth two (2) times a day.
Pt lives with son in apartment with elevator has HHA from 1-5 6/days a week/children

## 2023-01-31 DIAGNOSIS — F41.9 ANXIETY: ICD-10-CM

## 2023-01-31 RX ORDER — LORAZEPAM 0.5 MG/1
0.5 TABLET ORAL
Qty: 45 TABLET | Refills: 1 | Status: SHIPPED | OUTPATIENT
Start: 2023-01-31 | End: 2023-01-31

## 2023-01-31 RX ORDER — LORAZEPAM 0.5 MG/1
0.5 TABLET ORAL
Qty: 45 TABLET | Refills: 1 | Status: SHIPPED | OUTPATIENT
Start: 2023-01-31

## 2023-01-31 NOTE — TELEPHONE ENCOUNTER
Last visit: 12.09.22  Next visit: 02.10.23    :  01/03/2023 01/03/2023 1 Lorazepam 0.5 Mg Tablet 45.00 23 [place]   12/02/2022 12/02/2022 1 Lorazepam 0.5 Mg Tablet 45.00 22 [facility]   11/10/2022 11/10/2022 1 Lorazepam 0.5 Mg Tablet 45.00 22 [place] [facility]

## 2023-02-10 ENCOUNTER — OFFICE VISIT (OUTPATIENT)
Dept: CARDIOLOGY CLINIC | Age: 38
End: 2023-02-10

## 2023-02-10 ENCOUNTER — VIRTUAL VISIT (OUTPATIENT)
Dept: BEHAVIORAL/MENTAL HEALTH CLINIC | Age: 38
End: 2023-02-10
Payer: COMMERCIAL

## 2023-02-10 VITALS
BODY MASS INDEX: 40.88 KG/M2 | OXYGEN SATURATION: 97 % | DIASTOLIC BLOOD PRESSURE: 80 MMHG | HEART RATE: 88 BPM | RESPIRATION RATE: 16 BRPM | WEIGHT: 276 LBS | SYSTOLIC BLOOD PRESSURE: 122 MMHG | HEIGHT: 69 IN

## 2023-02-10 DIAGNOSIS — F40.10 SOCIAL ANXIETY DISORDER: ICD-10-CM

## 2023-02-10 DIAGNOSIS — F41.0 PANIC DISORDER: ICD-10-CM

## 2023-02-10 DIAGNOSIS — F41.1 GAD (GENERALIZED ANXIETY DISORDER): ICD-10-CM

## 2023-02-10 DIAGNOSIS — I10 PRIMARY HYPERTENSION: Primary | ICD-10-CM

## 2023-02-10 DIAGNOSIS — F32.A DEPRESSION, UNSPECIFIED DEPRESSION TYPE: Primary | ICD-10-CM

## 2023-02-10 PROCEDURE — 99214 OFFICE O/P EST MOD 30 MIN: CPT | Performed by: NURSE PRACTITIONER

## 2023-02-10 NOTE — PROGRESS NOTES
Eugenie Collier MD, MS, [address] [facility]            HISTORY OF PRESENT ILLNESS:    Thea Brown is a 40 y.o. male here for FU. Significant anxiety, social anxiety. Panic attacks. Thinks anxiety started after COVID vaccine. ED visit 7/26 HTN, CP, anxiety attacks. Diagnosed HTN 10 years ago. Long history of anxiety, Patient reports has had increase anxiety over the last weeks, months. He does have a long history of this and has been followed by Dr. Maxi Kang.  Patient was told and prescribed BuSpar but he has not started this because he was concerned he could interact with his hypertensive medications. Saw Cardiology last Fall - VCS - monitor placed - no echo. Holter and echo done here- unremarkable. Saw Psychiatry - was propranolol once a day. Zoloft made him depressed, dizzy, felt off - went to ED - psych consult over computer. Did a DNA test for medication optimization. ETT - eleavted BP - normal ischemic EKG changes. Trialed tribenzor -  did not like HCTZ - now on exforge and DOING WELL! BP is fantastic. Feels well. Not eating meat. Exercising.      SUMMARY:   Problem List  Date Reviewed: 2/10/2023            Codes Class Noted    Panic disorder ICD-10-CM: F41.0  ICD-9-CM: 300.01  10/14/2022        ERIC (generalized anxiety disorder) ICD-10-CM: F41.1  ICD-9-CM: 300.02  10/14/2022        Social anxiety disorder ICD-10-CM: F40.10  ICD-9-CM: 300.23  10/14/2022        Palpitations ICD-10-CM: R00.2  ICD-9-CM: 785.1  7/29/2022        Obesity, morbid ([facility] [address] ICD-10-CM: E66.01  ICD-9-CM: 278.01  3/29/2018        Chronic bilateral low back pain with sciatica ICD-10-CM: M54.40, G89.29  ICD-9-CM: 724.2, 724.3, 338.29  3/29/2018        JOS (obstructive sleep apnea) ICD-10-CM: G47.33  ICD-9-CM: 327.23  Unknown        Hypertension ICD-10-CM: I10  ICD-9-CM: 401.9  Unknown        Cold-induced asthma ICD-10-CM: J45.909  ICD-9-CM: 493.10  Unknown        Gout ICD-10-CM: M10.9  ICD-9-CM: 274.9  Unknown           Current Outpatient Medications on File Prior to Visit   Medication Sig    LORazepam (ATIVAN) 0.5 mg tablet Take 1 Tablet by mouth two (2) times daily as needed for Anxiety. Max Daily Amount: 1 mg.    busPIRone (BUSPAR) 5 mg tablet Take 1 Tablet by mouth two (2) times a day. (Patient taking differently: Take 5 mg by mouth daily.)    amLODIPine-valsartan (EXFORGE)  mg per tablet Take 1 Tablet by mouth daily. Blood Pressure Test Kit-Large kit 1 Units by Does Not Apply route daily. propranoloL (INDERAL) 10 mg tablet Take 1 Tablet by mouth two (2) times a day. (Patient not taking: Reported on 2/10/2023)     No current facility-administered medications on file prior to visit. CARDIOLOGY STUDIES TO DATE:  No results found for this visit on 02/10/23. No chief complaint on file.         CARDIAC ROS:   positive for fatigue, dyspnea on exertion    Past Medical History:   Diagnosis Date    Angioedema     ace induced asthma     Anxiety     Cold-induced asthma     Gout     Hernia of abdominal wall     Hypertension     JOS (obstructive sleep apnea)        Family History   Problem Relation Age of Onset    Sleep Apnea Mother     Hypertension Mother     Breast Cancer Maternal Grandmother     Hypertension Father        Social History     Socioeconomic History    Marital status: SINGLE     Spouse name: Not on file    Number of children: Not on file    Years of education: Not on file    Highest education level: Not on file   Occupational History    Not on file   Tobacco Use    Smoking status: Never    Smokeless tobacco: Never   Vaping Use    Vaping Use: Never used   Substance and Sexual Activity    Alcohol use: No    Drug use: Not Currently    Sexual activity: Yes     Partners: Female   Other Topics Concern    Not on file   Social History Narrative    Not on file     Social Determinants of Health     Financial Resource Strain: Low Risk     Difficulty of Paying Living Expenses: Not hard at all   Food Insecurity: No Food Insecurity    Worried About Running Out of Food in the Last Year: Never true    Ran Out of Food in the Last Year: Never true   Transportation Needs: Not on file   Physical Activity: Not on file   Stress: Not on file   Social Connections: Not on file   Intimate Partner Violence: Not on file   Housing Stability: Not on file        GENERAL ROS:  A comprehensive review of systems was negative except for that written in the HPI. Visit Vitals  /80   Pulse 88   Resp 16   Ht 5' 9\" (1.753 m)   Wt 125.2 kg (276 lb)   SpO2 97%   BMI 40.76 kg/m²           Wt Readings from Last 3 Encounters:   02/10/23 125.2 kg (276 lb)   01/03/23 126.6 kg (279 lb 1.6 oz)   12/21/22 124.1 kg (273 lb 9.5 oz)            BP Readings from Last 3 Encounters:   02/10/23 122/80   01/03/23 (!) 149/91   12/21/22 132/84       PHYSICAL EXAM  General appearance: alert, cooperative, no distress, appears stated age  Neck: supple, symmetrical, trachea midline, no adenopathy, thyroid: not enlarged, symmetric, no tenderness/mass/nodules, no carotid bruit, and no JVD  Lungs: clear to auscultation bilaterally  Heart: regular rate and rhythm, S1, S2 normal, no murmur, click, rub or gallop  Extremities: extremities normal, atraumatic, no cyanosis or edema    Lab Results   Component Value Date/Time    Cholesterol, total 182 02/11/2021 12:11 PM    Cholesterol, total 229 (H) 08/11/2014 02:45 PM    HDL Cholesterol 37 (L) 02/11/2021 12:11 PM    LDL, calculated 108 (H) 02/11/2021 12:11 PM    Triglyceride 210 (H) 02/11/2021 12:11 PM       ASSESSMENT    ICD-10-CM ICD-9-CM    1. Primary hypertension  I10 401.9       2. Social anxiety disorder  F40.10 300.23       3. ERIC (generalized anxiety disorder)  F41.1 300.02               Encounter Diagnoses   Name Primary?     Primary hypertension Yes    Social anxiety disorder     ERIC (generalized anxiety disorder)              No orders of the defined types were placed in this encounter. Yen Martines MD  2/10/2023        [address]   [address]  [address]  [phone] (F)    [address]  [phone] (P)  [phone] (F)    ATTENTION:   This medical record was transcribed using an electronic medical records/speech recognition system. Although proofread, it may and can contain electronic, spelling and other errors. Corrections may be executed at a later time. Please feel free to contact us for any clarifications as needed.

## 2023-02-10 NOTE — PROGRESS NOTES
CHIEF COMPLAINT:  Fermin Rico is a 40 y.o. male and was seen today for follow-up of psychiatric condition and psychotropic medication management. HPI:    Zayda Jaime reports the following psychiatric symptoms by hx:  depression and anxiety. Overall symptoms have been present for years. Currently symptoms are  of moderate severity. The symptoms occur daily. Patient reports and anxiety has improved. Panic attacks are less frequent. Pt reports medications are beneficial. Met with pt via video telehealth for appt today  to review current treatment plan. FAMILY/SOCIAL HX: External stressors      REVIEW OF SYSTEMS:  Psychiatric symptoms being monitored for:   panic attacks   Appetite:no change from normal   Sleep: improved   Neuro: denies     There were no vitals taken for this visit. Side Effects:  none    MENTAL STATUS EXAM:   Sensorium  oriented to time, place and person   Relations cooperative   Appearance:  age appropriate and within normal Limits   Motor Behavior:  within normal limits   Speech:  normal pitch and normal volume   Thought Process: within normal limits   Thought Content free of delusions and free of hallucinations   Suicidal ideations none   Homicidal ideations none   Mood:  Anxious/depressed   Affect:  anxious   Memory recent  adequate   Memory remote:  adequate   Concentration:  impaired   Abstraction:  abstract   Insight:  fair   Reliability good   Judgment:  fair and good     MEDICAL DECISION MAKING:  Problems addressed today:    ICD-10-CM ICD-9-CM    1. Depression, unspecified depression type  F32. A 311       2. ERIC (generalized anxiety disorder)  F41.1 300.02       3. Panic disorder  F41.0 300.01       4. Social anxiety disorder  F40.10 300.23         Assessment:   Zayda Jaime is responding to treatment. Symptoms are improved. He is doing better overall. Reinforced positive coping strategies. Discussed current medications and dosages. No changes made today.  Patient uses Ativan very sparingly and understands benzo safety guidelines. Reviewed treatment goals and target symptoms to monitor for. Plan:   1. Current Outpatient Medications   Medication Sig Dispense Refill    LORazepam (ATIVAN) 0.5 mg tablet Take 1 Tablet by mouth two (2) times daily as needed for Anxiety. Max Daily Amount: 1 mg. 45 Tablet 1    busPIRone (BUSPAR) 5 mg tablet Take 1 Tablet by mouth two (2) times a day. (Patient taking differently: Take 5 mg by mouth daily.) 60 Tablet 1    amLODIPine-valsartan (EXFORGE)  mg per tablet Take 1 Tablet by mouth daily. 90 Tablet 1    Blood Pressure Test Kit-Large kit 1 Units by Does Not Apply route daily. 1 Kit 0    propranoloL (INDERAL) 10 mg tablet Take 1 Tablet by mouth two (2) times a day. (Patient not taking: Reported on 2/10/2023) 60 Tablet 1          medication changes made today: Continue Buspar 5 mg po twice daily, continue Ativan 0.5 mg po twice daily as needed. 2.  Counseling and coordination of care including instructions for treatment, risks/benefits, risk factor reduction and patient/family education. He agrees with the plan. Patient instructed to call with any side effects, questions or issues. Yolanda Lamar, was evaluated through a synchronous (real-time) audio-video encounter. The patient (or guardian if applicable) is aware that this is a billable service, which includes applicable co-pays. This Virtual Visit was conducted with patient's (and/or legal guardian's) consent. The visit was conducted pursuant to the emergency declaration under the [address] [place] [address], [address] [place] [address] authority and the [employer] and [employer] General Act. Patient identification was verified, and a caregiver was present when appropriate. The patient was located at: Home: [place] [id number]  The provider was located at:  Facility [facility] Department[facility] [address] [address]       An electronic signature was used to authenticate this note.   -- Clay Beard, RAYRAY       2/10/2023  Clay Beard, NP

## 2023-02-15 ENCOUNTER — PATIENT MESSAGE (OUTPATIENT)
Dept: CARDIOLOGY CLINIC | Age: 38
End: 2023-02-15

## 2023-02-15 NOTE — TELEPHONE ENCOUNTER
Sierra Guajardo MD 2/15/2023 4:55 PM EST    Hold the course.      ----- Message -----  From: Parveen Adams LPN  Sent: 0/05/1498 2:38 PM EST  To: Sara Guzman MD  Subject: Payton Mancini: Delmis Zepeda       ----- Message -----  From: Santana Benz RN  Sent: 2/15/2023 8:21 AM EST  To: Elda Diaz LPN  Subject: FW: Delmis Jamesom       ----- Message -----  From: Elizabethella Plants  Sent: 2/15/2023 7:25 AM EST  To: Britta [facility] Nurse Pool  Subject: Delmis Katelyn     Good morning, please have Dr. Souleymane Beckwith lower my blood pressure dosage. My current medication is causing dizziness , sick and nauseous feeling every morning. Blood pressure is normal now and in the morning it's lower than normal 117/70, 120/73, 118/75. ...... I am requesting to be put back on only AMLODAPINE 10mg . The Valasartin 320 mg is too strong. .... Please call me with any questions. [phone].  My new Pharmacy is [facility] [phone] [place] [phone]

## 2023-03-02 ENCOUNTER — VIRTUAL VISIT (OUTPATIENT)
Dept: INTERNAL MEDICINE CLINIC | Age: 38
End: 2023-03-02
Payer: COMMERCIAL

## 2023-03-02 DIAGNOSIS — F41.1 GAD (GENERALIZED ANXIETY DISORDER): ICD-10-CM

## 2023-03-02 DIAGNOSIS — I10 PRIMARY HYPERTENSION: ICD-10-CM

## 2023-03-02 DIAGNOSIS — F41.0 PANIC DISORDER: Primary | ICD-10-CM

## 2023-03-02 DIAGNOSIS — G47.33 OSA (OBSTRUCTIVE SLEEP APNEA): ICD-10-CM

## 2023-03-02 PROCEDURE — 99214 OFFICE O/P EST MOD 30 MIN: CPT | Performed by: INTERNAL MEDICINE

## 2023-03-02 RX ORDER — AMLODIPINE AND VALSARTAN 10; 320 MG/1; MG/1
1 TABLET ORAL DAILY
Qty: 90 TABLET | Refills: 1 | Status: SHIPPED | OUTPATIENT
Start: 2023-03-02

## 2023-03-02 NOTE — PROGRESS NOTES
Chief Complaint   Patient presents with    Medication Evaluation          1. \"Have you been to the ER, urgent care clinic since your last visit? Hospitalized since your last visit? \" No    2. \"Have you seen or consulted any other health care providers outside of the [address] [place] [address] since your last visit? \" No     3. For patients aged 39-70: Has the patient had a colonoscopy / FIT/ Cologuard? NA - based on age      If the patient is female:    4. For patients aged 41-77: Has the patient had a mammogram within the past 2 years? NA - based on age or sex      11. For patients aged 21-65: Has the patient had a pap smear?  NA - based on age or sex

## 2023-03-02 NOTE — TELEPHONE ENCOUNTER
Future Appointments:  Future Appointments   Date Time Provider Portia Steele   6/16/2023  9:00 AM MD BRYAN Cantu   9/28/2023  3:10 PM Janae Gómez NP [facility] AM[facility]        Last Appointment With Me:  3/2/2023     Requested Prescriptions     Pending Prescriptions Disp Refills    amLODIPine-valsartan (EXFORGE)  mg per tablet 90 Tablet 1     Sig: Take 1 Tablet by mouth daily.

## 2023-03-02 NOTE — PROGRESS NOTES
CC: Medication Evaluation      HPI:    He is a 40 y.o. male who presents for evaluation of blood pressure and anxiety       Went to cardiologist two weeks ago   120/80     Saw cards Dr Manfred Fenton last visit Feb 10 /2023  Blood pressure is excellent   Stress  test is negative for ischemia   Today's blood pressure is 120/70 and he is concerned that that is too low. He denies dizziness chest pain. He is drinking more water and cut out red meat from his diet    Taking buspar once a day and helps with anxiety , he is seeing a mental health provider Ancelmo   Decreased use of ativan to twice a week   If has argument and having difficulty calming down he will take a lorazepam    Overall he is doing well      This is an established visit conducted via telemedicine with video. The patient has been instructed that this meets HIPAA criteria and acknowledges and agrees to this method of visitation. Pursuant to the emergency declaration under the [address] [place] [address] waiver authority and the [employer] and [employer] Act, this Virtual Visit was conducted, with patient's consent, to reduce the patient's risk of exposure to COVID-19 and provide continuity of care for an established patient. Services were provided through a video synchronous discussion virtually to substitute for in-person clinic visit. ROS:  Constitutional: negative for fevers, chills, anorexia and weight loss  10 systems reviewed and negative other than HPI    Past Medical History:   Diagnosis Date    Angioedema     ace induced asthma     Anxiety     Cold-induced asthma     Gout     Hernia of abdominal wall     Hypertension     JOS (obstructive sleep apnea)        Current Outpatient Medications on File Prior to Visit   Medication Sig Dispense Refill    LORazepam (ATIVAN) 0.5 mg tablet Take 1 Tablet by mouth two (2) times daily as needed for Anxiety.  Max Daily Amount: 1 mg. 45 Tablet 1    busPIRone (BUSPAR) 5 mg tablet Take 1 Tablet by mouth two (2) times a day. (Patient taking differently: Take 5 mg by mouth daily.) 60 Tablet 1    amLODIPine-valsartan (EXFORGE)  mg per tablet Take 1 Tablet by mouth daily. 90 Tablet 1    Blood Pressure Test Kit-Large kit 1 Units by Does Not Apply route daily. 1 Kit 0    propranoloL (INDERAL) 10 mg tablet Take 1 Tablet by mouth two (2) times a day. (Patient not taking: No sig reported) 60 Tablet 1     No current facility-administered medications on file prior to visit. Past Surgical History:   Procedure Laterality Date    HX ADENOIDECTOMY         Family History   Problem Relation Age of Onset    Sleep Apnea Mother     Hypertension Mother     Breast Cancer Maternal Grandmother     Hypertension Father      Reviewed and no changes     Social History     Socioeconomic History    Marital status: SINGLE     Spouse name: Not on file    Number of children: Not on file    Years of education: Not on file    Highest education level: Not on file   Occupational History    Not on file   Tobacco Use    Smoking status: Never    Smokeless tobacco: Never   Vaping Use    Vaping Use: Never used   Substance and Sexual Activity    Alcohol use: No    Drug use: Not Currently    Sexual activity: Yes     Partners: Female   Other Topics Concern    Not on file   Social History Narrative    Not on file     Social Determinants of Health     Financial Resource Strain: Low Risk     Difficulty of Paying Living Expenses: Not hard at all   Food Insecurity: No Food Insecurity    Worried About Running Out of Food in the Last Year: Never true    Ran Out of Food in the Last Year: Never true   Transportation Needs: Not on file   Physical Activity: Not on file   Stress: Not on file   Social Connections: Not on file   Intimate Partner Violence: Not on file   Housing Stability: Not on file          There were no vitals taken for this visit.     Physical Examination:   Gen: well appearing male  HEENT: normal conjunctiva, no audible congestion, patient does not see oral erythema, has MMM  Neck: patient does not feel enlarged or tender LAD or masses  Resp: normal respiratory effort, no audible wheezing. CV: patient does not feel palpitations or heart irregularity  Abd: patient does not feel abdominal tenderness or mass, patient does not notice distension  Extrem: patient does not see swelling in ankles or joints.    Neuro: Alert and oriented, able to answer questions without difficulty, able to move all extremities and walk normally          Lab Results   Component Value Date/Time    WBC 10.7 01/03/2023 03:52 PM    HGB 13.8 01/03/2023 03:52 PM    HCT 43.3 01/03/2023 03:52 PM    PLATELET 692 48/56/5529 03:52 PM    MCV 85.7 01/03/2023 03:52 PM     Lab Results   Component Value Date/Time    Sodium 137 01/03/2023 03:52 PM    Potassium 3.9 01/03/2023 03:52 PM    Chloride 100 01/03/2023 03:52 PM    CO2 30 01/03/2023 03:52 PM    Anion gap 7 01/03/2023 03:52 PM    Glucose 113 (H) 01/03/2023 03:52 PM    BUN 18 01/03/2023 03:52 PM    Creatinine 1.33 (H) 01/03/2023 03:52 PM    BUN/Creatinine ratio 14 01/03/2023 03:52 PM    GFR est AA >60 09/24/2022 05:45 PM    GFR est non-AA 60 (L) 09/24/2022 05:45 PM    Calcium 9.1 01/03/2023 03:52 PM     Lab Results   Component Value Date/Time    Cholesterol, total 182 02/11/2021 12:11 PM    HDL Cholesterol 37 (L) 02/11/2021 12:11 PM    LDL, calculated 108 (H) 02/11/2021 12:11 PM    VLDL, calculated 37 02/11/2021 12:11 PM    Triglyceride 210 (H) 02/11/2021 12:11 PM     Lab Results   Component Value Date/Time    TSH 0.79 09/18/2022 01:21 PM     No results found for: PSA, [place], [id number], [id number]  Lab Results   Component Value Date/Time    Hemoglobin A1c 5.4 08/30/2022 02:04 PM     Lab Results   Component Value Date/Time    Vitamin D 25-Hydroxy 25.8 (L) 09/26/2022 10:38 AM       Lab Results   Component Value Date/Time    ALT (SGPT) 59 01/03/2023 03:52 PM    Alk. phosphatase 85 01/03/2023 03:52 PM    Bilirubin, total 0.7 01/03/2023 03:52 PM           Assessment/Plan:    1. Panic disorder    2. ERIC (generalized anxiety disorder)  -Making progress on BuSpar and decrease use of lorazepam.  He should continue following with mental health provider Roseline    3. Primary hypertension  -This is well controlled currently on amlodipine 10 mg and valsartan 320 mg.  I encouraged him to continue with this regimen. Continue healthy diet and encouraged to exercise    4. JOS (obstructive sleep apnea)  Compliant with CPAP use        Monica Cristobal MD    This is an established visit conducted via real time video and audio telemedicine. The patient has been instructed that this meets HIPAA criteria and acknowledges and agrees to this method of visitation.   Follow-up in person in 3-4 months

## 2023-03-07 ENCOUNTER — HOSPITAL ENCOUNTER (EMERGENCY)
Age: 38
Discharge: HOME | End: 2023-03-07
Attending: STUDENT IN AN ORGANIZED HEALTH CARE EDUCATION/TRAINING PROGRAM
Payer: COMMERCIAL

## 2023-03-07 ENCOUNTER — PATIENT MESSAGE (OUTPATIENT)
Dept: INTERNAL MEDICINE CLINIC | Age: 38
End: 2023-03-07

## 2023-03-07 ENCOUNTER — APPOINTMENT (OUTPATIENT)
Dept: GENERAL RADIOLOGY | Age: 38
End: 2023-03-07
Attending: STUDENT IN AN ORGANIZED HEALTH CARE EDUCATION/TRAINING PROGRAM
Payer: COMMERCIAL

## 2023-03-07 VITALS
OXYGEN SATURATION: 98 % | RESPIRATION RATE: 16 BRPM | TEMPERATURE: 99.2 F | BODY MASS INDEX: 43.55 KG/M2 | HEART RATE: 98 BPM | SYSTOLIC BLOOD PRESSURE: 150 MMHG | WEIGHT: 294 LBS | HEIGHT: 69 IN | DIASTOLIC BLOOD PRESSURE: 97 MMHG

## 2023-03-07 DIAGNOSIS — R07.89 NON-CARDIAC CHEST PAIN: ICD-10-CM

## 2023-03-07 DIAGNOSIS — R53.81 MALAISE AND FATIGUE: Primary | ICD-10-CM

## 2023-03-07 DIAGNOSIS — R53.83 MALAISE AND FATIGUE: Primary | ICD-10-CM

## 2023-03-07 DIAGNOSIS — M25.571 ACUTE RIGHT ANKLE PAIN: ICD-10-CM

## 2023-03-07 LAB
ALBUMIN SERPL-MCNC: 3.8 G/DL (ref 3.5–5)
ALBUMIN/GLOB SERPL: 0.9 (ref 1.1–2.2)
ALP SERPL-CCNC: 77 U/L (ref 45–117)
ALT SERPL-CCNC: 25 U/L (ref 12–78)
ANION GAP SERPL CALC-SCNC: 7 MMOL/L (ref 5–15)
AST SERPL-CCNC: 18 U/L (ref 15–37)
ATRIAL RATE: 86 BPM
BASOPHILS # BLD: 0 K/UL (ref 0–0.1)
BASOPHILS NFR BLD: 0 % (ref 0–1)
BILIRUB SERPL-MCNC: 0.7 MG/DL (ref 0.2–1)
BUN SERPL-MCNC: 10 MG/DL (ref 6–20)
BUN/CREAT SERPL: 10 (ref 12–20)
CALCIUM SERPL-MCNC: 9.2 MG/DL (ref 8.5–10.1)
CALCULATED P AXIS, ECG09: 54 DEGREES
CALCULATED R AXIS, ECG10: 26 DEGREES
CALCULATED T AXIS, ECG11: -8 DEGREES
CHLORIDE SERPL-SCNC: 102 MMOL/L (ref 97–108)
CO2 SERPL-SCNC: 29 MMOL/L (ref 21–32)
CREAT SERPL-MCNC: 1 MG/DL (ref 0.7–1.3)
DIAGNOSIS, 93000: NORMAL
DIFFERENTIAL METHOD BLD: ABNORMAL
EOSINOPHIL # BLD: 0.1 K/UL (ref 0–0.4)
EOSINOPHIL NFR BLD: 1 % (ref 0–7)
ERYTHROCYTE [DISTWIDTH] IN BLOOD BY AUTOMATED COUNT: 12.1 % (ref 11.5–14.5)
GLOBULIN SER CALC-MCNC: 4.4 G/DL (ref 2–4)
GLUCOSE SERPL-MCNC: 93 MG/DL (ref 65–100)
HCT VFR BLD AUTO: 41.6 % (ref 36.6–50.3)
HGB BLD-MCNC: 13.2 G/DL (ref 12.1–17)
IMM GRANULOCYTES # BLD AUTO: 0 K/UL
IMM GRANULOCYTES NFR BLD AUTO: 0 %
LYMPHOCYTES # BLD: 1.5 K/UL (ref 0.8–3.5)
LYMPHOCYTES NFR BLD: 27 % (ref 12–49)
MAGNESIUM SERPL-MCNC: 1.8 MG/DL (ref 1.6–2.4)
MCH RBC QN AUTO: 26.5 PG (ref 26–34)
MCHC RBC AUTO-ENTMCNC: 31.7 G/DL (ref 30–36.5)
MCV RBC AUTO: 83.4 FL (ref 80–99)
METAMYELOCYTES NFR BLD MANUAL: 1 %
MONOCYTES # BLD: 0.5 K/UL (ref 0–1)
MONOCYTES NFR BLD: 8 % (ref 5–13)
NEUTS BAND NFR BLD MANUAL: 1 % (ref 0–6)
NEUTS SEG # BLD: 3.6 K/UL (ref 1.8–8)
NEUTS SEG NFR BLD: 62 % (ref 32–75)
P-R INTERVAL, ECG05: 160 MS
PLATELET # BLD AUTO: 259 K/UL (ref 150–400)
PMV BLD AUTO: 11.8 FL (ref 8.9–12.9)
POTASSIUM SERPL-SCNC: 4 MMOL/L (ref 3.5–5.1)
PROT SERPL-MCNC: 8.2 G/DL (ref 6.4–8.2)
Q-T INTERVAL, ECG07: 348 MS
QRS DURATION, ECG06: 96 MS
QTC CALCULATION (BEZET), ECG08: 416 MS
RBC # BLD AUTO: 4.99 M/UL (ref 4.1–5.7)
RBC MORPH BLD: ABNORMAL
SODIUM SERPL-SCNC: 138 MMOL/L (ref 136–145)
VENTRICULAR RATE, ECG03: 86 BPM
WBC # BLD AUTO: 5.7 K/UL (ref 4.1–11.1)

## 2023-03-07 PROCEDURE — 73610 X-RAY EXAM OF ANKLE: CPT

## 2023-03-07 PROCEDURE — 71045 X-RAY EXAM CHEST 1 VIEW: CPT

## 2023-03-07 PROCEDURE — 36415 COLL VENOUS BLD VENIPUNCTURE: CPT

## 2023-03-07 PROCEDURE — 99285 EMERGENCY DEPT VISIT HI MDM: CPT

## 2023-03-07 PROCEDURE — 83735 ASSAY OF MAGNESIUM: CPT

## 2023-03-07 PROCEDURE — 80053 COMPREHEN METABOLIC PANEL: CPT

## 2023-03-07 PROCEDURE — 85025 COMPLETE CBC W/AUTO DIFF WBC: CPT

## 2023-03-07 PROCEDURE — 93005 ELECTROCARDIOGRAM TRACING: CPT

## 2023-03-07 RX ORDER — AMLODIPINE AND VALSARTAN 10; 160 MG/1; MG/1
1 TABLET ORAL DAILY
Qty: 30 TABLET | Refills: 1 | Status: SHIPPED | OUTPATIENT
Start: 2023-03-07

## 2023-03-07 NOTE — ED NOTES
Pt returned phone call, reports girlfriend, who is a nurse, removed IV and put it in sharps box prior to departure. Pt reports he held pressure on area. Discussed s/sx of infection to look for, pt verbalized good understanding.

## 2023-03-07 NOTE — TELEPHONE ENCOUNTER
Riccardo Singletary 3/7/2023 10:24 AM EST      ----- Message -----  From: Gabrielle Jenesn  Sent: 3/7/2023 10:18 AM EST  To: [facility] Nurse Pool  Subject: Kofi Durand     Good morning, I need my blood pressure meds decreased today. It's too strong. ... Blood pressure this morning was 114/67. ... I've been feeling weak and sluggish majority of the morning. .. Renay Villagran I'm recommending Amlodapine 10mg alone WITHOUT the Valsartan.

## 2023-03-07 NOTE — ED TRIAGE NOTES
Patient reports ankle pain radiating to leg that stopped this morning, states generalized weakness and fatigue. States he took his BP in his wrist and arm and they were different. Takes BP medications. Adds has not been eating meat in that last 30 days and he has been eating salads and last night he ate cheese sticks. . Patient states he is feeling anxious states that the gout made him think he had a blood clot because the pain went away so fast. States he took his ativan at 0400. But today he states he is just not feeling good and is not sure what is going on.

## 2023-03-07 NOTE — ED PROVIDER NOTES
Patient is a 29-year-old male with frequent ED visits presenting the ED today for concern of fatigue and malaise. Patient woke up feeling tired and not able to do what he usually does. Patient denies shortness of breath or fevers. No nausea or vomiting. Patient has multiple other chronic concerns about history of gout, blood pressure. While obtaining HPI patient endorsed some chest tightness. Patient is vital signs stable. ABCs intact. Fatigue  Pertinent negatives include no shortness of breath and no chest pain.       Past Medical History:   Diagnosis Date    Angioedema     ace induced asthma     Anxiety     Cold-induced asthma     Gout     Hernia of abdominal wall     Hypertension     JOS (obstructive sleep apnea)        Past Surgical History:   Procedure Laterality Date    HX ADENOIDECTOMY           Family History:   Problem Relation Age of Onset    Sleep Apnea Mother     Hypertension Mother     Breast Cancer Maternal Grandmother     Hypertension Father        Social History     Socioeconomic History    Marital status: SINGLE     Spouse name: Not on file    Number of children: Not on file    Years of education: Not on file    Highest education level: Not on file   Occupational History    Not on file   Tobacco Use    Smoking status: Never    Smokeless tobacco: Never   Vaping Use    Vaping Use: Never used   Substance and Sexual Activity    Alcohol use: No    Drug use: Not Currently    Sexual activity: Yes     Partners: Female   Other Topics Concern    Not on file   Social History Narrative    Not on file     Social Determinants of Health     Financial Resource Strain: Low Risk     Difficulty of Paying Living Expenses: Not hard at all   Food Insecurity: No Food Insecurity    Worried About Running Out of Food in the Last Year: Never true    Ran Out of Food in the Last Year: Never true   Transportation Needs: Not on file   Physical Activity: Not on file   Stress: Not on file   Social Connections: Not on file   Intimate Partner Violence: Not on file   Housing Stability: Not on file         ALLERGIES: Lisinopril, Pcn [penicillins], and Venlafaxine    Review of Systems   Constitutional:  Positive for activity change and fatigue. Respiratory:  Positive for chest tightness. Negative for shortness of breath. Cardiovascular:  Negative for chest pain. Musculoskeletal:  Positive for arthralgias. Vitals:    03/07/23 1149 03/07/23 1207 03/07/23 1237   BP: (!) 170/100 (!) 148/98 (!) 150/97   Pulse: 98     Resp: 16     Temp: 99.2 °F (37.3 °C)     SpO2: 99% 100% 98%   Weight: 133.4 kg (294 lb)     Height: 5' 9\" (1.753 m)              Physical Exam  Vitals and nursing note reviewed. Constitutional:       General: He is not in acute distress. Appearance: Normal appearance. HENT:      Head: Normocephalic and atraumatic. Right Ear: External ear normal.      Left Ear: External ear normal.      Nose: Nose normal.   Eyes:      Conjunctiva/sclera: Conjunctivae normal.   Cardiovascular:      Rate and Rhythm: Normal rate and regular rhythm. Pulses: Normal pulses. Radial pulses are 2+ on the right side and 2+ on the left side. Heart sounds: Normal heart sounds. Pulmonary:      Effort: Pulmonary effort is normal.      Breath sounds: Normal breath sounds. Abdominal:      General: There is no distension. Palpations: Abdomen is soft. Tenderness: There is no abdominal tenderness. Musculoskeletal:         General: Tenderness present. No deformity or signs of injury. Normal range of motion. Comments: Mild ankle tenderness   Skin:     General: Skin is warm and dry. Capillary Refill: Capillary refill takes less than 2 seconds. Neurological:      General: No focal deficit present. Mental Status: He is alert and oriented to person, place, and time.    Psychiatric:         Attention and Perception: Attention normal.         Mood and Affect: Mood normal.         Behavior: Behavior normal.        Medical Decision Making  Amount and/or Complexity of Data Reviewed  Independent Historian: friend  External Data Reviewed: labs and notes. Labs: ordered. Decision-making details documented in ED Course. Radiology: ordered and independent interpretation performed. ECG/medicine tests: ordered and independent interpretation performed. Decision-making details documented in ED Course. ED Course as of 03/09/23 0945   Tue Mar 07, 2023   1202 Temp: 99.2 °F (37.3 °C) [AL]   1202 Pulse (Heart Rate): 98 [AL]   1202 O2 Sat (%): 99 % [AL]   1202 BP(!): 170/100 [AL]   8049 Patient endorsing chest tightness will get EKG. [AL]   1254 EKG interpretation:   Rhythm: normal sinus rhythm; and regular . Rate (approx.): 86; Axis: normal; Intervals: normal ; ST/T wave: normal; EKG documented and interpreted by Alana Cat. Sylvia Escamilla MD, Emergency Medicine.     [AL]   1346 Magnesium: 1.8 [AL]   1346 WBC: 5.7 [AL]   1346 HGB: 13.2 [AL]   1346 Sodium: 138 [AL]   1346 Potassium: 4.0 [AL]   1346 Chloride: 102 [AL]   1346 CO2: 29 [AL]   0890 Creatinine: 1.00 [AL]   1347 Bilirubin, total: 0.7 [AL]   5912 ALT: 25 [AL]   4995 AST: 18 [AL]      ED Course User Index  [AL] Margie Thompson MD     LABORATORY RESULTS:  Labs Reviewed   CBC WITH AUTOMATED DIFF - Abnormal; Notable for the following components:       Result Value    METAMYELOCYTES 1 (*)     All other components within normal limits   METABOLIC PANEL, COMPREHENSIVE - Abnormal; Notable for the following components:    BUN/Creatinine ratio 10 (*)     Globulin 4.4 (*)     A-G Ratio 0.9 (*)     All other components within normal limits   MAGNESIUM       IMAGING RESULTS:  XR CHEST PORT   Final Result   1. No acute disease          XR ANKLE RT MIN 3 V   Final Result   No acute abnormality.           MEDICATIONS GIVEN:  Medications - No data to display    Differential diagnosis: Hypomagnesemia, hypokalemia, other electrolyte abnormality, arrhythmia, noncardiac chest pain, ankle pain, hypertension    ED physician interpretation of imaging: Chest X without focal pneumonia or pneumothorax  ED physician interpretation of EKG: No STEMI. See my interpretation EKG in ED course above. ED physician interpretation of laboratory results: Lab without critical values. Generally all within normal limits. Nothing to explain patient's fatigue and malaise. MDM: Patient is a 43-year-old male presented to ED with multiple concerns with a primary which is an acute generalized fatigue and malaise patient was feeling that is since resolved. Patient also has some chest tightness briefly. EKG was unremarkable. Do not suspect cardiac etiology at this tightness. Chest x-ray unremarkable. Patient also had right ankle pain. No signs of fracture but patient does have an old bone fragment from her previous injury that patient recalls being from a football injury in high school. As patient is feeling better and relatively extensive work-up was unremarkable discharge home and outpatient follow-up is appropriate. Further personalized recommendations for outpatient care as below. Key discharge instructions and summary of care: You presented to ED with feeling general foot fatigue and malaise. You had no focal neurologic deficits. Your lab work was grossly normal.  Chest x-ray and right ankle x-ray without acute fracture. You are feeling better here in the ED. No signs of hypoglycemia or hyperglycemia. Recommend close follow-up with your PCP for further outpatient evaluation if symptoms become persistent or recur. The patient has been re-evaluated and feeling better. Patient is stable for discharge. All available radiology and laboratory results have been reviewed with patient and/or available family.   Patient and/or family verbally conveyed their understanding and agreement of the patient's signs, symptoms, diagnosis, treatment and prognosis and additionally agree to follow-up as recommended in the discharge instructions or to return to the Emergency Department should their condition change or worsen prior to their follow-up appointment. All questions have been answered and patient and/or available family express understanding. IMPRESSION:  1. Malaise and fatigue    2. Acute right ankle pain    3. Non-cardiac chest pain        DISPOSITION: Discharged     Miguel Ashby MD      Procedures

## 2023-03-07 NOTE — DISCHARGE INSTRUCTIONS
You presented to ED with feeling general foot fatigue and malaise. You had no focal neurologic deficits. Your lab work was grossly normal.  Chest x-ray and right ankle x-ray without acute fracture. You are feeling better here in the ED. No signs of hypoglycemia or hyperglycemia. Recommend close follow-up with your PCP for further outpatient evaluation if symptoms become persistent or recur.

## 2023-04-02 DIAGNOSIS — F41.9 ANXIETY: ICD-10-CM

## 2023-04-03 ENCOUNTER — TELEPHONE (OUTPATIENT)
Dept: INTERNAL MEDICINE CLINIC | Age: 38
End: 2023-04-03

## 2023-04-03 NOTE — TELEPHONE ENCOUNTER
Requested Prescriptions     Pending Prescriptions Disp Refills    LORazepam (ATIVAN) 0.5 mg tablet 45 Tablet 1     Sig: Take 1 Tablet by mouth two (2) times daily as needed for Anxiety. Max Daily Amount: 1 mg.       Last visit: 2/10/2023   Next visit: 4/24/2023

## 2023-04-03 NOTE — TELEPHONE ENCOUNTER
----- Message from Winston Chan sent at 4/3/2023  8:08 AM EDT -----  Subject: Appointment Request    Reason for Call: Established Patient Appointment needed: Routine Physical   Exam    QUESTIONS    Reason for appointment request? No appointments available during search     Additional Information for Provider? patient is requesting a blood   pressure check and a yearly physical asap, please call him to set up  ---------------------------------------------------------------------------  --------------  [id number]  [phone]; OK to leave message on voicemail  ---------------------------------------------------------------------------  --------------  SCRIPT ANSWERS  COVID Screen: Axel Degroot

## 2023-04-04 ENCOUNTER — OFFICE VISIT (OUTPATIENT)
Dept: INTERNAL MEDICINE CLINIC | Age: 38
End: 2023-04-04
Payer: COMMERCIAL

## 2023-04-04 ENCOUNTER — HOSPITAL ENCOUNTER (OUTPATIENT)
Age: 38
End: 2023-04-04
Attending: EMERGENCY MEDICINE
Payer: COMMERCIAL

## 2023-04-04 LAB
ALBUMIN SERPL-MCNC: 3.8 G/DL (ref 3.5–5)
ALBUMIN/GLOB SERPL: 1 (ref 1.1–2.2)
ALP SERPL-CCNC: 78 U/L (ref 45–117)
ALT SERPL-CCNC: 44 U/L (ref 12–78)
ANION GAP SERPL CALC-SCNC: 10 MMOL/L (ref 5–15)
AST SERPL-CCNC: 27 U/L (ref 15–37)
BASOPHILS # BLD: 0.1 K/UL (ref 0–0.1)
BASOPHILS NFR BLD: 1 % (ref 0–1)
BILIRUB SERPL-MCNC: 0.8 MG/DL (ref 0.2–1)
BUN SERPL-MCNC: 11 MG/DL (ref 6–20)
BUN/CREAT SERPL: 11 (ref 12–20)
CALCIUM SERPL-MCNC: 9 MG/DL (ref 8.5–10.1)
CHLORIDE SERPL-SCNC: 103 MMOL/L (ref 97–108)
CO2 SERPL-SCNC: 29 MMOL/L (ref 21–32)
CREAT SERPL-MCNC: 1 MG/DL (ref 0.7–1.3)
DIFFERENTIAL METHOD BLD: NORMAL
EOSINOPHIL # BLD: 0.1 K/UL (ref 0–0.4)
EOSINOPHIL NFR BLD: 2 % (ref 0–7)
ERYTHROCYTE [DISTWIDTH] IN BLOOD BY AUTOMATED COUNT: 12.9 % (ref 11.5–14.5)
GLOBULIN SER CALC-MCNC: 4 G/DL (ref 2–4)
GLUCOSE SERPL-MCNC: 124 MG/DL (ref 65–100)
HCT VFR BLD AUTO: 42.2 % (ref 36.6–50.3)
HGB BLD-MCNC: 13.4 G/DL (ref 12.1–17)
IMM GRANULOCYTES # BLD AUTO: 0 K/UL (ref 0–0.04)
IMM GRANULOCYTES NFR BLD AUTO: 0 % (ref 0–0.5)
LYMPHOCYTES # BLD: 2.2 K/UL (ref 0.8–3.5)
LYMPHOCYTES NFR BLD: 43 % (ref 12–49)
MCH RBC QN AUTO: 26.6 PG (ref 26–34)
MCHC RBC AUTO-ENTMCNC: 31.8 G/DL (ref 30–36.5)
MCV RBC AUTO: 83.9 FL (ref 80–99)
MONOCYTES # BLD: 0.5 K/UL (ref 0–1)
MONOCYTES NFR BLD: 10 % (ref 5–13)
NEUTS SEG # BLD: 2.3 K/UL (ref 1.8–8)
NEUTS SEG NFR BLD: 44 % (ref 32–75)
NRBC # BLD: 0 K/UL (ref 0–0.01)
NRBC BLD-RTO: 0 PER 100 WBC
PLATELET # BLD AUTO: 187 K/UL (ref 150–400)
PMV BLD AUTO: 12.5 FL (ref 8.9–12.9)
POTASSIUM SERPL-SCNC: 4.1 MMOL/L (ref 3.5–5.1)
PROT SERPL-MCNC: 7.8 G/DL (ref 6.4–8.2)
RBC # BLD AUTO: 5.03 M/UL (ref 4.1–5.7)
SODIUM SERPL-SCNC: 142 MMOL/L (ref 136–145)
WBC # BLD AUTO: 5.1 K/UL (ref 4.1–11.1)

## 2023-04-04 PROCEDURE — 99214 OFFICE O/P EST MOD 30 MIN: CPT | Performed by: INTERNAL MEDICINE

## 2023-04-04 PROCEDURE — 80053 COMPREHEN METABOLIC PANEL: CPT

## 2023-04-04 PROCEDURE — 85025 COMPLETE CBC W/AUTO DIFF WBC: CPT

## 2023-04-04 PROCEDURE — 93005 ELECTROCARDIOGRAM TRACING: CPT

## 2023-04-04 PROCEDURE — 36415 COLL VENOUS BLD VENIPUNCTURE: CPT

## 2023-04-04 PROCEDURE — 99284 EMERGENCY DEPT VISIT MOD MDM: CPT

## 2023-04-04 RX ORDER — LORAZEPAM 0.5 MG/1
0.5 TABLET ORAL
Qty: 45 TABLET | Refills: 1 | Status: SHIPPED
Start: 2023-04-04

## 2023-04-04 NOTE — ED TRIAGE NOTES
Pt arrives co of dizziness and lightheadedness since approx 30 minutes ago after having lower than normal BP reading for him. Pt states his resting HR was also in the lower 60s when it is normally in the 80s    Pt states having not eaten meat for last 3 months and has had lifestyle and decrease in weight. Pt states PCP decreased his BP dose approx 1 month ago and told him he needed to be checked if his diastolic was less than 80 and this morning his BP was 140/62.   States he hasn't taken his morning BP meds today

## 2023-04-04 NOTE — ED PROVIDER NOTES
HPI the patient is a 15-year-old black male with frequent ER visits for severe anxiety and panic disorder has had multiple work-ups also been to cardiology with echo and other work-up all negative who presents with another episode of feeling dizzy 2 days prior to admission but not now he had a mild headache as well this morning he found his diastolic blood pressure was 62 which his physician told him he should go to the ED for previously.   He has no chest pain or shortness of breath and no other complaints at this time    Past Medical History:   Diagnosis Date    Angioedema     ace induced asthma     Anxiety     Cold-induced asthma     Gout     Hernia of abdominal wall     Hypertension     JOS (obstructive sleep apnea)        Past Surgical History:   Procedure Laterality Date    HX ADENOIDECTOMY           Family History:   Problem Relation Age of Onset    Sleep Apnea Mother     Hypertension Mother     Breast Cancer Maternal Grandmother     Hypertension Father        Social History     Socioeconomic History    Marital status: SINGLE     Spouse name: Not on file    Number of children: Not on file    Years of education: Not on file    Highest education level: Not on file   Occupational History    Not on file   Tobacco Use    Smoking status: Never    Smokeless tobacco: Never   Vaping Use    Vaping Use: Never used   Substance and Sexual Activity    Alcohol use: No    Drug use: Not Currently    Sexual activity: Yes     Partners: Female   Other Topics Concern    Not on file   Social History Narrative    Not on file     Social Determinants of Health     Financial Resource Strain: Low Risk     Difficulty of Paying Living Expenses: Not hard at all   Food Insecurity: No Food Insecurity    Worried About Running Out of Food in the Last Year: Never true    Ran Out of Food in the Last Year: Never true   Transportation Needs: Not on file   Physical Activity: Not on file   Stress: Not on file   Social Connections: Not on file Intimate Partner Violence: Not on file   Housing Stability: Not on file         ALLERGIES: Lisinopril, Pcn [penicillins], and Venlafaxine    Review of Systems   Constitutional: Negative. Negative for chills and fever. HENT: Negative. Negative for trouble swallowing. Eyes:  Negative for visual disturbance. Respiratory:  Negative for cough, shortness of breath and wheezing. Cardiovascular:  Negative for chest pain, palpitations and leg swelling. Gastrointestinal:  Negative for abdominal pain, diarrhea, nausea and vomiting. Genitourinary:  Negative for dysuria, frequency and urgency. Musculoskeletal:  Negative for arthralgias. Skin:  Negative for wound. Neurological:  Negative for speech difficulty, weakness, numbness and headaches. Psychiatric/Behavioral:  Negative for agitation. All other systems reviewed and are negative. Vitals:    04/04/23 0751   BP: (!) 162/99   Pulse: 98   Resp: 20   Temp: 99.4 °F (37.4 °C)   SpO2: 100%   Weight: 118.4 kg (261 lb)   Height: 5' 9\" (1.753 m)            Physical Exam  Vitals and nursing note reviewed. Constitutional:       Appearance: He is well-developed. HENT:      Head: Normocephalic and atraumatic. Mouth/Throat:      Pharynx: No oropharyngeal exudate. Eyes:      General: No scleral icterus. Conjunctiva/sclera: Conjunctivae normal.   Neck:      Thyroid: No thyromegaly. Cardiovascular:      Rate and Rhythm: Normal rate and regular rhythm. Heart sounds: Normal heart sounds. No murmur heard. No friction rub. No gallop. Pulmonary:      Effort: Pulmonary effort is normal. No respiratory distress. Breath sounds: Normal breath sounds. No stridor. No wheezing or rales. Abdominal:      General: Bowel sounds are normal.      Palpations: Abdomen is soft. Tenderness: There is no abdominal tenderness. There is no guarding or rebound. Musculoskeletal:         General: Normal range of motion.       Cervical back: Neck supple. Lymphadenopathy:      Cervical: No cervical adenopathy. Skin:     General: Skin is warm and dry. Neurological:      Mental Status: He is alert and oriented to person, place, and time. Medical Decision Making  The patient was observed in the emergency room for approximately 1 hour he had his EKG which showed no acute findings he had a CBC and a CMP which are essentially normal as well he was encouraged to follow-up with his PCP rather than come to the ED as he has had multiple visits in the past year. He was educated about blood pressure interpretation and encouraged to take multiple blood pressures and write them down and see his PCP for follow-up. Amount and/or Complexity of Data Reviewed  Labs: ordered. ECG/medicine tests: ordered. Procedures               ED EKG interpretation:  Rhythm: Normal sinus rhythm rate 98 no acute ST or T wave changes normal axis. This EKG was interpreted by Lennie Gilliam MD,ED Provider.

## 2023-04-04 NOTE — PATIENT INSTRUCTIONS
If blood pressure if less then 110/60  Then I can lower your medication - would lower the amlodipine     Can take zyrtec at bedtime

## 2023-04-04 NOTE — PROGRESS NOTES
Mr. Rogelio St is presenting to follow up     CC:  Hypertension       HPI:      Blacarmen Jomar to cardiologist two weeks ago   120/80     Saw cards Dr Tee Gomez last visit Feb 10 /2023  Blood pressure is excellent   Stress  test is negative for ischemia       He then messaged me on 03/07 that his blood pressure was low  and lowered valsartan to 160mg   Continues amlodipine 10mg   BP is excellent 130/62  This morning he was concerned with diastolic and had HA and went to ER reassured      Taking buspar once a day and helps with anxiety , he is seeing a mental health provider Ntalabati   Decreased use of ativan to twice a week   If has argument and having difficulty calming down he will take a lorazepam    Overall he is doing well    Review of systems:  Constitutional: negative for fever, chills, weight loss, night sweats   10 systems reviewed and negative other then HPI      Past Medical History:   Diagnosis Date    Angioedema     ace induced asthma     Anxiety     Cold-induced asthma     Gout     Hernia of abdominal wall     Hypertension     JOS (obstructive sleep apnea)         Past Surgical History:   Procedure Laterality Date    HX ADENOIDECTOMY         Allergies   Allergen Reactions    Lisinopril Swelling     Pt has sever swelling of the throat and tongue. Pcn [Penicillins] Other (comments)     Allergic as a child      Venlafaxine Palpitations       Current Outpatient Medications on File Prior to Visit   Medication Sig Dispense Refill    LORazepam (ATIVAN) 0.5 mg tablet Take 1 Tablet by mouth two (2) times daily as needed for Anxiety. Max Daily Amount: 1 mg. 45 Tablet 1    amLODIPine-valsartan (EXFORGE)  mg per tablet Take 1 Tablet by mouth daily. 30 Tablet 1    busPIRone (BUSPAR) 5 mg tablet Take 1 Tablet by mouth two (2) times a day. 60 Tablet 1    Blood Pressure Test Kit-Large kit 1 Units by Does Not Apply route daily. 1 Kit 0     No current facility-administered medications on file prior to visit. family history includes Breast Cancer in his maternal grandmother; Hypertension in his father and mother; Sleep Apnea in his mother. Social History     Socioeconomic History    Marital status: SINGLE     Spouse name: Not on file    Number of children: Not on file    Years of education: Not on file    Highest education level: Not on file   Occupational History    Not on file   Tobacco Use    Smoking status: Never    Smokeless tobacco: Never   Vaping Use    Vaping Use: Never used   Substance and Sexual Activity    Alcohol use: No    Drug use: Not Currently    Sexual activity: Yes     Partners: Female   Other Topics Concern    Not on file   Social History Narrative    Not on file     Social Determinants of Health     Financial Resource Strain: Low Risk     Difficulty of Paying Living Expenses: Not hard at all   Food Insecurity: No Food Insecurity    Worried About Running Out of Food in the Last Year: Never true    Ran Out of Food in the Last Year: Never true   Transportation Needs: Not on file   Physical Activity: Not on file   Stress: Not on file   Social Connections: Not on file   Intimate Partner Violence: Not on file   Housing Stability: Not on file       Visit Vitals  Resp (P) 18   Ht (P) 5' 9\" (1.753 m)   Wt (P) 271 lb 6.4 oz (123.1 kg)   BMI (P) 40.08 kg/m²     General:  Well appearing male no acute distress  HEENT:   PERRL,normal conjunctiva. External ear and canals normal, TMs normal.  Hearing normal to voice. Nose without edema or discharge, normal septum. Lips, teeth, gums normal.  Oropharynx: no erythema, no exudates, no lesions, normal tongue. Neck:  Supple. Thyroid normal size, nontender, without nodules. No carotid bruit. No masses or lymphadenopathy  Respiratory: no respiratory distress,  no wheezing, no rhonchi, no rales. No chest wall tenderness. Cardiovascular:  RRR, normal S1S2, no murmur. Gastrointestinal: normal bowel sounds, soft, nontender, without masses.   No hepatosplenomegaly. Extremities +2 pulses, no edema, normal sensation   Musculoskeletal:  Normal gait. Normal digits and nails. Normal strength and tone, no atrophy, and no abnormal movement. Skin:  No rash, no lesions, no ulcers. Skin warm, normal turgor, without induration or nodules. Neuro:  A and OX4, fluent speech, cranial nerves normal 2-12. Sensation normal to light touch. DTR symmetrical  Psych:  Normal affect      Lab Results   Component Value Date/Time    WBC 5.1 04/04/2023 08:01 AM    HGB 13.4 04/04/2023 08:01 AM    HCT 42.2 04/04/2023 08:01 AM    PLATELET 609 74/06/5346 08:01 AM    MCV 83.9 04/04/2023 08:01 AM     Lab Results   Component Value Date/Time    Sodium 142 04/04/2023 08:01 AM    Potassium 4.1 04/04/2023 08:01 AM    Chloride 103 04/04/2023 08:01 AM    CO2 29 04/04/2023 08:01 AM    Anion gap 10 04/04/2023 08:01 AM    Glucose 124 (H) 04/04/2023 08:01 AM    BUN 11 04/04/2023 08:01 AM    Creatinine 1.00 04/04/2023 08:01 AM    BUN/Creatinine ratio 11 (L) 04/04/2023 08:01 AM    GFR est AA >60 09/24/2022 05:45 PM    GFR est non-AA 60 (L) 09/24/2022 05:45 PM    Calcium 9.0 04/04/2023 08:01 AM     Lab Results   Component Value Date/Time    Cholesterol, total 182 02/11/2021 12:11 PM    HDL Cholesterol 37 (L) 02/11/2021 12:11 PM    LDL, calculated 108 (H) 02/11/2021 12:11 PM    VLDL, calculated 37 02/11/2021 12:11 PM    Triglyceride 210 (H) 02/11/2021 12:11 PM     Lab Results   Component Value Date/Time    TSH 0.79 09/18/2022 01:21 PM     Lab Results   Component Value Date/Time    Hemoglobin A1c 5.4 08/30/2022 02:04 PM     Lab Results   Component Value Date/Time    Vitamin D 25-Hydroxy 25.8 (L) 09/26/2022 10:38 AM                   Assessment and Plan:     1. Essential hypertension  Discussed at length that his blood pressure is at goal   Advised to take amlodipine 10 valsartan 160 mg daily   To notify me if BP is less than 110/60 or higher then 150/90  Avoid ER use    2. Panic disorder    3.  ERIC (generalized anxiety disorder)      Taking buspar once a day and helps with anxiety , he is seeing a mental health provider Ntalabati   Decreased use of ativan  Follow up in 6 months  Harrison Urias MD

## 2023-04-04 NOTE — PROGRESS NOTES
Chief Complaint   Patient presents with    Hypertension          1. \"Have you been to the ER, urgent care clinic since your last visit? Hospitalized since your last visit? yes, ER  in AM, BP check. See chart. 2. \"Have you seen or consulted any other health care providers outside of the [address] [place] [address] since your last visit?no    3. For patients aged 39-70: Has the patient had a colonoscopy / FIT/ Cologuard? NA - based on age      If the patient is female:    4. For patients aged 41-77: Has the patient had a mammogram within the past 2 years? NA - based on age or sex      11. For patients aged 21-65: Has the patient had a pap smear?  NA - based on age or sex

## 2023-04-05 LAB
ATRIAL RATE: 98 BPM
CALCULATED P AXIS, ECG09: 57 DEGREES
CALCULATED R AXIS, ECG10: 34 DEGREES
CALCULATED T AXIS, ECG11: -2 DEGREES
DIAGNOSIS, 93000: NORMAL
P-R INTERVAL, ECG05: 166 MS
Q-T INTERVAL, ECG07: 336 MS
QRS DURATION, ECG06: 96 MS
QTC CALCULATION (BEZET), ECG08: 428 MS
VENTRICULAR RATE, ECG03: 98 BPM

## 2023-04-06 NOTE — TELEPHONE ENCOUNTER
Future Appointments:  Future Appointments   Date Time Provider Portia Steele   4/24/2023  2:00 PM René Bennett NP [facility] BS AMB   6/16/2023  9:00 AM MD BRYAN Pruitt BS AMB   9/12/2023 11:30 AM Antoni Wells MD [facility] BS AMB   9/28/2023  3:10 PM Earl Fierro NP [facility] BS AMB        Last Appointment With Me:  4/4/2023     Requested Prescriptions     Pending Prescriptions Disp Refills    cetirizine (ZYRTEC) 10 mg tablet 90 Tablet 1     Sig: Take 1 Tablet by mouth daily.

## 2023-04-06 NOTE — TELEPHONE ENCOUNTER
----- Message from [address]. Gwendolyn Kaplan sent at 4/6/2023 11:39 AM EDT -----  Regarding: Lazarus Lambert  Contact: [phone]  Good evening,  Dr Wilver Orta was suppose to send in [facility] yesterday for allergies.  [facility] said they don't see the prescription for it

## 2023-04-26 RX ORDER — BUSPIRONE HYDROCHLORIDE 5 MG/1
5 TABLET ORAL 2 TIMES DAILY
Qty: 60 TABLET | Refills: 1 | Status: SHIPPED | OUTPATIENT
Start: 2023-04-26

## 2023-04-26 NOTE — TELEPHONE ENCOUNTER
Future Appointments:  Future Appointments   Date Time Provider Portia Steele   6/16/2023  9:00 AM MD BRYAN Molina BS AMB   9/12/2023 11:30 AM Antoni Gamble MD [facility] BS AMB        Last Appointment With Me:  4/4/2023     Requested Prescriptions     Pending Prescriptions Disp Refills    busPIRone (BUSPAR) 5 mg tablet 60 Tablet 1     Sig: Take 1 Tablet by mouth two (2) times a day.

## 2023-05-09 ENCOUNTER — TELEPHONE (OUTPATIENT)
Age: 38
End: 2023-05-09

## 2023-05-10 ENCOUNTER — TELEPHONE (OUTPATIENT)
Age: 38
End: 2023-05-10

## 2023-05-10 ENCOUNTER — PATIENT MESSAGE (OUTPATIENT)
Age: 38
End: 2023-05-10

## 2023-05-10 DIAGNOSIS — I10 PRIMARY HYPERTENSION: Primary | ICD-10-CM

## 2023-05-10 RX ORDER — AMLODIPINE BESYLATE 10 MG/1
10 TABLET ORAL DAILY
Qty: 90 TABLET | Refills: 1 | Status: SHIPPED | OUTPATIENT
Start: 2023-05-10

## 2023-05-10 NOTE — TELEPHONE ENCOUNTER
Joi Joseph 5/10/2023 1:55 PM EDT      ----- Message -----  From: Mar Plaza  Sent: 5/10/2023 1:55 PM EDT  To: , *  Subject: Annette Monge     Did you send the Amlodapine to my pharmacy without valsartan?

## 2023-05-15 ENCOUNTER — TELEPHONE (OUTPATIENT)
Age: 38
End: 2023-05-15

## 2023-05-17 DIAGNOSIS — F41.0 PANIC DISORDER (EPISODIC PAROXYSMAL ANXIETY): Primary | ICD-10-CM

## 2023-05-17 RX ORDER — LORAZEPAM 0.5 MG/1
0.5 TABLET ORAL 2 TIMES DAILY PRN
Qty: 30 TABLET | Refills: 0 | Status: SHIPPED | OUTPATIENT
Start: 2023-05-17 | End: 2023-06-16

## 2023-05-17 NOTE — TELEPHONE ENCOUNTER
Next visit: 06.29.23    :  Date filled: 04/04/2023   Date written: 04/04/2023 3   Drug: Lorazepam 0.5 Mg Tablet  QTY: 45.00   Day Supply: 23   [facility]     01/31/2023 01/31/2023 3   Lorazepam 0.5 Mg Tablet  45.00   23   [facility]

## 2023-05-21 ENCOUNTER — HOSPITAL ENCOUNTER (EMERGENCY)
Facility: HOSPITAL | Age: 38
Discharge: HOME | End: 2023-05-21
Attending: EMERGENCY MEDICINE
Payer: COMMERCIAL

## 2023-05-21 VITALS
OXYGEN SATURATION: 99 % | TEMPERATURE: 99.5 F | DIASTOLIC BLOOD PRESSURE: 92 MMHG | HEART RATE: 98 BPM | RESPIRATION RATE: 20 BRPM | SYSTOLIC BLOOD PRESSURE: 134 MMHG

## 2023-05-21 DIAGNOSIS — S00.512A ABRASION OF TONGUE, INITIAL ENCOUNTER: Primary | ICD-10-CM

## 2023-05-21 PROCEDURE — 99283 EMERGENCY DEPT VISIT LOW MDM: CPT

## 2023-05-21 PROCEDURE — 6370000000 HC RX 637 (ALT 250 FOR IP): Performed by: EMERGENCY MEDICINE

## 2023-05-21 RX ADMIN — ALUMINUM HYDROXIDE, MAGNESIUM HYDROXIDE, AND SIMETHICONE 40 ML: 200; 200; 20 SUSPENSION ORAL at 19:53

## 2023-05-21 ASSESSMENT — ENCOUNTER SYMPTOMS
STRIDOR: 0
RHINORRHEA: 0
SHORTNESS OF BREATH: 0
BACK PAIN: 0
COLOR CHANGE: 0
COUGH: 0
NAUSEA: 0
SORE THROAT: 0
DIARRHEA: 0
VOICE CHANGE: 0
VOMITING: 0
ABDOMINAL PAIN: 0
EYE PAIN: 0
TROUBLE SWALLOWING: 0

## 2023-05-22 NOTE — ED NOTES
Pt discharged to home, in nad, states understanding of dc instructions and followup     Brea España RN  05/21/23 2020

## 2023-05-27 ENCOUNTER — APPOINTMENT (OUTPATIENT)
Facility: HOSPITAL | Age: 38
End: 2023-05-27
Payer: COMMERCIAL

## 2023-05-27 ENCOUNTER — HOSPITAL ENCOUNTER (EMERGENCY)
Facility: HOSPITAL | Age: 38
Discharge: HOME | End: 2023-05-27
Attending: EMERGENCY MEDICINE
Payer: COMMERCIAL

## 2023-05-27 VITALS
HEART RATE: 82 BPM | DIASTOLIC BLOOD PRESSURE: 89 MMHG | HEIGHT: 69 IN | SYSTOLIC BLOOD PRESSURE: 149 MMHG | BODY MASS INDEX: 40.69 KG/M2 | TEMPERATURE: 98.6 F | OXYGEN SATURATION: 98 % | RESPIRATION RATE: 18 BRPM | WEIGHT: 274.69 LBS

## 2023-05-27 DIAGNOSIS — R10.9 ACUTE LEFT FLANK PAIN: Primary | ICD-10-CM

## 2023-05-27 DIAGNOSIS — K59.00 CONSTIPATION, UNSPECIFIED CONSTIPATION TYPE: ICD-10-CM

## 2023-05-27 DIAGNOSIS — R10.32 ABDOMINAL PAIN, LEFT LOWER QUADRANT: ICD-10-CM

## 2023-05-27 LAB
ALBUMIN SERPL-MCNC: 3.6 G/DL (ref 3.5–5)
ALBUMIN/GLOB SERPL: 0.9 (ref 1.1–2.2)
ALP SERPL-CCNC: 87 U/L (ref 45–117)
ALT SERPL-CCNC: 36 U/L (ref 12–78)
ANION GAP SERPL CALC-SCNC: 1 MMOL/L (ref 5–15)
APPEARANCE UR: CLEAR
AST SERPL-CCNC: 12 U/L (ref 15–37)
BACTERIA URNS QL MICRO: NEGATIVE /HPF
BASOPHILS # BLD: 0.1 K/UL (ref 0–0.1)
BASOPHILS NFR BLD: 1 % (ref 0–1)
BILIRUB SERPL-MCNC: 0.5 MG/DL (ref 0.2–1)
BILIRUB UR QL: NEGATIVE
BUN SERPL-MCNC: 17 MG/DL (ref 6–20)
BUN/CREAT SERPL: 16 (ref 12–20)
CALCIUM SERPL-MCNC: 8.8 MG/DL (ref 8.5–10.1)
CHLORIDE SERPL-SCNC: 105 MMOL/L (ref 97–108)
CO2 SERPL-SCNC: 31 MMOL/L (ref 21–32)
COLOR UR: NORMAL
CREAT SERPL-MCNC: 1.05 MG/DL (ref 0.7–1.3)
DIFFERENTIAL METHOD BLD: NORMAL
EOSINOPHIL # BLD: 0.1 K/UL (ref 0–0.4)
EOSINOPHIL NFR BLD: 1 % (ref 0–7)
EPITH CASTS URNS QL MICRO: NORMAL /LPF
ERYTHROCYTE [DISTWIDTH] IN BLOOD BY AUTOMATED COUNT: 12.9 % (ref 11.5–14.5)
GLOBULIN SER CALC-MCNC: 4.2 G/DL (ref 2–4)
GLUCOSE SERPL-MCNC: 120 MG/DL (ref 65–100)
GLUCOSE UR STRIP.AUTO-MCNC: NEGATIVE MG/DL
HCT VFR BLD AUTO: 43.5 % (ref 36.6–50.3)
HGB BLD-MCNC: 13.8 G/DL (ref 12.1–17)
HGB UR QL STRIP: NEGATIVE
HYALINE CASTS URNS QL MICRO: NORMAL /LPF (ref 0–2)
IMM GRANULOCYTES # BLD AUTO: 0 K/UL (ref 0–0.04)
IMM GRANULOCYTES NFR BLD AUTO: 0 % (ref 0–0.5)
KETONES UR QL STRIP.AUTO: NEGATIVE MG/DL
LEUKOCYTE ESTERASE UR QL STRIP.AUTO: NEGATIVE
LYMPHOCYTES # BLD: 2.4 K/UL (ref 0.8–3.5)
LYMPHOCYTES NFR BLD: 32 % (ref 12–49)
MCH RBC QN AUTO: 27 PG (ref 26–34)
MCHC RBC AUTO-ENTMCNC: 31.7 G/DL (ref 30–36.5)
MCV RBC AUTO: 85 FL (ref 80–99)
MONOCYTES # BLD: 0.6 K/UL (ref 0–1)
MONOCYTES NFR BLD: 8 % (ref 5–13)
NEUTS SEG # BLD: 4.4 K/UL (ref 1.8–8)
NEUTS SEG NFR BLD: 58 % (ref 32–75)
NITRITE UR QL STRIP.AUTO: NEGATIVE
NRBC # BLD: 0 K/UL (ref 0–0.01)
NRBC BLD-RTO: 0 PER 100 WBC
PH UR STRIP: 6 (ref 5–8)
PLATELET # BLD AUTO: 203 K/UL (ref 150–400)
PMV BLD AUTO: 12.5 FL (ref 8.9–12.9)
POTASSIUM SERPL-SCNC: 3.8 MMOL/L (ref 3.5–5.1)
PROT SERPL-MCNC: 7.8 G/DL (ref 6.4–8.2)
PROT UR STRIP-MCNC: NEGATIVE MG/DL
RBC # BLD AUTO: 5.12 M/UL (ref 4.1–5.7)
RBC #/AREA URNS HPF: NORMAL /HPF (ref 0–5)
SODIUM SERPL-SCNC: 137 MMOL/L (ref 136–145)
SP GR UR REFRACTOMETRY: 1.02
URINE CULTURE IF INDICATED: NORMAL
UROBILINOGEN UR QL STRIP.AUTO: 0.2 EU/DL (ref 0.2–1)
WBC # BLD AUTO: 7.6 K/UL (ref 4.1–11.1)
WBC URNS QL MICRO: NORMAL /HPF (ref 0–4)

## 2023-05-27 PROCEDURE — 96361 HYDRATE IV INFUSION ADD-ON: CPT

## 2023-05-27 PROCEDURE — 36415 COLL VENOUS BLD VENIPUNCTURE: CPT

## 2023-05-27 PROCEDURE — 85025 COMPLETE CBC W/AUTO DIFF WBC: CPT

## 2023-05-27 PROCEDURE — 80053 COMPREHEN METABOLIC PANEL: CPT

## 2023-05-27 PROCEDURE — 74176 CT ABD & PELVIS W/O CONTRAST: CPT

## 2023-05-27 PROCEDURE — 96374 THER/PROPH/DIAG INJ IV PUSH: CPT

## 2023-05-27 PROCEDURE — 81001 URINALYSIS AUTO W/SCOPE: CPT

## 2023-05-27 PROCEDURE — 99284 EMERGENCY DEPT VISIT MOD MDM: CPT

## 2023-05-27 PROCEDURE — 6360000002 HC RX W HCPCS: Performed by: EMERGENCY MEDICINE

## 2023-05-27 PROCEDURE — 2580000003 HC RX 258: Performed by: EMERGENCY MEDICINE

## 2023-05-27 RX ORDER — POLYETHYLENE GLYCOL 3350 17 G/17G
17 POWDER, FOR SOLUTION ORAL DAILY
Qty: 510 G | Refills: 0 | Status: SHIPPED | OUTPATIENT
Start: 2023-05-27 | End: 2023-06-26

## 2023-05-27 RX ORDER — DICYCLOMINE HCL 20 MG
20 TABLET ORAL 4 TIMES DAILY PRN
Qty: 20 TABLET | Refills: 0 | Status: SHIPPED | OUTPATIENT
Start: 2023-05-27

## 2023-05-27 RX ORDER — 0.9 % SODIUM CHLORIDE 0.9 %
1000 INTRAVENOUS SOLUTION INTRAVENOUS ONCE
Status: COMPLETED | OUTPATIENT
Start: 2023-05-27 | End: 2023-05-27

## 2023-05-27 RX ORDER — KETOROLAC TROMETHAMINE 30 MG/ML
15 INJECTION, SOLUTION INTRAMUSCULAR; INTRAVENOUS
Status: COMPLETED | OUTPATIENT
Start: 2023-05-27 | End: 2023-05-27

## 2023-05-27 RX ADMIN — KETOROLAC TROMETHAMINE 15 MG: 30 INJECTION, SOLUTION INTRAMUSCULAR; INTRAVENOUS at 06:51

## 2023-05-27 RX ADMIN — SODIUM CHLORIDE 1000 ML: 9 INJECTION, SOLUTION INTRAVENOUS at 06:51

## 2023-05-27 ASSESSMENT — LIFESTYLE VARIABLES
HOW MANY STANDARD DRINKS CONTAINING ALCOHOL DO YOU HAVE ON A TYPICAL DAY: 1 OR 2
HOW OFTEN DO YOU HAVE A DRINK CONTAINING ALCOHOL: MONTHLY OR LESS

## 2023-05-27 ASSESSMENT — PAIN - FUNCTIONAL ASSESSMENT: PAIN_FUNCTIONAL_ASSESSMENT: NONE - DENIES PAIN

## 2023-05-27 ASSESSMENT — PAIN SCALES - GENERAL: PAINLEVEL_OUTOF10: 8

## 2023-05-27 NOTE — ED PROVIDER NOTES
[facility] EMERGENCY DEPT  EMERGENCY DEPARTMENT ENCOUNTER       Pt Name: Trini Whitaker  MRN: [id number]  [place] 1985  Date of evaluation: 5/27/2023  Provider: Carol Davis MD   PCP: Yajaira Castro MD patient  Note Started: 7:16 AM 5/27/23     CHIEF COMPLAINT       Chief Complaint   Patient presents with    Abdominal Pain     ED visit d/t (L)LQ abd pain / (L) flank pain - onset of sxs, 2 days ago - reports having sharp pain to (L)LQ abd pain along with (L) flank pain - worsening pain / changes to bowel movements - Denies fevers/ N / V / D / sick contacst / similar sxs in the past / abd surgeries;;         HISTORY OF PRESENT ILLNESS: 1 or more elements      History From: Patient  HPI Limitations: None     Trini Whitaker is a 40 y.o. male with history of anxiety, hypertension and obesity who presents to ED with chief complaint of left lower quadrant abdominal pain that radiates to left flank. Symptoms started about 2 days ago. Patient describes his pain as a sharp pain that is moderate in intensity. He denies any dysuria, hematuria, urinary frequency. States he has had difficulty moving his bowels for the past 2 days. He did have a bowel movement today that was dark, but thought it was related to the [facility] he ate last night. Denies any history of kidney stone or diverticulitis. Denies fevers or chills. He has not taken anything for his symptoms. He does report he was seen last week at an [facility] facility complaining of swelling in his throat that was thought to be due to an allergic reaction. He received a dose of Decadron on 5/21. The symptoms have resolved. REVIEW OF SYSTEMS      Review of Systems     Positives and Pertinent negatives as per HPI.     PAST HISTORY     Past Medical History:  Past Medical History:   Diagnosis Date    Angioedema     ace induced asthma     Anxiety     Cold-induced asthma     Gout     Hernia of abdominal wall     Hypertension     NAIN (obstructive sleep apnea)

## 2023-06-22 ENCOUNTER — TELEPHONE (OUTPATIENT)
Age: 38
End: 2023-06-22

## 2023-06-22 ENCOUNTER — CLINICAL DOCUMENTATION (OUTPATIENT)
Age: 38
End: 2023-06-22

## 2023-06-22 NOTE — TELEPHONE ENCOUNTER
Informed pt documents were faxed to [address] for resupply order and provided their number, pt stated someone from the sleep office last week told him they were going to send the documents needed and provided him wht same dme name and number,he called dme and they said all they had was his name and no additional information, apologized for the issue, informed pt [facility] and [facility] will reach out to him or he can follow up with them.

## 2023-06-22 NOTE — TELEPHONE ENCOUNTER
Informed pt the need to reschedule appt due to provider changing locations for certain days of the week. Pt agreed. Pt inquired about DME and additional supplies. Informed pt he will receive a call back shortly with more information.

## 2023-06-29 ENCOUNTER — OFFICE VISIT (OUTPATIENT)
Age: 38
End: 2023-06-29
Payer: COMMERCIAL

## 2023-06-29 VITALS
WEIGHT: 268 LBS | RESPIRATION RATE: 16 BRPM | BODY MASS INDEX: 39.69 KG/M2 | HEIGHT: 69 IN | OXYGEN SATURATION: 100 % | HEART RATE: 88 BPM | TEMPERATURE: 98.6 F | DIASTOLIC BLOOD PRESSURE: 80 MMHG | SYSTOLIC BLOOD PRESSURE: 131 MMHG

## 2023-06-29 DIAGNOSIS — F41.0 PANIC DISORDER (EPISODIC PAROXYSMAL ANXIETY): Primary | ICD-10-CM

## 2023-06-29 DIAGNOSIS — F40.10 SOCIAL PHOBIA, UNSPECIFIED: ICD-10-CM

## 2023-06-29 DIAGNOSIS — F32.A DEPRESSION, UNSPECIFIED DEPRESSION TYPE: ICD-10-CM

## 2023-06-29 PROCEDURE — 99214 OFFICE O/P EST MOD 30 MIN: CPT | Performed by: NURSE PRACTITIONER

## 2023-06-29 PROCEDURE — 3079F DIAST BP 80-89 MM HG: CPT | Performed by: NURSE PRACTITIONER

## 2023-06-29 PROCEDURE — 3075F SYST BP GE 130 - 139MM HG: CPT | Performed by: NURSE PRACTITIONER

## 2023-06-29 RX ORDER — LORAZEPAM 0.5 MG/1
0.5 TABLET ORAL 2 TIMES DAILY PRN
Qty: 45 TABLET | Refills: 2 | Status: SHIPPED | OUTPATIENT
Start: 2023-06-29 | End: 2023-09-05

## 2023-06-29 RX ORDER — LORAZEPAM 0.5 MG/1
0.5 TABLET ORAL EVERY 6 HOURS PRN
COMMUNITY
End: 2023-06-29 | Stop reason: SDUPTHER

## 2023-06-29 ASSESSMENT — PATIENT HEALTH QUESTIONNAIRE - PHQ9
10. IF YOU CHECKED OFF ANY PROBLEMS, HOW DIFFICULT HAVE THESE PROBLEMS MADE IT FOR YOU TO DO YOUR WORK, TAKE CARE OF THINGS AT HOME, OR GET ALONG WITH OTHER PEOPLE: 1
4. FEELING TIRED OR HAVING LITTLE ENERGY: 1
3. TROUBLE FALLING OR STAYING ASLEEP: 1
7. TROUBLE CONCENTRATING ON THINGS, SUCH AS READING THE NEWSPAPER OR WATCHING TELEVISION: 0
8. MOVING OR SPEAKING SO SLOWLY THAT OTHER PEOPLE COULD HAVE NOTICED. OR THE OPPOSITE, BEING SO FIGETY OR RESTLESS THAT YOU HAVE BEEN MOVING AROUND A LOT MORE THAN USUAL: 0
5. POOR APPETITE OR OVEREATING: 0
6. FEELING BAD ABOUT YOURSELF - OR THAT YOU ARE A FAILURE OR HAVE LET YOURSELF OR YOUR FAMILY DOWN: 1
SUM OF ALL RESPONSES TO PHQ QUESTIONS 1-9: 4
SUM OF ALL RESPONSES TO PHQ9 QUESTIONS 1 & 2: 1
2. FEELING DOWN, DEPRESSED OR HOPELESS: 1
9. THOUGHTS THAT YOU WOULD BE BETTER OFF DEAD, OR OF HURTING YOURSELF: 0
SUM OF ALL RESPONSES TO PHQ QUESTIONS 1-9: 4
1. LITTLE INTEREST OR PLEASURE IN DOING THINGS: 0
SUM OF ALL RESPONSES TO PHQ QUESTIONS 1-9: 4
SUM OF ALL RESPONSES TO PHQ QUESTIONS 1-9: 4

## 2023-07-10 ENCOUNTER — TELEPHONE (OUTPATIENT)
Age: 38
End: 2023-07-10

## 2023-07-10 NOTE — TELEPHONE ENCOUNTER
Spoke with patient to schedule NTP appointment with SERA Peterson. Patient states he was unable to schedule at the moment and will call the office back soon.

## 2023-07-25 DIAGNOSIS — I10 PRIMARY HYPERTENSION: ICD-10-CM

## 2023-07-26 DIAGNOSIS — I10 PRIMARY HYPERTENSION: ICD-10-CM

## 2023-07-26 RX ORDER — AMLODIPINE BESYLATE 10 MG/1
10 TABLET ORAL DAILY
Qty: 90 TABLET | Refills: 1 | OUTPATIENT
Start: 2023-07-26

## 2023-07-26 RX ORDER — AMLODIPINE BESYLATE 10 MG/1
10 TABLET ORAL DAILY
Qty: 90 TABLET | Refills: 0 | Status: SHIPPED | OUTPATIENT
Start: 2023-07-26

## 2023-07-26 NOTE — TELEPHONE ENCOUNTER
PCP: Marlin Mauricio MD    Last appt:   4/4/2023    Future Appointments   Date Time Provider [address]   8/4/2023  9:20 AM MD ANTHONY Cisneros BS AMB   9/12/2023 11:30 AM Marlin Mauricio MD [facility] BS AMB   9/27/2023  2:50 PM Marcia Valenzuela APRN - NP [facility] BS AMB       Requested Prescriptions     Pending Prescriptions Disp Refills    amLODIPine (NORVASC) 10 MG tablet 90 tablet 1     Sig: Take 1 tablet by mouth daily

## 2023-07-26 NOTE — TELEPHONE ENCOUNTER
----- Message from [address] [place] [address]. Jesús Adalberto sent at 7/26/2023  2:11 PM EDT -----  Regarding: Pedro Breen  Contact: [phone]  I requested a refill on my Amlodapine 10mg. Please send to the [facility] on file .

## 2023-08-27 ENCOUNTER — HOSPITAL ENCOUNTER (EMERGENCY)
Facility: HOSPITAL | Age: 38
Discharge: HOME | End: 2023-08-27
Payer: COMMERCIAL

## 2023-08-27 VITALS
WEIGHT: 265 LBS | HEART RATE: 88 BPM | OXYGEN SATURATION: 98 % | SYSTOLIC BLOOD PRESSURE: 153 MMHG | BODY MASS INDEX: 39.25 KG/M2 | TEMPERATURE: 99.8 F | RESPIRATION RATE: 13 BRPM | DIASTOLIC BLOOD PRESSURE: 108 MMHG | HEIGHT: 69 IN

## 2023-08-27 DIAGNOSIS — M79.10 MYALGIA: ICD-10-CM

## 2023-08-27 DIAGNOSIS — E86.0 DEHYDRATION: ICD-10-CM

## 2023-08-27 DIAGNOSIS — R00.0 TACHYCARDIA: Primary | ICD-10-CM

## 2023-08-27 LAB
ALBUMIN SERPL-MCNC: 3.5 G/DL (ref 3.5–5)
ALBUMIN/GLOB SERPL: 0.8 (ref 1.1–2.2)
ALP SERPL-CCNC: 74 U/L (ref 45–117)
ALT SERPL-CCNC: 43 U/L (ref 12–78)
ANION GAP SERPL CALC-SCNC: 3 MMOL/L (ref 5–15)
AST SERPL-CCNC: 23 U/L (ref 15–37)
BASOPHILS # BLD: 0 K/UL (ref 0–0.1)
BASOPHILS NFR BLD: 0 % (ref 0–1)
BILIRUB SERPL-MCNC: 0.8 MG/DL (ref 0.2–1)
BUN SERPL-MCNC: 13 MG/DL (ref 6–20)
BUN/CREAT SERPL: 12 (ref 12–20)
CALCIUM SERPL-MCNC: 8.9 MG/DL (ref 8.5–10.1)
CHLORIDE SERPL-SCNC: 106 MMOL/L (ref 97–108)
CO2 SERPL-SCNC: 29 MMOL/L (ref 21–32)
CREAT SERPL-MCNC: 1.07 MG/DL (ref 0.7–1.3)
D DIMER PPP FEU-MCNC: 0.42 MG/L FEU (ref 0–0.65)
DIFFERENTIAL METHOD BLD: ABNORMAL
EKG ATRIAL RATE: 104 BPM
EKG DIAGNOSIS: NORMAL
EKG P AXIS: 51 DEGREES
EKG P-R INTERVAL: 154 MS
EKG Q-T INTERVAL: 336 MS
EKG QRS DURATION: 96 MS
EKG QTC CALCULATION (BAZETT): 441 MS
EKG R AXIS: 25 DEGREES
EKG T AXIS: -6 DEGREES
EKG VENTRICULAR RATE: 104 BPM
EOSINOPHIL # BLD: 0.1 K/UL (ref 0–0.4)
EOSINOPHIL NFR BLD: 2 % (ref 0–7)
ERYTHROCYTE [DISTWIDTH] IN BLOOD BY AUTOMATED COUNT: 12.6 % (ref 11.5–14.5)
FLUAV AG NPH QL IA: NEGATIVE
FLUBV AG NOSE QL IA: NEGATIVE
GLOBULIN SER CALC-MCNC: 4.4 G/DL (ref 2–4)
GLUCOSE SERPL-MCNC: 130 MG/DL (ref 65–100)
HCT VFR BLD AUTO: 41.4 % (ref 36.6–50.3)
HGB BLD-MCNC: 13.2 G/DL (ref 12.1–17)
IMM GRANULOCYTES # BLD AUTO: 0 K/UL (ref 0–0.04)
IMM GRANULOCYTES NFR BLD AUTO: 0 % (ref 0–0.5)
LYMPHOCYTES # BLD: 0.6 K/UL (ref 0.8–3.5)
LYMPHOCYTES NFR BLD: 9 % (ref 12–49)
MCH RBC QN AUTO: 27.2 PG (ref 26–34)
MCHC RBC AUTO-ENTMCNC: 31.9 G/DL (ref 30–36.5)
MCV RBC AUTO: 85.4 FL (ref 80–99)
MONOCYTES # BLD: 0.7 K/UL (ref 0–1)
MONOCYTES NFR BLD: 10 % (ref 5–13)
NEUTS SEG # BLD: 5.6 K/UL (ref 1.8–8)
NEUTS SEG NFR BLD: 79 % (ref 32–75)
NRBC # BLD: 0 K/UL (ref 0–0.01)
NRBC BLD-RTO: 0 PER 100 WBC
PLATELET # BLD AUTO: 201 K/UL (ref 150–400)
POTASSIUM SERPL-SCNC: 3.8 MMOL/L (ref 3.5–5.1)
PROT SERPL-MCNC: 7.9 G/DL (ref 6.4–8.2)
RBC # BLD AUTO: 4.85 M/UL (ref 4.1–5.7)
RBC MORPH BLD: ABNORMAL
SARS-COV-2 RDRP RESP QL NAA+PROBE: NOT DETECTED
SODIUM SERPL-SCNC: 138 MMOL/L (ref 136–145)
SOURCE: NORMAL
TROPONIN I SERPL HS-MCNC: 12 NG/L (ref 0–76)
TSH SERPL DL<=0.05 MIU/L-ACNC: 1.5 UIU/ML (ref 0.36–3.74)
WBC # BLD AUTO: 7 K/UL (ref 4.1–11.1)

## 2023-08-27 PROCEDURE — 6360000002 HC RX W HCPCS: Performed by: PHYSICIAN ASSISTANT

## 2023-08-27 PROCEDURE — 85025 COMPLETE CBC W/AUTO DIFF WBC: CPT

## 2023-08-27 PROCEDURE — 96361 HYDRATE IV INFUSION ADD-ON: CPT

## 2023-08-27 PROCEDURE — 99284 EMERGENCY DEPT VISIT MOD MDM: CPT

## 2023-08-27 PROCEDURE — 93005 ELECTROCARDIOGRAM TRACING: CPT | Performed by: EMERGENCY MEDICINE

## 2023-08-27 PROCEDURE — 87635 SARS-COV-2 COVID-19 AMP PRB: CPT

## 2023-08-27 PROCEDURE — 84484 ASSAY OF TROPONIN QUANT: CPT

## 2023-08-27 PROCEDURE — 80053 COMPREHEN METABOLIC PANEL: CPT

## 2023-08-27 PROCEDURE — 87804 INFLUENZA ASSAY W/OPTIC: CPT

## 2023-08-27 PROCEDURE — 85379 FIBRIN DEGRADATION QUANT: CPT

## 2023-08-27 PROCEDURE — 84443 ASSAY THYROID STIM HORMONE: CPT

## 2023-08-27 PROCEDURE — 96374 THER/PROPH/DIAG INJ IV PUSH: CPT

## 2023-08-27 PROCEDURE — 2580000003 HC RX 258: Performed by: PHYSICIAN ASSISTANT

## 2023-08-27 RX ORDER — KETOROLAC TROMETHAMINE 30 MG/ML
15 INJECTION, SOLUTION INTRAMUSCULAR; INTRAVENOUS ONCE
Status: COMPLETED | OUTPATIENT
Start: 2023-08-27 | End: 2023-08-27

## 2023-08-27 RX ORDER — 0.9 % SODIUM CHLORIDE 0.9 %
1000 INTRAVENOUS SOLUTION INTRAVENOUS ONCE
Status: COMPLETED | OUTPATIENT
Start: 2023-08-27 | End: 2023-08-27

## 2023-08-27 RX ADMIN — SODIUM CHLORIDE 1000 ML: 9 INJECTION, SOLUTION INTRAVENOUS at 08:15

## 2023-08-27 RX ADMIN — KETOROLAC TROMETHAMINE 15 MG: 30 INJECTION, SOLUTION INTRAMUSCULAR; INTRAVENOUS at 08:15

## 2023-08-27 ASSESSMENT — PAIN SCALES - GENERAL: PAINLEVEL_OUTOF10: 6

## 2023-08-27 ASSESSMENT — PAIN DESCRIPTION - LOCATION: LOCATION: HEAD;NECK

## 2023-08-27 NOTE — ED PROVIDER NOTES
[facility] EMERGENCY DEPT  EMERGENCY DEPARTMENT ENCOUNTER       Pt Name: Yolie Miranda  MRN: [id number]  [address] [facility] 1985  Date of evaluation: 8/27/2023  Provider: MIGUEL Courtney   PCP: Nettie Dietrich MD  Note Started:  7:20 AM EDT 8/27/23     CHIEF COMPLAINT       Chief Complaint   Patient presents with    Anxiety    Tachycardia     \"Fast heart rate started around 6am\"    Headache     Headache and neck ache began about one in the morning. Reports he has been to urgent care for inflammation in right foot \"they thought was gout\"        HISTORY OF PRESENT ILLNESS: 1 or more elements      History From: Patient and Patient's Wife  HPI Limitations: None     Yolie Miranda is a 41 y.o.nonsmoking male with past medical history of anxiety, gout, hypertension, sleep apnea, obesity who presents BIB self with CC of \"not feeling right\", headache, neck myalgias, anxiety, and feeling like his heart is racing. The symptoms were present upon waking this morning around 6 AM.  He took an oral Ativan thinking his symptoms were due to anxiety, but it is not helped. Denies known sick contacts, congestion, sore throat, cough, chest pain, shortness of breath, abdominal pain, nausea, vomiting, body myalgias. He notes he was diagnosed with right foot gout at urgent care 2 days ago and treated with indomethacin and colchicine. Yesterday he had several episodes of diarrhea, which she attributed to these medications. At this urgent care visit he voiced some associated right calf pain that is since fully resolved, however now he is concerned about a possible blood clot. No history of blood clots. Nursing Notes were all reviewed and agreed with or any disagreements were addressed in the HPI. REVIEW OF SYSTEMS      Review of Systems   Musculoskeletal:  Positive for neck pain. Neurological:  Positive for headaches. Negative for dizziness, seizures and syncope. Psychiatric/Behavioral:  The patient is nervous/anxious. VM left for patient's mother that all 3 children can have physicals Wednesday 12/2 at 10:20am.

## 2023-08-27 NOTE — ED NOTES
DC info reviewed with patient, all questions answered. Patient well-appearing at time of discharge and vital signs stable. Ambulatory out of ED at this time.        Josy Garcia RN  08/27/23 [id number]

## 2023-08-31 NOTE — Clinical Note
[address]  [facility] EMERGENCY DEPT  [address] [id number]  [phone]    Work/School Note    Date: 8/22/2022    To Whom It May concern:      Martin Lew was seen and treated today in the emergency room by the following provider(s):  Attending Provider: Dawn Shell MD.      Martin Lew is excused from work/school on 08/22/22. He is clear to return to work/school on 08/23/22.         Sincerely,          Jodee Chinchilla MD Occupational Therapy Evaluation     Patient Name: Danii Edmond  AVRNQ'O Date: 8/31/2023  Problem List  Active Problems:    MDD (major depressive disorder), recurrent, severe, with psychosis ([address])    Anemia of chronic disease    Hyperlipemia    Rheumatoid arthritis ([address])    Dysphagia    Tuberculosis    Pulmonary cryptococcosis ([address])    Hypercalcemia    Nausea & vomiting    Bladder wall thickening    Past Medical History  Past Medical History:   Diagnosis Date    Abnormal electrocardiogram (ECG) (EKG) 8/17/2022    Abnormal findings on diagnostic imaging of breast     la 4/12/16    Anxiety     Bilateral impacted cerumen     la 11/15/16    Colon cancer screening 4/24/2018 11/2011--> "Multiple sessile polyps" removed, but path did not show any abnormality, although specimens described as fragmented. Depression     Epistaxis     la 11/29/16    Impaired fasting glucose     Mastitis     Milk intolerance     Multiple benign polyps of large intestine     Obesity     Osteoarthritis of knee     Osteoporosis     Psychiatric disorder     Psychiatric illness     Psychosis ([address])     Schizoaffective disorder ([address])     SOB (shortness of breath) 4/28/2022    Thickened endometrium     Vitamin D deficiency      Past Surgical History  Past Surgical History:   Procedure Laterality Date    IR LUMBAR PUNCTURE  6/23/2023    [place] HYSTEROSCOPY BX ENDOMETRIUM&/POLYPC W/WO D&C N/A 12/28/2017    Procedure: DILATATION AND CURETTAGE (D&C) WITH HYSTEROSCOPY;  Surgeon: Kasia Daly MD;  Location: [facility];  Service: Gynecology    WOUND DEBRIDEMENT Left 5/16/2023    Procedure: LEFT KNEE DEBRIDEMENT LOWER EXTREMITY ([address] OUT);   Surgeon: Willie Jackman DO;  Location: [facility];  Service: Orthopedics    WRIST GANGLION EXCISION               08/31/23 0941   OT Last Visit   OT Visit Date 08/31/23   Note Type   Note type Evaluation   Pain Assessment   Pain Score No Pain   Restrictions/Precautions   Weight Bearing Precautions Per Order No Other Precautions Chair Alarm;Multiple lines;O2;Fall Risk   Home Living   Type of [address] Two level;Performs ADLs on one level;Stairs to enter with rails;Bed/bath upstairs   Bathroom Shower/Tub Tub/shower unit   Bathroom Toilet Standard   Bathroom Equipment Commode   [address]; Hospital bed   Additional Comments Pt lives in a 2 story home with 4 LIU and FFOS to second floor where bed/bath are located. After recent d/c from hospital pt was set up with hospial bed and BSC and RW to have [facility]. Prior Function   Level of [place] Needs assistance with functional mobility; Needs assistance with ADLs; Needs assistance with IADLS   Lives With Daughter   Receives Help From Family;Home health   IADLs Family/Friend/Other provides transportation; Family/Friend/Other provides meals; Family/Friend/Other provides medication management   Falls in the last 6 months 0   Vocational Retired   [address] with ADLS and IADLS -    Reciprocal Relationships supportive DTR and home health aides   Service to Others retired   Intrinsic Gratification Walking   Subjective   Subjective Primarily [address] [facility] [address] speaking, pt reports " thank you" at end of session   ADL   Where Assessed Edge of bed   Eating Assistance 5  Supervision/Setup   Grooming Assistance 5  Supervision/Setup    [address] 4  Minimal Assistance    [address] 3  Moderate Assistance   [address] 4  [address] [facility] 3  Moderate [address]  3  Moderate Assistance   Bed Mobility   Supine to Sit 4  Minimal assistance   Additional items Assist x 1;Leg ;LE management;Verbal cues   Additional Comments OOB at end of session   Transfers   Sit to Stand 4  Minimal assistance   Additional items Assist x 1   Stand to Sit 4  Minimal assistance   Additional items Assist x 1   Stand pivot 4  Minimal assistance Additional items Assist x 1   Additional Comments RW   Functional Mobility   Functional Mobility 4  Minimal assistance   Additional Comments Ax1 - steps to chair   Additional items Rolling walker   Balance   Static Sitting Fair   Dynamic Sitting Fair -   Static Standing Poor +   Dynamic Standing Poor +   Ambulatory Poor +   Activity Tolerance   Activity Tolerance Patient limited by fatigue  (as well as + N/V during session)   Medical Staff Made Aware Mauri ESPITIA   Nurse Made Aware yes, cleared for OTIE   RUE Assessment   RUE Assessment WFL   LUE Assessment   LUE Assessment WFL   Psychosocial   Psychosocial (WDL) WDL   Cognition   Arousal/Participation Alert; Responsive; Cooperative   Attention Attends with cues to redirect   Orientation Level Oriented to person   Memory Unable to assess   Following Commands Follows one step commands with increased time or repetition   Comments Overall pleasant and cooeprative   Assessment   Limitation Decreased ADL status; Decreased Safe judgement during ADL;Decreased UE strength;Decreased endurance;Decreased self-care trans;Decreased high-level ADLs   Prognosis Good   Assessment Pt is a 71 y.o. female seen for OT evaluation s/p admit to [facility] on 8/30/2023 w/ <principal problem not specified>. Pt was recently d/c'd from hospital to home, pt then with bouts of +N/V and returned to the ED. Comorbidities affecting pt's functional performance at time of assessment include:  has a past medical history of Abnormal electrocardiogram (ECG) (EKG), Abnormal findings on diagnostic imaging of breast, Anxiety, Bilateral impacted cerumen, Colon cancer screening, Depression, Epistaxis, Impaired fasting glucose, Mastitis, Milk intolerance, Multiple benign polyps of large intestine, Obesity, Osteoarthritis of knee, Osteoporosis, Psychiatric disorder, Psychiatric illness, Psychosis ([address]), Schizoaffective disorder ([address]), SOB (shortness of breath), Thickened endometrium, and Vitamin D deficiency. Stephanie Inch Personal factors affecting pt at time of IE include:steps to enter environment, difficulty performing ADLS, difficulty performing IADLS , decreased initiation and engagement  and health management . Prior to admission, pt was needing assist to . Upon evaluation: Pt presents supine on 3 L O2 via NC with the following vital signs: WNL. Pt requires overall Min A 2* the following deficits impacting occupational performance: weakness, decreased strength, decreased balance, decreased tolerance and decreased safety awareness. Pt resting in chair at end of session with all needs in reach, alarm on, all lines in place and SCD's on. Pt to benefit from continued skilled OT tx while in the hospital to address deficits as defined above and maximize level of functional independence w ADL's and functional mobility. Occupational Performance areas to address include: grooming, bathing/shower, toilet hygiene, dressing, health maintenance, functional mobility, community mobility and clothing management. The patient's raw score on the AM-PAC Daily Activity inpatient short form is 16  , standardized score is 35.96  , less than 39.4. Patients at this level are likely to benefit from discharge to post-acute rehabilitation services. However this is pt's previous baseline and she gets assist with ADLS at home therefore can d/c to home with home health and current level of assist. Please refer to the recommendation of the Occupational Therapist for safe discharge planning. Goals   Patient Goals will continue to assess   LTG Time Frame 10-14   Long Term Goal #1 see below   Plan   Treatment Interventions ADL retraining;Functional transfer training;UE strengthening/ROM; Cognitive reorientation;Patient/family training;Equipment evaluation/education; Compensatory technique education;Continued evaluation; Activityengagement; Energy conservation   Goal Expiration Date 09/14/23   OT Frequency 2-3x/wk   Recommendation   OT Discharge Recommendation Home with home health rehabilitation   AM-PAC Daily Activity Inpatient   Lower Body Dressing 2   Bathing 2   Toileting 2   Upper Body Dressing 3   Grooming 3   Eating 4   Daily Activity Raw Score 16   Daily Activity Standardized Score (Calc for Raw Score >=11) 35.96   AM-PAC Applied Cognition Inpatient   Following a Speech/Presentation 3   Understanding Ordinary Conversation 4   Taking Medications 3   Remembering Where Things Are Placed or Put Away 3   Remembering List of 4-5 Errands 3   Taking Care of Complicated Tasks 2   Applied Cognition Raw Score 18   Applied Cognition Standardized Score 38.07     OT goals to be addressed in the next 14 days:    Pt will increase activity tolerance to G for 30 min txment sessions    Pt will complete UB/LB self care w/ S- UB and Min A- LB using adaptive device and DME as needed    Pt will complete toileting w/ S w/ G hygiene/thoroughness using DME as needed    Pt will improve functional transfers to S on/off all surfaces using DME as needed w/ G balance/safety     Pt will improve functional mobility during ADL/IADL/leisure tasks to S using DME as needed w/ G balance/safety     Pt will independently identify and utilize 2-3 coping strategies to increase positive affect and promote overall well-being.     Pt will engage in ongoing cognitive assessment w/ G participation to assist w/ safe d/c planning/recommendations

## 2023-09-26 NOTE — PROGRESS NOTES
[address] [facility] Gosia Keller, [address]   Tel.  [phone]   Fax. [facility], [address]   Tel.  [phone]   Fax. [phone]  [facility], [address] [facility]   Tel.  [phone]   Fax. [phone]     Yenifer Florence (: 1985) is a 40 y.o. male, new patient, seen for positive airway pressure follow-up and evaluation. He was last seen by me on 2022, previously seen by Dr. Maria Fernanda Saucedo on 2017, prior notes reviewed in detail. Home sleep test 2017 showed AHI of 23/hr with a lowest  SaO2 of 55%. He is seen today for follow up. ASSESSMENT/PLAN:   Diagnosis Orders   1. NAIN (obstructive sleep apnea)  Home Sleep Study      2. Primary hypertension        3. BMI 40.0-44.9, adult ([address])          AHI = 23 (2017). On APAP :  4-20 cmH2O. Set up ? Franny Garber He is adherent with PAP therapy and PAP continues to benefit patient and remains necessary for control of his sleep apnea. No follow-up provider specified. Sleep Apnea - He notes he has lost about 30+ lbs and his wife no longer complains of him snoring. He admits to not noticing a difference on or off the device in terms of how he feels during the day. He is interested in retesting to have apnea evaluated in the setting of weight loss. Orders Placed This Encounter   Procedures    Home Sleep Study     Standing Status:   Future     Standing Expiration Date:   2023     Order Specific Question:   Location For Sleep Study     Answer:   [facility]     * Counseling was provided regarding the importance of regular PAP use with emphasis on ensuring sufficient total sleep time, proper sleep hygiene, and safe driving. * Re-enforced proper and regular cleaning for the device. * He was asked to contact our office for any problems regarding PAP therapy.     2. Hypertension -  continue on his current regimen, he will continue to monitor his BP and follow up with his PMD for

## 2023-09-27 ENCOUNTER — OFFICE VISIT (OUTPATIENT)
Age: 38
End: 2023-09-27
Payer: COMMERCIAL

## 2023-09-27 VITALS
WEIGHT: 279.6 LBS | SYSTOLIC BLOOD PRESSURE: 142 MMHG | HEIGHT: 69 IN | HEART RATE: 99 BPM | OXYGEN SATURATION: 99 % | DIASTOLIC BLOOD PRESSURE: 92 MMHG | BODY MASS INDEX: 41.41 KG/M2

## 2023-09-27 DIAGNOSIS — I10 PRIMARY HYPERTENSION: ICD-10-CM

## 2023-09-27 DIAGNOSIS — G47.33 OSA (OBSTRUCTIVE SLEEP APNEA): Primary | ICD-10-CM

## 2023-09-27 PROCEDURE — 3077F SYST BP >= 140 MM HG: CPT | Performed by: NURSE PRACTITIONER

## 2023-09-27 PROCEDURE — 99213 OFFICE O/P EST LOW 20 MIN: CPT | Performed by: NURSE PRACTITIONER

## 2023-09-27 PROCEDURE — 3080F DIAST BP >= 90 MM HG: CPT | Performed by: NURSE PRACTITIONER

## 2023-09-27 ASSESSMENT — SLEEP AND FATIGUE QUESTIONNAIRES
HOW LIKELY ARE YOU TO NOD OFF OR FALL ASLEEP WHILE SITTING QUIETLY AFTER LUNCH WITHOUT ALCOHOL: 0
HOW LIKELY ARE YOU TO NOD OFF OR FALL ASLEEP IN A CAR, WHILE STOPPED FOR A FEW MINUTES IN TRAFFIC: 0
HOW LIKELY ARE YOU TO NOD OFF OR FALL ASLEEP WHILE SITTING AND TALKING TO SOMEONE: 0
HOW LIKELY ARE YOU TO NOD OFF OR FALL ASLEEP WHILE WATCHING TV: 0
HOW LIKELY ARE YOU TO NOD OFF OR FALL ASLEEP WHILE LYING DOWN TO REST IN THE AFTERNOON WHEN CIRCUMSTANCES PERMIT: 0
ESS TOTAL SCORE: 2
HOW LIKELY ARE YOU TO NOD OFF OR FALL ASLEEP WHILE SITTING AND READING: 1
HOW LIKELY ARE YOU TO NOD OFF OR FALL ASLEEP WHILE SITTING INACTIVE IN A PUBLIC PLACE: 0
HOW LIKELY ARE YOU TO NOD OFF OR FALL ASLEEP WHEN YOU ARE A PASSENGER IN A CAR FOR AN HOUR WITHOUT A BREAK: 1

## 2023-09-27 NOTE — PATIENT INSTRUCTIONS
[address] [facility] Gosia Narvaez, [address]  Tel.  [phone]  Fax. [address] [facility], [address] [place] [address]  Tel.  [phone]  Fax. [phone] [facility], [address] [facility]  Tel.  [phone]  Fax. [phone]     Learning About CPAP for Sleep Apnea  What is CPAP? CPAP is a small machine that you use at home every night while you sleep. It increases air pressure in your throat to keep your airway open. When you have sleep apnea, this can help you sleep better so you feel much better. CPAP stands for \"continuous positive airway pressure. \"  The CPAP machine will have one of the following:  A mask that covers your nose and mouth  Prongs that fit into your nose  A mask that covers your nose only, the most common type. This type is called NCPAP. The N stands for \"nasal.\"  Why is it done? CPAP is usually the best treatment for obstructive sleep apnea. It is the first treatment choice and the most widely used. Your doctor may suggest CPAP if you have: Moderate to severe sleep apnea. Sleep apnea and coronary artery disease (CAD) or heart failure. How does it help? CPAP can help you have more normal sleep, so you feel less sleepy and more alert during the daytime. CPAP may help keep heart failure or other heart problems from getting worse. NCPAP may help lower your blood pressure. If you use CPAP, your bed partner may also sleep better because you are not snoring or restless. What are the side effects? Some people who use CPAP have:  A dry or stuffy nose and a sore throat. Irritated skin on the face. Sore eyes. Bloating. If you have any of these problems, work with your doctor to fix them. Here are some things you can try:  Be sure the mask or nasal prongs fit well. See if your doctor can adjust the pressure of your CPAP. If your nose is dry, try a humidifier.   If your nose is runny or stuffy, try decongestant medicine or a steroid

## 2023-09-28 ENCOUNTER — HOSPITAL ENCOUNTER (OUTPATIENT)
Facility: HOSPITAL | Age: 38
Discharge: HOME | End: 2023-10-01
Payer: COMMERCIAL

## 2023-09-28 ENCOUNTER — PROCEDURE VISIT (OUTPATIENT)
Age: 38
End: 2023-09-28

## 2023-09-28 DIAGNOSIS — G47.33 OSA (OBSTRUCTIVE SLEEP APNEA): ICD-10-CM

## 2023-09-28 DIAGNOSIS — G47.33 OSA (OBSTRUCTIVE SLEEP APNEA): Primary | ICD-10-CM

## 2023-09-28 PROCEDURE — G0400 HOME SLEEP TEST/TYPE 4 PORTA: HCPCS | Performed by: INTERNAL MEDICINE

## 2023-09-29 NOTE — PROGRESS NOTES
[address] [facility]., Gosia Narvaez, [address]  Tel.  [phone]  Fax. [address] [facility], [address] [place] [address]  Tel.  [phone]  Fax. [phone] [address]  Tel.  [phone]  Fax. [phone]       S>Shimon SANTOS Halie Baires is a 41 y.o. male seen today to receive a home sleep testing unit (HST). Patient was educated on proper hookup and operation of the HST. Instruction forms and documentation were reviewed and signed. The patient demonstrated good understanding of the HST.    O>    There were no vitals taken for this visit. A>  No diagnosis found. P>  General information regarding operations and maintenance of the device was provided. He was provided information on sleep apnea including coresponding risk factors and the importance of proper treatment. Follow-up appointment was made to return the HST. He will be contacted once the results have been reviewed. He was asked to contact our office for any problems regarding his home sleep test study.

## 2023-10-02 ENCOUNTER — TELEPHONE (OUTPATIENT)
Age: 38
End: 2023-10-02

## 2023-10-02 DIAGNOSIS — G47.33 OBSTRUCTIVE SLEEP APNEA (ADULT) (PEDIATRIC): Primary | ICD-10-CM

## 2023-10-06 ENCOUNTER — TELEPHONE (OUTPATIENT)
Age: 38
End: 2023-10-06

## 2023-10-06 DIAGNOSIS — G47.33 OBSTRUCTIVE SLEEP APNEA (ADULT) (PEDIATRIC): Primary | ICD-10-CM

## 2023-10-06 NOTE — TELEPHONE ENCOUNTER
Patient said he dropped off device 7 days ago and would like result of sleep study and next step. I told him result will ready within 10 business days.

## 2023-10-09 ENCOUNTER — HOSPITAL ENCOUNTER (EMERGENCY)
Facility: HOSPITAL | Age: 38
Discharge: HOME | End: 2023-10-09
Attending: EMERGENCY MEDICINE
Payer: COMMERCIAL

## 2023-10-09 ENCOUNTER — APPOINTMENT (OUTPATIENT)
Facility: HOSPITAL | Age: 38
End: 2023-10-09
Payer: COMMERCIAL

## 2023-10-09 VITALS
SYSTOLIC BLOOD PRESSURE: 142 MMHG | OXYGEN SATURATION: 98 % | RESPIRATION RATE: 16 BRPM | DIASTOLIC BLOOD PRESSURE: 89 MMHG | TEMPERATURE: 99 F | HEART RATE: 82 BPM

## 2023-10-09 DIAGNOSIS — R03.0 ELEVATED BLOOD PRESSURE READING: ICD-10-CM

## 2023-10-09 DIAGNOSIS — R07.89 ATYPICAL CHEST PAIN: Primary | ICD-10-CM

## 2023-10-09 DIAGNOSIS — R00.2 PALPITATIONS: ICD-10-CM

## 2023-10-09 LAB
ALBUMIN SERPL-MCNC: 3.7 G/DL (ref 3.5–5)
ALBUMIN/GLOB SERPL: 0.9 (ref 1.1–2.2)
ALP SERPL-CCNC: 88 U/L (ref 45–117)
ALT SERPL-CCNC: 36 U/L (ref 12–78)
ANION GAP SERPL CALC-SCNC: 4 MMOL/L (ref 5–15)
AST SERPL-CCNC: 33 U/L (ref 15–37)
BASOPHILS # BLD: 0.2 K/UL (ref 0–0.1)
BASOPHILS NFR BLD: 3 % (ref 0–1)
BILIRUB SERPL-MCNC: 0.7 MG/DL (ref 0.2–1)
BUN SERPL-MCNC: 16 MG/DL (ref 6–20)
BUN/CREAT SERPL: 12 (ref 12–20)
CALCIUM SERPL-MCNC: 8.9 MG/DL (ref 8.5–10.1)
CHLORIDE SERPL-SCNC: 106 MMOL/L (ref 97–108)
CO2 SERPL-SCNC: 30 MMOL/L (ref 21–32)
CREAT SERPL-MCNC: 1.29 MG/DL (ref 0.7–1.3)
D DIMER PPP FEU-MCNC: 0.31 MG/L FEU (ref 0–0.65)
DIFFERENTIAL METHOD BLD: ABNORMAL
EOSINOPHIL # BLD: 0 K/UL (ref 0–0.4)
EOSINOPHIL NFR BLD: 0 % (ref 0–7)
ERYTHROCYTE [DISTWIDTH] IN BLOOD BY AUTOMATED COUNT: 12.8 % (ref 11.5–14.5)
GLOBULIN SER CALC-MCNC: 4.2 G/DL (ref 2–4)
GLUCOSE SERPL-MCNC: 125 MG/DL (ref 65–100)
HCT VFR BLD AUTO: 42.7 % (ref 36.6–50.3)
HGB BLD-MCNC: 13.7 G/DL (ref 12.1–17)
IMM GRANULOCYTES # BLD AUTO: 0 K/UL (ref 0–0.04)
IMM GRANULOCYTES NFR BLD AUTO: 0 % (ref 0–0.5)
LYMPHOCYTES # BLD: 3 K/UL (ref 0.8–3.5)
LYMPHOCYTES NFR BLD: 46 % (ref 12–49)
MCH RBC QN AUTO: 28 PG (ref 26–34)
MCHC RBC AUTO-ENTMCNC: 32.1 G/DL (ref 30–36.5)
MCV RBC AUTO: 87.1 FL (ref 80–99)
METAMYELOCYTES NFR BLD MANUAL: 1 %
MONOCYTES # BLD: 0.2 K/UL (ref 0–1)
MONOCYTES NFR BLD: 3 % (ref 5–13)
NEUTS SEG # BLD: 3.1 K/UL (ref 1.8–8)
NEUTS SEG NFR BLD: 47 % (ref 32–75)
NRBC # BLD: 0 K/UL (ref 0–0.01)
NRBC BLD-RTO: 0 PER 100 WBC
PLATELET # BLD AUTO: 204 K/UL (ref 150–400)
PLATELET COMMENT: ABNORMAL
PMV BLD AUTO: 13 FL (ref 8.9–12.9)
POTASSIUM SERPL-SCNC: 4 MMOL/L (ref 3.5–5.1)
PROT SERPL-MCNC: 7.9 G/DL (ref 6.4–8.2)
RBC # BLD AUTO: 4.9 M/UL (ref 4.1–5.7)
RBC MORPH BLD: ABNORMAL
SODIUM SERPL-SCNC: 140 MMOL/L (ref 136–145)
TROPONIN I SERPL HS-MCNC: 10 NG/L (ref 0–76)
TROPONIN I SERPL HS-MCNC: 11 NG/L (ref 0–76)
WBC # BLD AUTO: 6.6 K/UL (ref 4.1–11.1)

## 2023-10-09 PROCEDURE — 96374 THER/PROPH/DIAG INJ IV PUSH: CPT

## 2023-10-09 PROCEDURE — 85025 COMPLETE CBC W/AUTO DIFF WBC: CPT

## 2023-10-09 PROCEDURE — 93005 ELECTROCARDIOGRAM TRACING: CPT | Performed by: EMERGENCY MEDICINE

## 2023-10-09 PROCEDURE — 71046 X-RAY EXAM CHEST 2 VIEWS: CPT

## 2023-10-09 PROCEDURE — 84484 ASSAY OF TROPONIN QUANT: CPT

## 2023-10-09 PROCEDURE — 6360000002 HC RX W HCPCS: Performed by: EMERGENCY MEDICINE

## 2023-10-09 PROCEDURE — 36415 COLL VENOUS BLD VENIPUNCTURE: CPT

## 2023-10-09 PROCEDURE — 2580000003 HC RX 258: Performed by: EMERGENCY MEDICINE

## 2023-10-09 PROCEDURE — 80053 COMPREHEN METABOLIC PANEL: CPT

## 2023-10-09 PROCEDURE — 85379 FIBRIN DEGRADATION QUANT: CPT

## 2023-10-09 PROCEDURE — 99285 EMERGENCY DEPT VISIT HI MDM: CPT

## 2023-10-09 PROCEDURE — 96361 HYDRATE IV INFUSION ADD-ON: CPT

## 2023-10-09 RX ORDER — LORAZEPAM 2 MG/ML
0.5 INJECTION INTRAMUSCULAR ONCE
Status: COMPLETED | OUTPATIENT
Start: 2023-10-09 | End: 2023-10-09

## 2023-10-09 RX ORDER — SODIUM CHLORIDE, SODIUM LACTATE, POTASSIUM CHLORIDE, AND CALCIUM CHLORIDE .6; .31; .03; .02 G/100ML; G/100ML; G/100ML; G/100ML
1000 INJECTION, SOLUTION INTRAVENOUS
Status: COMPLETED | OUTPATIENT
Start: 2023-10-09 | End: 2023-10-09

## 2023-10-09 RX ADMIN — SODIUM CHLORIDE, POTASSIUM CHLORIDE, SODIUM LACTATE AND CALCIUM CHLORIDE 1000 ML: 600; 310; 30; 20 INJECTION, SOLUTION INTRAVENOUS at 19:57

## 2023-10-09 RX ADMIN — LORAZEPAM 0.5 MG: 2 INJECTION INTRAMUSCULAR; INTRAVENOUS at 19:58

## 2023-10-09 ASSESSMENT — PAIN DESCRIPTION - LOCATION: LOCATION: CHEST

## 2023-10-09 ASSESSMENT — PAIN SCALES - GENERAL: PAINLEVEL_OUTOF10: 0

## 2023-10-09 ASSESSMENT — PAIN DESCRIPTION - DESCRIPTORS: DESCRIPTORS: DISCOMFORT

## 2023-10-09 ASSESSMENT — HEART SCORE: ECG: 0

## 2023-10-09 NOTE — ED PROVIDER NOTES
and conveys that all of his questions have been answered. I have also provided discharge instructions for him that include: educational information regarding their diagnosis and treatment, and list of reasons why they would want to return to the ED prior to their follow-up appointment, should his condition change. CLINICAL IMPRESSION    DISPOSITION Decision To Discharge 10/09/2023 08:44:58 PM       PATIENT REFERRED TO:  Floyd Núñez MD  [address] [facility]  [phone]    In 3 days      [facility] EMERGENCY DEPT  [address] [place] [address] [address]  [phone]    If symptoms worsen       DISCHARGE MEDICATIONS:     Medication List        ASK your doctor about these medications      amLODIPine 10 MG tablet  Commonly known as: NORVASC  Take 1 tablet by mouth daily     amLODIPine-valsartan  MG per tablet  Commonly known as: EXFORGE     dicyclomine 20 MG tablet  Commonly known as: BENTYL  Take 1 tablet by mouth 4 times daily as needed (abdominal cramping)                DISCONTINUED MEDICATIONS:  Current Discharge Medication List          I am the Primary Clinician of Record. Licha Be MD (electronically signed)    (Please note that parts of this dictation were completed with voice recognition software. Quite often unanticipated grammatical, syntax, homophones, and other interpretive errors are inadvertently transcribed by the computer software. Please disregards these errors.  Please excuse any errors that have escaped final proofreading.)         Licha Be MD  10/09/23 9514

## 2023-10-10 LAB
EKG ATRIAL RATE: 108 BPM
EKG DIAGNOSIS: NORMAL
EKG P AXIS: 47 DEGREES
EKG P-R INTERVAL: 154 MS
EKG Q-T INTERVAL: 334 MS
EKG QRS DURATION: 92 MS
EKG QTC CALCULATION (BAZETT): 447 MS
EKG R AXIS: 43 DEGREES
EKG T AXIS: 0 DEGREES
EKG VENTRICULAR RATE: 108 BPM

## 2023-10-10 NOTE — ED NOTES
Patient discharged from the ED by Sydney Lemos. Diagnosis, medications, precautions and follow-ups were reviewed with the patient/family. Questions were asked and answered prior to departure.  Patient departed the ED via [facility] [address], [address] [facility], [address]  10/09/23 5117

## 2023-10-10 NOTE — DISCHARGE INSTRUCTIONS
Please follow-up with your primary care doctor. Please drink plenty of fluids to stay hydrated. Please return for new or worsening symptoms anytime.

## 2023-10-12 NOTE — TELEPHONE ENCOUNTER
Reviewed sleep study results with patient. He expressed understanding. Patient requests an order for a new APAP device and supplies. His current device is over 11years old.

## 2023-10-12 NOTE — TELEPHONE ENCOUNTER
HSAT in r-drive. Tech to convey results to patient  (recently saw KASANDRA Osborn NP in clinic)       Watchpat HSAT positive for significant sleep apnea. AHI 36/hour and lowest oxygen saturation was 83%. Based on the results of the home sleep apnea test,  APAP still recommended.  He can continue on his current settings and be seen at regular annual follow up with Yves Sherwood NP

## 2023-10-17 NOTE — TELEPHONE ENCOUNTER
Results reviewed by tech. See previous telephone encounter. I have attached dme order for PAP on previous settings.      First adherence should be with [facility]

## 2023-10-25 DIAGNOSIS — G47.33 OSA (OBSTRUCTIVE SLEEP APNEA): Primary | ICD-10-CM

## 2023-10-25 NOTE — PROGRESS NOTES
Orders Placed This Encounter   Procedures    DME Order for (Specify) as OP     Primary Encounter Diagnosis: Obstructive Sleep Apnea  (G47.33)    ResMed Device with Heated Humidifer [id number] / [id number]. Positive Airway Pressure Therapy: Duration of need: 99 months. Set Pressure: 4-20 cmH2O     Nasal Cushion (Replace) 2 per month.  Nasal Interface Mask 1 every 3 months.  Headgear 1 every 6 months.  Filter(s) Disposable 2 per month.  Filter(s) Non-Disposable 1 every 6 months. [address]  for Edenilson Anguiano (Replace) 1 every 6 months.  Tubing with heating element 1 every 3 months. Perform Mask Fitting per patient preference and comfort - replace as above. ARMINDA Sun, [facility] NPI: [id number]  Electronically signed.  10/25/23         ARMINDA Sun, [facility]   Nurse Practitioner  [address] [facility]

## 2023-10-27 ENCOUNTER — CLINICAL DOCUMENTATION (OUTPATIENT)
Age: 38
End: 2023-10-27

## 2023-11-01 ENCOUNTER — PATIENT MESSAGE (OUTPATIENT)
Age: 38
End: 2023-11-01

## 2023-11-01 ENCOUNTER — TELEPHONE (OUTPATIENT)
Age: 38
End: 2023-11-01

## 2023-11-01 DIAGNOSIS — E55.9 VITAMIN D DEFICIENCY: ICD-10-CM

## 2023-11-01 DIAGNOSIS — I10 ESSENTIAL (PRIMARY) HYPERTENSION: Primary | ICD-10-CM

## 2023-11-01 DIAGNOSIS — R53.83 OTHER FATIGUE: ICD-10-CM

## 2023-11-01 NOTE — TELEPHONE ENCOUNTER
Patient states he needs a call back Asap in reference to Urgent Care visit today @ [facility] seen for Sore Throat. Patient states he is concerned with Medication Prescribed & needs to be advised if his PCP/Dr. Rios thinks this medication is safe to take. Patient states medication is \"Cefdinir\". Please call to discuss & advise. Thank you

## 2023-11-04 ENCOUNTER — HOSPITAL ENCOUNTER (EMERGENCY)
Facility: HOSPITAL | Age: 38
Discharge: HOME | End: 2023-11-04
Attending: EMERGENCY MEDICINE
Payer: COMMERCIAL

## 2023-11-04 ENCOUNTER — APPOINTMENT (OUTPATIENT)
Facility: HOSPITAL | Age: 38
End: 2023-11-04
Payer: COMMERCIAL

## 2023-11-04 VITALS
SYSTOLIC BLOOD PRESSURE: 145 MMHG | WEIGHT: 281.09 LBS | BODY MASS INDEX: 41.63 KG/M2 | RESPIRATION RATE: 20 BRPM | HEART RATE: 86 BPM | HEIGHT: 69 IN | TEMPERATURE: 98.6 F | OXYGEN SATURATION: 99 % | DIASTOLIC BLOOD PRESSURE: 99 MMHG

## 2023-11-04 DIAGNOSIS — J06.9 URI WITH COUGH AND CONGESTION: ICD-10-CM

## 2023-11-04 DIAGNOSIS — J02.9 ACUTE PHARYNGITIS, UNSPECIFIED ETIOLOGY: Primary | ICD-10-CM

## 2023-11-04 LAB
DEPRECATED S PYO AG THROAT QL EIA: NEGATIVE
FLUAV AG NPH QL IA: NEGATIVE
FLUBV AG NOSE QL IA: NEGATIVE
SARS-COV-2 RDRP RESP QL NAA+PROBE: NOT DETECTED
SOURCE: NORMAL

## 2023-11-04 PROCEDURE — 87635 SARS-COV-2 COVID-19 AMP PRB: CPT

## 2023-11-04 PROCEDURE — 71045 X-RAY EXAM CHEST 1 VIEW: CPT

## 2023-11-04 PROCEDURE — 87804 INFLUENZA ASSAY W/OPTIC: CPT

## 2023-11-04 PROCEDURE — 87880 STREP A ASSAY W/OPTIC: CPT

## 2023-11-04 PROCEDURE — 99284 EMERGENCY DEPT VISIT MOD MDM: CPT

## 2023-11-04 PROCEDURE — 6360000002 HC RX W HCPCS: Performed by: EMERGENCY MEDICINE

## 2023-11-04 PROCEDURE — 6370000000 HC RX 637 (ALT 250 FOR IP): Performed by: EMERGENCY MEDICINE

## 2023-11-04 PROCEDURE — 87070 CULTURE OTHR SPECIMN AEROBIC: CPT

## 2023-11-04 RX ORDER — DEXAMETHASONE 4 MG/1
8 TABLET ORAL ONCE
Status: COMPLETED | OUTPATIENT
Start: 2023-11-04 | End: 2023-11-04

## 2023-11-04 RX ADMIN — ALUMINUM HYDROXIDE AND MAGNESIUM HYDROXIDE 20 ML: 200; 200 SUSPENSION ORAL at 08:31

## 2023-11-04 RX ADMIN — DEXAMETHASONE 8 MG: 4 TABLET ORAL at 08:31

## 2023-11-04 ASSESSMENT — PAIN SCALES - GENERAL: PAINLEVEL_OUTOF10: 0

## 2023-11-04 NOTE — DISCHARGE INSTRUCTIONS
I recommend starting an antacid either famotidine or omeprazole/esomeprazole for the the burning pain, nighttime symptoms. Tylenol 1 g, Motrin 600 to 800 mg every 6 hours will also help your pain. Warm tea with honey can help sore throat pain.

## 2023-11-04 NOTE — ED PROVIDER NOTES
[facility] EMERGENCY DEPT  EMERGENCY DEPARTMENT ENCOUNTER       Pt Name: Russel Linn  MRN: [id number]  [address] [facility] 1985  Date of evaluation: 11/4/2023  Provider: Orly Mcintyre MD   PCP: Zohra Delgado MD  Note Started: 10:44 AM 11/4/23     CHIEF COMPLAINT       Chief Complaint   Patient presents with    Cough     Patient reports sore throat for one week, tested for strep and covid with negative results this week. Pt states a cough started this morning that causes pain when coughing, reports burning sensation in chest. Pt reports difficulty breathing at night and mucous intermittently. HISTORY OF PRESENT ILLNESS: 1 or more elements      History From: Patient, History limited by: None     Russel Linn is a 41 y.o. male who presents with multiple symptoms. He describes about a week and a half of cough productive for yellow sputum, sore throat. Reports pain in his right chest with coughing described as burning. Reports mild dyspnea, no fever no chills. Has had multiple strep and COVID flu test which have been negative. Was prescribed an antiviral for possible pneumonia which he has not filled given the penicillin allergy. Nursing Notes were all reviewed and agreed with or any disagreements were addressed in the HPI. REVIEW OF SYSTEMS        Positives and Pertinent negatives as per HPI.     PAST HISTORY     Past Medical History:  Past Medical History:   Diagnosis Date    Angioedema     ace induced asthma     Anxiety     Cold-induced asthma     Gout     Hernia of abdominal wall     Hypertension     NAIN (obstructive sleep apnea)        Past Surgical History:  Past Surgical History:   Procedure Laterality Date    ADENOIDECTOMY         Family History:  Family History   Problem Relation Age of Onset    Sleep Apnea Mother     Hypertension Father     Breast Cancer Maternal Grandmother     Hypertension Mother        Social History:  Social History     Tobacco Use    Smoking status: Never    Smokeless [facility]  [phone]    As needed, If symptoms worsen       DISCHARGE MEDICATIONS:     Medication List        ASK your doctor about these medications      amLODIPine 10 MG tablet  Commonly known as: NORVASC  Take 1 tablet by mouth daily     amLODIPine-valsartan  MG per tablet  Commonly known as: EXFORGE     dicyclomine 20 MG tablet  Commonly known as: BENTYL  Take 1 tablet by mouth 4 times daily as needed (abdominal cramping)                DISCONTINUED MEDICATIONS:  Discharge Medication List as of 11/4/2023  9:32 AM          I am the Primary Clinician of Record. Nhan Howard MD (electronically signed)    (Please note that parts of this dictation were completed with voice recognition software. Quite often unanticipated grammatical, syntax, homophones, and other interpretive errors are inadvertently transcribed by the computer software. Please disregards these errors.  Please excuse any errors that have escaped final proofreading.)         Nhan Howard MD  11/04/23 7624

## 2023-11-05 LAB
BACTERIA SPEC CULT: NORMAL
SERVICE CMNT-IMP: NORMAL

## 2023-11-06 LAB
BACTERIA SPEC CULT: NORMAL
SERVICE CMNT-IMP: NORMAL

## 2023-11-08 ENCOUNTER — OFFICE VISIT (OUTPATIENT)
Age: 38
End: 2023-11-08
Payer: COMMERCIAL

## 2023-11-08 VITALS
HEIGHT: 69 IN | OXYGEN SATURATION: 100 % | HEART RATE: 79 BPM | TEMPERATURE: 98.5 F | DIASTOLIC BLOOD PRESSURE: 85 MMHG | BODY MASS INDEX: 41.62 KG/M2 | RESPIRATION RATE: 18 BRPM | WEIGHT: 281 LBS | SYSTOLIC BLOOD PRESSURE: 139 MMHG

## 2023-11-08 DIAGNOSIS — F32.5 DEPRESSION, MAJOR, IN REMISSION (HCC): ICD-10-CM

## 2023-11-08 DIAGNOSIS — F41.0 PANIC DISORDER (EPISODIC PAROXYSMAL ANXIETY): Primary | ICD-10-CM

## 2023-11-08 PROCEDURE — 90792 PSYCH DIAG EVAL W/MED SRVCS: CPT | Performed by: NURSE PRACTITIONER

## 2023-11-08 RX ORDER — HYDROXYZINE HYDROCHLORIDE 10 MG/1
TABLET, FILM COATED ORAL
Qty: 60 TABLET | Refills: 2 | Status: SHIPPED | OUTPATIENT
Start: 2023-11-08

## 2023-11-08 RX ORDER — LORAZEPAM 0.5 MG/1
0.5 TABLET ORAL DAILY PRN
Qty: 30 TABLET | Refills: 0 | Status: SHIPPED | OUTPATIENT
Start: 2023-11-08 | End: 2023-12-08

## 2023-11-08 ASSESSMENT — PATIENT HEALTH QUESTIONNAIRE - PHQ9
SUM OF ALL RESPONSES TO PHQ QUESTIONS 1-9: 1
5. POOR APPETITE OR OVEREATING: 0
9. THOUGHTS THAT YOU WOULD BE BETTER OFF DEAD, OR OF HURTING YOURSELF: 0
SUM OF ALL RESPONSES TO PHQ QUESTIONS 1-9: 1
8. MOVING OR SPEAKING SO SLOWLY THAT OTHER PEOPLE COULD HAVE NOTICED. OR THE OPPOSITE, BEING SO FIGETY OR RESTLESS THAT YOU HAVE BEEN MOVING AROUND A LOT MORE THAN USUAL: 0
2. FEELING DOWN, DEPRESSED OR HOPELESS: 0
1. LITTLE INTEREST OR PLEASURE IN DOING THINGS: 0
SUM OF ALL RESPONSES TO PHQ9 QUESTIONS 1 & 2: 0
SUM OF ALL RESPONSES TO PHQ QUESTIONS 1-9: 1
7. TROUBLE CONCENTRATING ON THINGS, SUCH AS READING THE NEWSPAPER OR WATCHING TELEVISION: 0
3. TROUBLE FALLING OR STAYING ASLEEP: 1
SUM OF ALL RESPONSES TO PHQ QUESTIONS 1-9: 1
10. IF YOU CHECKED OFF ANY PROBLEMS, HOW DIFFICULT HAVE THESE PROBLEMS MADE IT FOR YOU TO DO YOUR WORK, TAKE CARE OF THINGS AT HOME, OR GET ALONG WITH OTHER PEOPLE: 0
4. FEELING TIRED OR HAVING LITTLE ENERGY: 0
6. FEELING BAD ABOUT YOURSELF - OR THAT YOU ARE A FAILURE OR HAVE LET YOURSELF OR YOUR FAMILY DOWN: 0

## 2023-11-08 ASSESSMENT — ANXIETY QUESTIONNAIRES
4. TROUBLE RELAXING: 1
GAD7 TOTAL SCORE: 8
IF YOU CHECKED OFF ANY PROBLEMS ON THIS QUESTIONNAIRE, HOW DIFFICULT HAVE THESE PROBLEMS MADE IT FOR YOU TO DO YOUR WORK, TAKE CARE OF THINGS AT HOME, OR GET ALONG WITH OTHER PEOPLE: SOMEWHAT DIFFICULT
3. WORRYING TOO MUCH ABOUT DIFFERENT THINGS: 1
1. FEELING NERVOUS, ANXIOUS, OR ON EDGE: 1
6. BECOMING EASILY ANNOYED OR IRRITABLE: 2
7. FEELING AFRAID AS IF SOMETHING AWFUL MIGHT HAPPEN: 2
2. NOT BEING ABLE TO STOP OR CONTROL WORRYING: 1
5. BEING SO RESTLESS THAT IT IS HARD TO SIT STILL: 0

## 2023-11-08 NOTE — TELEPHONE ENCOUNTER
Lorenza Moyer 11/8/2023 2:59 PM EST      ----- Message -----  From: Giovanni Amaral  Sent: 11/8/2023 2:33 PM EST  To: *  Subject: Justo Abraham afternoon, I currently have a order to have my A1C checked. Could you also put in a complete blood count as well to have Vitamin A,B,C D checked as well? I would like to do it at one time.

## 2023-11-08 NOTE — PROGRESS NOTES
[address] since your last visit? Include any pap smears or colon screening.  No
work, take care of things at home, or get along with other people? 0              11/8/2023     9:49 AM 10/3/2022     6:07 AM 9/12/2022     1:00 PM   SEBASTIÁN-7 SCREENING   Feeling nervous, anxious, or on edge Several days Nearly every day Nearly every day   Not being able to stop or control worrying Several days Several days Nearly every day   Worrying too much about different things Several days More than half the days Nearly every day   Trouble relaxing Several days Nearly every day Nearly every day   Being so restless that it is hard to sit still Not at all More than half the days Several days   Becoming easily annoyed or irritable More than half the days Nearly every day Nearly every day   Feeling afraid as if something awful might happen More than half the days Nearly every day Nearly every day   SEBASTIÁN-7 Total Score 8 17 19   How difficult have these problems made it for you to do your work, take care of things at home, or get along with other people?  Somewhat difficult Very difficult Very difficult       MENTAL STATUS EXAM:     Orientation oriented to time, place and person   Vital Signs (BP,Pulse, Temp) See below (reviewed)   Gait and Station Within normal limits   Abnormal Muscular Movements/Tone/Behavior No EPS, no Tardive Dyskinesia, no abnormal muscular movements; wnl tone   Relations cooperative   General Appearance:  age appropriate and casually dressed   Language No aphasia or dysarthria   Speech:  normal pitch and normal volume   Thought Processes logical, wnl rate of thoughts, good abstract reasoning and computation   Thought Associations within normal limits   Thought Content no hallucinations and no delusions   Suicidal Ideations none   Homicidal Ideations none   Mood:  within normal limits   Affect:  normal and mood-congruent   Memory recent  adequate   Memory remote:  adequate   Concentration/Attention:  adequate   Fund of Knowledge Fair/average   Insight:  good   Reliability good   Judgment:  good

## 2023-11-09 DIAGNOSIS — I10 PRIMARY HYPERTENSION: ICD-10-CM

## 2023-11-09 RX ORDER — AMLODIPINE BESYLATE 10 MG/1
10 TABLET ORAL DAILY
Qty: 90 TABLET | Refills: 0 | Status: SHIPPED | OUTPATIENT
Start: 2023-11-09

## 2023-11-14 ENCOUNTER — NURSE ONLY (OUTPATIENT)
Age: 38
End: 2023-11-14

## 2023-11-14 DIAGNOSIS — R53.83 OTHER FATIGUE: ICD-10-CM

## 2023-11-14 DIAGNOSIS — R73.9 ELEVATED BLOOD SUGAR: ICD-10-CM

## 2023-11-14 DIAGNOSIS — E55.9 VITAMIN D DEFICIENCY: ICD-10-CM

## 2023-11-15 LAB
25(OH)D3 SERPL-MCNC: 14.7 NG/ML (ref 30–100)
EST. AVERAGE GLUCOSE BLD GHB EST-MCNC: 105 MG/DL
HBA1C MFR BLD: 5.3 % (ref 4–5.6)
VIT B12 SERPL-MCNC: 471 PG/ML (ref 193–986)

## 2023-11-23 PROCEDURE — 99284 EMERGENCY DEPT VISIT MOD MDM: CPT

## 2023-11-24 ENCOUNTER — HOSPITAL ENCOUNTER (EMERGENCY)
Facility: HOSPITAL | Age: 38
Discharge: HOME | End: 2023-11-24
Attending: EMERGENCY MEDICINE
Payer: COMMERCIAL

## 2023-11-24 VITALS
WEIGHT: 270 LBS | BODY MASS INDEX: 39.99 KG/M2 | SYSTOLIC BLOOD PRESSURE: 145 MMHG | DIASTOLIC BLOOD PRESSURE: 91 MMHG | HEIGHT: 69 IN | RESPIRATION RATE: 12 BRPM | HEART RATE: 100 BPM | TEMPERATURE: 99.7 F | OXYGEN SATURATION: 98 %

## 2023-11-24 DIAGNOSIS — U07.1 ACUTE COVID-19: Primary | ICD-10-CM

## 2023-11-24 LAB
ALBUMIN SERPL-MCNC: 3.5 G/DL (ref 3.5–5)
ALBUMIN/GLOB SERPL: 0.8 (ref 1.1–2.2)
ALP SERPL-CCNC: 81 U/L (ref 45–117)
ALT SERPL-CCNC: 42 U/L (ref 12–78)
ANION GAP SERPL CALC-SCNC: 2 MMOL/L (ref 5–15)
AST SERPL-CCNC: 26 U/L (ref 15–37)
BASOPHILS # BLD: 0 K/UL (ref 0–0.1)
BASOPHILS NFR BLD: 0 % (ref 0–1)
BILIRUB SERPL-MCNC: 0.6 MG/DL (ref 0.2–1)
BUN SERPL-MCNC: 14 MG/DL (ref 6–20)
BUN/CREAT SERPL: 13 (ref 12–20)
CALCIUM SERPL-MCNC: 8.9 MG/DL (ref 8.5–10.1)
CHLORIDE SERPL-SCNC: 105 MMOL/L (ref 97–108)
CO2 SERPL-SCNC: 31 MMOL/L (ref 21–32)
CREAT SERPL-MCNC: 1.06 MG/DL (ref 0.7–1.3)
DIFFERENTIAL METHOD BLD: ABNORMAL
EOSINOPHIL # BLD: 0.2 K/UL (ref 0–0.4)
EOSINOPHIL NFR BLD: 2 % (ref 0–7)
ERYTHROCYTE [DISTWIDTH] IN BLOOD BY AUTOMATED COUNT: 12.7 % (ref 11.5–14.5)
FLUAV AG NPH QL IA: NEGATIVE
FLUBV AG NOSE QL IA: NEGATIVE
GLOBULIN SER CALC-MCNC: 4.2 G/DL (ref 2–4)
GLUCOSE SERPL-MCNC: 130 MG/DL (ref 65–100)
HCT VFR BLD AUTO: 41.2 % (ref 36.6–50.3)
HGB BLD-MCNC: 13.1 G/DL (ref 12.1–17)
IMM GRANULOCYTES # BLD AUTO: 0 K/UL (ref 0–0.04)
IMM GRANULOCYTES NFR BLD AUTO: 0 % (ref 0–0.5)
LACTATE SERPL-SCNC: 0.9 MMOL/L (ref 0.4–2)
LYMPHOCYTES # BLD: 1.1 K/UL (ref 0.8–3.5)
LYMPHOCYTES NFR BLD: 14 % (ref 12–49)
MAGNESIUM SERPL-MCNC: 2 MG/DL (ref 1.6–2.4)
MCH RBC QN AUTO: 26.7 PG (ref 26–34)
MCHC RBC AUTO-ENTMCNC: 31.8 G/DL (ref 30–36.5)
MCV RBC AUTO: 84.1 FL (ref 80–99)
MONOCYTES # BLD: 0.6 K/UL (ref 0–1)
MONOCYTES NFR BLD: 8 % (ref 5–13)
NEUTS SEG # BLD: 5.7 K/UL (ref 1.8–8)
NEUTS SEG NFR BLD: 76 % (ref 32–75)
NRBC # BLD: 0 K/UL (ref 0–0.01)
NRBC BLD-RTO: 0 PER 100 WBC
PLATELET # BLD AUTO: 193 K/UL (ref 150–400)
PLATELET COMMENT: ABNORMAL
PMV BLD AUTO: 12.7 FL (ref 8.9–12.9)
POTASSIUM SERPL-SCNC: 3.6 MMOL/L (ref 3.5–5.1)
PROT SERPL-MCNC: 7.7 G/DL (ref 6.4–8.2)
RBC # BLD AUTO: 4.9 M/UL (ref 4.1–5.7)
RBC MORPH BLD: ABNORMAL
SARS-COV-2 RDRP RESP QL NAA+PROBE: DETECTED
SODIUM SERPL-SCNC: 138 MMOL/L (ref 136–145)
SOURCE: ABNORMAL
TROPONIN I SERPL HS-MCNC: 12 NG/L (ref 0–76)
WBC # BLD AUTO: 7.6 K/UL (ref 4.1–11.1)

## 2023-11-24 PROCEDURE — 80053 COMPREHEN METABOLIC PANEL: CPT

## 2023-11-24 PROCEDURE — 93005 ELECTROCARDIOGRAM TRACING: CPT | Performed by: EMERGENCY MEDICINE

## 2023-11-24 PROCEDURE — 6370000000 HC RX 637 (ALT 250 FOR IP): Performed by: EMERGENCY MEDICINE

## 2023-11-24 PROCEDURE — 36415 COLL VENOUS BLD VENIPUNCTURE: CPT

## 2023-11-24 PROCEDURE — 87804 INFLUENZA ASSAY W/OPTIC: CPT

## 2023-11-24 PROCEDURE — 96374 THER/PROPH/DIAG INJ IV PUSH: CPT

## 2023-11-24 PROCEDURE — 87635 SARS-COV-2 COVID-19 AMP PRB: CPT

## 2023-11-24 PROCEDURE — 6360000002 HC RX W HCPCS: Performed by: EMERGENCY MEDICINE

## 2023-11-24 PROCEDURE — 84484 ASSAY OF TROPONIN QUANT: CPT

## 2023-11-24 PROCEDURE — 85025 COMPLETE CBC W/AUTO DIFF WBC: CPT

## 2023-11-24 PROCEDURE — 2580000003 HC RX 258: Performed by: EMERGENCY MEDICINE

## 2023-11-24 PROCEDURE — 83735 ASSAY OF MAGNESIUM: CPT

## 2023-11-24 PROCEDURE — 96361 HYDRATE IV INFUSION ADD-ON: CPT

## 2023-11-24 PROCEDURE — 83605 ASSAY OF LACTIC ACID: CPT

## 2023-11-24 RX ORDER — FLUTICASONE PROPIONATE 50 MCG
1 SPRAY, SUSPENSION (ML) NASAL DAILY
Qty: 32 G | Refills: 1 | Status: SHIPPED | OUTPATIENT
Start: 2023-11-24

## 2023-11-24 RX ORDER — FLUTICASONE PROPIONATE 50 MCG
1 SPRAY, SUSPENSION (ML) NASAL DAILY
Qty: 32 G | Refills: 1 | Status: SHIPPED | OUTPATIENT
Start: 2023-11-24 | End: 2023-11-24 | Stop reason: SDUPTHER

## 2023-11-24 RX ORDER — BENZONATATE 100 MG/1
100 CAPSULE ORAL 3 TIMES DAILY PRN
Qty: 30 CAPSULE | Refills: 0 | Status: SHIPPED | OUTPATIENT
Start: 2023-11-24 | End: 2023-12-04

## 2023-11-24 RX ORDER — IBUPROFEN 600 MG/1
600 TABLET ORAL EVERY 6 HOURS PRN
Qty: 120 TABLET | Refills: 0 | Status: SHIPPED | OUTPATIENT
Start: 2023-11-24

## 2023-11-24 RX ORDER — IBUPROFEN 600 MG/1
600 TABLET ORAL EVERY 6 HOURS PRN
Qty: 120 TABLET | Refills: 0 | Status: SHIPPED | OUTPATIENT
Start: 2023-11-24 | End: 2023-11-24 | Stop reason: SDUPTHER

## 2023-11-24 RX ORDER — LORAZEPAM 2 MG/ML
0.5 INJECTION INTRAMUSCULAR ONCE
Status: COMPLETED | OUTPATIENT
Start: 2023-11-24 | End: 2023-11-24

## 2023-11-24 RX ORDER — BENZONATATE 100 MG/1
100 CAPSULE ORAL 3 TIMES DAILY PRN
Qty: 30 CAPSULE | Refills: 0 | Status: SHIPPED | OUTPATIENT
Start: 2023-11-24 | End: 2023-11-24 | Stop reason: SDUPTHER

## 2023-11-24 RX ORDER — ACETAMINOPHEN 325 MG/1
325 TABLET ORAL
Status: COMPLETED | OUTPATIENT
Start: 2023-11-24 | End: 2023-11-24

## 2023-11-24 RX ORDER — IBUPROFEN 600 MG/1
600 TABLET ORAL
Status: COMPLETED | OUTPATIENT
Start: 2023-11-24 | End: 2023-11-24

## 2023-11-24 RX ORDER — 0.9 % SODIUM CHLORIDE 0.9 %
1000 INTRAVENOUS SOLUTION INTRAVENOUS ONCE
Status: COMPLETED | OUTPATIENT
Start: 2023-11-24 | End: 2023-11-24

## 2023-11-24 RX ADMIN — IBUPROFEN 600 MG: 600 TABLET, FILM COATED ORAL at 01:00

## 2023-11-24 RX ADMIN — SODIUM CHLORIDE 1000 ML: 9 INJECTION, SOLUTION INTRAVENOUS at 01:01

## 2023-11-24 RX ADMIN — ACETAMINOPHEN 325 MG: 325 TABLET ORAL at 01:00

## 2023-11-24 RX ADMIN — LORAZEPAM 0.5 MG: 2 INJECTION INTRAMUSCULAR; INTRAVENOUS at 03:33

## 2023-11-24 ASSESSMENT — PAIN DESCRIPTION - LOCATION: LOCATION: CHEST

## 2023-11-24 ASSESSMENT — ENCOUNTER SYMPTOMS
SHORTNESS OF BREATH: 0
ABDOMINAL PAIN: 0
VOMITING: 0
COUGH: 0
COLOR CHANGE: 0
NAUSEA: 0
SORE THROAT: 1

## 2023-11-24 ASSESSMENT — PAIN SCALES - GENERAL: PAINLEVEL_OUTOF10: 2

## 2023-11-24 NOTE — ED PROVIDER NOTES
100 mg ritonavir tablets) by mouth every 12 hours for 5 days. ASK your doctor about these medications      amLODIPine 10 MG tablet  Commonly known as: NORVASC  Take 1 tablet by mouth daily     amLODIPine-valsartan  MG per tablet  Commonly known as: EXFORGE     dicyclomine 20 MG tablet  Commonly known as: BENTYL  Take 1 tablet by mouth 4 times daily as needed (abdominal cramping)     hydrOXYzine HCl 10 MG tablet  Commonly known as: ATARAX  Take 1-2 tablets by mouth twice daily as needed     LORazepam 0.5 MG tablet  Commonly known as: ATIVAN  Take 1 tablet by mouth daily as needed for Anxiety for up to 30 days. Max Daily Amount: 0.5 mg               Where to Get Your Medications        These medications were sent to [facility] [address], [address] [place] [id number]      Phone: [phone]   benzonatate 100 MG capsule  fluticasone 50 MCG/ACT nasal spray  ibuprofen 600 MG tablet  nirmatrelvir/ritonavir 300/100 20 x 150 MG & 10 x 100MG Tbpk           DISCONTINUED MEDICATIONS:  Current Discharge Medication List          I am the Primary Clinician of Record. Pop Rosas MD (electronically signed)      (Please note that parts of this dictation were completed with voice recognition software. Quite often unanticipated grammatical, syntax, homophones, and other interpretive errors are inadvertently transcribed by the computer software. Please disregards these errors.  Please excuse any errors that have escaped final proofreading.)        Viktoria Soliman MD  11/24/23 7660

## 2023-11-24 NOTE — DISCHARGE INSTRUCTIONS
You were seen emergency room for fever. Your COVID test is positive. You can take Tylenol 1000 and Motrin 600 mg every 6 hours to help control your fever. Your next dose of Tylenol is due at 5 AM and your next dose of Motrin is due at 7 AM.  You can wait and take them both at 7 AM if you would like. You should remain isolated for 5 days. If you have any trouble breathing and your oxygen is lower than 90%, return to the emergency room.

## 2023-11-24 NOTE — ED NOTES
Termeer MD reviewed discharge instructions with the patient. The patient verbalized understanding. All questions and concerns were addressed. The patient is discharged ambulatory with instructions and prescriptions in hand. Pt is alert and oriented x 4. Respirations are clear and unlabored.        Steph Swartz RN  11/24/23 2558

## 2023-11-25 LAB
EKG ATRIAL RATE: 125 BPM
EKG DIAGNOSIS: NORMAL
EKG P AXIS: 35 DEGREES
EKG P-R INTERVAL: 140 MS
EKG Q-T INTERVAL: 302 MS
EKG QRS DURATION: 92 MS
EKG QTC CALCULATION (BAZETT): 435 MS
EKG R AXIS: 18 DEGREES
EKG T AXIS: -14 DEGREES
EKG VENTRICULAR RATE: 125 BPM

## 2023-11-26 ENCOUNTER — HOSPITAL ENCOUNTER (EMERGENCY)
Facility: HOSPITAL | Age: 38
Discharge: HOME | End: 2023-11-26
Payer: COMMERCIAL

## 2023-11-26 VITALS
OXYGEN SATURATION: 98 % | WEIGHT: 280.65 LBS | DIASTOLIC BLOOD PRESSURE: 110 MMHG | RESPIRATION RATE: 18 BRPM | SYSTOLIC BLOOD PRESSURE: 164 MMHG | TEMPERATURE: 98.6 F | HEART RATE: 94 BPM | BODY MASS INDEX: 41.57 KG/M2 | HEIGHT: 69 IN

## 2023-11-26 DIAGNOSIS — R50.9 FEVER IN ADULT: ICD-10-CM

## 2023-11-26 DIAGNOSIS — U07.1 COVID: Primary | ICD-10-CM

## 2023-11-26 PROCEDURE — 99282 EMERGENCY DEPT VISIT SF MDM: CPT

## 2023-11-26 ASSESSMENT — PAIN - FUNCTIONAL ASSESSMENT: PAIN_FUNCTIONAL_ASSESSMENT: 0-10

## 2023-11-26 ASSESSMENT — PAIN SCALES - GENERAL: PAINLEVEL_OUTOF10: 0

## 2023-11-26 NOTE — ED NOTES
Pt discharged by Gino Looney RN. Discharge instructions discussed and pt given opportunity to ask questions.  Pt ambulatory out of ED        Yaquelin Herring RN  11/26/23 9855

## 2023-11-26 NOTE — ED PROVIDER NOTES
[facility] EMERGENCY DEPT  EMERGENCY DEPARTMENT ENCOUNTER       Pt Name: Awilda Astudillo  MRN: [id number]  [address] 1985  Date of evaluation: 11/26/2023  Provider: MIGUEL Florian   PCP: Carlos Marquez MD  Note Started: 7:43 AM EST 11/26/23     CHIEF COMPLAINT       Chief Complaint   Patient presents with    Fever     Pt states he was here and dx with COVID on Thursday. Pt is concerned that he is still having \"low grade fevers\" at home (ranging between 98-99). Pt last took tylenol 1g around 6 am and 600mg of advil around 430 a.m. pt has no other complaints. HISTORY OF PRESENT ILLNESS: 1 or more elements      History From: Patient  HPI Limitations: None     Awilda Astudillo is a 41 y.o. male who presents by POV with complaints of a fever. He started feeling ill with a scratchy throat on Wednesday. Symptoms worsened and he was seen in the ED on Thursday and noted to be positive for COVID. He is still running a fever and concerned. He also notes congestion, mild cough, and myalgias. He has been taking over the counter medications with temporary relief. Nursing Notes were all reviewed and agreed with or any disagreements were addressed in the HPI. REVIEW OF SYSTEMS      Review of Systems     Positives and Pertinent negatives as per HPI. PAST HISTORY     Past Medical History:  Past Medical History:   Diagnosis Date    Angioedema     ace induced asthma     Anxiety     Cold-induced asthma     Gout     Hernia of abdominal wall     Hypertension     NAIN (obstructive sleep apnea)        Past Surgical History:  Past Surgical History:   Procedure Laterality Date    ADENOIDECTOMY         Family History:  Family History   Problem Relation Age of Onset    Sleep Apnea Mother     Hypertension Father     Breast Cancer Maternal Grandmother     Hypertension Mother        Social History:  Social History     Tobacco Use    Smoking status: Never    Smokeless tobacco: Never   Substance Use Topics    Alcohol use:  No

## 2023-11-26 NOTE — DISCHARGE INSTRUCTIONS
It was a pleasure taking care of you at [facility] Emergency Department today. We know that when you come to [facility], you are entrusting us with your health, comfort, and safety. Our physicians and nurses honor that trust, and we truly appreciate the opportunity to care for you and your loved ones. We also value your feedback. If you receive a survey about your Emergency Department experience today, please fill it out. We care about our patients' feedback, and we listen to what you have to say. Thank you!

## 2023-12-14 ENCOUNTER — TELEMEDICINE (OUTPATIENT)
Age: 38
End: 2023-12-14
Payer: COMMERCIAL

## 2023-12-14 DIAGNOSIS — F41.1 GENERALIZED ANXIETY DISORDER: Primary | ICD-10-CM

## 2023-12-14 PROCEDURE — 99214 OFFICE O/P EST MOD 30 MIN: CPT | Performed by: NURSE PRACTITIONER

## 2023-12-14 PROCEDURE — 90833 PSYTX W PT W E/M 30 MIN: CPT | Performed by: NURSE PRACTITIONER

## 2023-12-14 NOTE — PROGRESS NOTES
nearest ER or call 911. Patient was given time to ask questions and voice concerns. I believe all questions concerns were adequately addressed at this office visit. Patient verbalized agreement and understanding of the above-stated plan. Follow-up and Dispositions    Return in about 3 months (around 3/14/2024).        12/14/2023  SATHISH Vo - NP

## 2024-01-05 ENCOUNTER — CLINICAL DOCUMENTATION (OUTPATIENT)
Age: 39
End: 2024-01-05

## 2024-01-10 ENCOUNTER — TELEMEDICINE (OUTPATIENT)
Age: 39
End: 2024-01-10
Payer: COMMERCIAL

## 2024-01-10 DIAGNOSIS — G47.33 OSA (OBSTRUCTIVE SLEEP APNEA): Primary | ICD-10-CM

## 2024-01-10 DIAGNOSIS — I10 PRIMARY HYPERTENSION: ICD-10-CM

## 2024-01-10 PROCEDURE — 99213 OFFICE O/P EST LOW 20 MIN: CPT | Performed by: NURSE PRACTITIONER

## 2024-01-10 ASSESSMENT — SLEEP AND FATIGUE QUESTIONNAIRES
FOSQ SCORE: 20
HAS YOUR MOOD BEEN AFFECTED BECAUSE YOU ARE SLEEPY OR TIRED: NO
HOW LIKELY ARE YOU TO NOD OFF OR FALL ASLEEP WHILE SITTING INACTIVE IN A PUBLIC PLACE: 0
HOW LIKELY ARE YOU TO NOD OFF OR FALL ASLEEP WHEN YOU ARE A PASSENGER IN A CAR FOR AN HOUR WITHOUT A BREAK: WOULD NEVER DOZE
DO YOU HAVE DIFFICULTY CONCENTRATING ON THE THINGS YOU DO BECAUSE YOU ARE SLEEPY OR TIRED: NO
DO YOU HAVE DIFFICULTY OPERATING A MOTOR VEHICLE FOR LONG DISTANCES (GREATER THAN 100 MILES) BECAUSE YOU BECOME SLEEPY: NO
DO YOU GENERALLY HAVE DIFFICULTY REMEMBERING THINGS BECAUSE YOU ARE SLEEPY OR TIRED: NO
DO YOU HAVE DIFFICULTY BEING AS ACTIVE AS YOU WANT TO BE IN THE EVENING BECAUSE YOU ARE SLEEPY OR TIRED: NO
HOW LIKELY ARE YOU TO NOD OFF OR FALL ASLEEP WHILE WATCHING TV: WOULD NEVER DOZE
HOW LIKELY ARE YOU TO NOD OFF OR FALL ASLEEP WHILE SITTING AND READING: 1
HOW LIKELY ARE YOU TO NOD OFF OR FALL ASLEEP WHILE SITTING QUIETLY AFTER LUNCH WITHOUT ALCOHOL: 0
HOW LIKELY ARE YOU TO NOD OFF OR FALL ASLEEP WHILE LYING DOWN TO REST IN THE AFTERNOON WHEN CIRCUMSTANCES PERMIT: 0
DO YOU HAVE DIFFICULTY BEING AS ACTIVE AS YOU WANT TO BE IN THE MORNING BECAUSE YOU ARE SLEEPY OR TIRED: NO
HOW LIKELY ARE YOU TO NOD OFF OR FALL ASLEEP IN A CAR, WHILE STOPPED FOR A FEW MINUTES IN TRAFFIC: 0
DO YOU HAVE DIFFICULTY OPERATING A MOTOR VEHICLE FOR SHORT DISTANCES (LESS THAN 100 MILES) BECAUSE YOU BECOME SLEEPY: NO
HOW LIKELY ARE YOU TO NOD OFF OR FALL ASLEEP WHEN YOU ARE A PASSENGER IN A CAR FOR AN HOUR WITHOUT A BREAK: 0
HOW LIKELY ARE YOU TO NOD OFF OR FALL ASLEEP WHILE SITTING INACTIVE IN A PUBLIC PLACE: WOULD NEVER DOZE
HOW LIKELY ARE YOU TO NOD OFF OR FALL ASLEEP WHILE LYING DOWN TO REST IN THE AFTERNOON WHEN CIRCUMSTANCES PERMIT: WOULD NEVER DOZE
HOW LIKELY ARE YOU TO NOD OFF OR FALL ASLEEP WHILE WATCHING TV: 0
HOW LIKELY ARE YOU TO NOD OFF OR FALL ASLEEP IN A CAR, WHILE STOPPED FOR A FEW MINUTES IN TRAFFIC: WOULD NEVER DOZE
DO YOU HAVE DIFFICULTY WATCHING A MOVIE OR VIDEO BECAUSE YOU BECOME SLEEPY OR TIRED: NO
HOW LIKELY ARE YOU TO NOD OFF OR FALL ASLEEP WHILE SITTING AND TALKING TO SOMEONE: WOULD NEVER DOZE
HOW LIKELY ARE YOU TO NOD OFF OR FALL ASLEEP WHILE SITTING AND TALKING TO SOMEONE: 0
HAS YOUR RELATIONSHIP WITH FAMILY, FRIENDS OR WORK COLLEAGUES BEEN AFFECTED BECAUSE YOU ARE SLEEPY OR TIRED: NO
DO YOU HAVE DIFFICULTY VISITING YOUR FAMILY OR FRIENDS IN THEIR HOME BECAUSE YOU BECOME SLEEPY OR TIRED: NO
HOW LIKELY ARE YOU TO NOD OFF OR FALL ASLEEP WHILE SITTING AND READING: SLIGHT CHANCE OF DOZING
HOW LIKELY ARE YOU TO NOD OFF OR FALL ASLEEP WHILE SITTING QUIETLY AFTER LUNCH WITHOUT ALCOHOL: WOULD NEVER DOZE
ESS TOTAL SCORE: 1

## 2024-01-10 NOTE — PATIENT INSTRUCTIONS
[address]  [place] [address]  Tel.  [phone]  Fax. [phone] [address]  [place] [address]  Tel.  [phone]  Fax. [phone] [address] [place] [address]  Tel.  [phone]  Fax. [phone]     Learning About CPAP for Sleep Apnea  What is CPAP?              CPAP is a small machine that you use at home every night while you sleep. It increases air pressure in your throat to keep your airway open. When you have sleep apnea, this can help you sleep better so you feel much better. CPAP stands for \"continuous positive airway pressure.\"  The CPAP machine will have one of the following:  A mask that covers your nose and mouth  Prongs that fit into your nose  A mask that covers your nose only, the most common type. This type is called NCPAP. The N stands for \"nasal.\"  Why is it done?  CPAP is usually the best treatment for obstructive sleep apnea. It is the first treatment choice and the most widely used. Your doctor may suggest CPAP if you have:  Moderate to severe sleep apnea.  Sleep apnea and coronary artery disease (CAD) or heart failure.  How does it help?  CPAP can help you have more normal sleep, so you feel less sleepy and more alert during the daytime.  CPAP may help keep heart failure or other heart problems from getting worse.  NCPAP may help lower your blood pressure.  If you use CPAP, your bed partner may also sleep better because you are not snoring or restless.  What are the side effects?  Some people who use CPAP have:  A dry or stuffy nose and a sore throat.  Irritated skin on the face.  Sore eyes.  Bloating.  If you have any of these problems, work with your doctor to fix them. Here are some things you can try:  Be sure the mask or nasal prongs fit well.  See if your doctor can adjust the pressure of your CPAP.  If your nose is dry, try a humidifier.  If your nose is runny or stuffy, try decongestant medicine or a steroid

## 2024-01-10 NOTE — PROGRESS NOTES
waiver authority and the Coronavirus Preparedness and Response Supplemental Appropriations Act, this Virtual Visit was conducted with patient's (and/or legal guardian's) consent, to reduce the patient's risk of exposure to COVID-19 and provide necessary medical care. The patient (or guardian if applicable) is aware that this is a billable service, which includes applicable copays.     Patient identification was verified at the start of the visit: Yes using name and date of birth. Patient's phone number [phone] (home)  was confirmed for accuracy.  He gives permission for messages regarding results and appointments to be left at that number.    Services were provided through a video synchronous discussion virtually to substitute for in-person clinic visit.  I was  at home  while conducting this encounter, patient located at their home or alternate location of their choice.    Shimon Min, was evaluated through a synchronous (real-time) audio-video encounter. The patient (or guardian if applicable) is aware that this is a billable service, which includes applicable co-pays. This Virtual Visit was conducted with patient's (and/or legal guardian's) consent. Patient identification was verified, and a caregiver was present when appropriate.   The patient was located at Other: at work  Provider was located at Home (Appt Dept State): [place]        --SATHISH Gerard NP on 1/10/2024 at 2:23 PM    An electronic signature was used to authenticate this note.  
5 (moderate pain)

## 2024-01-12 ENCOUNTER — CLINICAL DOCUMENTATION (OUTPATIENT)
Age: 39
End: 2024-01-12

## 2024-01-18 ENCOUNTER — OFFICE VISIT (OUTPATIENT)
Age: 39
End: 2024-01-18
Payer: COMMERCIAL

## 2024-01-18 VITALS
SYSTOLIC BLOOD PRESSURE: 142 MMHG | OXYGEN SATURATION: 98 % | BODY MASS INDEX: 42.45 KG/M2 | HEIGHT: 69 IN | HEART RATE: 64 BPM | DIASTOLIC BLOOD PRESSURE: 90 MMHG | WEIGHT: 286.6 LBS

## 2024-01-18 DIAGNOSIS — F40.10 SOCIAL PHOBIA, UNSPECIFIED: ICD-10-CM

## 2024-01-18 DIAGNOSIS — F41.1 GENERALIZED ANXIETY DISORDER: ICD-10-CM

## 2024-01-18 DIAGNOSIS — I10 ESSENTIAL (PRIMARY) HYPERTENSION: Primary | ICD-10-CM

## 2024-01-18 DIAGNOSIS — R00.2 PALPITATIONS: ICD-10-CM

## 2024-01-18 PROCEDURE — 3077F SYST BP >= 140 MM HG: CPT | Performed by: INTERNAL MEDICINE

## 2024-01-18 PROCEDURE — 99214 OFFICE O/P EST MOD 30 MIN: CPT | Performed by: INTERNAL MEDICINE

## 2024-01-18 PROCEDURE — 3080F DIAST BP >= 90 MM HG: CPT | Performed by: INTERNAL MEDICINE

## 2024-01-18 RX ORDER — ACETAMINOPHEN 160 MG
TABLET,DISINTEGRATING ORAL
COMMUNITY

## 2024-01-18 ASSESSMENT — PATIENT HEALTH QUESTIONNAIRE - PHQ9
SUM OF ALL RESPONSES TO PHQ QUESTIONS 1-9: 0
SUM OF ALL RESPONSES TO PHQ QUESTIONS 1-9: 0
SUM OF ALL RESPONSES TO PHQ9 QUESTIONS 1 & 2: 0
SUM OF ALL RESPONSES TO PHQ QUESTIONS 1-9: 0
SUM OF ALL RESPONSES TO PHQ QUESTIONS 1-9: 0
1. LITTLE INTEREST OR PLEASURE IN DOING THINGS: 0
2. FEELING DOWN, DEPRESSED OR HOPELESS: 0

## 2024-01-18 NOTE — PROGRESS NOTES
Per Dr. Jimenez- follow up one year.   
Angioedema     ace induced asthma     Anxiety     Cold-induced asthma     Gout     Hernia of abdominal wall     Hypertension     NAIN (obstructive sleep apnea)        Past Surgical History:   Procedure Laterality Date    ADENOIDECTOMY         CURRENT MEDICATIONS:    .  Current Outpatient Medications   Medication Sig Dispense Refill    Cholecalciferol (VITAMIN D3) 50 MCG (2000 UT) CAPS Take by mouth      amLODIPine (NORVASC) 10 MG tablet Take 1 tablet by mouth daily 90 tablet 0    hydrOXYzine HCl (ATARAX) 10 MG tablet Take 1-2 tablets by mouth twice daily as needed 60 tablet 2    fluticasone (FLONASE) 50 MCG/ACT nasal spray 1 spray by Each Nostril route daily (Patient not taking: Reported on 1/18/2024) 32 g 1     No current facility-administered medications for this visit.       Allergies   Allergen Reactions    Lisinopril Swelling     Pt has sever swelling of the throat and tongue.    Penicillins Other (See Comments)     Allergic as a child    Venlafaxine Palpitations       SOCIAL HISTORY:     Social History     Tobacco Use    Smoking status: Never    Smokeless tobacco: Never   Substance Use Topics    Alcohol use: No    Drug use: Not Currently       FAMILY HISTORY:     Family History   Problem Relation Age of Onset    Sleep Apnea Mother     Hypertension Father     Breast Cancer Maternal Grandmother     Hypertension Mother        REVIEW OF SYMPTOMS:     Review of Symptoms:  Negative except as above, all other systems reviewed and are negative for a Comprehensive ROS (10+)    PHYSICAL EXAM:     Physical Exam:  BP (!) 142/90 (Site: Left Upper Arm, Position: Sitting, Cuff Size: Large Adult)   Pulse 64   Ht 1.753 m (5' 9\")   Wt 130 kg (286 lb 9.6 oz)   SpO2 98%   BMI 42.32 kg/m²     General: alert, cooperative, no distress, appears stated age  Neck: supple, symmetrical, trachea midline, no adenopathy, thyroid: not enlarged, symmetric, no tenderness/mass/nodules, no carotid bruit, and no JVD  Lungs: clear to

## 2024-01-26 ENCOUNTER — CLINICAL DOCUMENTATION (OUTPATIENT)
Age: 39
End: 2024-01-26

## 2024-01-28 ENCOUNTER — APPOINTMENT (OUTPATIENT)
Facility: HOSPITAL | Age: 39
End: 2024-01-28
Payer: COMMERCIAL

## 2024-01-28 ENCOUNTER — HOSPITAL ENCOUNTER (EMERGENCY)
Facility: HOSPITAL | Age: 39
Discharge: HOME | End: 2024-01-28
Attending: EMERGENCY MEDICINE
Payer: COMMERCIAL

## 2024-01-28 VITALS
SYSTOLIC BLOOD PRESSURE: 130 MMHG | WEIGHT: 265 LBS | BODY MASS INDEX: 39.25 KG/M2 | HEART RATE: 88 BPM | TEMPERATURE: 98.8 F | HEIGHT: 69 IN | DIASTOLIC BLOOD PRESSURE: 88 MMHG | RESPIRATION RATE: 12 BRPM | OXYGEN SATURATION: 96 %

## 2024-01-28 DIAGNOSIS — R20.0 LEFT ARM NUMBNESS: ICD-10-CM

## 2024-01-28 DIAGNOSIS — R07.9 CHEST PAIN, UNSPECIFIED TYPE: Primary | ICD-10-CM

## 2024-01-28 LAB
ALBUMIN SERPL-MCNC: 4 G/DL (ref 3.5–5)
ALBUMIN/GLOB SERPL: 0.9 (ref 1.1–2.2)
ALP SERPL-CCNC: 87 U/L (ref 45–117)
ALT SERPL-CCNC: 39 U/L (ref 12–78)
ANION GAP SERPL CALC-SCNC: 4 MMOL/L (ref 5–15)
AST SERPL-CCNC: 30 U/L (ref 15–37)
BASOPHILS # BLD: 0.1 K/UL (ref 0–0.1)
BASOPHILS NFR BLD: 1 % (ref 0–1)
BILIRUB SERPL-MCNC: 0.5 MG/DL (ref 0.2–1)
BUN SERPL-MCNC: 22 MG/DL (ref 6–20)
BUN/CREAT SERPL: 19 (ref 12–20)
CALCIUM SERPL-MCNC: 8.5 MG/DL (ref 8.5–10.1)
CHLORIDE SERPL-SCNC: 106 MMOL/L (ref 97–108)
CO2 SERPL-SCNC: 28 MMOL/L (ref 21–32)
COMMENT:: NORMAL
CREAT SERPL-MCNC: 1.16 MG/DL (ref 0.7–1.3)
DIFFERENTIAL METHOD BLD: ABNORMAL
EOSINOPHIL # BLD: 0.3 K/UL (ref 0–0.4)
EOSINOPHIL NFR BLD: 3 % (ref 0–7)
ERYTHROCYTE [DISTWIDTH] IN BLOOD BY AUTOMATED COUNT: 12.7 % (ref 11.5–14.5)
GLOBULIN SER CALC-MCNC: 4.4 G/DL (ref 2–4)
GLUCOSE BLD STRIP.AUTO-MCNC: 120 MG/DL (ref 65–117)
GLUCOSE SERPL-MCNC: 105 MG/DL (ref 65–100)
HCT VFR BLD AUTO: 42.6 % (ref 36.6–50.3)
HGB BLD-MCNC: 13.6 G/DL (ref 12.1–17)
IMM GRANULOCYTES # BLD AUTO: 0 K/UL
IMM GRANULOCYTES NFR BLD AUTO: 0 %
INR PPP: 1 (ref 0.9–1.1)
LYMPHOCYTES # BLD: 3.6 K/UL (ref 0.8–3.5)
LYMPHOCYTES NFR BLD: 41 % (ref 12–49)
MAGNESIUM SERPL-MCNC: 1.9 MG/DL (ref 1.6–2.4)
MCH RBC QN AUTO: 27 PG (ref 26–34)
MCHC RBC AUTO-ENTMCNC: 31.9 G/DL (ref 30–36.5)
MCV RBC AUTO: 84.7 FL (ref 80–99)
MONOCYTES # BLD: 0.5 K/UL (ref 0–1)
MONOCYTES NFR BLD: 6 % (ref 5–13)
NEUTS SEG # BLD: 4.4 K/UL (ref 1.8–8)
NEUTS SEG NFR BLD: 49 % (ref 32–75)
NRBC # BLD: 0 K/UL (ref 0–0.01)
NRBC BLD-RTO: 0 PER 100 WBC
PLATELET # BLD AUTO: 196 K/UL (ref 150–400)
PLATELET COMMENT: ABNORMAL
PMV BLD AUTO: 12.4 FL (ref 8.9–12.9)
POTASSIUM SERPL-SCNC: 3.4 MMOL/L (ref 3.5–5.1)
PROT SERPL-MCNC: 8.4 G/DL (ref 6.4–8.2)
PROTHROMBIN TIME: 10 SEC (ref 9–11.1)
RBC # BLD AUTO: 5.03 M/UL (ref 4.1–5.7)
RBC MORPH BLD: ABNORMAL
SERVICE CMNT-IMP: ABNORMAL
SODIUM SERPL-SCNC: 138 MMOL/L (ref 136–145)
SPECIMEN HOLD: NORMAL
TROPONIN I SERPL HS-MCNC: 12 NG/L (ref 0–76)
WBC # BLD AUTO: 8.9 K/UL (ref 4.1–11.1)

## 2024-01-28 PROCEDURE — 6360000004 HC RX CONTRAST MEDICATION: Performed by: EMERGENCY MEDICINE

## 2024-01-28 PROCEDURE — 71045 X-RAY EXAM CHEST 1 VIEW: CPT

## 2024-01-28 PROCEDURE — 70450 CT HEAD/BRAIN W/O DYE: CPT

## 2024-01-28 PROCEDURE — 0042T CT BRAIN PERFUSION: CPT

## 2024-01-28 PROCEDURE — APPNB45 APP NON BILLABLE 31-45 MINUTES

## 2024-01-28 PROCEDURE — 93005 ELECTROCARDIOGRAM TRACING: CPT | Performed by: EMERGENCY MEDICINE

## 2024-01-28 PROCEDURE — 70498 CT ANGIOGRAPHY NECK: CPT

## 2024-01-28 PROCEDURE — 99285 EMERGENCY DEPT VISIT HI MDM: CPT

## 2024-01-28 PROCEDURE — 6360000004 HC RX CONTRAST MEDICATION: Performed by: RADIOLOGY

## 2024-01-28 PROCEDURE — 84484 ASSAY OF TROPONIN QUANT: CPT

## 2024-01-28 PROCEDURE — 82962 GLUCOSE BLOOD TEST: CPT

## 2024-01-28 PROCEDURE — 85025 COMPLETE CBC W/AUTO DIFF WBC: CPT

## 2024-01-28 PROCEDURE — 83735 ASSAY OF MAGNESIUM: CPT

## 2024-01-28 PROCEDURE — 80053 COMPREHEN METABOLIC PANEL: CPT

## 2024-01-28 PROCEDURE — 36415 COLL VENOUS BLD VENIPUNCTURE: CPT

## 2024-01-28 PROCEDURE — 6370000000 HC RX 637 (ALT 250 FOR IP): Performed by: EMERGENCY MEDICINE

## 2024-01-28 PROCEDURE — 85610 PROTHROMBIN TIME: CPT

## 2024-01-28 PROCEDURE — A9579 GAD-BASE MR CONTRAST NOS,1ML: HCPCS | Performed by: EMERGENCY MEDICINE

## 2024-01-28 PROCEDURE — 70553 MRI BRAIN STEM W/O & W/DYE: CPT

## 2024-01-28 RX ORDER — LORAZEPAM 1 MG/1
0.5 TABLET ORAL ONCE
Status: COMPLETED | OUTPATIENT
Start: 2024-01-28 | End: 2024-01-28

## 2024-01-28 RX ADMIN — IOPAMIDOL 40 ML: 755 INJECTION, SOLUTION INTRAVENOUS at 19:26

## 2024-01-28 RX ADMIN — LORAZEPAM 0.5 MG: 1 TABLET ORAL at 20:50

## 2024-01-28 RX ADMIN — GADOTERIDOL 20 ML: 279.3 INJECTION, SOLUTION INTRAVENOUS at 21:21

## 2024-01-28 RX ADMIN — IOPAMIDOL 80 ML: 755 INJECTION, SOLUTION INTRAVENOUS at 19:27

## 2024-01-28 NOTE — ED PROVIDER NOTES
CONSULTS:  None    PROCEDURES:  Unless otherwise noted below, none     Procedures      FINAL IMPRESSION      1. Chest pain, unspecified type    2. Left arm numbness          DISPOSITION/PLAN   DISPOSITION Decision To Discharge 01/28/2024 09:59:23 PM      PATIENT REFERRED TO:  Gladys Hernandez MD  [address]  [facility] [address] [phone]    Schedule an appointment as soon as possible for a visit   As needed    Mariposa Swann MD  [address]  [facility] [address] [phone]    Schedule an appointment as soon as possible for a visit       [facility] EMERGENCY DEP  [address] [address] [phone]    If symptoms worsen      DISCHARGE MEDICATIONS:  New Prescriptions    No medications on file         (Please note that portions of this note were completed with a voice recognition program.  Efforts were made to edit the dictations but occasionally words are mis-transcribed.)    Alexey Cunningham MD (electronically signed)  Emergency Attending Physician / Physician Assistant / Nurse Practitioner             Alexey Cunningham MD  01/28/24 0809

## 2024-01-28 NOTE — ED TRIAGE NOTES
Pt arrives from home with cc of intermittent chest pain for the last 3 days.     Pt also notes left arm heaviness and left leg tingling at 9am. No appreciable weakness noted. No aphasia, dizziness, or drifts noted.     Has hx of anxiety and took hydroxyzine.

## 2024-01-29 LAB
EKG ATRIAL RATE: 117 BPM
EKG DIAGNOSIS: NORMAL
EKG P AXIS: 64 DEGREES
EKG P-R INTERVAL: 172 MS
EKG Q-T INTERVAL: 324 MS
EKG QRS DURATION: 94 MS
EKG QTC CALCULATION (BAZETT): 451 MS
EKG R AXIS: 80 DEGREES
EKG T AXIS: -30 DEGREES
EKG VENTRICULAR RATE: 117 BPM

## 2024-01-29 NOTE — PROGRESS NOTES
Neurocritical Care Code Stroke Documentation      Symptoms: L arm tingling, L leg heaviness   Baseline mRS:   0   Last Known Well: 09   Medical hx: Past Medical History:   Diagnosis Date    Angioedema     ace induced asthma     Anxiety     Cold-induced asthma     Gout     Hernia of abdominal wall     Hypertension     NAIN (obstructive sleep apnea)       Anticoagulation: None   VAN:   Negative   NIHSS:   1a-LOC:0    1b-Month/Age:0    1c-Open/Close Hand:0    2-Best Gaze:0    3-Visual Fields:0    4-Facial Palsy:0    5a-Left Arm:0    5b-Right Arm:0    6a-Left Le    6b-Right Le    7-Limb Ataxia:0    8-Sensory:0    9-Best Language:0    10-Dysarthria:0    11-Extinction/Inattention:0  TOTAL SCORE:0   Imaging:   CT: No acute processes    CTA/ CTP: No LVO   Plan:   TNK Candidate: NO    Mechanical thrombectomy Candidate: NO     *Perform dysphagia screening prior to any PO intake*    Discussed with: Dr Cunningham, Dr Reynolds, Teleneuro    Arrival time: 1903  Time spent: 40 minutes.     SATHISH Robins - CNP  Neurocritical Care Nurse Practitioner  [phone]

## 2024-01-29 NOTE — ED NOTES
Patient discharged by Octavio CHIRINOS - pt sent to the [facility], with strong and steady gait, no acute distress noted at time of discharge - Discharge information / home RX / and reasons to return to the ED were reviewed by the ED provider.      Advised pt to not drive himself home - advised patient to call for a ride home due to PO benzo given this evening;;

## 2024-02-07 ENCOUNTER — HOSPITAL ENCOUNTER (EMERGENCY)
Facility: HOSPITAL | Age: 39
Discharge: LEFT BEFORE DISCHARGE | End: 2024-02-07

## 2024-02-07 ENCOUNTER — HOSPITAL ENCOUNTER (EMERGENCY)
Facility: HOSPITAL | Age: 39
Discharge: HOME | End: 2024-02-07
Attending: EMERGENCY MEDICINE
Payer: COMMERCIAL

## 2024-02-07 VITALS
SYSTOLIC BLOOD PRESSURE: 154 MMHG | HEART RATE: 78 BPM | RESPIRATION RATE: 18 BRPM | TEMPERATURE: 98.5 F | OXYGEN SATURATION: 97 % | DIASTOLIC BLOOD PRESSURE: 108 MMHG

## 2024-02-07 DIAGNOSIS — K29.00 ACUTE GASTRITIS WITHOUT HEMORRHAGE, UNSPECIFIED GASTRITIS TYPE: Primary | ICD-10-CM

## 2024-02-07 LAB — S PYO AG THROAT QL: NEGATIVE

## 2024-02-07 PROCEDURE — 87070 CULTURE OTHR SPECIMN AEROBIC: CPT

## 2024-02-07 PROCEDURE — 6370000000 HC RX 637 (ALT 250 FOR IP): Performed by: STUDENT IN AN ORGANIZED HEALTH CARE EDUCATION/TRAINING PROGRAM

## 2024-02-07 PROCEDURE — 99283 EMERGENCY DEPT VISIT LOW MDM: CPT

## 2024-02-07 PROCEDURE — 87880 STREP A ASSAY W/OPTIC: CPT

## 2024-02-07 RX ORDER — PANTOPRAZOLE SODIUM 40 MG/1
40 TABLET, DELAYED RELEASE ORAL
Qty: 30 TABLET | Refills: 0 | Status: SHIPPED | OUTPATIENT
Start: 2024-02-07 | End: 2024-03-08

## 2024-02-07 RX ORDER — EPINEPHRINE 0.3 MG/.3ML
0.3 INJECTION SUBCUTANEOUS ONCE
Qty: 2 EACH | Refills: 0 | Status: SHIPPED | OUTPATIENT
Start: 2024-02-07 | End: 2024-02-07

## 2024-02-07 RX ORDER — FAMOTIDINE 20 MG/1
20 TABLET, FILM COATED ORAL 2 TIMES DAILY
Qty: 60 TABLET | Refills: 0 | Status: SHIPPED | OUTPATIENT
Start: 2024-02-07 | End: 2024-03-08

## 2024-02-07 RX ORDER — FAMOTIDINE 20 MG/1
40 TABLET, FILM COATED ORAL ONCE
Status: COMPLETED | OUTPATIENT
Start: 2024-02-07 | End: 2024-02-07

## 2024-02-07 RX ADMIN — FAMOTIDINE 40 MG: 20 TABLET, FILM COATED ORAL at 03:15

## 2024-02-07 RX ADMIN — Medication 40 ML: at 03:15

## 2024-02-07 ASSESSMENT — ENCOUNTER SYMPTOMS
EYE DISCHARGE: 0
ABDOMINAL PAIN: 0
SHORTNESS OF BREATH: 0

## 2024-02-07 ASSESSMENT — PAIN - FUNCTIONAL ASSESSMENT: PAIN_FUNCTIONAL_ASSESSMENT: 0-10

## 2024-02-07 ASSESSMENT — PAIN SCALES - GENERAL: PAINLEVEL_OUTOF10: 0

## 2024-02-07 NOTE — ED PROVIDER NOTES
[facility] EMERGENCY DEP  EMERGENCY DEPARTMENT ENCOUNTER      Pt Name: Shimon Min  MRN: [id number]  Birthdate 1985  Date of evaluation: 2/7/2024  Provider: Mila Ludwig PA-C    CHIEF COMPLAINT       Chief Complaint   Patient presents with    Pharyngitis         HISTORY OF PRESENT ILLNESS    HPI  Patient is a 38 y.o. male who presents today with complaints of difficulty swallowing just prior to arrival.  States he has a history of gastritis and has seen GI in the past for this.  He had 600 mg Motrin and then shortly after, patient stated began having some difficulty swallowing.  No difficulty breathing or sore throat.  Reports his symptoms are much improved now.  He does take Pepcid occasionally for reflux.  Denies any chest pain or shortness of breath.  No known allergic contacts.    Nursing Notes were reviewed.      REVIEW OF SYSTEMS       Review of Systems   Constitutional:  Negative for fever.   HENT:  Negative for congestion.    Eyes:  Negative for discharge.   Respiratory:  Negative for shortness of breath.    Cardiovascular:  Negative for chest pain.        + Dysphagia   Gastrointestinal:  Negative for abdominal pain.   Genitourinary:  Negative for dysuria.   Skin:  Negative for wound.   Neurological:  Negative for seizures.   Psychiatric/Behavioral:  Negative for suicidal ideas.        Except as noted above the remainder of the review of systems was reviewed and negative.       PAST MEDICAL HISTORY     Past Medical History:   Diagnosis Date    Angioedema     ace induced asthma     Anxiety     Cold-induced asthma     Gout     Hernia of abdominal wall     Hypertension     NAIN (obstructive sleep apnea)          SURGICAL HISTORY       Past Surgical History:   Procedure Laterality Date    ADENOIDECTOMY           CURRENT MEDICATIONS       Discharge Medication List as of 2/7/2024  2:59 AM        CONTINUE these medications which have NOT CHANGED    Details   Cholecalciferol (VITAMIN D3) 50 MCG (2000 UT) CAPS

## 2024-02-07 NOTE — ED NOTES
Attempted to call the patient a second time with no response. Will try one more time before attempting to call him.

## 2024-02-07 NOTE — ED NOTES
Patient discharged by MIGUEL Zaragoza. Discharge information / home RX / and reasons to return to the ED were reviewed by the ED provider. The patient verbalized understanding. He left ambulatory in stable condition, no acute distress noted.

## 2024-02-07 NOTE — ED NOTES
Called patient with no response. Patient was not in the bathroom. Will attempt again in a few minutes.

## 2024-02-07 NOTE — ED TRIAGE NOTES
Patient arrived ambulatory w/ c/o sore throat and difficulty swallowing starting at 2200. Endorses eating cereal and P&J sandwich when he noticed the difficulty swallowing. Denies difficulty breathing. Denies allergies to food.     Denies burning in chest, chest pain, nausea, vomiting

## 2024-02-07 NOTE — ED NOTES
Was notified by registration that the patient was over at [facility] for evaluation. He will be discharged from here for evaluation there.

## 2024-02-08 LAB
BACTERIA SPEC CULT: NORMAL
SERVICE CMNT-IMP: NORMAL

## 2024-02-12 DIAGNOSIS — I10 PRIMARY HYPERTENSION: ICD-10-CM

## 2024-02-13 ENCOUNTER — TELEMEDICINE (OUTPATIENT)
Age: 39
End: 2024-02-13

## 2024-02-13 ENCOUNTER — TELEPHONE (OUTPATIENT)
Age: 39
End: 2024-02-13

## 2024-02-13 ENCOUNTER — CLINICAL DOCUMENTATION (OUTPATIENT)
Age: 39
End: 2024-02-13

## 2024-02-13 DIAGNOSIS — Z87.828 HISTORY OF TRAUMA: ICD-10-CM

## 2024-02-13 DIAGNOSIS — R41.840 INATTENTION: ICD-10-CM

## 2024-02-13 DIAGNOSIS — F41.0 PANIC DISORDER: ICD-10-CM

## 2024-02-13 DIAGNOSIS — F32.A ANXIETY AND DEPRESSION: Primary | ICD-10-CM

## 2024-02-13 DIAGNOSIS — F41.9 ANXIETY AND DEPRESSION: Primary | ICD-10-CM

## 2024-02-13 RX ORDER — AMLODIPINE BESYLATE 10 MG/1
10 TABLET ORAL DAILY
Qty: 90 TABLET | Refills: 1 | Status: SHIPPED | OUTPATIENT
Start: 2024-02-13

## 2024-02-13 NOTE — TELEPHONE ENCOUNTER
Called patient to schedule 1st adh follow up visit. Unable to reach patient. LVM to give office a call to schedule.

## 2024-02-13 NOTE — PROGRESS NOTES
history is negative for TBI, seizure, meningitis/encephalitis, DMITRIY Fever, Lupus, Lyme.      PHQ-9    Little interest or pleasure in doing things 0   Feeling down, depressed, or hopeless 0   Trouble falling or staying asleep, or sleeping too much 1   Feeling tired or having little energy 0   Poor appetite or overeating 0   Feeling bad about yourself - or that you are a failure or have let yourself or your family down 0   Trouble concentrating on things, such as reading the newspaper or watching television 0   Moving or speaking so slowly that other people could have noticed. Or the opposite - being so fidgety or restless that you have been moving around a lot more than usual 0   Thoughts that you would be better off dead, or of hurting yourself in some way 0   PHQ-2 Score 0   PHQ-9 Total Score 1   If you checked off any problems, how difficult have these problems made it for you to do your work, take care of things at home, or get along with other people? 0        Neuropsychological Mental Status Exam (NMSE):      Historian: Good  Praxis: No UE apraxia  R/L Orientation: Intact to self and to other  Dress: within normal limits   Weight:overweight  Appearance/Hygiene: within normal limits   Gait: not assessed, seated in car  Assistive Devices: Glasses (sun)  Mood: within normal limits   Affect: within normal limits   Comprehension: within normal limits   Thought Process: within normal limits   Expressive Language: within normal limits   Receptive Language: within normal limits   Motor:  No cognitive or motor perseveration  ETOH: Denied  Tobacco: Denied  Illicit: Denied  SI/HI: Denied  Psychosis: Denied  Insight: Within normal limits  Judgment: Within normal limits  Other Psych:      Past Medical History:   Diagnosis Date    Angioedema     ace induced asthma     Anxiety     Cold-induced asthma     Gout     Hernia of abdominal wall     Hypertension     NAIN (obstructive sleep apnea)        Past Surgical History:

## 2024-02-14 ENCOUNTER — TELEPHONE (OUTPATIENT)
Age: 39
End: 2024-02-14

## 2024-02-14 NOTE — TELEPHONE ENCOUNTER
Patient called stating his AHI was elevated at 6 after an appx 1 hour nap. He was concerned that his AHI had never been that high on therapy and may be causing issues with his heart. Prior to the nap, he had taken his prescribed Hydroxyzine for a panic attack.    Per his request, patient will been scheduled for follow-up at soonest available with Alicja Osborn to discuss his concerns.

## 2024-02-15 ENCOUNTER — TELEMEDICINE (OUTPATIENT)
Age: 39
End: 2024-02-15
Payer: COMMERCIAL

## 2024-02-15 DIAGNOSIS — G47.33 OSA (OBSTRUCTIVE SLEEP APNEA): Primary | ICD-10-CM

## 2024-02-15 DIAGNOSIS — I10 PRIMARY HYPERTENSION: ICD-10-CM

## 2024-02-15 PROCEDURE — 99213 OFFICE O/P EST LOW 20 MIN: CPT | Performed by: NURSE PRACTITIONER

## 2024-02-15 NOTE — PROGRESS NOTES
[address]   [place] [address]   Tel.  [phone]   Fax. [phone]  [address]   [place] [address]   Tel.  [phone]   Fax. [phone] [address] [place] [address]   Tel.  [phone]   Fax. [phone]     Shimon Min (: 1985) is a 37 y.o. male, new patient, seen for positive airway pressure follow-up and evaluation.  He was last seen by me on 2024, previously seen by Dr. Hoffman on , prior notes reviewed in detail.  Home sleep test  showed AHI of 23/hr with a lowest  SaO2 of 55%. He had lost about 30 lbs and another home sleep study was obtained  showing an overall AHI of 36/hr and lowest oxygen saturation of 83%. He was started on PAP at that time. He is seen today for follow up.      ASSESSMENT/PLAN:   Diagnosis Orders   1. NAIN (obstructive sleep apnea)        2. Primary hypertension        3. BMI 39.0-39.9,adult            AHI = 36 ().  On CPAP :  4-20 cmH2O. Set up 2024.     He is adherent with PAP therapy and PAP continues to benefit patient and remains necessary for control of his sleep apnea.     No follow-up provider specified.    Sleep Apnea -  Continue on current pressures. He had been concerned after he had a panic attack and took his Hydroxyzine 10 mg. He then woke up and noticed his events per hour was 6. He was concerned that his apnea had worsened. We discussed the data and trends and what can worsen apnea (medications, alcohol, large meals, sleeping supine). He is anxious about his health, particularly his heart health. We discussed the data and fluctuations in trends. He is working to lose weight and has been trying to walk 3 miles per day. He is also followed by Cardiology.     *  Counseling was provided regarding the importance of regular PAP use with emphasis on ensuring sufficient total sleep time, proper sleep hygiene, and safe driving.    * Re-enforced proper and regular cleaning

## 2024-02-24 ENCOUNTER — APPOINTMENT (OUTPATIENT)
Facility: HOSPITAL | Age: 39
End: 2024-02-24
Payer: COMMERCIAL

## 2024-02-24 ENCOUNTER — HOSPITAL ENCOUNTER (EMERGENCY)
Facility: HOSPITAL | Age: 39
Discharge: HOME | End: 2024-02-24
Attending: STUDENT IN AN ORGANIZED HEALTH CARE EDUCATION/TRAINING PROGRAM
Payer: COMMERCIAL

## 2024-02-24 VITALS
TEMPERATURE: 99.5 F | OXYGEN SATURATION: 99 % | DIASTOLIC BLOOD PRESSURE: 100 MMHG | SYSTOLIC BLOOD PRESSURE: 153 MMHG | WEIGHT: 270 LBS | HEART RATE: 85 BPM | RESPIRATION RATE: 24 BRPM | HEIGHT: 69 IN | BODY MASS INDEX: 39.99 KG/M2

## 2024-02-24 DIAGNOSIS — F41.1 ANXIETY STATE: ICD-10-CM

## 2024-02-24 DIAGNOSIS — R42 DIZZINESS: Primary | ICD-10-CM

## 2024-02-24 DIAGNOSIS — I10 HYPERTENSION, UNSPECIFIED TYPE: ICD-10-CM

## 2024-02-24 LAB
ALBUMIN SERPL-MCNC: 3.7 G/DL (ref 3.5–5)
ALBUMIN/GLOB SERPL: 0.9 (ref 1.1–2.2)
ALP SERPL-CCNC: 83 U/L (ref 45–117)
ALT SERPL-CCNC: 30 U/L (ref 12–78)
ANION GAP SERPL CALC-SCNC: 3 MMOL/L (ref 5–15)
AST SERPL-CCNC: 16 U/L (ref 15–37)
BASOPHILS # BLD: 0.1 K/UL (ref 0–0.1)
BASOPHILS NFR BLD: 1 % (ref 0–1)
BILIRUB SERPL-MCNC: 0.5 MG/DL (ref 0.2–1)
BUN SERPL-MCNC: 12 MG/DL (ref 6–20)
BUN/CREAT SERPL: 12 (ref 12–20)
CALCIUM SERPL-MCNC: 9.3 MG/DL (ref 8.5–10.1)
CHLORIDE SERPL-SCNC: 106 MMOL/L (ref 97–108)
CO2 SERPL-SCNC: 32 MMOL/L (ref 21–32)
COMMENT:: NORMAL
CREAT SERPL-MCNC: 1 MG/DL (ref 0.7–1.3)
D DIMER PPP FEU-MCNC: 0.22 MG/L FEU (ref 0–0.65)
DIFFERENTIAL METHOD BLD: NORMAL
EOSINOPHIL # BLD: 0.1 K/UL (ref 0–0.4)
EOSINOPHIL NFR BLD: 1 % (ref 0–7)
ERYTHROCYTE [DISTWIDTH] IN BLOOD BY AUTOMATED COUNT: 12.4 % (ref 11.5–14.5)
FLUAV AG NPH QL IA: NEGATIVE
FLUBV AG NOSE QL IA: NEGATIVE
GLOBULIN SER CALC-MCNC: 4.2 G/DL (ref 2–4)
GLUCOSE SERPL-MCNC: 140 MG/DL (ref 65–100)
HCT VFR BLD AUTO: 42.9 % (ref 36.6–50.3)
HGB BLD-MCNC: 13.6 G/DL (ref 12.1–17)
IMM GRANULOCYTES # BLD AUTO: 0 K/UL (ref 0–0.04)
IMM GRANULOCYTES NFR BLD AUTO: 0 % (ref 0–0.5)
LYMPHOCYTES # BLD: 1.8 K/UL (ref 0.8–3.5)
LYMPHOCYTES NFR BLD: 30 % (ref 12–49)
MAGNESIUM SERPL-MCNC: 2.1 MG/DL (ref 1.6–2.4)
MCH RBC QN AUTO: 27.4 PG (ref 26–34)
MCHC RBC AUTO-ENTMCNC: 31.7 G/DL (ref 30–36.5)
MCV RBC AUTO: 86.5 FL (ref 80–99)
MONOCYTES # BLD: 0.6 K/UL (ref 0–1)
MONOCYTES NFR BLD: 10 % (ref 5–13)
NEUTS SEG # BLD: 3.3 K/UL (ref 1.8–8)
NEUTS SEG NFR BLD: 58 % (ref 32–75)
NRBC # BLD: 0 K/UL (ref 0–0.01)
NRBC BLD-RTO: 0 PER 100 WBC
PLATELET # BLD AUTO: 195 K/UL (ref 150–400)
PMV BLD AUTO: 12.5 FL (ref 8.9–12.9)
POTASSIUM SERPL-SCNC: 3.7 MMOL/L (ref 3.5–5.1)
PROT SERPL-MCNC: 7.9 G/DL (ref 6.4–8.2)
RBC # BLD AUTO: 4.96 M/UL (ref 4.1–5.7)
SARS-COV-2 RDRP RESP QL NAA+PROBE: NOT DETECTED
SODIUM SERPL-SCNC: 141 MMOL/L (ref 136–145)
SOURCE: NORMAL
SPECIMEN HOLD: NORMAL
TROPONIN I SERPL HS-MCNC: 13 NG/L (ref 0–76)
TROPONIN I SERPL HS-MCNC: 13 NG/L (ref 0–76)
WBC # BLD AUTO: 5.8 K/UL (ref 4.1–11.1)

## 2024-02-24 PROCEDURE — 94761 N-INVAS EAR/PLS OXIMETRY MLT: CPT

## 2024-02-24 PROCEDURE — 36415 COLL VENOUS BLD VENIPUNCTURE: CPT

## 2024-02-24 PROCEDURE — 6370000000 HC RX 637 (ALT 250 FOR IP): Performed by: STUDENT IN AN ORGANIZED HEALTH CARE EDUCATION/TRAINING PROGRAM

## 2024-02-24 PROCEDURE — 87804 INFLUENZA ASSAY W/OPTIC: CPT

## 2024-02-24 PROCEDURE — 71045 X-RAY EXAM CHEST 1 VIEW: CPT

## 2024-02-24 PROCEDURE — 80053 COMPREHEN METABOLIC PANEL: CPT

## 2024-02-24 PROCEDURE — 84484 ASSAY OF TROPONIN QUANT: CPT

## 2024-02-24 PROCEDURE — 83735 ASSAY OF MAGNESIUM: CPT

## 2024-02-24 PROCEDURE — 96360 HYDRATION IV INFUSION INIT: CPT

## 2024-02-24 PROCEDURE — 85025 COMPLETE CBC W/AUTO DIFF WBC: CPT

## 2024-02-24 PROCEDURE — 96361 HYDRATE IV INFUSION ADD-ON: CPT

## 2024-02-24 PROCEDURE — 2580000003 HC RX 258: Performed by: STUDENT IN AN ORGANIZED HEALTH CARE EDUCATION/TRAINING PROGRAM

## 2024-02-24 PROCEDURE — 85379 FIBRIN DEGRADATION QUANT: CPT

## 2024-02-24 PROCEDURE — 93005 ELECTROCARDIOGRAM TRACING: CPT | Performed by: STUDENT IN AN ORGANIZED HEALTH CARE EDUCATION/TRAINING PROGRAM

## 2024-02-24 PROCEDURE — 99285 EMERGENCY DEPT VISIT HI MDM: CPT

## 2024-02-24 PROCEDURE — 87635 SARS-COV-2 COVID-19 AMP PRB: CPT

## 2024-02-24 RX ORDER — LORAZEPAM 0.5 MG/1
0.5 TABLET ORAL ONCE
Status: COMPLETED | OUTPATIENT
Start: 2024-02-24 | End: 2024-02-24

## 2024-02-24 RX ORDER — 0.9 % SODIUM CHLORIDE 0.9 %
1000 INTRAVENOUS SOLUTION INTRAVENOUS ONCE
Status: COMPLETED | OUTPATIENT
Start: 2024-02-24 | End: 2024-02-24

## 2024-02-24 RX ADMIN — SODIUM CHLORIDE 1000 ML: 9 INJECTION, SOLUTION INTRAVENOUS at 12:16

## 2024-02-24 RX ADMIN — LORAZEPAM 0.5 MG: 0.5 TABLET ORAL at 12:29

## 2024-02-24 ASSESSMENT — PAIN - FUNCTIONAL ASSESSMENT: PAIN_FUNCTIONAL_ASSESSMENT: NONE - DENIES PAIN

## 2024-02-24 NOTE — ED NOTES
Pt reassured that all lab works is WNL. Pt concerned about his blood pressure being elevated. Explained to pt that anxiety and the stress of being in the ED and worrying can all cause his blood pressure to be elevated. Pt still appears to be anxious.

## 2024-02-24 NOTE — ED NOTES
Dr Welch at bedside to discuss discharge with patient.    Copy of discharge paperwork and work note given. Patient condition stable, respiratory status within normal limits, neuro status intact. Ambulatory out of er, accompanied by significant other

## 2024-02-24 NOTE — ED NOTES
Assumed care of patient. Pt resting in position of comfort. Call bell within reach. Pt placed on monitor x 3. Significant other at bedside.     Pt reporting dizziness and anxiety starting pta after taking ashwagandha gummy which he started taking 1 week ago. Pt appears very anxious, continually asking if his heart rate is too high. Pt reporting hx of anxiety but claims he currently feels calm despite appearing very anxious. Pt denies feeling like the room is spinning, denies SOB. Reports his children were sick last week with GI s/s but denies any cough/congestion/fever. Denies recent travel.    Dr Welch at bedside to discuss plan of care.     Pt agrees to take ativan in a small dose. Pt's significant other is very supportive and agrees that pt should take ativan.     1213: pt complete orthostatic readings, blood pressure elevated but pt appears very anxious. RN reassuring pt that we are monitoring all of his vitals and completing all neccessary testing.

## 2024-02-24 NOTE — DISCHARGE INSTRUCTIONS
Please make a followup with your primary care physician.  If you have any new or worsening concerning medical symptoms please return to the emergency department.

## 2024-02-24 NOTE — ED PROVIDER NOTES
[facility] EMERGENCY DEPT  EMERGENCY DEPARTMENT ENCOUNTER       Pt Name: Shimon Min  MRN: [id number]  Birthdate 1985  Date of evaluation: 2/24/2024  Provider: Shawn Welch MD   PCP: Gladys Hernandez MD  Note Started: 3:08 PM EST 2/24/24     CHIEF COMPLAINT       Chief Complaint   Patient presents with    Dizziness     Pt reports to ed complaining of dizziness/anxiety onset today, pt states that he took his medications plus Ashwagandha gummies and has only been using this medication for about a weak        HISTORY OF PRESENT ILLNESS: 1 or more elements      History From: patient, History limited by: none     Shimon Min is a 38 y.o. male who presents to the emergency department with complaints of dizziness and weakness of his legs that started suddenly today.  He reports that he struggles with anxiety and for the past week he has been using gummy supplements to try to help relax.  HE took one of these a few hours before symptoms started.  He then also uses hydroxyzine which he has for panic attacks.  He endorses being slight anxious.  HE reports left sided chest pain that does not radiate.  Reports that his children have recently been sick, but he has not had any infectious symptoms.  He denies hx of blood clot, leg swelling, recent surgery, or recent travel.       Please See MDM for Additional Details of the HPI/PMH  Nursing Notes were all reviewed and agreed with or any disagreements were addressed in the HPI.     REVIEW OF SYSTEMS        Positives and Pertinent negatives as per HPI.    PAST HISTORY     Past Medical History:  Past Medical History:   Diagnosis Date    Angioedema     ace induced asthma     Anxiety     Cold-induced asthma     Gout     Hernia of abdominal wall     Hypertension     NAIN (obstructive sleep apnea)        Past Surgical History:  Past Surgical History:   Procedure Laterality Date    ADENOIDECTOMY         Family History:  Family History   Problem Relation Age of Onset    Sleep  proofreading.)          Shawn Welch MD  02/25/24 0012

## 2024-02-25 LAB
EKG ATRIAL RATE: 98 BPM
EKG DIAGNOSIS: NORMAL
EKG P AXIS: 48 DEGREES
EKG P-R INTERVAL: 160 MS
EKG Q-T INTERVAL: 328 MS
EKG QRS DURATION: 98 MS
EKG QTC CALCULATION (BAZETT): 418 MS
EKG R AXIS: 24 DEGREES
EKG T AXIS: -4 DEGREES
EKG VENTRICULAR RATE: 98 BPM

## 2024-02-28 ENCOUNTER — PROCEDURE VISIT (OUTPATIENT)
Age: 39
End: 2024-02-28
Payer: COMMERCIAL

## 2024-02-28 DIAGNOSIS — F43.10 PTSD (POST-TRAUMATIC STRESS DISORDER): Primary | ICD-10-CM

## 2024-02-28 DIAGNOSIS — F32.1 MODERATE MAJOR DEPRESSION (HCC): ICD-10-CM

## 2024-02-28 DIAGNOSIS — F90.2 ATTENTION DEFICIT HYPERACTIVITY DISORDER (ADHD), COMBINED TYPE, MODERATE: ICD-10-CM

## 2024-02-28 DIAGNOSIS — F41.8 ANXIETY WITH SOMATIC FEATURES: ICD-10-CM

## 2024-02-28 PROCEDURE — 96138 PSYCL/NRPSYC TECH 1ST: CPT | Performed by: CLINICAL NEUROPSYCHOLOGIST

## 2024-02-28 PROCEDURE — 96131 PSYCL TST EVAL PHYS/QHP EA: CPT | Performed by: CLINICAL NEUROPSYCHOLOGIST

## 2024-02-28 PROCEDURE — 96130 PSYCL TST EVAL PHYS/QHP 1ST: CPT | Performed by: CLINICAL NEUROPSYCHOLOGIST

## 2024-02-28 PROCEDURE — 96139 PSYCL/NRPSYC TST TECH EA: CPT | Performed by: CLINICAL NEUROPSYCHOLOGIST

## 2024-02-29 ENCOUNTER — NURSE ONLY (OUTPATIENT)
Age: 39
End: 2024-02-29

## 2024-02-29 VITALS — SYSTOLIC BLOOD PRESSURE: 139 MMHG | DIASTOLIC BLOOD PRESSURE: 88 MMHG | HEART RATE: 99 BPM

## 2024-02-29 DIAGNOSIS — Z01.30 BLOOD PRESSURE CHECK: Primary | ICD-10-CM

## 2024-02-29 NOTE — PROGRESS NOTES
Pt came in for a blood pressure check Bp was 139/88   Notified MD TARAN advised patient to cut back to 5 mg of the amlodipine and re check blood pressure for the next two weeks.

## 2024-03-01 RX ORDER — AMLODIPINE BESYLATE 5 MG/1
5 TABLET ORAL DAILY
Qty: 90 TABLET | Refills: 0 | Status: SHIPPED | OUTPATIENT
Start: 2024-03-01

## 2024-03-01 NOTE — TELEPHONE ENCOUNTER
----- Message from Shimon Min sent at 3/1/2024 10:57 AM EST -----  Regarding: Shimon Min  Contact: [phone]  Good morning,  I appreciate you letting me come in yesterday.  So I was instructed to split my 10mg Amlodapine in half and I purchased a pill cutter but they are not splitting evenly and some are crumbling. Could you please send in 5mg  of Amlodapine to my pharmacy? I want to make sure I'm getting a even consistent 5mg of meds.

## 2024-03-05 DIAGNOSIS — F41.0 PANIC DISORDER (EPISODIC PAROXYSMAL ANXIETY): ICD-10-CM

## 2024-03-05 RX ORDER — HYDROXYZINE HYDROCHLORIDE 10 MG/1
TABLET, FILM COATED ORAL
Qty: 60 TABLET | Refills: 2 | Status: SHIPPED | OUTPATIENT
Start: 2024-03-05

## 2024-03-07 ENCOUNTER — TELEMEDICINE (OUTPATIENT)
Age: 39
End: 2024-03-07

## 2024-03-07 DIAGNOSIS — F41.1 GAD (GENERALIZED ANXIETY DISORDER): Primary | ICD-10-CM

## 2024-03-07 DIAGNOSIS — F33.41 RECURRENT MAJOR DEPRESSIVE DISORDER, IN PARTIAL REMISSION (HCC): ICD-10-CM

## 2024-03-07 RX ORDER — ESCITALOPRAM OXALATE 5 MG/1
5 TABLET ORAL DAILY
Qty: 30 TABLET | Refills: 1 | Status: SHIPPED | OUTPATIENT
Start: 2024-03-07 | End: 2024-03-07

## 2024-03-07 RX ORDER — ESCITALOPRAM OXALATE 5 MG/1
5 TABLET ORAL DAILY
Qty: 30 TABLET | Refills: 1 | Status: SHIPPED | OUTPATIENT
Start: 2024-03-07 | End: 2024-05-06

## 2024-03-07 NOTE — PROGRESS NOTES
average. Patient states that he wears his CPAP faithfully. Patient denies any thoughts of self harm, suicidal, or homicidal ideation. Denies symptoms of psychosis or guadalupe.     Current Outpatient Medications on File Prior to Visit   Medication Sig Dispense Refill    hydrOXYzine HCl (ATARAX) 10 MG tablet Take 1-2 tablets by mouth twice daily as needed 60 tablet 2    amLODIPine (NORVASC) 5 MG tablet Take 1 tablet by mouth daily 90 tablet 0    amLODIPine (NORVASC) 10 MG tablet Take 1 tablet by mouth daily 90 tablet 1    Cholecalciferol (VITAMIN D3) 50 MCG (2000 UT) CAPS Take by mouth       No current facility-administered medications on file prior to visit.        Past Medical History:   Diagnosis Date    Angioedema     ace induced asthma     Anxiety     Cold-induced asthma     Gout     Hernia of abdominal wall     Hypertension     NAIN (obstructive sleep apnea)         Family History   Problem Relation Age of Onset    Sleep Apnea Mother     Hypertension Father     Breast Cancer Maternal Grandmother     Hypertension Mother           Social History     Socioeconomic History    Marital status: Single     Spouse name: Not on file    Number of children: Not on file    Years of education: Not on file    Highest education level: Not on file   Occupational History    Not on file   Tobacco Use    Smoking status: Never    Smokeless tobacco: Never   Substance and Sexual Activity    Alcohol use: No    Drug use: Not Currently    Sexual activity: Not on file   Other Topics Concern    Not on file   Social History Narrative    Not on file     Social Determinants of Health     Financial Resource Strain: Low Risk  (2/20/2024)    Overall Financial Resource Strain (CARDIA)     Difficulty of Paying Living Expenses: Not hard at all   Food Insecurity: No Food Insecurity (2/20/2024)    Hunger Vital Sign     Worried About Running Out of Food in the Last Year: Never true     Ran Out of Food in the Last Year: Never true   Transportation Needs:

## 2024-03-10 ENCOUNTER — HOSPITAL ENCOUNTER (EMERGENCY)
Facility: HOSPITAL | Age: 39
Discharge: HOME | End: 2024-03-10
Attending: EMERGENCY MEDICINE
Payer: COMMERCIAL

## 2024-03-10 ENCOUNTER — APPOINTMENT (OUTPATIENT)
Facility: HOSPITAL | Age: 39
End: 2024-03-10
Payer: COMMERCIAL

## 2024-03-10 VITALS
WEIGHT: 281.75 LBS | TEMPERATURE: 99.5 F | BODY MASS INDEX: 41.61 KG/M2 | RESPIRATION RATE: 24 BRPM | SYSTOLIC BLOOD PRESSURE: 157 MMHG | HEART RATE: 96 BPM | OXYGEN SATURATION: 97 % | DIASTOLIC BLOOD PRESSURE: 106 MMHG

## 2024-03-10 DIAGNOSIS — B34.9 VIRAL SYNDROME: ICD-10-CM

## 2024-03-10 DIAGNOSIS — R51.9 ACUTE NONINTRACTABLE HEADACHE, UNSPECIFIED HEADACHE TYPE: ICD-10-CM

## 2024-03-10 DIAGNOSIS — F41.1 ANXIETY STATE: Primary | ICD-10-CM

## 2024-03-10 LAB
ALBUMIN SERPL-MCNC: 3.9 G/DL (ref 3.5–5)
ALBUMIN/GLOB SERPL: 0.9 (ref 1.1–2.2)
ALP SERPL-CCNC: 88 U/L (ref 45–117)
ALT SERPL-CCNC: 34 U/L (ref 12–78)
ANION GAP SERPL CALC-SCNC: 13 MMOL/L (ref 5–15)
AST SERPL-CCNC: 19 U/L (ref 15–37)
BASOPHILS # BLD: 0.1 K/UL (ref 0–0.1)
BASOPHILS NFR BLD: 1 % (ref 0–1)
BILIRUB SERPL-MCNC: 0.7 MG/DL (ref 0.2–1)
BUN SERPL-MCNC: 17 MG/DL (ref 6–20)
BUN/CREAT SERPL: 14 (ref 12–20)
CALCIUM SERPL-MCNC: 9.7 MG/DL (ref 8.5–10.1)
CHLORIDE SERPL-SCNC: 105 MMOL/L (ref 97–108)
CO2 SERPL-SCNC: 21 MMOL/L (ref 21–32)
CREAT SERPL-MCNC: 1.18 MG/DL (ref 0.7–1.3)
DIFFERENTIAL METHOD BLD: NORMAL
EOSINOPHIL # BLD: 0.1 K/UL (ref 0–0.4)
EOSINOPHIL NFR BLD: 1 % (ref 0–7)
ERYTHROCYTE [DISTWIDTH] IN BLOOD BY AUTOMATED COUNT: 12.4 % (ref 11.5–14.5)
FLUAV AG NPH QL IA: NEGATIVE
FLUBV AG NOSE QL IA: NEGATIVE
GLOBULIN SER CALC-MCNC: 4.2 G/DL (ref 2–4)
GLUCOSE SERPL-MCNC: 105 MG/DL (ref 65–100)
HCT VFR BLD AUTO: 44.3 % (ref 36.6–50.3)
HGB BLD-MCNC: 13.9 G/DL (ref 12.1–17)
IMM GRANULOCYTES # BLD AUTO: 0 K/UL (ref 0–0.04)
IMM GRANULOCYTES NFR BLD AUTO: 0 % (ref 0–0.5)
LYMPHOCYTES # BLD: 2.3 K/UL (ref 0.8–3.5)
LYMPHOCYTES NFR BLD: 36 % (ref 12–49)
MCH RBC QN AUTO: 27.3 PG (ref 26–34)
MCHC RBC AUTO-ENTMCNC: 31.4 G/DL (ref 30–36.5)
MCV RBC AUTO: 86.9 FL (ref 80–99)
MONOCYTES # BLD: 0.5 K/UL (ref 0–1)
MONOCYTES NFR BLD: 8 % (ref 5–13)
NEUTS SEG # BLD: 3.5 K/UL (ref 1.8–8)
NEUTS SEG NFR BLD: 54 % (ref 32–75)
NRBC # BLD: 0 K/UL (ref 0–0.01)
NRBC BLD-RTO: 0 PER 100 WBC
PLATELET # BLD AUTO: 221 K/UL (ref 150–400)
PMV BLD AUTO: 12.4 FL (ref 8.9–12.9)
POTASSIUM SERPL-SCNC: 3.8 MMOL/L (ref 3.5–5.1)
PROT SERPL-MCNC: 8.1 G/DL (ref 6.4–8.2)
RBC # BLD AUTO: 5.1 M/UL (ref 4.1–5.7)
SODIUM SERPL-SCNC: 139 MMOL/L (ref 136–145)
TROPONIN I SERPL HS-MCNC: 10 NG/L (ref 0–76)
WBC # BLD AUTO: 6.4 K/UL (ref 4.1–11.1)

## 2024-03-10 PROCEDURE — 96374 THER/PROPH/DIAG INJ IV PUSH: CPT

## 2024-03-10 PROCEDURE — 2580000003 HC RX 258: Performed by: EMERGENCY MEDICINE

## 2024-03-10 PROCEDURE — 99285 EMERGENCY DEPT VISIT HI MDM: CPT

## 2024-03-10 PROCEDURE — 36415 COLL VENOUS BLD VENIPUNCTURE: CPT

## 2024-03-10 PROCEDURE — 6360000002 HC RX W HCPCS: Performed by: EMERGENCY MEDICINE

## 2024-03-10 PROCEDURE — 71045 X-RAY EXAM CHEST 1 VIEW: CPT

## 2024-03-10 PROCEDURE — 84484 ASSAY OF TROPONIN QUANT: CPT

## 2024-03-10 PROCEDURE — 87804 INFLUENZA ASSAY W/OPTIC: CPT

## 2024-03-10 PROCEDURE — 85025 COMPLETE CBC W/AUTO DIFF WBC: CPT

## 2024-03-10 PROCEDURE — 6370000000 HC RX 637 (ALT 250 FOR IP)

## 2024-03-10 PROCEDURE — 80053 COMPREHEN METABOLIC PANEL: CPT

## 2024-03-10 RX ORDER — ACETAMINOPHEN 325 MG/1
TABLET ORAL
Status: COMPLETED
Start: 2024-03-10 | End: 2024-03-10

## 2024-03-10 RX ORDER — 0.9 % SODIUM CHLORIDE 0.9 %
500 INTRAVENOUS SOLUTION INTRAVENOUS ONCE
Status: COMPLETED | OUTPATIENT
Start: 2024-03-10 | End: 2024-03-10

## 2024-03-10 RX ORDER — ACETAMINOPHEN 325 MG/1
650 TABLET ORAL
Status: COMPLETED | OUTPATIENT
Start: 2024-03-10 | End: 2024-03-10

## 2024-03-10 RX ORDER — BUTALBITAL, ACETAMINOPHEN AND CAFFEINE 50; 325; 40 MG/1; MG/1; MG/1
1 TABLET ORAL EVERY 6 HOURS PRN
Qty: 20 TABLET | Refills: 0 | Status: SHIPPED | OUTPATIENT
Start: 2024-03-10

## 2024-03-10 RX ORDER — KETOROLAC TROMETHAMINE 30 MG/ML
30 INJECTION, SOLUTION INTRAMUSCULAR; INTRAVENOUS
Status: COMPLETED | OUTPATIENT
Start: 2024-03-10 | End: 2024-03-10

## 2024-03-10 RX ADMIN — ACETAMINOPHEN 650 MG: 325 TABLET ORAL at 14:19

## 2024-03-10 RX ADMIN — SODIUM CHLORIDE 500 ML: 9 INJECTION, SOLUTION INTRAVENOUS at 13:08

## 2024-03-10 RX ADMIN — KETOROLAC TROMETHAMINE 30 MG: 30 INJECTION, SOLUTION INTRAMUSCULAR at 13:07

## 2024-03-10 ASSESSMENT — PAIN SCALES - GENERAL
PAINLEVEL_OUTOF10: 7
PAINLEVEL_OUTOF10: 5

## 2024-03-10 ASSESSMENT — HEART SCORE: ECG: 0

## 2024-03-10 ASSESSMENT — PAIN DESCRIPTION - LOCATION: LOCATION: HEAD

## 2024-03-10 NOTE — ED NOTES
Discharge instructions provided. Pt was given copy of discharge instructions with 0 paper script(s) and 1 electronic script(s). Pt verbalized understanding of the medication instructions, and the importance of following up as recommended by EDP. Pt has no further questions at this time. Wheelchair offered from treatment area to hospital entrance, pt declined. Pt leaving ED ambulatory and in stable condition.

## 2024-03-10 NOTE — ED PROVIDER NOTES
medications      * amLODIPine 10 MG tablet  Commonly known as: NORVASC  Take 1 tablet by mouth daily     * amLODIPine 5 MG tablet  Commonly known as: NORVASC  Take 1 tablet by mouth daily     escitalopram 5 MG tablet  Commonly known as: LEXAPRO  Take 1 tablet by mouth daily     hydrOXYzine HCl 10 MG tablet  Commonly known as: ATARAX  Take 1-2 tablets by mouth twice daily as needed     Vitamin D3 50 MCG (2000 UT) Caps capsule           * This list has 2 medication(s) that are the same as other medications prescribed for you. Read the directions carefully, and ask your doctor or other care provider to review them with you.                   Where to Get Your Medications        These medications were sent to [id number] - [place], [place] - [address] - P [phone] - F [phone] [address] [facility] [address]      Phone: [phone]   butalbital-acetaminophen-caffeine -40 MG per tablet           DISCONTINUED MEDICATIONS:  Discharge Medication List as of 3/10/2024  2:41 PM          I am the Primary Clinician of Record.   Syed Pollard DO (electronically signed)    (Please note that parts of this dictation were completed with voice recognition software. Quite often unanticipated grammatical, syntax, homophones, and other interpretive errors are inadvertently transcribed by the computer software. Please disregards these errors. Please excuse any errors that have escaped final proofreading.)          Syed Pollard DO  03/11/24 0752

## 2024-03-11 ENCOUNTER — HOSPITAL ENCOUNTER (EMERGENCY)
Facility: HOSPITAL | Age: 39
Discharge: HOME | End: 2024-03-11
Attending: EMERGENCY MEDICINE
Payer: COMMERCIAL

## 2024-03-11 VITALS
TEMPERATURE: 98.1 F | OXYGEN SATURATION: 99 % | DIASTOLIC BLOOD PRESSURE: 107 MMHG | HEART RATE: 92 BPM | SYSTOLIC BLOOD PRESSURE: 162 MMHG | RESPIRATION RATE: 16 BRPM

## 2024-03-11 DIAGNOSIS — R00.2 PALPITATIONS: Primary | ICD-10-CM

## 2024-03-11 DIAGNOSIS — I99.8 POORLY CONTROLLED BLOOD PRESSURE: ICD-10-CM

## 2024-03-11 DIAGNOSIS — R07.9 CHEST PAIN, UNSPECIFIED TYPE: ICD-10-CM

## 2024-03-11 LAB
ANION GAP SERPL CALC-SCNC: 0 MMOL/L (ref 5–15)
BASOPHILS # BLD: 0.1 K/UL (ref 0–0.1)
BASOPHILS NFR BLD: 1 % (ref 0–1)
BUN SERPL-MCNC: 16 MG/DL (ref 6–20)
BUN/CREAT SERPL: 12 (ref 12–20)
CALCIUM SERPL-MCNC: 9.4 MG/DL (ref 8.5–10.1)
CHLORIDE SERPL-SCNC: 106 MMOL/L (ref 97–108)
CO2 SERPL-SCNC: 29 MMOL/L (ref 21–32)
COMMENT:: NORMAL
CREAT SERPL-MCNC: 1.34 MG/DL (ref 0.7–1.3)
D DIMER PPP FEU-MCNC: 0.25 MG/L FEU (ref 0–0.65)
DIFFERENTIAL METHOD BLD: NORMAL
EKG ATRIAL RATE: 97 BPM
EKG DIAGNOSIS: NORMAL
EKG P AXIS: 54 DEGREES
EKG P-R INTERVAL: 156 MS
EKG Q-T INTERVAL: 340 MS
EKG QRS DURATION: 100 MS
EKG QTC CALCULATION (BAZETT): 431 MS
EKG R AXIS: 42 DEGREES
EKG T AXIS: -10 DEGREES
EKG VENTRICULAR RATE: 97 BPM
EOSINOPHIL # BLD: 0.1 K/UL (ref 0–0.4)
EOSINOPHIL NFR BLD: 1 % (ref 0–7)
ERYTHROCYTE [DISTWIDTH] IN BLOOD BY AUTOMATED COUNT: 12.5 % (ref 11.5–14.5)
GLUCOSE SERPL-MCNC: 125 MG/DL (ref 65–100)
HCT VFR BLD AUTO: 42.4 % (ref 36.6–50.3)
HGB BLD-MCNC: 13.9 G/DL (ref 12.1–17)
IMM GRANULOCYTES # BLD AUTO: 0 K/UL (ref 0–0.04)
IMM GRANULOCYTES NFR BLD AUTO: 0 % (ref 0–0.5)
LYMPHOCYTES # BLD: 1.6 K/UL (ref 0.8–3.5)
LYMPHOCYTES NFR BLD: 23 % (ref 12–49)
MAGNESIUM SERPL-MCNC: 1.9 MG/DL (ref 1.6–2.4)
MCH RBC QN AUTO: 27.2 PG (ref 26–34)
MCHC RBC AUTO-ENTMCNC: 32.8 G/DL (ref 30–36.5)
MCV RBC AUTO: 83 FL (ref 80–99)
MONOCYTES # BLD: 0.6 K/UL (ref 0–1)
MONOCYTES NFR BLD: 8 % (ref 5–13)
NEUTS SEG # BLD: 4.8 K/UL (ref 1.8–8)
NEUTS SEG NFR BLD: 67 % (ref 32–75)
NRBC # BLD: 0 K/UL (ref 0–0.01)
NRBC BLD-RTO: 0 PER 100 WBC
PLATELET # BLD AUTO: 220 K/UL (ref 150–400)
PMV BLD AUTO: 12.1 FL (ref 8.9–12.9)
POTASSIUM SERPL-SCNC: 3.7 MMOL/L (ref 3.5–5.1)
RBC # BLD AUTO: 5.11 M/UL (ref 4.1–5.7)
SODIUM SERPL-SCNC: 135 MMOL/L (ref 136–145)
SPECIMEN HOLD: NORMAL
TROPONIN I SERPL HS-MCNC: 10 NG/L (ref 0–76)
WBC # BLD AUTO: 7.2 K/UL (ref 4.1–11.1)

## 2024-03-11 PROCEDURE — 80048 BASIC METABOLIC PNL TOTAL CA: CPT

## 2024-03-11 PROCEDURE — 6370000000 HC RX 637 (ALT 250 FOR IP): Performed by: EMERGENCY MEDICINE

## 2024-03-11 PROCEDURE — 84484 ASSAY OF TROPONIN QUANT: CPT

## 2024-03-11 PROCEDURE — 99284 EMERGENCY DEPT VISIT MOD MDM: CPT

## 2024-03-11 PROCEDURE — 83735 ASSAY OF MAGNESIUM: CPT

## 2024-03-11 PROCEDURE — 85379 FIBRIN DEGRADATION QUANT: CPT

## 2024-03-11 PROCEDURE — 2580000003 HC RX 258: Performed by: EMERGENCY MEDICINE

## 2024-03-11 PROCEDURE — 36415 COLL VENOUS BLD VENIPUNCTURE: CPT

## 2024-03-11 PROCEDURE — 93005 ELECTROCARDIOGRAM TRACING: CPT | Performed by: EMERGENCY MEDICINE

## 2024-03-11 PROCEDURE — 85025 COMPLETE CBC W/AUTO DIFF WBC: CPT

## 2024-03-11 RX ORDER — LORAZEPAM 1 MG/1
0.5 TABLET ORAL ONCE
Status: COMPLETED | OUTPATIENT
Start: 2024-03-11 | End: 2024-03-11

## 2024-03-11 RX ORDER — 0.9 % SODIUM CHLORIDE 0.9 %
1000 INTRAVENOUS SOLUTION INTRAVENOUS ONCE
Status: COMPLETED | OUTPATIENT
Start: 2024-03-11 | End: 2024-03-11

## 2024-03-11 RX ADMIN — LORAZEPAM 0.5 MG: 1 TABLET ORAL at 14:52

## 2024-03-11 RX ADMIN — SODIUM CHLORIDE 1000 ML: 9 INJECTION, SOLUTION INTRAVENOUS at 14:51

## 2024-03-11 ASSESSMENT — ENCOUNTER SYMPTOMS
ABDOMINAL DISTENTION: 0
ABDOMINAL PAIN: 0
WHEEZING: 0
SHORTNESS OF BREATH: 0
VOMITING: 0
COUGH: 0
NAUSEA: 0
BLOOD IN STOOL: 0
DIARRHEA: 0
ANAL BLEEDING: 0

## 2024-03-11 ASSESSMENT — PAIN DESCRIPTION - DESCRIPTORS: DESCRIPTORS: ACHING

## 2024-03-11 ASSESSMENT — PAIN SCALES - GENERAL: PAINLEVEL_OUTOF10: 4

## 2024-03-11 ASSESSMENT — PAIN DESCRIPTION - ORIENTATION: ORIENTATION: MID

## 2024-03-11 ASSESSMENT — PAIN - FUNCTIONAL ASSESSMENT: PAIN_FUNCTIONAL_ASSESSMENT: 0-10

## 2024-03-11 ASSESSMENT — PAIN DESCRIPTION - LOCATION: LOCATION: CHEST

## 2024-03-11 ASSESSMENT — PAIN DESCRIPTION - PAIN TYPE: TYPE: ACUTE PAIN

## 2024-03-11 NOTE — ED PROVIDER NOTES
joint swelling.   Skin:  Negative for wound.   Neurological:  Negative for dizziness, syncope and light-headedness.   Hematological:  Does not bruise/bleed easily.   Psychiatric/Behavioral:  Negative for confusion.        Except as noted above the remainder of the review of systems was reviewed and negative.       PAST MEDICAL HISTORY     Past Medical History:   Diagnosis Date    Angioedema     ace induced asthma     Anxiety     Cold-induced asthma     Gout     Hernia of abdominal wall     Hypertension     NAIN (obstructive sleep apnea)          SURGICAL HISTORY       Past Surgical History:   Procedure Laterality Date    ADENOIDECTOMY           CURRENT MEDICATIONS       Discharge Medication List as of 3/11/2024  3:25 PM        CONTINUE these medications which have NOT CHANGED    Details   butalbital-acetaminophen-caffeine (FIORICET, ESGIC) -40 MG per tablet Take 1 tablet by mouth every 6 hours as needed for Headaches, Disp-20 tablet, R-0Normal      hydrOXYzine HCl (ATARAX) 10 MG tablet Take 1-2 tablets by mouth twice daily as needed, Disp-60 tablet, R-2Normal      !! amLODIPine (NORVASC) 5 MG tablet Take 1 tablet by mouth daily, Disp-90 tablet, R-0Normal      !! amLODIPine (NORVASC) 10 MG tablet Take 1 tablet by mouth daily, Disp-90 tablet, R-1Normal      Cholecalciferol (VITAMIN D3) 50 MCG (2000 UT) CAPS Take by mouthHistorical Med       !! - Potential duplicate medications found. Please discuss with provider.          ALLERGIES     Lisinopril, Penicillins, and Venlafaxine    FAMILY HISTORY       Family History   Problem Relation Age of Onset    Sleep Apnea Mother     Hypertension Father     Breast Cancer Maternal Grandmother     Hypertension Mother           SOCIAL HISTORY       Social History     Socioeconomic History    Marital status: Single   Tobacco Use    Smoking status: Never    Smokeless tobacco: Never   Substance and Sexual Activity    Alcohol use: No    Drug use: Not Currently     Social

## 2024-03-11 NOTE — ED TRIAGE NOTES
Pt c/o rapid heart rate and \"hot flashes\". Pt states he started lexapro 4 days ago and last dose was Saturday night at 7p. Pt was seen at [facility] for cardiac workup yesterday. Pt anxious and having panic attacks.

## 2024-03-12 ENCOUNTER — TELEMEDICINE (OUTPATIENT)
Age: 39
End: 2024-03-12

## 2024-03-12 DIAGNOSIS — F43.10 PTSD (POST-TRAUMATIC STRESS DISORDER): Primary | ICD-10-CM

## 2024-03-12 DIAGNOSIS — F41.0 PANIC DISORDER: ICD-10-CM

## 2024-03-12 DIAGNOSIS — F90.2 ATTENTION DEFICIT HYPERACTIVITY DISORDER (ADHD), COMBINED TYPE, MODERATE: ICD-10-CM

## 2024-03-12 DIAGNOSIS — F32.1 MODERATE MAJOR DEPRESSION (HCC): ICD-10-CM

## 2024-03-12 DIAGNOSIS — F41.8 ANXIETY WITH SOMATIC FEATURES: ICD-10-CM

## 2024-03-12 LAB
EKG ATRIAL RATE: 109 BPM
EKG DIAGNOSIS: NORMAL
EKG P AXIS: 58 DEGREES
EKG P-R INTERVAL: 146 MS
EKG Q-T INTERVAL: 320 MS
EKG QRS DURATION: 96 MS
EKG QTC CALCULATION (BAZETT): 430 MS
EKG R AXIS: 88 DEGREES
EKG T AXIS: -17 DEGREES
EKG VENTRICULAR RATE: 109 BPM

## 2024-03-12 PROCEDURE — 90832 PSYTX W PT 30 MINUTES: CPT | Performed by: CLINICAL NEUROPSYCHOLOGIST

## 2024-03-12 PROCEDURE — 93010 ELECTROCARDIOGRAM REPORT: CPT | Performed by: INTERNAL MEDICINE

## 2024-03-12 NOTE — PROGRESS NOTES
Shimon Min, was evaluated through a synchronous (real-time) audio-video encounter. The patient (or guardian if applicable) is aware that this is a billable service, which includes applicable co-pays. This Virtual Visit was conducted with patient's (and/or legal guardian's) consent. Patient identification was verified, and a caregiver was present when appropriate.   The patient was located at Home: [address] [place] [address]  Provider was located at Facility (Appt Dept): [address] [place] [address]  [address]  [place] [address]      Shimon Min (:  1985) is a Established patient, presenting virtually for evaluation of the following:      This is a teleneuropsychology (audio/visual) visit that was performed with in the originating site at patient's home and the distance site at [facility] [place].   Verbal consent to participate in the video visit was obtained.  This visit occurred during the corona (COVID -19) public health emergency and these visits were authorized by the .   I discussed with the patient the nature of our teleneuropsych visit in that :    - I would evaluate the patient and recommend diagnostics and treatment based on my assessment and impressions, and/or provided test results and discussed these issues with the patient and/or family.    - Our sessions are not being recorded and that personal health information is protected    - Our team will provide follow-up care in person if when the patient needs it.    Prior to seeing the patient I reviewed the records, including the previously completed report, [facility], and any updated visits from other providers since I saw the patient last.      Today, I engaged in a psychoeducational and supportive and cognitive/behavioral psychotherapy session with the patient via teleneuropsychology.   I provided psychotherapy in the form of psychoeducation and support

## 2024-03-13 ENCOUNTER — TELEPHONE (OUTPATIENT)
Age: 39
End: 2024-03-13

## 2024-03-13 ENCOUNTER — OFFICE VISIT (OUTPATIENT)
Age: 39
End: 2024-03-13
Payer: COMMERCIAL

## 2024-03-13 ENCOUNTER — PATIENT MESSAGE (OUTPATIENT)
Age: 39
End: 2024-03-13

## 2024-03-13 VITALS
SYSTOLIC BLOOD PRESSURE: 132 MMHG | OXYGEN SATURATION: 98 % | DIASTOLIC BLOOD PRESSURE: 80 MMHG | BODY MASS INDEX: 40.14 KG/M2 | HEART RATE: 100 BPM | WEIGHT: 271 LBS | HEIGHT: 69 IN

## 2024-03-13 DIAGNOSIS — I10 ESSENTIAL (PRIMARY) HYPERTENSION: ICD-10-CM

## 2024-03-13 DIAGNOSIS — F41.9 ANXIETY: ICD-10-CM

## 2024-03-13 DIAGNOSIS — R07.9 CHEST PAIN, UNSPECIFIED TYPE: ICD-10-CM

## 2024-03-13 DIAGNOSIS — R94.31 ABNORMAL EKG: Primary | ICD-10-CM

## 2024-03-13 DIAGNOSIS — E66.01 MORBID (SEVERE) OBESITY DUE TO EXCESS CALORIES (HCC): ICD-10-CM

## 2024-03-13 PROCEDURE — 3075F SYST BP GE 130 - 139MM HG: CPT

## 2024-03-13 PROCEDURE — 99214 OFFICE O/P EST MOD 30 MIN: CPT

## 2024-03-13 PROCEDURE — 3079F DIAST BP 80-89 MM HG: CPT

## 2024-03-13 RX ORDER — ATENOLOL 25 MG/1
TABLET ORAL
Qty: 3 TABLET | Refills: 0 | Status: SHIPPED | OUTPATIENT
Start: 2024-03-13

## 2024-03-13 NOTE — TELEPHONE ENCOUNTER
Patient stated he has been having chest tightness, palpitations and rapid heart beats for the about 3-4 weeks, scheduled him with Anjelica on 3/15 but just wanted to inform you of the symptoms.       Pt# [phone]

## 2024-03-13 NOTE — PROGRESS NOTES
had concerns including Palpitations.    Vitals:    03/13/24 1537   BP: 132/80   Site: Left Upper Arm   Position: Sitting   Pulse: 100   SpO2: 98%   Weight: 122.9 kg (271 lb)   Height: 1.753 m (5' 9\")        Was on lexapro and then was told by psychiatrist to stop    Some dizziness     Chest pain patient states chest pain all the times     Refills No        1. Have you been to the ER, urgent care clinic since your last visit? No       Hospitalized since your last visit? No       Where?        Date?  
[facility]   3/15/2024  1:00 PM [facility]   3/15/2024  2:40 PM Anjelica Garsia, APRN - NP [facility]   1/23/2025  9:00 AM Radha Jimenez MD [facility]       Ximena Catalan APRN - NP    [facility]  [facility]    [address] [facility], [address]    [place] [address]    Ph: [phone]

## 2024-03-15 ENCOUNTER — TELEMEDICINE (OUTPATIENT)
Age: 39
End: 2024-03-15
Payer: COMMERCIAL

## 2024-03-15 DIAGNOSIS — F41.0 PANIC DISORDER (EPISODIC PAROXYSMAL ANXIETY): Primary | ICD-10-CM

## 2024-03-15 DIAGNOSIS — F32.5 DEPRESSION, MAJOR, IN REMISSION (HCC): ICD-10-CM

## 2024-03-15 PROCEDURE — 99214 OFFICE O/P EST MOD 30 MIN: CPT | Performed by: NURSE PRACTITIONER

## 2024-03-15 NOTE — PROGRESS NOTES
Shimon Min, was evaluated through a synchronous (real-time) audio-video encounter. The patient (or guardian if applicable) is aware that this is a billable service, which includes applicable co-pays. This Virtual Visit was conducted with patient's (and/or legal guardian's) consent. Patient identification was verified, and a caregiver was present when appropriate.     The patient was located at Home: [address] [place] [address]  Provider was located at Facility (Appt Dept): [address] [facility]  [address]  [place] [address]    {STOP! Confirm you are appropriately licensed, registered, or certified to deliver care in the state where the patient is located as indicated above. If you are not or unsure, please re-schedule the visit.     Are you appropriately licensed in the patient's state?: Yes    CHIEF COMPLAINT:  Shimon Min is a 38 y.o. male and was seen today for follow-up of psychiatric condition and psychotropic medication management.     HPI:    Shimon reports a history of depression and anxiety symptoms, which have been present for several years. Currently, these symptoms are of moderate severity and occur intermittently. He has identified health-related stress as a significant issue. This stress has resulted in multiple visits to the emergency room and specialist consultations, which appear to be linked to his anxiety and palpitations. The patient reports that he is scheduled for a CT scan of his heart to rule out any medical causes for his symptoms.     The patient states that after starting on Lexapro, he experienced nausea and a recurrence of panic attacks. He has expressed a fear of medications, which has hindered his ability to properly trial any medicines beyond hydroxyzine and lorazepam. He alternates between these two medications, taking them only as needed.     His current health issues have prevented him from working, which has somewhat exacerbated his depression as he feels

## 2024-03-22 ENCOUNTER — TELEPHONE (OUTPATIENT)
Age: 39
End: 2024-03-22

## 2024-03-22 NOTE — TELEPHONE ENCOUNTER
Spoke with patient after ID x2 and conveyed atenolol instructions. Patient to return call as needed.

## 2024-03-22 NOTE — TELEPHONE ENCOUNTER
Patient is calling asking about the medication he's to take for his upcoming procedure.    Patient # [phone]

## 2024-03-25 ENCOUNTER — PATIENT MESSAGE (OUTPATIENT)
Age: 39
End: 2024-03-25

## 2024-03-25 ENCOUNTER — TELEPHONE (OUTPATIENT)
Age: 39
End: 2024-03-25

## 2024-03-25 NOTE — TELEPHONE ENCOUNTER
Spoke with patient after ID x2 and conveyed Atenolol order for testing. Patient conveys anxiety and fearful. Patient states \" I don't want to have a low heart and get heart failure.\" Patient states when he is not anxious his heart rate can be around 60 while sleeping per his monitoring device. Explained if he felt more comfortable with one tablet tonight and before the exam as a potential. Explained indication for atenolol and possible side effects.  Explained scenarios of if heart is not at goal then he potentially will need IV medication prior to the test. Patient understands if he does not take the pre-medication and arrives with his heart rate not at goal then a IV medication would be administered and if not effective the test will need to be rescheduled.

## 2024-03-25 NOTE — PROGRESS NOTES
Called pt and LV for pt to call office back.    Also sent my chart message to the patient See Transitions of Care Document

## 2024-03-25 NOTE — TELEPHONE ENCOUNTER
Patient is calling because the NP Ximena HUDSON has put an order in for a medication for the patient to take before he has his CT cardiac  done on 3/26/24.Patient wants to know if he would have any interactions with his other medications.Please advise    [phone]

## 2024-03-26 ENCOUNTER — HOSPITAL ENCOUNTER (OUTPATIENT)
Facility: HOSPITAL | Age: 39
Discharge: HOME | End: 2024-03-29
Payer: COMMERCIAL

## 2024-03-26 VITALS
SYSTOLIC BLOOD PRESSURE: 143 MMHG | TEMPERATURE: 98.5 F | DIASTOLIC BLOOD PRESSURE: 56 MMHG | HEART RATE: 56 BPM | RESPIRATION RATE: 20 BRPM | OXYGEN SATURATION: 99 %

## 2024-03-26 DIAGNOSIS — R94.31 ABNORMAL EKG: ICD-10-CM

## 2024-03-26 DIAGNOSIS — E66.01 MORBID (SEVERE) OBESITY DUE TO EXCESS CALORIES (HCC): ICD-10-CM

## 2024-03-26 DIAGNOSIS — R07.9 CHEST PAIN, UNSPECIFIED TYPE: ICD-10-CM

## 2024-03-26 PROCEDURE — 75574 CT ANGIO HRT W/3D IMAGE: CPT

## 2024-03-26 PROCEDURE — 75574 CT ANGIO HRT W/3D IMAGE: CPT | Performed by: INTERNAL MEDICINE

## 2024-03-26 PROCEDURE — 6370000000 HC RX 637 (ALT 250 FOR IP): Performed by: INTERNAL MEDICINE

## 2024-03-26 PROCEDURE — 2500000003 HC RX 250 WO HCPCS: Performed by: INTERNAL MEDICINE

## 2024-03-26 PROCEDURE — 6360000004 HC RX CONTRAST MEDICATION

## 2024-03-26 RX ORDER — NITROGLYCERIN 0.4 MG/1
0.8 TABLET SUBLINGUAL ONCE
Status: COMPLETED | OUTPATIENT
Start: 2024-03-26 | End: 2024-03-26

## 2024-03-26 RX ORDER — METOPROLOL TARTRATE 1 MG/ML
20 INJECTION, SOLUTION INTRAVENOUS EVERY 5 MIN PRN
Status: DISCONTINUED | OUTPATIENT
Start: 2024-03-26 | End: 2024-03-30 | Stop reason: HOSPADM

## 2024-03-26 RX ADMIN — IOPAMIDOL 100 ML: 755 INJECTION, SOLUTION INTRAVENOUS at 15:22

## 2024-03-26 RX ADMIN — NITROGLYCERIN 0.8 MG: 0.4 TABLET SUBLINGUAL at 15:01

## 2024-03-26 RX ADMIN — METOROPROLOL TARTRATE 5 MG: 5 INJECTION, SOLUTION INTRAVENOUS at 14:35

## 2024-03-26 NOTE — DISCHARGE INSTRUCTIONS
[employer].   Care instructions adapted under license by [facility]. If you have questions about a medical condition or this instruction, always ask your healthcare professional. [employer] disclaims any warranty or liability for your use of this information.

## 2024-03-26 NOTE — PROGRESS NOTES
Discharge instructions reviewed with patient.  Patient verbalizes understanding.  Discharged via wheelchair in stable condition.  Patient released with family member via wheelchair.

## 2024-03-27 ENCOUNTER — PATIENT MESSAGE (OUTPATIENT)
Age: 39
End: 2024-03-27

## 2024-03-27 NOTE — TELEPHONE ENCOUNTER
Spoke with patient after ID x2 and conveyed CTA results per Ximena Catalan NP. Patient expressed understaning. Patient asked if he could go back to the gym and was advised is has no restrictions and may return to the gym.

## 2024-03-27 NOTE — RESULT ENCOUNTER NOTE
Dear Mr. Min,  Good News!  Your CT scan was overall normal. Your calcium score is 1. This is reassuring that you do not have any heart blockages.   Please continue the current treatment plan.  We can discuss more at your scheduled follow up if you have questions.  Best Regards,  SATHISH Gil NP

## 2024-04-08 ENCOUNTER — TELEPHONE (OUTPATIENT)
Age: 39
End: 2024-04-08

## 2024-04-08 ENCOUNTER — HOSPITAL ENCOUNTER (OUTPATIENT)
Facility: HOSPITAL | Age: 39
Setting detail: RECURRING SERIES
Discharge: HOME | End: 2024-04-11
Payer: COMMERCIAL

## 2024-04-08 VITALS
SYSTOLIC BLOOD PRESSURE: 162 MMHG | DIASTOLIC BLOOD PRESSURE: 96 MMHG | TEMPERATURE: 98.3 F | HEART RATE: 85 BPM | OXYGEN SATURATION: 98 %

## 2024-04-08 PROCEDURE — 90853 GROUP PSYCHOTHERAPY: CPT

## 2024-04-08 ASSESSMENT — PATIENT HEALTH QUESTIONNAIRE - PHQ9
6. FEELING BAD ABOUT YOURSELF - OR THAT YOU ARE A FAILURE OR HAVE LET YOURSELF OR YOUR FAMILY DOWN: MORE THAN HALF THE DAYS
3. TROUBLE FALLING OR STAYING ASLEEP: MORE THAN HALF THE DAYS
9. THOUGHTS THAT YOU WOULD BE BETTER OFF DEAD, OR OF HURTING YOURSELF: NOT AT ALL
4. FEELING TIRED OR HAVING LITTLE ENERGY: SEVERAL DAYS
8. MOVING OR SPEAKING SO SLOWLY THAT OTHER PEOPLE COULD HAVE NOTICED. OR THE OPPOSITE, BEING SO FIGETY OR RESTLESS THAT YOU HAVE BEEN MOVING AROUND A LOT MORE THAN USUAL: NOT AT ALL
SUM OF ALL RESPONSES TO PHQ QUESTIONS 1-9: 14
5. POOR APPETITE OR OVEREATING: MORE THAN HALF THE DAYS
SUM OF ALL RESPONSES TO PHQ9 QUESTIONS 1 & 2: 5
2. FEELING DOWN, DEPRESSED OR HOPELESS: NEARLY EVERY DAY
SUM OF ALL RESPONSES TO PHQ QUESTIONS 1-9: 14
1. LITTLE INTEREST OR PLEASURE IN DOING THINGS: MORE THAN HALF THE DAYS
10. IF YOU CHECKED OFF ANY PROBLEMS, HOW DIFFICULT HAVE THESE PROBLEMS MADE IT FOR YOU TO DO YOUR WORK, TAKE CARE OF THINGS AT HOME, OR GET ALONG WITH OTHER PEOPLE: SOMEWHAT DIFFICULT
SUM OF ALL RESPONSES TO PHQ QUESTIONS 1-9: 14
SUM OF ALL RESPONSES TO PHQ QUESTIONS 1-9: 14
7. TROUBLE CONCENTRATING ON THINGS, SUCH AS READING THE NEWSPAPER OR WATCHING TELEVISION: MORE THAN HALF THE DAYS

## 2024-04-08 ASSESSMENT — ANXIETY QUESTIONNAIRES
IF YOU CHECKED OFF ANY PROBLEMS ON THIS QUESTIONNAIRE, HOW DIFFICULT HAVE THESE PROBLEMS MADE IT FOR YOU TO DO YOUR WORK, TAKE CARE OF THINGS AT HOME, OR GET ALONG WITH OTHER PEOPLE: SOMEWHAT DIFFICULT
1. FEELING NERVOUS, ANXIOUS, OR ON EDGE: SEVERAL DAYS
5. BEING SO RESTLESS THAT IT IS HARD TO SIT STILL: NOT AT ALL
2. NOT BEING ABLE TO STOP OR CONTROL WORRYING: MORE THAN HALF THE DAYS
3. WORRYING TOO MUCH ABOUT DIFFERENT THINGS: SEVERAL DAYS
7. FEELING AFRAID AS IF SOMETHING AWFUL MIGHT HAPPEN: SEVERAL DAYS
4. TROUBLE RELAXING: SEVERAL DAYS
GAD7 TOTAL SCORE: 9
6. BECOMING EASILY ANNOYED OR IRRITABLE: NEARLY EVERY DAY

## 2024-04-08 NOTE — TELEPHONE ENCOUNTER
Called and left a detailed voice message for callback in regards to PHP at [facility] scheduling. Patient sent message via [employer] to provider with scheduling concerns related to PHP at [facility]. PHP at [facility] PSR confirmed patient arrived at initial assessment appointment today. PSR confirmed patient is patient is scheduled for future appointment.Provider has been notified patient started treatment with PHP at [facility].

## 2024-04-08 NOTE — H&P
History and Physical    Chief Complaint of Present Illness: \"I've been anxious, jittery and having panic attacks\"    Past Medical History:   Diagnosis Date    Angioedema     ace induced asthma     Anxiety     Cold-induced asthma     Gout     Hernia of abdominal wall     Hypertension     NAIN (obstructive sleep apnea)       Past Surgical History:   Procedure Laterality Date    ADENOIDECTOMY         Social History     Tobacco Use    Smoking status: Never    Smokeless tobacco: Never   Substance Use Topics    Alcohol use: No      Family History   Problem Relation Age of Onset    Sleep Apnea Mother     Hypertension Father     Breast Cancer Maternal Grandmother     Hypertension Mother         Allergies   Allergen Reactions    Lisinopril Swelling     Pt has sever swelling of the throat and tongue.    Penicillins Other (See Comments)     Allergic as a child    Venlafaxine Palpitations       Medications: Ativan 0.5 mg bid PRN  Current Outpatient Medications on File Prior to Encounter   Medication Sig Dispense Refill    atenolol (TENORMIN) 25 MG tablet Take 2 tablets the night before procedure; then one tablet 1 hour before procedure (for coronary CT). 3 tablet 0    butalbital-acetaminophen-caffeine (FIORICET, ESGIC) -40 MG per tablet Take 1 tablet by mouth every 6 hours as needed for Headaches (Patient not taking: Reported on 3/13/2024) 20 tablet 0    hydrOXYzine HCl (ATARAX) 10 MG tablet Take 1-2 tablets by mouth twice daily as needed 60 tablet 2    amLODIPine (NORVASC) 5 MG tablet Take 1 tablet by mouth daily 90 tablet 0    Cholecalciferol (VITAMIN D3) 50 MCG (2000 UT) CAPS Take by mouth       No current facility-administered medications on file prior to encounter.        Review of Systems (Check normal or all that currently apply):  GENERAL [x] Normal    []Abnormal weight loss  []Abnormal weight gain  []Fever  []Fatigue  []Chills/Sweats  []Other:    HEENT  [x] Normal    []Headache  []Blurred

## 2024-04-08 NOTE — BH NOTE
met with patient for intake assessment. Pt denied SI/HI upon assessment. Pt signed GERDA for fiance and psychiatrist. SW did not complete COWS, Audit-C or CIWA due to pt denying alcohol and opiate use.

## 2024-04-08 NOTE — GROUP NOTE
Group Therapy Note    Date: 4/8/2024    Group Start Time:  2:00 PM  Group End Time:  2:45 PM  Group Topic: Wrap-Up    [facility]    Viola Aguilar MSW        Group Therapy Note    Attendees: 7    Writer facilitated wrap up group this afternoon. Writer opened by encouraging patients to fill out their wrap up sheets. Writer then asked patients two ice breaker questions, as there were several patients newer to the group. Writer then facilitated a guided meditation for patients that was centered around letting go of thoughts and anxiety. Writer asked patients how they were feeling afterward and facilitated discussion until the end of group.        Patient's Goal:  Participate in group therapy, build rapport with peers, engage in mindfulness meditation.     Notes:  Patient engaged well in group this afternoon. He filled out his wrap up sheet, declining any SI or HI. He identified middling levels of depression and higher levels of anger and anxiety. Patient engaged well in the meditation and shared that it made him feel fairly sleepy. Writer reflected that that's fairly common and valid--it can be a good way to settle down for sleep at night. Patient will continue with identified treatment goals in further groups.     Status After Intervention:  Improved    Participation Level: Active Listener and Interactive    Participation Quality: Appropriate, Attentive, and Sharing      Speech:  normal      Thought Process/Content: Logical      Affective Functioning: Congruent      Mood: euthymic      Level of consciousness:  Alert and Oriented x4      Response to Learning: Able to verbalize current knowledge/experience, Capable of insight, and Progressing to goal      Endings: None Reported    Modes of Intervention: Support, Socialization, Exploration, and Activity      Discipline Responsible: /Counselor      Signature:  MALLORY Saleh    
                                                                      Group Therapy Note    Date: 4/8/2024    Group Start Time: 10:40 AM  Group End Time: 11:30 AM  Group Topic: Cognitive Skills    [facility] Blanca Hollingsworth MSW        Group Therapy Note    This writer facilitated group and opened with encouraging peers to reflect on their relationship with avoidance. Patients were provided with a sheet outlining different examples as well as the different avoidance strategies. Patients reflected on examples they related with and completed prompts encouraging them to reflect on a certain thought, emotion, or event they avoid the most. To conclude, patients were encouraged to brainstorm more balanced responses.     Attendees: 8        Patient's Goal: to learn skills for avoidance, to identify triggers, to identify cognitive distortions           Notes:  Patient was present and engaged in group. Patient reflected on using projection to avoid processing difficult emotions with partner. Patient also processed avoidance with grief. Patient was receptive to content and supportive of peers. Patient was pulled the last 15 minutes for medication management. Patient will continue to work towards goal.    Status After Intervention:  Improved    Participation Level: Active Listener    Participation Quality: Appropriate      Speech:  normal      Thought Process/Content: Logical      Affective Functioning: Congruent      Mood: anxious      Level of consciousness:  Alert      Response to Learning: Able to verbalize/acknowledge new learning, Able to retain information, Capable of insight, and Progressing to goal      Endings: None Reported    Modes of Intervention: Education and Exploration      Discipline Responsible: /Counselor      Signature:  MALLORY Sun    
                                                                      Group Therapy Note    Date: 4/8/2024    Group Start Time: 12:00 PM  Group End Time:  1:00 PM  Group Topic: Psychoeducation    [facility]    Naomi Young MSW        Group Therapy Note    Patients were given educational materials about coping skills for addiction. Patients were asked to identify unhealthy coping skills and behavior patterns they would like to address, and were encouraged to identify ways they use different types of coping strategies. Patients were asked to complete a \"Combat Cravings Action Plan\" to identify incremental coping skills for unwanted behaviors.     Attendees: 8       Patient's Goal:  Identify unwanted behaviors, identify coping skills for cravings and urges    Notes:  The patient engaged actively in relapse prevention group. The patient discussed using his fiance as a support system. The patient discussed how hunger impacts his mood and behaviors. The patient discussed medical anxiety and how he manages the urge to get medically evaluated frequently. The patient will continue to practice identifying coping skills and areas for growth.    Status After Intervention:  Unchanged    Participation Level: Active Listener and Interactive    Participation Quality: Appropriate, Attentive, and Sharing      Speech:  normal      Thought Process/Content: Logical  Linear      Affective Functioning: Congruent      Mood: euthymic      Level of consciousness:  Alert, Oriented x4, and Attentive      Response to Learning: Able to verbalize current knowledge/experience, Able to verbalize/acknowledge new learning, Capable of insight, and Progressing to goal      Endings: None Reported    Modes of Intervention: Education and Exploration      Discipline Responsible: /Counselor      Signature:  MALLORY Castellano    
Clarifying      Discipline Responsible: /Counselor      Signature:  Alisia Clinton, MSW Student.

## 2024-04-08 NOTE — BH NOTE
[facility] Intake Packet    OUTPATIENT INTAKE PACKET    DEMOGRAPHICS  Shimon Min   1985  [address] [place] [address]   [phone]   [id number]    4/8/2024  9:37 AM  Information Source: Patient referred by psychiatrist   38 y.o.  Accompanied by/Method of Arrival: Patient drove self     Marital Status: Engaged     Emergency Contact: Karissa Dorantes ([facility])  Phone: [phone]     Allergies   Allergen Reactions    Lisinopril Swelling     Pt has sever swelling of the throat and tongue.    Penicillins Other (See Comments)     Allergic as a child    Venlafaxine Palpitations        [] Guardian [] POA Name: n/a Language if not English:      [x] Reads [x] Writes      REPRODUCTION/SEXUALITY  Sexual Preference/Orientation: Straight  Patient's Gender: male   Patient's Gender Identity: male  Pronoun Preference: he/him    PSYCHIATRIC CARE HISTORY    Last Inpatient Treatment: Patient denies     Last Seen: [x] Psychiatrist Name: Blas Shannon  Date: 3/10/24     Intent to Follow [x] Yes [] No      [] Therapist Name: Pt denies but requests one Date:      Intent to Follow [] Yes [] No    CURRENT/PREVIOUS TREATMENT HISTORY     Diagnosis:   Per chart review: Panic disorder, SEBASTIÁN (generalized anxiety disorder), Social anxiety disorder      REASON FOR REFERRAL    Chief Complaint (patient's own words):     Patient was referred by his psychiatrist following multiple emergency visits for anxiety. He stated, \" I get these panic attacks out of the blue and feel like I am running a marathon but am sitting still.\" Patient reported anger, stress,conversations around grief, memories of the past, and social situations are triggers for his anxiety. Patient expressed not being able to \"chill and stop the worrying.\" Patient reported to be fired or leave multiple jobs due to fearing he will have a panic attack. Patient stated, \" I fear and constantly am on guard of when the next attack will be.\" Patient reported

## 2024-04-08 NOTE — SUICIDE SAFETY PLAN
SAFETY PLAN    A suicide Safety Plan is a document that supports someone when they are having thoughts of suicide.    Warning Signs that indicate a suicidal crisis may be developing: What (situations, thoughts, feelings, body sensations, behaviors, etc.) do you experience that lets you know you are beginning to think about suicide?  1. Jittering  2. Warm sensation throughout body  3. Dizziness     Internal Coping Strategies:  What things can I do (relaxation techniques, hobbies, physical activities, etc.) to take my mind off my problems without contacting another person?  1. Deep breathing   2. Listening to affirmations  3. Listening to music   4. Grilling    People and social settings that provide distraction: Who can I call or where can I go to distract me?  1. Name: Karissa Dorantes  Phone: [phone]   2. Name: Agustin García  Phone: [phone]   3. Place: [facility]             4. Place: [place] [facility]     People whom I can ask for help: Who can I call when I need help - for example, friends, family, clergy, someone else?  1. Name:Karissa Dorantes       Phone: [phone]   2. Name: Agustin García         Phone: [phone]     Professionals or Behavioral Health agencies I can contact during a crisis: Who can I call for help - for example, my doctor, my psychiatrist, my psychologist, a mental health provider, a suicide hotline?  1. Clinician Name: Josias Shannon    Phone: [phone]      Clinician Pager or Emergency Contact #: [phone]    2. Clinician Name: Blanca Esteban   Phone: [phone]       Clinician Pager or Emergency Contact #: [phone]    3. Suicide Prevention Lifeline: 7-267-717-TALK (4070)    4. Local Behavioral Health Emergency Services -  for example, [facility], [facility] suicide hotline, [facility] Hotline: [facility]      Emergency Services Address: [address] [facility].       Emergency

## 2024-04-08 NOTE — BH NOTE
This writer called patient's joseluise (GERDA signed) for collateral information.     She reported patient's mood is heavily dependent on the events and he has a lot of anxiety about their relationship.     She also reports pt worries about his health and asks her to check his heartbeat. She also reports pt gets triggered by certain foods when he knows they have the potential to impact health such as fast food.     She shared pt does cope with meditations, affirmations, and taking walks but it is hard for him to do so when he gets super overwhelmed. She stated supporting him in these reminders and was agreeable to call back with any questions or concerns that may come up in regards to presentation at home.       P: [phone]

## 2024-04-09 ENCOUNTER — HOSPITAL ENCOUNTER (OUTPATIENT)
Facility: HOSPITAL | Age: 39
Setting detail: RECURRING SERIES
Discharge: HOME | End: 2024-04-12
Payer: COMMERCIAL

## 2024-04-09 VITALS
SYSTOLIC BLOOD PRESSURE: 139 MMHG | HEART RATE: 96 BPM | OXYGEN SATURATION: 98 % | DIASTOLIC BLOOD PRESSURE: 83 MMHG | TEMPERATURE: 98.7 F

## 2024-04-09 PROCEDURE — 90832 PSYTX W PT 30 MINUTES: CPT

## 2024-04-09 PROCEDURE — 90853 GROUP PSYCHOTHERAPY: CPT

## 2024-04-09 RX ORDER — DIVALPROEX SODIUM 500 MG/1
500 TABLET, DELAYED RELEASE ORAL 2 TIMES DAILY
Qty: 60 TABLET | Refills: 0 | Status: SHIPPED | OUTPATIENT
Start: 2024-04-09

## 2024-04-09 NOTE — GROUP NOTE
Group Therapy Note    Date: 4/9/2024    Group Start Time: 10:40 AM  Group End Time: 11:30 AM  Group Topic: Cognitive Skills    [facility]    Blanca Esteban MSW        Group Therapy Note    This writer facilitated group and opened with a reflection on strengths and values. Patients were encouraged to complete a worksheet on identifying strengths and reflect on it with peers. This writer introduced second worksheet and walked them through multiple examples of identifying which values under anxious thoughts. Patients worked on them together and reflected as a group.       Attendees: 7        Patient's Goal: to identify cognitive distortions, to re frame thoughts, to improve self-esteem       Notes:  Patient was present and engaged in group. Patient was receptive to content and completed worksheet. Patient identified being a good listener as a strength. Patient will continue to be encouraged towards goal.     Status After Intervention:  Improved    Participation Level: Active Listener    Participation Quality: Appropriate      Speech:  normal      Thought Process/Content: Logical      Affective Functioning: Congruent      Mood: depressed      Level of consciousness:  Alert and Oriented x4      Response to Learning: Able to retain information and Progressing to goal      Endings: None Reported    Modes of Intervention: Education      Discipline Responsible: /Counselor      Signature:  MALLORY Sun
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
## 2024-04-09 NOTE — GROUP NOTE
Group Therapy Note    Date: 4/9/2024    Group Start Time:  1:10 PM  Group End Time:  2:00 PM  Group Topic: Psychoeducation    [facility]    Viola Aguilar MSW        Group Therapy Note    Attendees: 8    Writer facilitated psychoeducation group this afternoon. Writer opened by asking patients to process about their prior inner critic group. Writer facilitated processing, and then moved into showing patients a Mamadou Talk about challenging their inner critic. Writer facilitated reflections until the end of group.        Patient's Goal:  Participate in group therapy, increase insight about the inner critic, work on understanding how to challenge it.     Notes:  Patient was pulled for individual session during this group and will continue with identified treatment goals in further groups.     Status After Intervention:  Unchanged    Participation Level: Active Listener and Interactive    Participation Quality: Appropriate      Speech:  normal      Thought Process/Content: Logical      Affective Functioning: Congruent      Mood: euthymic      Level of consciousness:  Alert and Oriented x4      Response to Learning: Able to retain information, Capable of insight, and Progressing to goal      Endings: None Reported    Modes of Intervention: Education, Exploration, and Media      Discipline Responsible: /Counselor      Signature:  MALLORY Saleh

## 2024-04-09 NOTE — GROUP NOTE
Group Therapy Note    Date: 4/9/2024    Group Start Time:  2:00 PM  Group End Time:  2:45 PM  Group Topic: Wrap-Up    [facility]    Viola Aguilar, MSW        Group Therapy Note    Attendees: 8    Writer facilitated wrap up group this afternoon. Writer opened by encouraging patients to fill out their wrap up sheets, and then moved into the activity: writer asked patients to visualize a room which has everything that makes them happy in it, and then to draw that room. Writer facilitated sharing, and then closed out group with a mindfulness meditation for self love.        Patient's Goal:  Participate in group therapy, fill out wrap up sheet, engage in creative expression activity, and participate in mindfulness meditation.     Notes:  Patient engaged well in group this afternoon. He filled out his wrap up sheet, declining any SI or HI. He identified low levels of negative emotions. Patient shared that his happy room was a man cave full of football spirit gear, specifically for [employer] and the [employer]. Patient also showed that he had drawn his children playing in the room. Patient engaged well in the meditation and shared that it had helped him bring his heart rate down, and helped him to feel more calm. Patient will continue with identified treatment goals in further groups.     Status After Intervention:  Unchanged    Participation Level: Active Listener and Interactive    Participation Quality: Appropriate, Attentive, and Sharing      Speech:  normal      Thought Process/Content: Logical      Affective Functioning: Congruent      Mood: euthymic      Level of consciousness:  Alert and Oriented x4      Response to Learning: Able to verbalize current knowledge/experience, Capable of insight, and Progressing to goal      Endings: None Reported    Modes of Intervention: Support, Socialization, Exploration, and Activity      Discipline Responsible: Social

## 2024-04-09 NOTE — BH NOTE
Pt came to writer at 8:52 stating he feels uncomfortable with having his vitals taken in front of other patients, and having other patients be able to see his vitals on the monitor. Pt stated he feels that it is \"none of their business\" what his vitals are, and that it increases his feelings of anxiety and his blood pressure reading to have others observe. Writer stated staff will discuss options to accommodate having his vitals taken in a more private location.    MALLORY Castellano

## 2024-04-09 NOTE — GROUP NOTE
Group Therapy Note    Date: 4/9/2024    Group Start Time:  9:40 AM  Group End Time: 10:30 AM  Group Topic: Process Group - Outpatient    [facility]    Blanca Esteban MSW        Group Therapy Note    This writer facilitated group and opened with encouraging peers to process an event, thoughts, and/emotion they may want to vent about and get feedback. Patients supported peers struggling with physical ailments, graduation, and concerns with housing.       Attendees: 8        Patient's Goal: to process emotions, to provide feedback to peers       Notes:  Patient was present and alert in group. Patient did not verbally engage with peers but was supportive towards them. Patient will continue to be encouraged towards goal.     Status After Intervention:  Unchanged    Participation Level: Minimal    Participation Quality: Appropriate      Speech:  normal      Thought Process/Content: Logical      Affective Functioning: Congruent      Mood: depressed      Level of consciousness:  Alert      Response to Learning: Progressing to goal      Endings: None Reported    Modes of Intervention: Support and Socialization      Discipline Responsible: /Counselor      Signature:  MALLORY Sun

## 2024-04-09 NOTE — BH NOTE
Partial Hospitalization Program Individual Psychotherapy Note      Diagnosis: F41.0, F41.1, F40.10     Goal: Individual session and review of treatment plan       Psychotherapy Session    Start time: 1:10  Stop time: 1:40        Patient Mental Status and Mood/Affect:Anxious    Patient Behavior and Appearance: Cooperativeshows no evidence of impairment    Intervention/Techniques: Informed, Assigned, Reflected, Guided, Challenged, Reframed, Promoted Peer Support, and Provided Feedback    Focus of Session/Patient Response and Progress Towards Goal:     Patient actively participated and engaged with clinician to collaborate on updating treatment plan.  Patient denied SI/HI. Patient is making progress towards goal as evidenced by self-report and staff observation.       Narrative:      Patient reported feeling anxious but feeling good about treatment. Patient shared it is hard for him to open up to people and he wants to change it. Patient expressed feeling like he is projecting a lot on his fiancee and wants to work on that. Patient was introduced to treatment plan and encouraged to collaborate with this writer on creating one.     Patient in collaboration set the following two short term goals: to learn and demonstrate anxiety management and to learn and demonstrate improved social connections. Patient was informed this plan is a working document and he will update it weekly. The following interventions are set for the goals:     Short term goal #1: group therapy, assess safety, and monitor medications    Short term goal #2: group therapy, family involvement, and referral to community services     Patient reviewed the plan and signed with no questions. Patient shared he is willing to stay the minimum of two weeks and will report in next session if he can extend job to start after four weeks. Patient is appropriate for treatment and will continue treatment with a session scheduled for 4/16.

## 2024-04-09 NOTE — GROUP NOTE
Group Therapy Note    Date: 4/9/2024    Group Start Time:  9:00 AM  Group End Time:  9:40 AM  Group Topic: Community Meeting    [facility]    Alisia Clinton        Group Therapy Note  This writer facilitated a community group that encouraged patients to check in with how they were feeling today. Patients were asked to complete the check-in form. After allowing time for this, this writer had patients reflect on where they feel different emotions in their body. This writer used open ended questions and reflective statements to prompt discussion.    Attendees: 8       Patient's Goal:  To complete check-in form and identify emotions in the body.    Notes:  The patient presented well to the session. The patient completed the check in form and indicated ratings of 9-10 for anger, depression, and anxiety. The patient did not indicate any SI, self-harm, or harm to others. The patient completed the activity and shared that he used white for all emotions. This writer asked the patients open ended questions. The patient shared that he did not feel emotions. This writer then encouraged the patient to reflect on the emotion of love and his relationship. The patient engaged in discussion with his peers regarding trust. The patient will continue to work on identified treatment goals in further groups.     Status After Intervention:  Unchanged    Participation Level: Active Listener and Interactive    Participation Quality: Appropriate, Attentive, and Sharing      Speech:  normal      Thought Process/Content: Logical  Linear      Affective Functioning: Congruent      Mood: euthymic      Level of consciousness:  Alert, Oriented x4, and Attentive      Response to Learning: Able to verbalize current knowledge/experience, Able to retain information, and Capable of insight      Endings: None Reported    Modes of Intervention: Support, Socialization,

## 2024-04-09 NOTE — GROUP NOTE
Group Therapy Note    Date: 4/9/2024    Group Start Time: 12:00 PM  Group End Time:  1:00 PM  Group Topic: Psychotherapy    [facility]    Alisia Clinton        Group Therapy Note  This writer facilitated a treatment planning group by providing education around the inner critic. This writer discussed the 7 different types of inner critic and the purpose of the inner critic. Patients were then asked to reflect on their inner critic regarding common criticisms, impact, and strength. This writer used open ended questions, strengths-based approach, clarifying, and reflective statements.     Attendees: 7       Patient's Goal:  To learn about the inner critic and its impact.    Notes:  The patient presented well to the session. The patient identified the type of inner critic that he has. He also engaged in discussion with his peers regarding the destroyer inner critic. The patient also discussed trust and protective factors. The patient will continue to work on identified treatment goals in further groups.     Status After Intervention:  Unchanged    Participation Level: Active Listener and Interactive    Participation Quality: Appropriate, Attentive, Sharing, and Supportive      Speech:  normal      Thought Process/Content: Logical  Linear      Affective Functioning: Congruent      Mood: euthymic      Level of consciousness:  Alert, Oriented x4, and Attentive      Response to Learning: Able to verbalize current knowledge/experience, Able to verbalize/acknowledge new learning, Able to retain information, and Capable of insight      Endings: None Reported    Modes of Intervention: Education, Support, Socialization, Exploration, and Clarifying      Discipline Responsible: /Counselor      Signature:  MALLORY Meza Student.

## 2024-04-10 ENCOUNTER — HOSPITAL ENCOUNTER (OUTPATIENT)
Facility: HOSPITAL | Age: 39
Setting detail: RECURRING SERIES
Discharge: HOME | End: 2024-04-13
Payer: COMMERCIAL

## 2024-04-10 VITALS
TEMPERATURE: 99 F | HEART RATE: 105 BPM | OXYGEN SATURATION: 99 % | DIASTOLIC BLOOD PRESSURE: 98 MMHG | SYSTOLIC BLOOD PRESSURE: 144 MMHG

## 2024-04-10 PROCEDURE — 90853 GROUP PSYCHOTHERAPY: CPT

## 2024-04-10 NOTE — GROUP NOTE
Group Therapy Note    Date: 4/10/2024    Group Start Time:  9:40 AM  Group End Time: 10:30 AM  Group Topic: Process Group - Outpatient    [facility]    Blanca Esteban MSW        Group Therapy Note      This writer facilitated group and opened with encouraging peers to process an event, thought, or emotion they would like to vent or receive feedback. Patients processed fears around health and reflected on what it means to be in survival mode vs not.       Attendees: 10        Patient's Goal: to process thoughts, emotions, and behaviors, to provide support to peers         Notes:  Patient was present and engaged in group. Patient related to peer struggling with constantly worrying there is something wrong with his physical health. Patient processed his various trips to the ER and how his anxiety is linked to childhood. Patient was supportive towards peers and will continue to work towards goal.    Status After Intervention:  Improved    Participation Level: Active Listener    Participation Quality: Appropriate      Speech:  normal      Thought Process/Content: Logical      Affective Functioning: Congruent      Mood: anxious      Level of consciousness:  Alert and Oriented x4      Response to Learning: Capable of insight and Progressing to goal      Endings: None Reported    Modes of Intervention: Support, Socialization, and Exploration      Discipline Responsible: /Counselor      Signature:  MALLORY Sun

## 2024-04-10 NOTE — BH NOTE
1302 Pt reported believing he was having a panic attack. Pt reported dizziness, chest tightness, increased heart rate, and physical trembling. Pt stated being unsure if it had been triggered by something he ate causing a spike in blood pressure mimicking a panic attack or a reaction to his new medication. Pt reported he had also taken the medication the night prior and had no reaction. Writer offered pt a number of coping skill options. Pt declined and asked for his oxygen and pulse to be taken. Writer did so. Pt's oxygen was 98-99 and his heart rate was 105. Writer offered pt option for medical evaluation if he felt it was necessary. Pt declined. It is noted this is positive progress based on pt's history. Writer encouraged pt to identify any possible triggers in group and provided brief, validating psychoeducation. Pt stated a group discussion earlier in the day \"brought up some old memories and feelings.\" Writer encouraged pt to try ice, drinking water, deep breathing, tapping, or going on a walk and briefly explained how ice and walks support nervous system regulation. Pt expressed concern a walk would increase the feeling of nervousness due to elevated blood pressure through appropriately reflected that he has previously found walks relaxing. Pt agreed to drink cool water, which writer provided. Pt stated he \"was good\" and would try just sitting and relaxing.     MALLORY Blancas

## 2024-04-10 NOTE — GROUP NOTE
Group Therapy Note    Date: 4/10/2024    Group Start Time:  2:00 PM  Group End Time:  2:45 PM  Group Topic: Wrap-Up    [facility]    Tania Prescott MSW        Group Therapy Note    Writer facilitated a community wrap up group focused on assessing for safety, coping skills practice, and planning ways to engage in healthy coping while in the community. Writer facilitated a symptom and safety check in using the afternoon check in form. Writer prompted pts to complete evening self-care routines and encouraged pts to share resources. Writer utilized the anger ball to support discussion and processing of healthy coping. Writer provided education on the importance of developing strong coping over hoping for an easy life.       Attendees: 10       Patient's Goal:  disclosing safety concerns and identifying healthy coping skills to use in the community.      Notes:  Pt presented as attentive and alert. Pt denied SI/HI/SWAPNIL on the check in form. Pt was observed to complete the routine worksheet. Pt asked appropriate questions. Pt will continue to work on disclosing safety concerns and identifying healthy coping skills to use in the community.      Status After Intervention:  Unchanged    Participation Level: Minimal    Participation Quality: Appropriate and Attentive      Speech:  normal      Thought Process/Content: Logical      Affective Functioning: Congruent      Mood: anxious      Level of consciousness:  Alert, Oriented x4, and Attentive      Response to Learning: Able to verbalize current knowledge/experience and Progressing to goal      Endings: None Reported    Modes of Intervention: Support and Problem-solving      Discipline Responsible: /Counselor      Signature:  MALLORY MCELROY

## 2024-04-10 NOTE — GROUP NOTE
Group Therapy Note    Date: 4/10/2024    Group Start Time: 10:44 AM  Group End Time: 11:30 AM  Group Topic: Cognitive Skills    [facility]    Tania Prescott MSW        Group Therapy Note    Writer facilitated cognitive skills group. Writer opened with brief introductions due to there being multiple pts that had not yet met the writer. Writer utilized the red and green flags handout to discuss components of healthy interpersonal relationships. Writer encouraged engagement through direct prompts, having peers read aloud from the handout, open ended questions, and reflective statements. Writer encouraged peers to relate the information to their lived experiences and discuss shared experiences.    Attendees: 10       Patient's Goal:  processing and understanding relationship red and green flags    Notes:  Pt presented as attentive and engaged as evidenced by spontaneous participation. Pt participated appropriately in introductions. Pt shared about having rigid boundaries and difficulty trusting others. Pt discussed importance of honesty and accountability to him. Pt provided brief feedback to peers. Pt will continue to work on processing and understanding relationship red and green flags.    Status After Intervention:  Unchanged    Participation Level: Active Listener and Interactive    Participation Quality: Appropriate, Attentive, and Sharing      Speech:  normal      Thought Process/Content: Logical      Affective Functioning: Congruent      Mood: euthymic      Level of consciousness:  Alert, Oriented x4, and Attentive      Response to Learning: Able to verbalize current knowledge/experience, Capable of insight, and Progressing to goal      Endings: None Reported    Modes of Intervention: Education and Exploration      Discipline Responsible: /Counselor      Signature:  MALLORY MCELROY

## 2024-04-10 NOTE — GROUP NOTE
Group Therapy Note    Date: 4/10/2024    Group Start Time:  1:10 PM  Group End Time:  2:00 PM  Group Topic: Psychoeducation    [facility]    Viola Aguilar, MSW        Group Therapy Note    Attendees: 10    Writer co-facilitated group with peer recovery specialists Jorge Luis and Ru. Ru prompted patients to describe themselves in one word, and went around the group asking each patient to share. This prompted discussion of identity. Ru then asked patients to share some of their greatest barriers, which prompted good discussion that lasted until the end of group.        Patient's Goal:  Participate in group therapy, engage in discussion with .     Notes:  Patient engaged well in group this afternoon. He came a little late, but was present for the majority of group. Patient at first shared that he would identify himself with a question lisa moreso than a word, but once talking about it, writer reflected that it sounded like he might identify himself as, \"thoughtful\". Patient presented as receptive to this. He engaged well in the discussion of learning about oneself and the traumas that get passed down from generation to generation. Patient will continue with identified treatment goals in further groups.     Status After Intervention:  Improved    Participation Level: Active Listener and Interactive    Participation Quality: Appropriate, Attentive, and Sharing      Speech:  normal      Thought Process/Content: Logical      Affective Functioning: Congruent      Mood: euthymic      Level of consciousness:  Alert and Oriented x4      Response to Learning: Able to verbalize current knowledge/experience, Able to verbalize/acknowledge new learning, Capable of insight, and Progressing to goal      Endings: None Reported    Modes of Intervention: Education, Support, and Exploration      Discipline Responsible: Social

## 2024-04-10 NOTE — GROUP NOTE
Group Therapy Note    Date: 4/10/2024    Group Start Time: 12:00 PM  Group End Time:  1:00 PM  Group Topic: Psychotherapy    [facility]    Blanca Esteban MSW        Group Therapy Note    This writer facilitated group and opened with displaying a video suggested by a peer on a bird flying for the first time after being in a cage for most of its life. Patients watched and reflected on the ways they can relate to the bird. This writer introduced group topic and displayed a brief video explaining the difference between self-esteem and self-compassion. Patients watched and reflected on personal self-esteem. To conclude patients were encouraged to brainstorm ways they can introduce more self-compassion into their lives.       Attendees: 7      Patient's Goal: to identify coping skills, to improve self compassion, to improve well-being       Notes:  Patient was present and engaged in group. Patient reflected on wanting to fly but fearing the unknown. Patient also reflected on his son needing an IEP and how that impacted his self-esteem. Patient was receptive to the content and supportive of peers. Patient will continue to work towards goal.    Status After Intervention:  Improved    Participation Level: Active Listener    Participation Quality: Appropriate      Speech:  normal      Thought Process/Content: Logical      Affective Functioning: Congruent      Mood: anxious      Level of consciousness:  Alert and Oriented x4      Response to Learning: Able to verbalize/acknowledge new learning, Able to retain information, Capable of insight, and Progressing to goal      Endings: None Reported    Modes of Intervention: Education and Support      Discipline Responsible: /Counselor      Signature:  MALLORY Sun

## 2024-04-10 NOTE — GROUP NOTE
Group Therapy Note    Date: 4/10/2024    Group Start Time:  9:00 AM  Group End Time:  9:40 AM  Group Topic: Community Meeting    [facility]    Viola Aguilar MSW        Group Therapy Note    Attendees: 9    Writer facilitated check in group this morning. Writer opened by encouraging patients to fill out their check in sheets. Writer then moved into the warm up activity: an autobiography in 5 short chapters. Several of the patients had done this activity before, so writer asked them instead to instead write a 6 word memoir and share. Writer encouraged sharing until the end of group.        Patient's Goal:  Participate in group therapy, complete check in sheet, engage in open sharing with peers.     Notes:  Patient engaged well in group this morning. He filled out his check in sheet, declining any SI or thoughts of self harm. Patient identified low levels of negative emotions. Patient interacted appropriately with peers, but declined to share what he had written. Patient will continue with identified treatment goals in further groups.     Status After Intervention:  Unchanged    Participation Level: Active Listener and Interactive    Participation Quality: Appropriate and Attentive      Speech:  normal      Thought Process/Content: Logical      Affective Functioning: Congruent      Mood: anxious      Level of consciousness:  Alert and Oriented x4      Response to Learning: Able to verbalize current knowledge/experience, Capable of insight, and Progressing to goal      Endings: None Reported    Modes of Intervention: Support, Socialization, and Exploration      Discipline Responsible: /Counselor      Signature:  MALLORY Saleh

## 2024-04-11 ENCOUNTER — HOSPITAL ENCOUNTER (OUTPATIENT)
Facility: HOSPITAL | Age: 39
Setting detail: RECURRING SERIES
Discharge: HOME | End: 2024-04-14
Payer: COMMERCIAL

## 2024-04-11 VITALS
TEMPERATURE: 98.8 F | OXYGEN SATURATION: 97 % | DIASTOLIC BLOOD PRESSURE: 103 MMHG | SYSTOLIC BLOOD PRESSURE: 165 MMHG | HEART RATE: 84 BPM

## 2024-04-11 PROCEDURE — 90853 GROUP PSYCHOTHERAPY: CPT

## 2024-04-11 NOTE — GROUP NOTE
Group Therapy Note    Date: 4/11/2024    Group Start Time:  1:10 PM  Group End Time:  2:00 PM  Group Topic: Psychotherapy    [facility]    Blanca Esteban MSW        Group Therapy Note    This writer facilitated group and opened with continuing discussion started in last group about letters to past self. Patients were encouraged to reflect on how the activity felt and to jot one critical thought they have about themselves on a piece of paper. Patients were asked to crumble the thought on paper and throw it in the trash can. To conclude, patients were asked to participate in a sunflower activity.             Attendees: 7       Notes:  Patient was present and alert in group. Patient declined to verbally participate with peers and shared it was too much for him to speak but he is listening. Patient will continue to work towards goal.     Status After Intervention:  Unchanged    Participation Level: Active Listener    Participation Quality: Appropriate      Speech:  normal      Thought Process/Content: Logical      Affective Functioning: Congruent      Mood: anxious      Level of consciousness:  Alert      Response to Learning: Progressing to goal      Endings: None Reported    Modes of Intervention: Support, Socialization, and Exploration      Discipline Responsible: /Counselor      Signature:  MALLORY Sun

## 2024-04-11 NOTE — GROUP NOTE
Group Therapy Note    Date: 4/11/2024    Group Start Time: 10:40 AM  Group End Time: 11:30 AM  Group Topic: Cognitive Skills    [facility]    Tania Prescott MSW        Group Therapy Note    Writer facilitated cognitive skills group. Writer opened with an ice breaker by patient request to support transition after difficult topics the prior group as well as support building group rapport. Writer transitioned to psychoeducation on cognitive distortions. Writer provided education on core beliefs and types of cognitive distortions using a handout. Writer encouraged engagement through prompting peers to read from the handouts for each other as well as supporting pts in processing how the education connects to their lived experiences.    Attendees: 9       Patient's Goal:  understanding and identifying cognitive distortions    Notes:  Pt presented as attentive and engaged as evidenced by spontaneous participation. Pt engaged appropriately with peers and staff during the ice breaker. Pt reported identifying with catastrophizing and endorsed relating with peers. Pt will continue to work on understanding and identifying cognitive distortions.    Status After Intervention:  Unchanged    Participation Level: Active Listener and Interactive    Participation Quality: Appropriate, Attentive, and Sharing      Speech:  normal      Thought Process/Content: Logical      Affective Functioning: Congruent      Mood: euthymic      Level of consciousness:  Alert, Oriented x4, and Attentive      Response to Learning: Able to verbalize current knowledge/experience and Progressing to goal      Endings: None Reported    Modes of Intervention: Education and Socialization      Discipline Responsible: /Counselor      Signature:  MALLORY MCELROY

## 2024-04-11 NOTE — GROUP NOTE
Group Therapy Note    Date: 4/11/2024    Group Start Time: 12:00 PM  Group End Time:  1:00 PM  Group Topic: Psychotherapy    [facility]    Viola Aguilar MSW        Group Therapy Note    Attendees: 8    Writer facilitated psychotherapy group this afternoon centered around reflection and journaling. Writer provided patients with guided journaling activity asking them to reflect on an event or a time in their life that felt unresolved, and then to write a letter to their past self. Writer provided patients with time to write, and then time to reflect, which lasted until the end of group.        Patient's Goal:  Participate in group therapy, engage in self reflection, write a letter to their past self.     Notes:  Patient engaged well in group this afternoon. He presented as engaged in the activity and appeared to finish first, before his peers. Patient declined to share in group. He interacted well with his peers as evidenced by smiling, laughing and conversing with them. Patient will continue with identified treatment goals in further groups.     Status After Intervention:  Unchanged    Participation Level: Active Listener and Interactive    Participation Quality: Appropriate and Attentive      Speech:  normal      Thought Process/Content: Logical      Affective Functioning: Congruent      Mood: euthymic      Level of consciousness:  Alert and Oriented x4      Response to Learning: Able to verbalize current knowledge/experience, Capable of insight, and Progressing to goal      Endings: None Reported    Modes of Intervention: Support, Exploration, and Activity      Discipline Responsible: /Counselor      Signature:  MALLORY Saleh

## 2024-04-11 NOTE — GROUP NOTE
Group Therapy Note    Date: 4/11/2024    Group Start Time:  9:40 AM  Group End Time: 10:30 AM  Group Topic: Process Group - Outpatient    [facility]    Blanca Esteban MSW        Group Therapy Note    This writer facilitated process group and encouraged peers to reflect on a situation, emotion, or thought they might need feedback on or just need to vent. Patients processed peers struggling with masking at work, judgmental families, and grief.       Attendees: 9        Patient's Goal: to identify emotions, to process emotions, to connect with peers       Notes:  Patient was present and engaged in group. Patient related to peer struggling with keeping a job due to the fear he might have a panic attack. Patient also reflected on masking at work and with his family due to stigma around mental health. Patient was supportive towards peers and will continue to work towards goal.     Status After Intervention:  Improved    Participation Level: Active Listener    Participation Quality: Appropriate      Speech:  normal      Thought Process/Content: Logical      Affective Functioning: Congruent      Mood: anxious      Level of consciousness:  Alert and Oriented x4      Response to Learning: Capable of insight and Progressing to goal      Endings: None Reported    Modes of Intervention: Support, Socialization, and Exploration      Discipline Responsible: /Counselor      Signature:  MALLORY Sun

## 2024-04-11 NOTE — BH NOTE
OUTPATIENT PHYSICIAN PROGRESS NOTE      Chief Complaint/Symptoms/Impairments (as noted in Treatment Plan): I have anger issues.  BPAD, Cluster B traits    Criteria for Continued Treatment (check all that apply):   [] Preventing Decompensation   [x] Improving Level of Functioning  [] Reducing Isolative Behaviors  [] Understanding Diagnosis and Need for Medications  [] Improving Treatment/Medication Compliance  [] Confronting Denial of Illness  [x] Stabilizing Level of Functioning  [x] Improving Emotional/Social/Cognitive Functioning  [] Decreasing Frequency of Hospitalizations    Suicidal/Homicidal ideations:   [x] Absent  [] Present  [] Passive  [] Intent  [] Plan  [] Death Wishes      CURRENT CONDITION OF PATIENT & PROGRESS ON TREATMENT PLAN    Subjective (ongoing complaints by patient regarding symptom severity, presentation, affect, function, etc.): Patient reports irritability, anger, impulsivity.  Notes that he gets angry so fast but is gaining from the groups ways to better self regulate.  Was concerned about medication interactions between his medications.  Tolerating them well despite his concerns.      Behavior (side effects, changes in mental status, objective observations seen in individual sessions or by staff):   Shimon Min is calm cooperative, clear and coherent with speech of average rate volume and tone.  Moods are irritable.  Affect is full range. Appropriately dressed and groomed.  Denies SI/HI/AH/VH.  No aggression or violence.  Appropriately interactive and aware.  Insight is fair and judgement is fair.      Assessment/Plan (functional improvement and/or ongoing impairment, response to treatment, plan for future ongoing treatment and medication management to include revisions):     Continue current care  Continue Depakote 500 mg PO BID for impulsivity, anger, mood stability  Group, individual and milieu therapy  Disposition planning with social work      [x] NO NEW Medications at this

## 2024-04-11 NOTE — GROUP NOTE
Group Therapy Note    Date: 4/11/2024    Group Start Time:  2:05 PM  Group End Time:  2:45 PM  Group Topic: Wrap-Up    [facility]    Tania Prescott MSW        Group Therapy Note    Writer facilitated a community wrap up group focused on assessing for safety and processing of the sunflower activity. Writer facilitated a symptom and safety check in using the afternoon check in form. Writer provided additional time for pts to complete the sunflower activity from the previous group. Writer encouraged pts to share what they put, relate to peers, and process shared experiences.     Attendees: 8       Patient's Goal:  disclosing safety concerns and engaging in processing with peers.     Notes:   Pt presented as attentive and alert. Pt denied SI/HI/SWAPNIL on the check out form. Pt shared about his response to the activity and an additional step he added. Pt asked appropriate questions. Pt will continue to work on disclosing safety concerns and engaging in processing with peers.     Status After Intervention:  Unchanged    Participation Level: Active Listener and Interactive    Participation Quality: Appropriate, Attentive, and Sharing      Speech:  normal      Thought Process/Content: Logical      Affective Functioning: Congruent      Mood: euthymic      Level of consciousness:  Alert, Oriented x4, and Attentive      Response to Learning: Able to verbalize current knowledge/experience and Progressing to goal      Endings: None Reported    Modes of Intervention: Support and Socialization      Discipline Responsible: /Counselor      Signature:  MALLORY MCELROY

## 2024-04-11 NOTE — BH NOTE
Writer notified Dr Silverman of pt's elevated BP. Writer stated pt reported no other symptoms except anxiety and that the pt stated he had not taken any anxiety medications that morning. Dr Silverman stated she would meet with the pt.    MALLORY Blancas

## 2024-04-12 ENCOUNTER — HOSPITAL ENCOUNTER (OUTPATIENT)
Facility: HOSPITAL | Age: 39
Setting detail: RECURRING SERIES
Discharge: HOME | End: 2024-04-15
Payer: COMMERCIAL

## 2024-04-12 VITALS
DIASTOLIC BLOOD PRESSURE: 107 MMHG | OXYGEN SATURATION: 99 % | HEART RATE: 85 BPM | TEMPERATURE: 98.8 F | SYSTOLIC BLOOD PRESSURE: 149 MMHG

## 2024-04-12 PROCEDURE — 90853 GROUP PSYCHOTHERAPY: CPT

## 2024-04-12 NOTE — GROUP NOTE
Group Therapy Note    Date: 4/12/2024    Group Start Time:  1:10 PM  Group End Time:  2:00 PM  Group Topic: Psychoeducation    [facility]    Viola Aguilar MSW        Group Therapy Note    Attendees: 8    Writer facilitated psychoeducation group centered around mindfulness and creative expression this afternoon. Writer opened by explaining the activity to patients and then showing them an instructional video on how to draw mandalas. Writer then provided patients supplies to draw and then decorate their mandalas, which they continued until the end of group.        Patient's Goal:  Participate in group therapy, deepen understanding of the relationship between mindfulness and creative expression, engage in creative expression.     Notes:  Patient engaged well in group this afternoon. Writer observed him participating in the activity, and though patient stated it was hard at first, he shared that he now understands better why his partner enjoys coloring books to relax. Patient interacted well with peers throughout and will continue with identified treatment goals in further groups.     Status After Intervention:  Improved    Participation Level: Active Listener and Interactive    Participation Quality: Appropriate and Attentive      Speech:  normal      Thought Process/Content: Logical      Affective Functioning: Congruent      Mood: euthymic      Level of consciousness:  Alert and Oriented x4      Response to Learning: Able to verbalize current knowledge/experience, Able to verbalize/acknowledge new learning, Capable of insight, and Progressing to goal      Endings: None Reported    Modes of Intervention: Education, Exploration, and Activity      Discipline Responsible: /Counselor      Signature:  MALLORY Saleh

## 2024-04-12 NOTE — GROUP NOTE
Group Therapy Note    Date: 4/12/2024    Group Start Time: 12:00 PM  Group End Time:  1:00 PM  Group Topic: Psychoeducation    [facility]    Tania Prescott MSW        Group Therapy Note  Writer opened with peer introductions for a new pt joining group. Writer encouraged rapport building conversation during introductions with an ice breaker question. Writer transitioned to completing education on cognitive distortions from a previous group. Writer prompted pts to anonymously identify their own cognitive distortions and supported the group in practicing challenging these distortions.     Attendees: 9       Patient's Goal:  building rapport with peers and learning to challenge cognitive distortions.    Notes:  Pt presented as attentive and engaged. Pt appropriately engaged in peer introductions, to include engaging appropriately and playfully in discussion with peers. Pt asked appropriate clarifying questions. Pt provided appropriate challenging of distortions. Pt will continue to work on building rapport with peers and learning to challenge cognitive distortions.    Status After Intervention:  Unchanged    Participation Level: Active Listener and Interactive    Participation Quality: Appropriate, Attentive, and Sharing      Speech:  normal      Thought Process/Content: Logical      Affective Functioning: Congruent      Mood: euthymic      Level of consciousness:  Alert, Oriented x4, and Attentive      Response to Learning: Able to verbalize current knowledge/experience and Progressing to goal      Endings: None Reported    Modes of Intervention: Education and Socialization      Discipline Responsible: /Counselor      Signature:  MALLORY MCELROY

## 2024-04-12 NOTE — BH NOTE
This writer directly informed Dr. Silverman of patient's elevated blood pressure. Patient reported feeling jittery and anxious due to not wanting to be present at the program.      - Alisia Clinton MSW Student.

## 2024-04-12 NOTE — GROUP NOTE
Group Therapy Note    Date: 4/12/2024    Group Start Time:  2:00 PM  Group End Time:  2:45 PM  Group Topic: Wrap-Up    [facility]    Alisia Clinton        Group Therapy Note  This writer facilitated a wrap-up group by encouraging the patients to reflect on their day. Each patient was asked to complete the Afternoon Wrap Up Form. After allowing time for this, patients continued engaging in the mindfulness activity from the prior group. Patients were then asked to reflect on the activity. This writer then provided patients with their safety plan.    Attendees: 9       Patient's Goal:  To reflect on the day and participate in activity.    Notes:  The patient presented well to the session. The patient completed his wrap up form and denied any SI and harm to others. The patient was also provided with his safety plan. This writer then asked the patient to reflect on being able to stay present in the moment while working on the activity. The patient shared that initially he was able to stay present but later did not feel the best to keep going. This writer used open ended questions and reflective statements to prompt the patient to express any symptoms he was feeling. The patient will continue to work on identified treatment goals in further groups.     Status After Intervention:  Unchanged    Participation Level: Active Listener and Interactive    Participation Quality: Appropriate, Attentive, and Sharing      Speech:  normal      Thought Process/Content: Logical  Linear      Affective Functioning: Congruent      Mood: euthymic      Level of consciousness:  Alert, Oriented x4, and Attentive      Response to Learning: Able to verbalize current knowledge/experience, Able to retain information, and Capable of insight      Endings: None Reported    Modes of Intervention: Support, Socialization, Exploration, Clarifying, and Activity      Discipline

## 2024-04-12 NOTE — GROUP NOTE
Group Therapy Note    Date: 4/12/2024    Group Start Time:  9:40 AM  Group End Time: 10:30 AM  Group Topic: Process Group - Outpatient    [facility]    Blanca Esteban MSW        Group Therapy Note    This writer facilitated group and opened with asking patients to continue their reflections of GLADS activity done in previous groups. Patients shared their answers and the delights prompt led to a discussion on comfort tv shows/movies. To conclude, this writer asked pt to choose a song that represents how they are feeling right now. This writer played the song and prompted discussion.       Attendees: 8        Patient's Goal: to process emotions, to engage with peers       Notes:  Patient was present and engaged in group. Patient shared his GLADS and identified learning that he is not alone and has the capacity to connect with peers. Patient participated in music activity and was supportive towards peers. Patient will continue to work towards goal.    Status After Intervention:  Improved    Participation Level: Active Listener    Participation Quality: Appropriate      Speech:  normal      Thought Process/Content: Logical      Affective Functioning: Congruent      Mood: anxious      Level of consciousness:  Alert and Oriented x4      Response to Learning: Capable of insight and Progressing to goal      Endings: None Reported    Modes of Intervention: Support, Socialization, and Exploration      Discipline Responsible: /Counselor      Signature:  MALLORY Sun

## 2024-04-12 NOTE — BH NOTE
This writer pulled patient for warm handoff. Patient was informed that this writer would be leaving and his case will be transferred to MALLORY Saleh. Patient reflected on feelings and was receptive to information. Patient did express he was unsure if he could do beyond two weeks but will try to complete three weeks.

## 2024-04-12 NOTE — GROUP NOTE
Group Therapy Note    Date: 4/12/2024    Group Start Time: 10:40 AM  Group End Time: 11:30 AM  Group Topic: Cognitive Skills    [facility] Alisia Yoo        Group Therapy Note    This writer facilitated a cognitive skills group on empathy versus sympathy. This writer showed patients a video that explained the difference between being empathetic versus sympathetic. After viewing this, patients were asked to reflect on the difference and how they feel when they receive each one. Patients engaged in discussion regarding their experiences and how they treat themselves in comparison to how they would treat a friend. This writer used a strengths based approach and open ended     Attendees: 8       Patient's Goal:  To learn about the difference between sympathy and empathy.    Notes:  The patient presented well to the session. After viewing the video, the patient shared that he resonated with feeling sympathy more than empathy from others. The patient also engaged in discussion with his peers regarding emotions that come up when others are sympathetic and start conversations with phrases such as \"at least\". The patient expressed that he is currently self-reflecting on actions and communications. The patient will continue to work on identified treatment goals in further groups.     Status After Intervention:  Unchanged    Participation Level: Active Listener and Interactive    Participation Quality: Appropriate, Attentive, Sharing, and Supportive      Speech:  normal      Thought Process/Content: Logical  Linear      Affective Functioning: Congruent      Mood: euthymic      Level of consciousness:  Alert, Oriented x4, and Attentive      Response to Learning: Able to verbalize current knowledge/experience, Able to verbalize/acknowledge new learning, Able to retain information, and Capable of insight      Endings: None Reported    Modes of

## 2024-04-12 NOTE — GROUP NOTE
Group Therapy Note    Date: 4/12/2024    Group Start Time:  9:00 AM  Group End Time:  9:40 AM  Group Topic: Community Meeting    [facility]    Viola Aguilar MSW        Group Therapy Note    Attendees: 8    Writer facilitated community check in group this morning. Writer opened by encouraging patients to fill out their check in sheets. Writer then provided patients with the GLADS activity, which asked them to identify something they were grateful for this week, something they learned, something they achieved, something that brought them delight, and something they did (or plan to do) for self care. Writer then facilitated sharing until the end of group.        Patient's Goal:  Participate in group therapy, complete check in sheet, identify gratitude and reflect on the past week.     Notes:  Patient engaged well in group this morning. He filled out his check in sheet, declining any SI or thoughts of self harm. He identified low levels of negative emotions. Patient did not get to share his activity during this group, but writer observed him interacting well with peers as evidenced by smiling and conversing. Patient will continue with identified treatment goals in further groups.     Status After Intervention:  Improved    Participation Level: Active Listener and Interactive    Participation Quality: Appropriate and Attentive      Speech:  normal      Thought Process/Content: Logical      Affective Functioning: Congruent      Mood: euthymic      Level of consciousness:  Alert and Oriented x4      Response to Learning: Able to verbalize current knowledge/experience, Capable of insight, and Progressing to goal      Endings: None Reported    Modes of Intervention: Support, Socialization, and Exploration      Discipline Responsible: /Counselor      Signature:  MALLORY Saleh

## 2024-04-15 ENCOUNTER — HOSPITAL ENCOUNTER (OUTPATIENT)
Facility: HOSPITAL | Age: 39
Setting detail: RECURRING SERIES
Discharge: HOME | End: 2024-04-18
Payer: COMMERCIAL

## 2024-04-15 VITALS
DIASTOLIC BLOOD PRESSURE: 100 MMHG | HEART RATE: 90 BPM | SYSTOLIC BLOOD PRESSURE: 158 MMHG | OXYGEN SATURATION: 98 % | TEMPERATURE: 99 F

## 2024-04-15 PROCEDURE — 90853 GROUP PSYCHOTHERAPY: CPT

## 2024-04-15 NOTE — GROUP NOTE
Group Therapy Note    Date: 4/15/2024    Group Start Time:  9:40 AM  Group End Time: 10:30 AM  Group Topic: Process Group - Outpatient    [facility]    Alisia Clinton        Group Therapy Note  This writer facilitated a process group by encouraging the patients to share about any recent thoughts, emotions, feelings, or situations, that they have experienced, in order to gain support and feedback from their peers. During this conversation the topics of addiction, validation, and emotions were discussed. This writer used open ended questions to encourage patients to reflect on their feelings.    Attendees: 9       Patient's Goal:  To process thoughts and feelings and provide support and feedback to peers.    Notes:  The patient presented well to the session. The patient appeared to attentively listen to his peers as they discussed goals. The patient provided feedback to his peer regarding career and goals. The patient will continue to work on processing emotions and thoughts in further groups.     Status After Intervention:  Unchanged    Participation Level: Active Listener and Interactive    Participation Quality: Appropriate, Attentive, and Sharing      Speech:  normal      Thought Process/Content: Logical  Linear      Affective Functioning: Congruent      Mood: euthymic      Level of consciousness:  Alert, Oriented x4, and Attentive      Response to Learning: Able to verbalize current knowledge/experience and Capable of insight      Endings: None Reported    Modes of Intervention: Support, Socialization, and Exploration      Discipline Responsible: /Counselor      Signature:  MALLORY Meza Student.      Home

## 2024-04-15 NOTE — GROUP NOTE
Group Therapy Note    Date: 4/15/2024    Group Start Time:  9:00 AM  Group End Time:  9:40 AM  Group Topic: Community Meeting    [facility]    Tania Prescott MSW        Group Therapy Note    Writer facilitated the morning check in community group focused on evaluating presentation, assessing for safety, and processing recent events. Writer facilitated a symptom and safety check in using the morning check in form.  Writer encouraged patients to share events, stressors, and progress. Writer prompted and supported peer feedback.     Attendees: 10       Patient's Goal:  disclosing safety concerns and engaging in processing with peers.      Notes:  Pt denied SI/HI/SWAPNIL on the check in form. Pt presented as quiet though attentive through appropriate eye contact. Pt will continue to work on disclosing safety concerns and engaging in processing with peers.      Status After Intervention:  Unchanged    Participation Level: Active Listener and Minimal    Participation Quality: Appropriate and Attentive      Speech:  normal      Thought Process/Content: Logical      Affective Functioning: Congruent      Mood: euthymic      Level of consciousness:  Alert, Oriented x4, and Attentive      Response to Learning: Progressing to goal      Endings: None Reported    Modes of Intervention: Support and Socialization      Discipline Responsible: /Counselor      Signature:  MALLORY MCELROY

## 2024-04-15 NOTE — GROUP NOTE
Group Therapy Note    Date: 4/15/2024    Group Start Time:  2:00 PM  Group End Time:  2:45 PM  Group Topic: Wrap-Up    [facility] Lela Fatima        Group Therapy Note    In the wrap up group, this writer presented patients with their behavioral wrap-up sheet to indicate staff of any SI/HI. This writer then asked patients to write down and respond to the following prompt; If a genie granted you three wishes, what would you wish for? This writer has patients process this question for several minutes and then asked each patient their wishes. The writers asked patients to discuss how their wishes reflect their personal values. This writer supported patient processing by asking patients opened ended questions to build group rapport.     Attendees: 7       Patient's Goal:  Engage in discussion on wishes and personal values     Notes:  This patient was present and engaged in the wrap up group, indicating no feelings of SI/HI. This pt stated that he would wish for a perfect mental health and wealthy and stability. This patient will continue to name values and build group rapport in the future wrap-up groups.     Status After Intervention:  Improved    Participation Level: Active Listener and Interactive    Participation Quality: Appropriate, Attentive, and Sharing      Speech:  normal      Thought Process/Content: Logical      Affective Functioning: Congruent      Mood: euthymic      Level of consciousness:  Attentive      Response to Learning: Able to verbalize current knowledge/experience, Able to verbalize/acknowledge new learning, Able to retain information, Capable of insight, Able to change behavior, and Progressing to goal      Endings: None Reported    Modes of Intervention: Support, Exploration, Activity, and Reality-testing      Discipline Responsible: /Counselor      Signature:  Lela Singh

## 2024-04-15 NOTE — GROUP NOTE
Group Therapy Note    Date: 4/15/2024    Group Start Time: 10:40 AM  Group End Time: 11:30 AM  Group Topic: Cognitive Skills    [facility] Lela Fatima        Group Therapy Note    In the cognitive skills group this writer presented patients with a collaborative activity to discuss different cognitive distortions. This writer asked each patient to pick a cognitive distortion from a cup and read aloud the distortion and its meaning to the group. This writer discussed each distortion and invited group members to discuss their personal experiences with each distortion. This writer supported patient processing by asking open ended questions and concepts derived from CBT.     Attendees: 8       Patient's Goal:  Engage in discussion on cognitive distortions    Notes:  This patient was present and showed minimal engagement in group. Pt introduced himself however stated that he did not want to be present. Pt participated in activity and showed interest in discussion around magical thinking. Pt will continue to learn about cognitive distortions in future cognitive skills groups.     Status After Intervention:  Unchanged    Participation Level: Active Listener and Interactive    Participation Quality: Appropriate and Attentive      Speech:  normal      Thought Process/Content: Logical      Affective Functioning: Constricted/Restricted      Mood: irritable      Level of consciousness:  Alert      Response to Learning: Able to retain information      Endings: None Reported    Modes of Intervention: Education, Exploration, and Activity      Discipline Responsible: /Counselor      Signature:  Lela Singh

## 2024-04-15 NOTE — GROUP NOTE
Group Therapy Note    Date: 4/15/2024    Group Start Time:  1:10 PM  Group End Time:  2:00 PM  Group Topic: Psychoeducation    [facility] Alisia Yoo        Group Therapy Note  This writer facilitated a psychoeducational group on the inner and outer mask. This writer began by having an open discussion on what the difference is between the inner and outer mask. This writer then asked patients to decorate their outer and inner mask with pictures and words to represent how they appear to others and parts of themselves that they keep hidden. Patients then discussed the appearance and difference between their masks. This writer used open questions to prompt discussion.     Attendees: 6       Patient's Goal:  To learn and reflect on the inner and outer masks.    Notes:  The patient presented well to the session. After the patient completed the decoration of the mask, he shared that his outer mask is straightforward and direct whereas his inner mask involved more emotions and \"telling it like it is\". The patient also shared that his inner and outer mask were the same. He also engaged in discussion with his peer regarding what would happen if they only had the inner mask. The patient was then pulled at the end of group to speak with the physician.The patient will continue to work on identified treatment goals in further groups.     Status After Intervention:  Unchanged    Participation Level: Active Listener and Interactive    Participation Quality: Appropriate, Attentive, Sharing, and Supportive      Speech:  normal      Thought Process/Content: Logical  Linear      Affective Functioning: Congruent      Mood: euthymic      Level of consciousness:  Alert, Oriented x4, and Attentive      Response to Learning: Able to verbalize current knowledge/experience, Able to verbalize/acknowledge new learning, Able to retain information, and Capable of

## 2024-04-16 ENCOUNTER — HOSPITAL ENCOUNTER (OUTPATIENT)
Facility: HOSPITAL | Age: 39
Setting detail: RECURRING SERIES
Discharge: HOME | End: 2024-04-19
Payer: COMMERCIAL

## 2024-04-16 VITALS
TEMPERATURE: 98.9 F | OXYGEN SATURATION: 99 % | SYSTOLIC BLOOD PRESSURE: 162 MMHG | DIASTOLIC BLOOD PRESSURE: 108 MMHG | HEART RATE: 115 BPM

## 2024-04-16 PROCEDURE — 90832 PSYTX W PT 30 MINUTES: CPT

## 2024-04-16 PROCEDURE — 90853 GROUP PSYCHOTHERAPY: CPT

## 2024-04-16 NOTE — PROGRESS NOTES
MEDICATION GROUP THERAPY PROGRESS NOTE      Brentdenise Min was present for medication group.    TOPIC: Medication adherence    GROUP TIME: 1:10 - 2:00 PM Tuesday    PERSONAL GOAL FOR PARTICIPATION: To be present for group, participate in discussion, and answer patient-directed questions.    GOAL ORIENTATION: Personal    THERAPEUTIC INTERVENTIONS REVIEWED AND DISCUSSED: The following topics were presented: common barriers to taking medications including but not limited to cost, transportation, complexity of regimen, lack of knowledge about medication regimen;  beliefs regarding medications; strategies on how to overcome barriers and beliefs regarding medications in order to improve medication adherence. Patients were given time to ask questions regarding their current therapy.    IMPRESSION OF PARTICIPATION: Timmy was an active participant in group discussions.  Asked facilitator about risk of sexual dysfunction on current regimen.  Also specifically asked about how divalproex (Depakote) works and did not like facilitator's answer.  Soon after facilitator's response, asked how he could have the medication removed from his profile.  Encouraged him to look up information on the medication using reputable websites ([employer]) and speaking with the PHP provider to make an informed decision as the medication appears to be working well for him.      Carin Alexander [facility]  Clinical Pharmacy Specialist, Behavioral Health  Desk: [phone]   Pharmacy: [phone]

## 2024-04-16 NOTE — GROUP NOTE
Group Therapy Note    Date: 4/16/2024    Group Start Time:  9:00 AM  Group End Time:  9:40 AM  Group Topic: Community Meeting    [facility] Alisia Yoo        Group Therapy Note  This writer facilitated a community group that encouraged patients to check in with how they were feeling today. Patients were asked to complete the check-in form. After allowing time for this, patients engaged in discussion regarding their interests in order to build community and get to know each other.     Attendees: 9       Patient's Goal:  To complete check-in form and engage in discussion.    Notes:  The patient presented well to the session. The patient completed the check in form and did not indicate any SI, self-harm, or harm to others. The patient also rated him level of depression, anxiety, and anger as a 1. The patient then participated in discussion with his peers regarding shared interests. The patient also shared that he enjoys other's positivity which allows him to share more. The patient will continue to work on identified treatment goals in further groups.     Status After Intervention:  Unchanged    Participation Level: Active Listener and Interactive    Participation Quality: Appropriate, Attentive, Sharing, and Supportive      Speech:  normal      Thought Process/Content: Logical  Linear      Affective Functioning: Congruent      Mood: euthymic      Level of consciousness:  Alert, Oriented x4, and Attentive      Response to Learning: Able to verbalize current knowledge/experience, Able to retain information, and Capable of insight      Endings: None Reported    Modes of Intervention: Support, Socialization, and Exploration      Discipline Responsible: /Counselor      Signature:  MALLORY Meza Student.     
                                                                      Group Therapy Note    Date: 4/16/2024    Group Start Time:  9:40 AM  Group End Time: 10:30 AM  Group Topic: Process Group - Outpatient    [facility]    Alisia Clinton        Group Therapy Note  This writer facilitated a process group by encouraging the patients to share about any recent thoughts, emotions, feelings, or situations, that they have experienced, in order to gain support and feedback from their peers. During this conversation the topic of effective communication, anxiety symptoms, and avoidance were discussed. This writer used open ended questions to encourage patients to reflect on their feelings.    Attendees: 10       Patient's Goal:  To process thoughts and feelings and provide support and feedback to peers.    Notes:  The patient presented well to the session. The patient appeared to listen attentively as his peers discussed avoidance of emotions and the cycle of anxiety. The patient was observed to nod his head up and down in agreement with the statements from his peers. The patient will continue to work on processing emotions in further groups.    Status After Intervention:  Unchanged    Participation Level: Active Listener and Interactive    Participation Quality: Appropriate, Attentive, and Supportive      Speech:  normal      Thought Process/Content: Logical  Linear      Affective Functioning: Congruent      Mood: euthymic      Level of consciousness:  Alert, Oriented x4, and Attentive      Response to Learning: Able to verbalize current knowledge/experience, Able to retain information, and Capable of insight      Endings: None Reported    Modes of Intervention: Support, Socialization, and Exploration      Discipline Responsible: /Counselor      Signature:  MALLORY Meza Student.     
                                                                      Group Therapy Note    Date: 4/16/2024    Group Start Time: 10:50 AM  Group End Time: 11:30 AM  Group Topic: Cognitive Skills    [facility]    Naomi Young MSW        Group Therapy Note    Patients were asked to participate in a communication activity, in which one group member is describing an image verbally for the rest of the group to draw. Patients were encouraged to use communication skills and ask clarifying questions. Patients were asked to reflect on communication skills used and how to apply learning.    Attendees: 9       Patient's Goal:  Identify and practice effective communication skills, engage actively with peers    Notes:  The patient was not present in cognitive skills group and did not engage.    Status After Intervention:  N/A    Participation Level: N/A    Participation Quality: N/A      Speech:  N/A      Thought Process/Content: N/A      Affective Functioning: N/A      Mood: N/A      Level of consciousness:  N/A      Response to Learning: N/A      Endings: N/A    Modes of Intervention: N/A      Discipline Responsible: /Counselor      Signature:  MALLORY Castellano    
                                                                      Group Therapy Note    Date: 4/16/2024    Group Start Time: 12:00 PM  Group End Time:  1:00 PM  Group Topic: Psychotherapy    [facility] PHP- Adolescent    Lela Singh        Group Therapy Note    In the psychotherapy group, this writer had patients gather outdoors to increase body movement and experience change of environment. This writer provided patients with the \"who am I?\" Identity exploration exercise that asked patients to think about and describe current values, traits, and experiences. This writer had patients process on this activity before regrouping indoors to discuss collaboratively on their experiences outside and with the activity. This writer supported pt process while sharing by asking open-ended questions to promote engagement and build group rapport.     Attendees: 5       Patient's Goal:  Engage in identity exploration exercise     Notes:  This pt was present and engaged in the psychotherapy group, sharing that sports and loyalty are a part of his identity, as sports have taught him, lifelong lessons he passed on to his children. This patient will continue to engage in identity exploration to build strong identity in the future psychotherapy groups.    Status After Intervention:  Unchanged    Participation Level: Active Listener and Interactive    Participation Quality: Appropriate, Attentive, and Sharing      Speech:  normal      Thought Process/Content: Logical      Affective Functioning: Congruent      Mood: euthymic      Level of consciousness:  Attentive      Response to Learning: Able to verbalize current knowledge/experience, Able to verbalize/acknowledge new learning, Able to retain information, Capable of insight, Able to change behavior, and Progressing to goal      Endings: None Reported    Modes of Intervention: Support, Socialization, Exploration, Activity, and Movement      Discipline Responsible: Social 
/Counselor      Signature:  Lela Singh

## 2024-04-16 NOTE — BH NOTE
Partial Hospitalization Program Individual Psychotherapy Note      Diagnosis: Generalized anxiety, panic disorder without agoraphobia, ADHD and PTSD both by history.     Goal: Individual counseling, treatment planning, discharge planning      Psychotherapy Session    Start time: 1045  Stop time: 1120      Patient Mental Status and Mood/Affect:Calm and Congruent    Patient Behavior and Appearance: Cooperativeshows no evidence of impairment    Intervention/Techniques: Validated/Supported, Reflected, Challenged, Prompted/Cued, Listened/Empathized, and Observed/Monitored    Focus of Session/Patient Response and Progress Towards Goal: progress check in, treatment plan update, processing.     Narrative:     Patient presented for session alert and oriented x4, denied SI/HI and AH/VH.     Patient expressed that he was doing okay, and had had to take an Ativan this morning even though he didn't want to. When writer prompted as to why, patient explained that he is concerned about the long term effects, including dementia.  Patient presented as generally preoccupied with medical issues, including blood pressure, but was easily redirectable and responsive to challenging. Writer reflected that doctors have for the most part agreed that patient is healthy, and encouraged that his body is resilient--even though this time of increased anxiety is difficult and uncomfortable, patient is capable of withstanding it.     Writer reflected that sometimes therapy is like re-breaking a bone to get it set to heal the right way, so that it doesn't effect our functioning in the long term (the way a broken nose can set wrong and affect breathing). Patient presented as receptive to this. He shared he does feel like he is improving, and has been going out for walks with his fiancee that have been positive. He shared they had a disagreement over the weekend, but it ended up being productive as they both used the skills from a worksheet he received

## 2024-04-16 NOTE — BH NOTE
4/15/2024    Patient approached writer in office during lunch, sharing that he was having a bad day and feeling like his anxiety was high. Patient shared that he had taken his PRN ativan to help with this, although he hadn't wanted to. Writer asked why, and patient stated that they can be habit forming and he doesn't want to become addicted. Patient stated feeling like his heart was beating out of his chest and that his blood pressure was high. He expressed that his discharge is scheduled for this Friday and he no longer wants to extend past that, as groups have been heavy today and he feels he wants to focus on positivity more than negativity. Writer reflected that sometimes Mondays can feel like that as it's after a weekend, and encouraged patient to give it time and take it day by day. Patient shared that it was really helping last week, and writer reflected that that's even more of a reason to give it time. Patient presented as agreeable, and left writer's office.     7-8 minutes later, patient returned, stating he felt he needed to go to seek medical attention because he felt his blood pressure was too high. Writer shared that group was starting, but someone could take patient's vitals and see if that were the case. Patient was agreeable, and as writer had to start group, Angela GOMES took patient's vitals while writer started group.     MALLORY Saleh   
planning with social work      [x] NO NEW Medications at this time.   [] New Medication prescribed and prescription provided to patient  [x] PHP Nurse notified of changes as needed    [x] Regarding any medications: patient instructed on purpose, benefits, side effects,   risks, and alternative treatments. Patient verbalizes understanding of instructions and has had the opportunity to ask questions.    Leslee Silverman MD  04/16/24   3:47 AM

## 2024-04-17 ENCOUNTER — HOSPITAL ENCOUNTER (OUTPATIENT)
Facility: HOSPITAL | Age: 39
Setting detail: RECURRING SERIES
Discharge: HOME | End: 2024-04-20
Payer: COMMERCIAL

## 2024-04-17 VITALS
TEMPERATURE: 98.5 F | SYSTOLIC BLOOD PRESSURE: 153 MMHG | DIASTOLIC BLOOD PRESSURE: 110 MMHG | HEART RATE: 96 BPM | OXYGEN SATURATION: 97 %

## 2024-04-17 PROCEDURE — 90853 GROUP PSYCHOTHERAPY: CPT

## 2024-04-17 NOTE — BH NOTE
This tech retook pt vitals at 11:15am. Pt pulse was no linger abnormally high however pt still had abnormal blood pressure. This tech notified Dr. Spencer of abnormal vitals.     This tech took pt vitals at the start of treatment day. Pt vitals read as high pulse. This tech informed Dr. Spencer that this tech would retake pt vitals in one hour and follow up as needed.

## 2024-04-17 NOTE — GROUP NOTE
Group Therapy Note    Date: 4/17/2024    Group Start Time:  1:10 PM  Group End Time:  2:00 PM  Group Topic: Psychoeducation    [facility]    Tania Prescott MSW        Group Therapy Note    Writer facilitated psychoeducation group. Writer provided handouts on the fight/flight/freeze/loida response. Writer provided education on the purpose of the F/F/F/F response and the different forms it takes. Writer transitioned to supporting the  who came to lead the second half of group. The PSS facilitated an identity activity and encouraged pts to share.     Attendees: 5       Patient's Goal:  understanding trauma responses and engaging with the PSS.    Notes:  Pt presented as attentive as evidenced by eye contact. Pt shared about his own experiences with trauma responses, to include dissociation. Pt asked appropriate questions. Pt engaged appropriately with the PSS. Pt will continue to work on understanding trauma responses and engaging with the PSS.    Status After Intervention:  Unchanged    Participation Level: Active Listener and Interactive    Participation Quality: Appropriate, Attentive, and Sharing      Speech:  normal      Thought Process/Content: Logical      Affective Functioning: Congruent      Mood: euthymic      Level of consciousness:  Alert, Oriented x4, and Attentive      Response to Learning: Able to verbalize current knowledge/experience and Progressing to goal      Endings: None Reported    Modes of Intervention: Education      Discipline Responsible: /Counselor      Signature:  MALLORY MCELROY    
                                                                      Group Therapy Note    Date: 4/17/2024    Group Start Time:  9:00 AM  Group End Time:  9:40 AM  Group Topic: Community Meeting    [facility]    Viola Aguilar MSW        Group Therapy Note    Attendees: 9    Writer facilitated community check in group this morning. Writer opened by encouraging patients to fill out their check in sheets. Writer then checked in with patients and asked them to share how they are doing today. This sharing lasted until the end of group.        Patient's Goal:  Participate in group therapy, fill out check in sheet, engage in open sharing and rapport building with peers.     Notes:  Patient engaged well in group this morning. He filled out his check in sheet, declining any SI or thoughts of self harm. Patient identified low levels of negative moods. Patient presented as euthymic and connected well with his peers. He shared that he does not need an alarm clock in the morning and is a naturally early riser, despite wanting to sleep in sometimes. Patient provided good verbal support for a peer who was processing about a stressful morning. Patient will continue with identified treatment goals in further groups.     Status After Intervention:  Unchanged    Participation Level: Active Listener and Interactive    Participation Quality: Appropriate, Attentive, and Sharing      Speech:  normal      Thought Process/Content: Logical      Affective Functioning: Congruent      Mood: euthymic      Level of consciousness:  Alert and Oriented x4      Response to Learning: Able to verbalize current knowledge/experience, Capable of insight, and Progressing to goal      Endings: None Reported    Modes of Intervention: Support, Socialization, and Exploration      Discipline Responsible: /Counselor      Signature:  MALLORY Saleh    
                                                                      Group Therapy Note    Date: 4/17/2024    Group Start Time:  9:40 AM  Group End Time: 10:30 AM  Group Topic: Process Group - Outpatient    [facility] PHP- Adolescent    Lela Singh        Group Therapy Note    This writer facilitated the process group, presenting the patients with a cathartic brain-dump activity. This writer asked patients to respond to several prompts based on current thoughts, feelings and experiences with no filter or formality to increase processing. This writer invited patients to discuss their experiences with their brain dump in a group discussion by asking open-ended questions and supported pt processing using concepts derived from CBT.     Attendees: 5       Patient's Goal:  Engage in cathartic brain dump exercise     Notes:  Pt was present in the process group showing minimal engagement in group discussion. Pt shared that he has difficulty having patience for negativity and stupidity which causes him irritation. Pt will continue to identify frustrations and causes to anger in future process groups.     Status After Intervention:  Unchanged    Participation Level: Active Listener and Interactive    Participation Quality: Appropriate and Attentive      Speech:  normal      Thought Process/Content: Logical      Affective Functioning: Congruent and Flat      Mood: euthymic      Level of consciousness:  Attentive      Response to Learning: Able to verbalize current knowledge/experience, Able to retain information, Capable of insight, and Progressing to goal      Endings: None Reported    Modes of Intervention: Support, Exploration, and Activity      Discipline Responsible: /Counselor      Signature:  Lela Singh    
                                                                      Group Therapy Note    Date: 4/17/2024    Group Start Time: 10:40 AM  Group End Time: 11:30 AM  Group Topic: Cognitive Skills    [facility]    Naomi Young MSW        Group Therapy Note    Patients were given educational materials about inner critic vs. inner . Patients were encouraged to identify how they engage in inner critic and inner  styles of self-talk. Patients were asked to identify sources of positive and negative self-talk. Patients were encouraged to share and provide feedback to peers.    Attendees: 4       Patient's Goal:  Identify examples of inner criticism and inner coaching, identify sources of inner criticism, identify ways to challenge thinking    Notes:  The patient engaged actively in cognitive skills group. The patient identified sources of inner criticism and examples from his life. The patient discussed the role of feedback from others in developing and combatting inner criticism. The patient identified how anger responses are impacted by feelings of self-criticism. The patient will continue to practice identifying and challenging thoughts.    Status After Intervention:  Unchanged    Participation Level: Active Listener and Interactive    Participation Quality: Appropriate, Attentive, and Sharing      Speech:  normal      Thought Process/Content: Logical  Linear      Affective Functioning: Congruent      Mood: euthymic      Level of consciousness:  Alert, Oriented x4, and Attentive      Response to Learning: Able to verbalize current knowledge/experience, Capable of insight, and Progressing to goal      Endings: None Reported    Modes of Intervention: Education and Exploration      Discipline Responsible: /Counselor      Signature:  MALLORY Castellano    
                                                                      Group Therapy Note    Date: 4/17/2024    Group Start Time: 12:00 PM  Group End Time:  1:00 PM  Group Topic: Psychotherapy    [facility] Lela Fatima        Group Therapy Note    In the treatment planning group, this writer reviewed the prior group discussion on inner  and inner critic. This writer discussed ways to combat inner critic such as the use of encouragement, self care and positive affirmations. This writer practiced challenging negative inner critic by having each patient write their favorite affirmation of their choice on a sticky note and give it to a group members. This writer encouraged patients to share feelings and thoughts after receiving their affirmation. This writer supported pt processing by asking open-ended questions and concepts derived from CBT.     Attendees: 5       Patient's Goal:  Challenge inner critic through sharing positive affirmations    Notes:  This pt was present and engaged in the treatment planning group, sharing that today he woke up stating a positive affirmation that today was going to be good. The pt will continue to challenge negative inner critic through positive self talk in the future treatment groups.     Status After Intervention:  Unchanged    Participation Level: Active Listener and Interactive    Participation Quality: Appropriate, Attentive, and Sharing      Speech:  normal      Thought Process/Content: Logical      Affective Functioning: Congruent      Mood: euthymic      Level of consciousness:  Attentive      Response to Learning: Able to verbalize current knowledge/experience, Able to retain information, Capable of insight, and Progressing to goal      Endings: None Reported    Modes of Intervention: Support, Exploration, and Activity      Discipline Responsible: /Counselor      Signature:  Lela Singh    
Lela Singh

## 2024-04-18 ENCOUNTER — HOSPITAL ENCOUNTER (OUTPATIENT)
Facility: HOSPITAL | Age: 39
Setting detail: RECURRING SERIES
Discharge: HOME | End: 2024-04-21
Payer: COMMERCIAL

## 2024-04-18 VITALS
SYSTOLIC BLOOD PRESSURE: 152 MMHG | TEMPERATURE: 98.9 F | DIASTOLIC BLOOD PRESSURE: 104 MMHG | OXYGEN SATURATION: 98 % | HEART RATE: 99 BPM

## 2024-04-18 PROCEDURE — 90853 GROUP PSYCHOTHERAPY: CPT

## 2024-04-18 PROCEDURE — 90832 PSYTX W PT 30 MINUTES: CPT

## 2024-04-18 NOTE — BH NOTE
Partial Hospitalization Program Individual Psychotherapy Note      Diagnosis: Generalized anxiety, panic disorder without agoraphobia, ADHD and PTSD both by history.     Goal: Discharge session     Psychotherapy Session    Start time: 1315  Stop time: 1340    Patient Mental Status and Mood/Affect:Calm and Congruent    Patient Behavior and Appearance: Cooperativeshows no evidence of impairment    Intervention/Techniques: Informed, Reflected, and Problem Solved    Focus of Session/Patient Response and Progress Towards Goal: discharge planning, review of resources    Narrative:     Patient presented for session alert and oriented x4, denied SI/HI and AH/VH.     Writer met with patient to review resources for discharge, including therapy appointment on Tuesday. Writer reviewed that patient's psychiatrist office has been contacted, and writer will try calling again if they don't get back with writer. Patient reports being comfortable with the amount of medications he has and plans to meet with the PHP provider one more time before discharge regardless.     Patient reflected feeling ready for discharge, and is proud of himself for finishing his 2 weeks of treatment. Patient expressed that he has never really finished something before and is glad to have finished his treatment here appropriately.     Writer and patient reflected what he might want to do on his last day, and landed on an affirmations activity that writer will lead tomorrow.     MALLORY Saleh

## 2024-04-18 NOTE — BH NOTE
OUTPATIENT PHYSICIAN PROGRESS NOTE      Chief Complaint/Symptoms/Impairments (as noted in Treatment Plan): \"I haven't been arguing with my fiance, and I'm letting little things go\" Continues to be somatically preoccupied    Criteria for Continued Treatment (check all that apply):   [x] Preventing Decompensation   [x] Improving Level of Functioning  [] Reducing Isolative Behaviors  [] Understanding Diagnosis and Need for Medications  [] Improving Treatment/Medication Compliance  [] Confronting Denial of Illness  [x] Stabilizing Level of Functioning  [] Improving Emotional/Social/Cognitive Functioning  [] Decreasing Frequency of Hospitalizations    Suicidal/Homicidal ideations:   [x] Absent  [] Present  [] Passive  [] Intent  [] Plan  [] Death Wishes      CURRENT CONDITION OF PATIENT & PROGRESS ON TREATMENT PLAN    Subjective (ongoing complaints by patient regarding symptom severity, presentation, affect, function, etc.): Improved patience, decreased arguments and irritability. Continues to worry about somatic symptoms like feeling \"dizzy\" at times. Rechecked BP manually in session was 144/90.      Behavior (side effects, changes in mental status, objective observations seen in individual sessions or by staff): Appears restless, fair EC. Appropriate dress and grooming. Denies SI or HI. Anxiety improved.      Assessment/Plan (functional improvement and/or ongoing impairment, response to treatment, plan for future ongoing treatment and medication management to include revisions): Continue Depakote 500 mg at HS, prn hydroxyzine.        [x] NO NEW Medications at this time.   [] New Medication prescribed and prescription provided to patient  [] PHP Nurse notified of changes as needed    [x] Regarding any medications: patient instructed on purpose, benefits, side effects,   risks, and alternative treatments. Patient verbalizes understanding of instructions and has had the opportunity to ask questions.    Leslee Silverman,

## 2024-04-18 NOTE — BH NOTE
This tech took pt vitals at approximately 8:30am. Pt had abnormal blood pressure. This tech asked pt if he was taking his new prescription of 10mg of Norvasc. Pt confirmed he has been taking 10mg of Norvasc since Saturday. This tech notified Doctor of abnormal vitals.

## 2024-04-18 NOTE — GROUP NOTE
Group Therapy Note    Date: 4/18/2024    Group Start Time:  1:10 PM  Group End Time:  2:00 PM  Group Topic: Psychoeducation    [facility] [place]    Tania Prescott MSW        Group Therapy Note    Writer continued facilitation of creative expression activity in which pts transformed caterpillars, which they wrote their negative beliefs about themselves, into butterflies by coloring over then cutting up the caterpillars and, by extension, the negative beliefs. Writer utilized open ended questions to support peers in sharing. Writer encouraged peer feedback.    Attendees: 9       Patient's Goal:  using creative expression to challenge negative self-narratives    Notes:  Pt presented as attentive and engaged as evidenced by spontaneous participation. Pt shared about the symbolism of the colors he used. Pt shared about the negative things he has been told of himself and how he turned them into affirmations in his butterfly. Pt provided appropriate feedback to peers. Pt will continue to work on using creative expression to challenge negative self-narratives.    Status After Intervention:  Unchanged    Participation Level: Active Listener and Interactive    Participation Quality: Appropriate, Attentive, and Sharing      Speech:  normal      Thought Process/Content: Logical      Affective Functioning: Congruent      Mood: euthymic      Level of consciousness:  Alert, Oriented x4, and Attentive      Response to Learning: Able to verbalize current knowledge/experience and Progressing to goal      Endings: None Reported    Modes of Intervention: Activity      Discipline Responsible: /Counselor      Signature:  MALLORY MCELROY    
                                                                      Group Therapy Note    Date: 4/18/2024    Group Start Time:  9:00 AM  Group End Time:  9:40 AM  Group Topic: Community Meeting    [facility] Lela Fatima        Group Therapy Note    In the community group, this writer presented patients with their behavioral check-in sheet to inform staff of any SI/HI. This writer then prompted group members to share any experiences or emotions they would like to identify having since the previous treatment day. This writer introduced a conversation of identifying daily wins to increase positive experiences and asked group members to share daily goals for treatment. This writer supported patient process by asking patients open prompting, open-ended questions.     Attendees: 9       Patient's Goal:  Complete check-in sheet, identify wins, and daily treatment goals.     Notes:  Pt was present in the community group indicating no SI/HI on behavioral check-in sheet. Pt acknowledged commonality with group member in enjoyment of morning meditation. Pt will continue to identify daily achievements and treatment goals in future community groups.    Status After Intervention:  Unchanged    Participation Level: Active Listener and Interactive    Participation Quality: Appropriate and Attentive      Speech:  normal      Thought Process/Content: Logical      Affective Functioning: Congruent      Mood: euthymic      Level of consciousness:  Attentive      Response to Learning: Able to verbalize current knowledge/experience, Able to retain information, and Progressing to goal      Endings: None Reported    Modes of Intervention: Support and Socialization      Discipline Responsible: /Counselor      Signature:  Lela Singh    
                                                                      Group Therapy Note    Date: 4/18/2024    Group Start Time: 10:40 AM  Group End Time: 11:30 AM  Group Topic: Cognitive Skills    [facility]    Tania Prescott MSW        Group Therapy Note    Writer facilitated cognitive skills group. Writer brought materials to provide education on defense mechanisms. Writer opened with asking pts to share the 1 thing they are grateful for that they were asked to identify at the end of last group. Writer encouraged and supported peer feedback. Writer prompted pts to identify skills for how to sit with difficult emotions based on a situation identified by a pt. Writer provided education on such skills.     Attendees: 9       Patient's Goal:   practicing gratitude and learning how to sit with difficult emotions    Notes:  Pt presented as attentive and engaged as evidenced by spontaneous participation. Pt identified his children as his source of gratitude. Pt provided appropriate feedback to a peer. Pt discussed difficulty in not responding when people are rude. Pt will continue to work on practicing gratitude and learning how to sit with difficult emotions.    Status After Intervention:  Appropriate, Attentive, and Sharing    Participation Level: Active Listener    Participation Quality: Appropriate and Attentive      Speech:  normal      Thought Process/Content: Logical      Affective Functioning: Congruent      Mood: euthymic      Level of consciousness:  Alert, Oriented x4, and Attentive      Response to Learning: Able to verbalize current knowledge/experience and Progressing to goal      Endings: None Reported    Modes of Intervention: Support and Activity      Discipline Responsible: /Counselor      Signature:  MALLORY MCELROY    
                                                                      Group Therapy Note    Date: 4/18/2024    Group Start Time: 12:00 PM  Group End Time:  1:00 PM  Group Topic: Psychotherapy    [facility] Lela Fatima        Group Therapy Note    In the treatment planning group, this writer presented patients with a narrative/art therapy activity that asked patients to reflect on their old story including past self, negative coping skills, criticisms, habits and cognitive distortions. This writer had patient use this reflection to create a caterpillar that patients then turned into a butterfly signifying freedom from past and ability to change. This writer supported pt processing by asked patients open ended questions about their art pieces and supported patients throughout the art process.     Attendees: 9       Patient's Goal:  Engage in therapeutic art activity    Notes:  This pt was present and engaged during this treatment group, identifying past cognitive distortion of self to be a \"real man\". Pt also met individually with physician during this time. Pt will continue to name cognitive distortions and engage in art therapy in the future treatment planning group.     Status After Intervention:  Unchanged    Participation Level: Active Listener and Interactive    Participation Quality: Appropriate and Attentive      Speech:  normal      Thought Process/Content: Logical      Affective Functioning: Congruent      Mood: euthymic      Level of consciousness:  Attentive      Response to Learning: Able to verbalize current knowledge/experience, Able to retain information, Capable of insight, Able to change behavior, and Progressing to goal      Endings: None Reported    Modes of Intervention: Socialization, Exploration, and Activity      Discipline Responsible: /Counselor      Signature:  Lela Singh    
MSW

## 2024-04-19 ENCOUNTER — HOSPITAL ENCOUNTER (OUTPATIENT)
Facility: HOSPITAL | Age: 39
Setting detail: RECURRING SERIES
Discharge: HOME | End: 2024-04-22
Payer: COMMERCIAL

## 2024-04-19 VITALS
TEMPERATURE: 99.2 F | OXYGEN SATURATION: 98 % | SYSTOLIC BLOOD PRESSURE: 147 MMHG | DIASTOLIC BLOOD PRESSURE: 87 MMHG | HEART RATE: 99 BPM

## 2024-04-19 PROCEDURE — 90853 GROUP PSYCHOTHERAPY: CPT

## 2024-04-19 NOTE — GROUP NOTE
Group Therapy Note    Date: 4/19/2024    Group Start Time:  9:00 AM  Group End Time:  9:40 AM  Group Topic: Community Meeting    [facility]    Alisia Clinton        Group Therapy Note  This writer facilitated a community group that encouraged patients to check in with how their afternoon went yesterday and how they were feeling today. Patients were asked to complete the check-in form. After allowing time for this, this writer had patients reflect on effective coping skills. This writer used open ended questions to prompt discussion among patients.     Attendees: 7       Patient's Goal:  To complete check-in form and engage in discussion.    Notes:  The patient presented well to the session. He completed the check in form and rated his depression, anxiety, and anger as a 1. The patient denied any SI, self-harm, or harm to others. This writer then asked the patient how he was feeling to which he responded that he was feeling good. This writer then asked the patient about coping skills that he used that were helpful to him. He shared that listening to go go music typically puts him in a better mood and using ice to help him calm down when feeling anxious. The patient then engaged in discussion with his peers regarding grounding and tapping as coping skills. The patient will continue to work on identified treatment goals in further groups.     Status After Intervention:  Unchanged    Participation Level: Active Listener and Interactive    Participation Quality: Appropriate, Attentive, Sharing, and Supportive      Speech:  normal      Thought Process/Content: Logical  Linear      Affective Functioning: Congruent      Mood: euthymic      Level of consciousness:  Alert, Oriented x4, and Attentive      Response to Learning: Able to verbalize current knowledge/experience, Able to verbalize/acknowledge new learning, Able to retain information, and

## 2024-04-19 NOTE — GROUP NOTE
Group Therapy Note    Date: 4/19/2024    Group Start Time: 12:00 PM  Group End Time:  1:00 PM  Group Topic: Psychotherapy    [facility]    Alisia Clinton        Group Therapy Note  This writer facilitated an education group that educated patients on the grieving process. This writer provided education by discussing acute, complicated, and integrated grief. Patients were than asked to reflect on their experiences with grief and how connection can provide comfort during the grieving process. Patients then shared ways they continue bonds with people they are grieving. This writer used open ended questions, empathy, and a strengths based approach.     Attendees: 9       Patient's Goal:  Learn about the grieving process and discuss coping skills to navigate grief.     Notes:  The patient presented well to the session. The patient engaged in discussion with this writer about the difference between acute, complicated, and integrated grief. The patient then discussed using avoidance to cope with grief.  This writer then encouraged the patient to reflect on the avoidance. The patient then shared that he was able to process his emotions recently with grief, which helped. This writer then discussed how grief symptoms can decrease over time. The patient will continue to work on identified treatment goals in further groups.     Status After Intervention:  Unchanged    Participation Level: Active Listener and Interactive    Participation Quality: Appropriate, Attentive, Sharing, and Supportive      Speech:  normal      Thought Process/Content: Logical  Linear      Affective Functioning: Congruent      Mood: euthymic      Level of consciousness:  Alert, Oriented x4, and Attentive      Response to Learning: Able to verbalize current knowledge/experience, Able to verbalize/acknowledge new learning, Able to retain information, and Capable of

## 2024-04-19 NOTE — BH NOTE
Discharge Progress Note  Patient was admitted to [facility] after originally presenting with increased anxiety symptoms, and was encouraged by his family to participate in treatment. Patient completed 60 hours of therapeutic treatment while in PHP, focusing on goals he identified of learning and demonstrating anxiety management skills and improving social skills. Patient made significant progress on these goals while in treatment, and expressed that he specifically was able to increase his coping skills while here. Patient demonstrated ability to change behavior, and reported that he now takes 5 minutes for mindfulness every day as well as reporting a decrease in relationship conflict. Patient discharged today in significantly improved condition and will continue treatment in outpatient individual counseling and medication management. His treatment plan was closed out for discharge and signed, and he received a copy of his safety plan and AVS prior to discharge. His follow up visits are as follows:    Outpatient Therapy     Monday, April 22, 2024   Maria Elena Rey @ [facility]   Details in email--virtual visit         Psychiat[facility]    [facility]   [address] [place] [address]   [phone]      Other Resources    https://bruno.org/Home - website for the [employer] of Mental Illness    https://namicva.org/calendar/ - local connection/support groups calendar through [facility]   Intensive Outpatient Programs: [facility] ([phone]), [facility] ([phone])   [facility]:  Trauma recovery center with groups and other counseling resources. [email or web address]/ [phone]      You are always welcome to return to [facility] at [facility] if you feel you need us! Our intake phone number is [phone].        MALLORY Saleh

## 2024-04-19 NOTE — DISCHARGE SUMMARY
PARTIAL HOSPITALIZATION PROGRAM DISCHARGE SUMMARY    Shimon Min  38 y.o.  1985  [id number]  [phone]        Admission Date: 04/08/2024  Discharge Date: 4/19/2024     Admission Diagnosis (DSM 5): Panic disorder without agoraphobia; generalized anxiety disorder; ADHD by history, PTSD by history     Discharge Diagnosis (DSM 5):  Generalized anxiety disorder F41.1, PTSD F43.1    Status at Last Contact: stable, improved from admission     Date and Time of Last Contact/Attempted Contact: 4/19/2024 2:45pm    Guardian Contacted: N/A    Reason for Admission (Summary):     Chief Complaint (patient's own words):      Patient was referred by his psychiatrist following multiple emergency visits for anxiety. He stated, \" I get these panic attacks out of the blue and feel like I am running a marathon but am sitting still.\" Patient reported anger, stress,conversations around grief, memories of the past, and social situations are triggers for his anxiety. Patient expressed not being able to \"chill and stop the worrying.\" Patient reported to be fired or leave multiple jobs due to fearing he will have a panic attack. Patient stated, \" I fear and constantly am on guard of when the next attack will be.\" Patient reported withdrawing from people due to his anxiety and only relying on his fiancee who is supportive but there is tension. Patient expressed wanting to strengthen his relationship with her and expand his social support. Patient [place] and scored as \" low risk.\" Patient's clinical scores are as follows:      SEBASTIÁN - 7: 9  PCL - F: 48  PHQ - 9: 14        Patient has four kids aged 13, 10, 10, and 5. Patient reported  his ex-wife three years ago and having a good relationship with her in relation to co-parenting three kids. Patient reported not having a good relationship with oldest kid's mother and currently having issues with child support.      Pt shared having a decent upbringing

## 2024-04-19 NOTE — BH NOTE
OUTPATIENT PHYSICIAN PROGRESS NOTE      Chief Complaint/Symptoms/Impairments (as noted in Treatment Plan): \"I'm doing pretty good\"    Criteria for Continued Treatment (check all that apply):   [x] Preventing Decompensation   [] Improving Level of Functioning  [] Reducing Isolative Behaviors  [] Understanding Diagnosis and Need for Medications  [] Improving Treatment/Medication Compliance  [] Confronting Denial of Illness  [] Stabilizing Level of Functioning  [x] Improving Emotional/Social/Cognitive Functioning  [] Decreasing Frequency of Hospitalizations    Suicidal/Homicidal ideations:   [x] Absent  [] Present  [] Passive  [] Intent  [] Plan  [] Death Wishes      CURRENT CONDITION OF PATIENT & PROGRESS ON TREATMENT PLAN    Subjective (ongoing complaints by patient regarding symptom severity, presentation, affect, function, etc.): Reports improved mood and patients. Less irritability. Reports trying to go back to work next week.      Behavior (side effects, changes in mental status, objective observations seen in individual sessions or by staff): Good EC, denies SI or HI. Appears more relaxed. No agiation or retardation noted. TP linear and logical. No evident delusions or hallucinations.      Assessment/Plan (functional improvement and/or ongoing impairment, response to treatment, plan for future ongoing treatment and medication management to include revisions): Discharge from program as planned.        [x] NO NEW Medications at this time.   [] New Medication prescribed and prescription provided to patient  [] PHP Nurse notified of changes as needed    [] Regarding any medications: patient instructed on purpose, benefits, side effects,   risks, and alternative treatments. Patient verbalizes understanding of instructions and has had the opportunity to ask questions.    Leslee Silverman MD  04/19/24   10:36 AM

## 2024-04-19 NOTE — GROUP NOTE
Group Therapy Note    Date: 4/19/2024    Group Start Time:  2:00 PM  Group End Time:  2:45 PM  Group Topic: Wrap-Up    [facility]    Viola Aguilar MSW        Group Therapy Note    Attendees: 9    Writer facilitated wrap up group this afternoon. Writer opened by encouraging patients to fill out their wrap up sheets. Writer also provided patients with their safety plans for the weekend along with education on support if they need it. Writer then moved into the activity, providing patients with blank paper and asking them to write their names at the top and then decorate a border. Once they were finished, writer facilitated patients passing around the sheets and having them write affirmations for each other, which took until the end of group.        Patient's Goal:  Participate in group therapy, complete check out sheet, share affirmations for peers.     Notes:  Patient engaged well in group this afternoon. He filled out his check out sheet, declining any SI or HI. He identified low levels of anger and depression and a medium level of anxiety. He participated well in the activity, and thanked his peers for the positivity on his last day. Patient was discharged at the end of group today.     Status After Intervention:  Improved    Participation Level: Active Listener and Interactive    Participation Quality: Appropriate, Attentive, and Sharing      Speech:  normal      Thought Process/Content: Logical      Affective Functioning: Congruent      Mood: euthymic      Level of consciousness:  Alert and Oriented x4      Response to Learning: Able to verbalize current knowledge/experience, Capable of insight, and Progressing to goal      Endings: None Reported    Modes of Intervention: Support, Exploration, and Activity      Discipline Responsible: /Counselor      Signature:  MALLORY Saleh

## 2024-04-19 NOTE — GROUP NOTE
Group Therapy Note    Date: 4/19/2024    Group Start Time:  1:10 PM  Group End Time:  2:00 PM  Group Topic: Psychoeducation    [facility] Lela Fatima        Group Therapy Note    In the psychoeducation group, this writer introduced patients with a brief discussion on the coat of arms. This writer discussed symbolism of family descent, moral codes, values, and honor. This writer provided patients with the materials to created a personalized coat of arms in order to increase identity and understanding of self-concept. This writer supported pt processing by asking patients open-ended questions about ones personal code of arms and their experiences with the narrative activity presented.     Attendees: 9       Patient's Goal:  Engage in narrative therapy art activity to better understand self-concept and identity.      Notes:  Pt was present and engaged in the psychoeducation group. Pt named staying true to himself and being a great father in coat of arms. Pt will continue to identify important personal values and qualities that form identity in future psychoeducation groups.     Status After Intervention:  Unchanged    Participation Level: Active Listener and Interactive    Participation Quality: Appropriate, Attentive, Sharing, and Supportive      Speech:  normal      Thought Process/Content: Logical      Affective Functioning: Congruent      Mood: euthymic      Level of consciousness:  Attentive      Response to Learning: Able to verbalize current knowledge/experience, Able to verbalize/acknowledge new learning, Able to retain information, Capable of insight, Able to change behavior, and Progressing to goal      Endings: None Reported    Modes of Intervention: Support, Socialization, and Activity      Discipline Responsible: /Counselor      Signature:  Lela Singh

## 2024-04-19 NOTE — GROUP NOTE
Group Therapy Note    Date: 4/19/2024    Group Start Time:  9:40 AM  Group End Time: 10:30 AM  Group Topic: Process Group - Outpatient    [facility]    Viola Aguilar MSW        Group Therapy Note    Attendees: 8    Writer facilitated process group this morning. Writer opened by asking patients how they were doing, and asked if anyone had anything they would like to bring to the group to talk through or process. Several patients did, and writer facilitated discussions until the end of group.        Patient's Goal:  Participate in group therapy, engage in open sharing and processing, provide support and insight to peers.     Notes:  Patient engaged well in group this morning. He provided good verbal feedback for a peer who was processing some anxiety about traveling this weekend. Patient connected with another of his peers around the topic of working to support oneself while in treatment. Writer prompted patient for reflection about it being his last day in [facility]; patient shared that the biggest thing he has learned here is coping skills. Patient reflected that deep breathing has worked well for him, and now he takes 5 minutes every morning to start his day with it, where he would have otherwise started running around and felt scattered. Patient will continue with identified treatment goals in further groups.     Status After Intervention:  Improved    Participation Level: Active Listener and Interactive    Participation Quality: Appropriate, Attentive, Sharing, and Supportive      Speech:  normal      Thought Process/Content: Logical      Affective Functioning: Congruent      Mood: euthymic      Level of consciousness:  Alert and Oriented x4      Response to Learning: Able to verbalize current knowledge/experience, Able to verbalize/acknowledge new learning, Capable of insight, Able to change behavior, and Progressing to goal      Endings: None

## 2024-04-19 NOTE — GROUP NOTE
Group Therapy Note    Date: 4/19/2024    Group Start Time: 10:40 AM  Group End Time: 11:40 AM  Group Topic: Cognitive Skills    [facility] Lela Fatima        Group Therapy Note    In the cognitive skills group, this writer provided patients with necessary materials to create a personal ecomap. This writer demonstrated a step-by-step on how to creates one's own ecomap. This writer then asked patients to identify the direction of energy in the relationships depicted within the ecomap. The writer supported patient processing by asking open-ended questions about ones ecopmap, the reciprocity of support between relationships and one's experiences with the activity.     Attendees: 9     Patient's Goal:  Create personal ecomap    Notes:  This pt was present and engaged in the cognitive skills group. Pt identified reciprocal and supportive relationship between cousins and fiancee. This pt will continue to identify people, organizations and places of support in the future cognitive skills group.     Status After Intervention:  Unchanged    Participation Level: Active Listener and Interactive    Participation Quality: Appropriate, Attentive, and Sharing      Speech:  normal      Thought Process/Content: Logical      Affective Functioning: Congruent      Mood: euthymic      Level of consciousness:  Attentive      Response to Learning: Able to verbalize current knowledge/experience, Able to verbalize/acknowledge new learning, Able to retain information, Capable of insight, Able to change behavior, and Progressing to goal      Endings: None Reported    Modes of Intervention: Support, Socialization, Exploration, and Activity      Discipline Responsible: /Counselor      Signature:  Lela Singh

## 2024-04-22 ENCOUNTER — APPOINTMENT (OUTPATIENT)
Facility: HOSPITAL | Age: 39
End: 2024-04-22
Payer: COMMERCIAL

## 2024-04-23 ENCOUNTER — APPOINTMENT (OUTPATIENT)
Facility: HOSPITAL | Age: 39
End: 2024-04-23
Payer: COMMERCIAL

## 2024-04-24 ENCOUNTER — APPOINTMENT (OUTPATIENT)
Facility: HOSPITAL | Age: 39
End: 2024-04-24
Payer: COMMERCIAL

## 2024-04-25 ENCOUNTER — APPOINTMENT (OUTPATIENT)
Facility: HOSPITAL | Age: 39
End: 2024-04-25
Payer: COMMERCIAL

## 2024-04-26 ENCOUNTER — APPOINTMENT (OUTPATIENT)
Facility: HOSPITAL | Age: 39
End: 2024-04-26
Payer: COMMERCIAL

## 2024-04-29 ENCOUNTER — TELEMEDICINE (OUTPATIENT)
Age: 39
End: 2024-04-29
Payer: COMMERCIAL

## 2024-04-29 ENCOUNTER — APPOINTMENT (OUTPATIENT)
Facility: HOSPITAL | Age: 39
End: 2024-04-29
Payer: COMMERCIAL

## 2024-04-29 DIAGNOSIS — F41.0 PANIC DISORDER (EPISODIC PAROXYSMAL ANXIETY): Primary | ICD-10-CM

## 2024-04-29 DIAGNOSIS — F32.5 DEPRESSION, MAJOR, IN REMISSION (HCC): ICD-10-CM

## 2024-04-29 PROCEDURE — 90833 PSYTX W PT W E/M 30 MIN: CPT | Performed by: NURSE PRACTITIONER

## 2024-04-29 PROCEDURE — 99214 OFFICE O/P EST MOD 30 MIN: CPT | Performed by: NURSE PRACTITIONER

## 2024-04-29 RX ORDER — DIVALPROEX SODIUM 250 MG/1
250 TABLET, DELAYED RELEASE ORAL 2 TIMES DAILY
Qty: 60 TABLET | Refills: 2 | Status: SHIPPED | OUTPATIENT
Start: 2024-04-29 | End: 2024-07-28

## 2024-04-29 RX ORDER — LORAZEPAM 0.5 MG/1
0.5 TABLET ORAL DAILY PRN
Qty: 30 TABLET | Refills: 0 | Status: SHIPPED | OUTPATIENT
Start: 2024-04-29 | End: 2024-05-29

## 2024-04-29 NOTE — PROGRESS NOTES
emergency to go to the nearest ER or call 911.     Patient was given time to ask questions and voice concerns. I believe all questions concerns were adequately addressed at this office visit. Patient verbalized agreement and understanding of the above-stated plan.    Follow-up and Dispositions    Return in about 3 months (around 7/29/2024).       4/30/2024  SATHISH Mahajan - NP

## 2024-04-30 ENCOUNTER — APPOINTMENT (OUTPATIENT)
Facility: HOSPITAL | Age: 39
End: 2024-04-30
Payer: COMMERCIAL

## 2024-05-10 ENCOUNTER — PATIENT MESSAGE (OUTPATIENT)
Age: 39
End: 2024-05-10

## 2024-05-10 DIAGNOSIS — M10.9 GOUT, UNSPECIFIED CAUSE, UNSPECIFIED CHRONICITY, UNSPECIFIED SITE: Primary | ICD-10-CM

## 2024-05-10 RX ORDER — COLCHICINE 0.6 MG/1
0.6 TABLET ORAL DAILY
Qty: 10 TABLET | Refills: 0 | Status: SHIPPED | OUTPATIENT
Start: 2024-05-10

## 2024-05-18 ENCOUNTER — APPOINTMENT (OUTPATIENT)
Facility: HOSPITAL | Age: 39
End: 2024-05-18
Payer: COMMERCIAL

## 2024-05-18 ENCOUNTER — HOSPITAL ENCOUNTER (EMERGENCY)
Facility: HOSPITAL | Age: 39
Discharge: HOME | End: 2024-05-18
Attending: EMERGENCY MEDICINE
Payer: COMMERCIAL

## 2024-05-18 VITALS
DIASTOLIC BLOOD PRESSURE: 97 MMHG | WEIGHT: 294.53 LBS | HEIGHT: 69 IN | OXYGEN SATURATION: 97 % | HEART RATE: 88 BPM | RESPIRATION RATE: 13 BRPM | TEMPERATURE: 98.4 F | BODY MASS INDEX: 43.62 KG/M2 | SYSTOLIC BLOOD PRESSURE: 146 MMHG

## 2024-05-18 DIAGNOSIS — F41.1 GAD (GENERALIZED ANXIETY DISORDER): ICD-10-CM

## 2024-05-18 DIAGNOSIS — R07.9 CHEST PAIN, UNSPECIFIED TYPE: Primary | ICD-10-CM

## 2024-05-18 LAB
ANION GAP SERPL CALC-SCNC: 4 MMOL/L (ref 5–15)
BASOPHILS # BLD: 0.1 K/UL (ref 0–0.1)
BASOPHILS NFR BLD: 1 % (ref 0–1)
BUN SERPL-MCNC: 15 MG/DL (ref 6–20)
BUN/CREAT SERPL: 13 (ref 12–20)
CALCIUM SERPL-MCNC: 9.2 MG/DL (ref 8.5–10.1)
CHLORIDE SERPL-SCNC: 110 MMOL/L (ref 97–108)
CO2 SERPL-SCNC: 28 MMOL/L (ref 21–32)
CREAT SERPL-MCNC: 1.14 MG/DL (ref 0.7–1.3)
DIFFERENTIAL METHOD BLD: NORMAL
EKG ATRIAL RATE: 109 BPM
EKG DIAGNOSIS: NORMAL
EKG P AXIS: 58 DEGREES
EKG P-R INTERVAL: 146 MS
EKG Q-T INTERVAL: 320 MS
EKG QRS DURATION: 96 MS
EKG QTC CALCULATION (BAZETT): 430 MS
EKG R AXIS: 88 DEGREES
EKG T AXIS: -17 DEGREES
EKG VENTRICULAR RATE: 109 BPM
EOSINOPHIL # BLD: 0.3 K/UL (ref 0–0.4)
EOSINOPHIL NFR BLD: 5 % (ref 0–7)
ERYTHROCYTE [DISTWIDTH] IN BLOOD BY AUTOMATED COUNT: 12.7 % (ref 11.5–14.5)
GLUCOSE SERPL-MCNC: 134 MG/DL (ref 65–100)
HCT VFR BLD AUTO: 41.7 % (ref 36.6–50.3)
HGB BLD-MCNC: 13.1 G/DL (ref 12.1–17)
IMM GRANULOCYTES # BLD AUTO: 0 K/UL (ref 0–0.04)
IMM GRANULOCYTES NFR BLD AUTO: 0 % (ref 0–0.5)
LYMPHOCYTES # BLD: 1.7 K/UL (ref 0.8–3.5)
LYMPHOCYTES NFR BLD: 32 % (ref 12–49)
MCH RBC QN AUTO: 26.8 PG (ref 26–34)
MCHC RBC AUTO-ENTMCNC: 31.4 G/DL (ref 30–36.5)
MCV RBC AUTO: 85.5 FL (ref 80–99)
MONOCYTES # BLD: 0.5 K/UL (ref 0–1)
MONOCYTES NFR BLD: 10 % (ref 5–13)
NEUTS SEG # BLD: 2.9 K/UL (ref 1.8–8)
NEUTS SEG NFR BLD: 52 % (ref 32–75)
NRBC # BLD: 0 K/UL (ref 0–0.01)
NRBC BLD-RTO: 0 PER 100 WBC
PLATELET # BLD AUTO: 182 K/UL (ref 150–400)
POTASSIUM SERPL-SCNC: 3.9 MMOL/L (ref 3.5–5.1)
RBC # BLD AUTO: 4.88 M/UL (ref 4.1–5.7)
SODIUM SERPL-SCNC: 142 MMOL/L (ref 136–145)
TROPONIN I SERPL HS-MCNC: 10 NG/L (ref 0–76)
TROPONIN I SERPL HS-MCNC: 9 NG/L (ref 0–76)
WBC # BLD AUTO: 5.5 K/UL (ref 4.1–11.1)

## 2024-05-18 PROCEDURE — 71046 X-RAY EXAM CHEST 2 VIEWS: CPT

## 2024-05-18 PROCEDURE — 80048 BASIC METABOLIC PNL TOTAL CA: CPT

## 2024-05-18 PROCEDURE — 93005 ELECTROCARDIOGRAM TRACING: CPT | Performed by: EMERGENCY MEDICINE

## 2024-05-18 PROCEDURE — 76700 US EXAM ABDOM COMPLETE: CPT

## 2024-05-18 PROCEDURE — 36415 COLL VENOUS BLD VENIPUNCTURE: CPT

## 2024-05-18 PROCEDURE — 85025 COMPLETE CBC W/AUTO DIFF WBC: CPT

## 2024-05-18 PROCEDURE — 6370000000 HC RX 637 (ALT 250 FOR IP): Performed by: EMERGENCY MEDICINE

## 2024-05-18 PROCEDURE — 84484 ASSAY OF TROPONIN QUANT: CPT

## 2024-05-18 PROCEDURE — 99285 EMERGENCY DEPT VISIT HI MDM: CPT

## 2024-05-18 RX ORDER — AMLODIPINE BESYLATE 5 MG/1
10 TABLET ORAL
Status: COMPLETED | OUTPATIENT
Start: 2024-05-18 | End: 2024-05-18

## 2024-05-18 RX ORDER — LORAZEPAM 2 MG/ML
1 INJECTION INTRAMUSCULAR ONCE
Status: DISCONTINUED | OUTPATIENT
Start: 2024-05-18 | End: 2024-05-18 | Stop reason: HOSPADM

## 2024-05-18 RX ADMIN — AMLODIPINE BESYLATE 5 MG: 5 TABLET ORAL at 10:36

## 2024-05-18 ASSESSMENT — LIFESTYLE VARIABLES
HOW OFTEN DO YOU HAVE A DRINK CONTAINING ALCOHOL: NEVER
HOW MANY STANDARD DRINKS CONTAINING ALCOHOL DO YOU HAVE ON A TYPICAL DAY: PATIENT DOES NOT DRINK

## 2024-05-18 ASSESSMENT — ENCOUNTER SYMPTOMS
ABDOMINAL PAIN: 0
SHORTNESS OF BREATH: 0

## 2024-05-18 ASSESSMENT — PAIN - FUNCTIONAL ASSESSMENT: PAIN_FUNCTIONAL_ASSESSMENT: NONE - DENIES PAIN

## 2024-05-18 ASSESSMENT — HEART SCORE: ECG: NORMAL

## 2024-05-18 NOTE — ED TRIAGE NOTES
Pt ambulated through triage. CC is chest pain. Pt states that the chest pain started this morning, describes the pain as a burning sensation. Pt states that it feels like \"something is stuck in his abdomen\". Pt states that his HR has been elevated this morning ranging from 85-98 and at the highest 130 on his watch. Pt states that he took .5 of ativan this morning around 8am. Pt did not take BP meds this morning

## 2024-05-18 NOTE — ED PROVIDER NOTES
96 %   05/18/24 1045 -- 90 10 (!) 150/96 97 %   05/18/24 1036 -- -- -- (!) 155/103 --   05/18/24 1030 -- 91 18 -- --   05/18/24 1015 -- 97 23 -- 98 %   05/18/24 1000 -- 97 12 -- 98 %   05/18/24 0949 98.4 °F (36.9 °C) 94 14 (!) 170/125 98 %          Physical Exam  Vitals reviewed.   Constitutional:       General: He is not in acute distress.     Appearance: Normal appearance. He is not ill-appearing.   Cardiovascular:      Rate and Rhythm: Normal rate and regular rhythm.   Pulmonary:      Effort: Pulmonary effort is normal. No respiratory distress.      Breath sounds: No wheezing or rhonchi.   Abdominal:      General: Abdomen is flat. There is no distension.      Palpations: Abdomen is soft.      Tenderness: There is no abdominal tenderness.   Skin:     General: Skin is warm and dry.   Neurological:      General: No focal deficit present.      Mental Status: He is alert and oriented to person, place, and time.   Psychiatric:         Mood and Affect: Mood is anxious.         Speech: Speech is rapid and pressured.         Diagnostic Study Results   Labs  Recent Results (from the past 24 hour(s))   Basic Metabolic Panel    Collection Time: 05/18/24  9:55 AM   Result Value Ref Range    Sodium 142 136 - 145 mmol/L    Potassium 3.9 3.5 - 5.1 mmol/L    Chloride 110 (H) 97 - 108 mmol/L    CO2 28 21 - 32 mmol/L    Anion Gap 4 (L) 5 - 15 mmol/L    Glucose 134 (H) 65 - 100 mg/dL    BUN 15 6 - 20 MG/DL    Creatinine 1.14 0.70 - 1.30 MG/DL    BUN/Creatinine Ratio 13 12 - 20      Est, Glom Filt Rate 85 >60 ml/min/1.73m2    Calcium 9.2 8.5 - 10.1 MG/DL   CBC with Auto Differential    Collection Time: 05/18/24  9:55 AM   Result Value Ref Range    WBC 5.5 4.1 - 11.1 K/uL    RBC 4.88 4.10 - 5.70 M/uL    Hemoglobin 13.1 12.1 - 17.0 g/dL    Hematocrit 41.7 36.6 - 50.3 %    MCV 85.5 80.0 - 99.0 FL    MCH 26.8 26.0 - 34.0 PG    MCHC 31.4 30.0 - 36.5 g/dL    RDW 12.7 11.5 - 14.5 %    Platelets 182 150 - 400 K/uL    Nucleated RBCs 0.0 0 PER  known as: NORVASC  Take 1 tablet by mouth daily     atenolol 25 MG tablet  Commonly known as: Tenormin  Take 2 tablets the night before procedure; then one tablet 1 hour before procedure (for coronary CT).     butalbital-acetaminophen-caffeine -40 MG per tablet  Commonly known as: FIORICET, ESGIC  Take 1 tablet by mouth every 6 hours as needed for Headaches     colchicine 0.6 MG tablet  Commonly known as: Colcrys  Take 1 tablet by mouth daily     divalproex 250 MG DR tablet  Commonly known as: Depakote  Take 1 tablet by mouth 2 times daily     hydrOXYzine HCl 10 MG tablet  Commonly known as: ATARAX  Take 1-2 tablets by mouth twice daily as needed     LORazepam 0.5 MG tablet  Commonly known as: ATIVAN  Take 1 tablet by mouth daily as needed for Anxiety for up to 30 days. Max Daily Amount: 0.5 mg     Vitamin D3 50 MCG (2000 UT) Caps               Follow up:  [facility] EMERGENCY DEPT  [address] [id number] [phone]        Gladys Hernandez MD  [address]  [place] [address]  [phone]             Disclaimers   I was the first provider for this patient on this visit.  To the best of my ability I reviewed relevant prior medical records, electrocardiograms, laboratories, and radiologic studies.    The patient's presenting problems were discussed, and the patient was in agreement with the care plan formulated and outlined with them.     Please note that this dictation was completed with Dragon voice recognition software. Quite often unanticipated grammatical, syntax, homophones, and other interpretive errors are inadvertently transcribed by the computer software.   Please disregard these errors and excuse any errors that have escaped final proofreading.    Valeriy Chavez MD    I personally performed the services described in this documentation on this date 5/18/24  for patient Shimon Min.      Valeriy Chavez MD  12:38 PM         Kathy

## 2024-05-18 NOTE — DISCHARGE INSTRUCTIONS
Your testing today is all reassuring and we did not detect any concerning findings with regard to your heart or lungs or abdominal organs.  The ultrasound of your liver and gallbladder shows some mild fatty liver disease, which is common and can be treated with diet and weight loss.  There were no signs of a heart attack or other significant heart problems today.  I would recommend you follow-up with your primary care physician as needed.

## 2024-05-18 NOTE — ED NOTES
Assumed care of patient at this time. Pt changed into gown, placed on monitor x3, bed rail raised, and call bell within reach. Patient is alert and oriented. No distress noted at this time.

## 2024-05-19 LAB
EKG ATRIAL RATE: 102 BPM
EKG DIAGNOSIS: NORMAL
EKG P AXIS: 51 DEGREES
EKG P-R INTERVAL: 150 MS
EKG Q-T INTERVAL: 336 MS
EKG QRS DURATION: 96 MS
EKG QTC CALCULATION (BAZETT): 437 MS
EKG R AXIS: 32 DEGREES
EKG T AXIS: -16 DEGREES
EKG VENTRICULAR RATE: 102 BPM

## 2024-05-23 ENCOUNTER — TELEPHONE (OUTPATIENT)
Age: 39
End: 2024-05-23

## 2024-05-23 ENCOUNTER — TELEMEDICINE (OUTPATIENT)
Age: 39
End: 2024-05-23
Payer: COMMERCIAL

## 2024-05-23 DIAGNOSIS — I10 PRIMARY HYPERTENSION: Primary | ICD-10-CM

## 2024-05-23 DIAGNOSIS — K76.0 FATTY LIVER: ICD-10-CM

## 2024-05-23 DIAGNOSIS — F41.1 GAD (GENERALIZED ANXIETY DISORDER): ICD-10-CM

## 2024-05-23 DIAGNOSIS — R73.01 IFG (IMPAIRED FASTING GLUCOSE): ICD-10-CM

## 2024-05-23 DIAGNOSIS — E66.01 OBESITY, MORBID (HCC): ICD-10-CM

## 2024-05-23 PROCEDURE — 99214 OFFICE O/P EST MOD 30 MIN: CPT | Performed by: INTERNAL MEDICINE

## 2024-05-23 RX ORDER — ACETAMINOPHEN 325 MG/1
650 TABLET ORAL EVERY 6 HOURS PRN
COMMUNITY

## 2024-05-23 ASSESSMENT — ENCOUNTER SYMPTOMS: SHORTNESS OF BREATH: 0

## 2024-05-23 NOTE — PROGRESS NOTES
count calories and work on portions       5. Fatty liver  Hemoglobin A1C    TSH   Reviewed imaging with him discussed weight loss for improvement will check LFTs Hepatic Function Panel      I have reviewed the note documented by the scribe.  The services provided are my own.  The documentation is accurate     Depression screen reviewed and negative.  Scribed by Ary Chino of [facility], as dictated by Dr. Lois Sánchez.    Current diagnosis and concerns discussed with pt at length. Pt understands risks and benefits or current treatment plan and medications, and accepts the treatment and medication with any possible risks. Pt asks appropriate questions, which were answered. Pt was instructed to call with any concerns or problems.    I have reviewed the note documented by the scribe. The services provided are my own. The documentation is accurate.  Shimon Min, was evaluated through a synchronous (real-time) audio-video encounter. The patient (or guardian if applicable) is aware that this is a billable service, which includes applicable co-pays. This Virtual Visit was conducted with patient's (and/or legal guardian's) consent. Patient identification was verified, and a caregiver was present when appropriate.   The patient was located at Home: [address] [address]  Provider was located at Home (Appt Dept State): [place]  Confirm you are appropriately licensed, registered, or certified to deliver care in the state where the patient is located as indicated above. If you are not or unsure, please re-schedule the visit: Yes, I confirm.      --Ary Chino on 5/23/2024 at 1:30 PM  An electronic signature was used to authenticate this note.

## 2024-05-23 NOTE — TELEPHONE ENCOUNTER
Pt called to follow up on joset encounter    5/22/24  8:05 PM  Recently I've been urinating very frequently,  2 times throughout the night. This evening I got so hungry,  and felt weak,  and dizzy.  Blood sugar was 51. Recently I started walking 2 miles daily and drinking a lot of water. But I may need my A1C checked. This has been going on for awhile but I don't think it's ever been that low before. I have been mistaken anxiety for this feeling when I'm thinking it's low blood sugar at times. Felt like I was going to pass out until I ate something.     5/23/24  6:51 AM   Last night 30 mins after blood sugar was low, it went to 171. 1 hour later I checked it and it read 127. This morning I woke up at 6am feeling shaky , jittery, feet tingling, legs shaky and my blood sugar was 121.     Per encounter notes, appt is needed.    Psr attempted to schedule but soonest in office appt was next week    Psr consulted with team leads and Yogesh stated that pt can be scheduled for vv today with dr cho for current symptoms    Pt scheduled for 1 pm today as vv with marquis.

## 2024-05-24 ENCOUNTER — NURSE ONLY (OUTPATIENT)
Age: 39
End: 2024-05-24

## 2024-05-24 VITALS — HEART RATE: 88 BPM | SYSTOLIC BLOOD PRESSURE: 152 MMHG | DIASTOLIC BLOOD PRESSURE: 89 MMHG

## 2024-05-24 DIAGNOSIS — I10 PRIMARY HYPERTENSION: Primary | ICD-10-CM

## 2024-05-24 DIAGNOSIS — R73.01 IFG (IMPAIRED FASTING GLUCOSE): ICD-10-CM

## 2024-05-24 DIAGNOSIS — K76.0 FATTY LIVER: ICD-10-CM

## 2024-05-24 DIAGNOSIS — I10 PRIMARY HYPERTENSION: ICD-10-CM

## 2024-05-24 LAB
ALBUMIN SERPL-MCNC: 3.8 G/DL (ref 3.5–5)
ALBUMIN/GLOB SERPL: 1 (ref 1.1–2.2)
ALP SERPL-CCNC: 82 U/L (ref 45–117)
ALT SERPL-CCNC: 39 U/L (ref 12–78)
AST SERPL-CCNC: 25 U/L (ref 15–37)
BILIRUB DIRECT SERPL-MCNC: 0.2 MG/DL (ref 0–0.2)
BILIRUB SERPL-MCNC: 0.8 MG/DL (ref 0.2–1)
EST. AVERAGE GLUCOSE BLD GHB EST-MCNC: 100 MG/DL
GLOBULIN SER CALC-MCNC: 4 G/DL (ref 2–4)
HBA1C MFR BLD: 5.1 % (ref 4–5.6)
PROT SERPL-MCNC: 7.8 G/DL (ref 6.4–8.2)
TSH SERPL DL<=0.05 MIU/L-ACNC: 1.13 UIU/ML (ref 0.36–3.74)

## 2024-05-24 NOTE — PROGRESS NOTES
Identified pt with two pt identifiers(name and ). Reviewed record in preparation for visit and have obtained necessary documentation.  Chief Complaint   Patient presents with    Blood Pressure Check        Vitals:    24 0849   BP: (!) 152/89   Pulse:            Umm Thakkar LPN     [facility]  [address], [address]   [place]  [address]  W: [phone]  F: [phone]

## 2024-05-24 NOTE — PROGRESS NOTES
Consulted with Dr. Yeimi Molina about pt. High b/p. She offered to add another medication or increase dosage he currently takes. Pt. declined

## 2024-06-02 ENCOUNTER — HOSPITAL ENCOUNTER (EMERGENCY)
Facility: HOSPITAL | Age: 39
Discharge: HOME | End: 2024-06-02
Attending: EMERGENCY MEDICINE
Payer: COMMERCIAL

## 2024-06-02 ENCOUNTER — APPOINTMENT (OUTPATIENT)
Facility: HOSPITAL | Age: 39
End: 2024-06-02
Payer: COMMERCIAL

## 2024-06-02 VITALS
OXYGEN SATURATION: 96 % | HEART RATE: 91 BPM | DIASTOLIC BLOOD PRESSURE: 103 MMHG | BODY MASS INDEX: 42.94 KG/M2 | TEMPERATURE: 99 F | RESPIRATION RATE: 18 BRPM | SYSTOLIC BLOOD PRESSURE: 166 MMHG | WEIGHT: 290.79 LBS

## 2024-06-02 DIAGNOSIS — I10 ESSENTIAL HYPERTENSION: ICD-10-CM

## 2024-06-02 DIAGNOSIS — J02.0 ACUTE STREPTOCOCCAL PHARYNGITIS: Primary | ICD-10-CM

## 2024-06-02 LAB
ANION GAP SERPL CALC-SCNC: 5 MMOL/L (ref 5–15)
BASOPHILS # BLD: 0.1 K/UL (ref 0–0.1)
BASOPHILS NFR BLD: 2 % (ref 0–1)
BUN SERPL-MCNC: 16 MG/DL (ref 6–20)
BUN/CREAT SERPL: 14 (ref 12–20)
CALCIUM SERPL-MCNC: 9.2 MG/DL (ref 8.5–10.1)
CHLORIDE SERPL-SCNC: 106 MMOL/L (ref 97–108)
CO2 SERPL-SCNC: 27 MMOL/L (ref 21–32)
CREAT SERPL-MCNC: 1.14 MG/DL (ref 0.7–1.3)
DEPRECATED S PYO AG THROAT QL EIA: NEGATIVE
DIFFERENTIAL METHOD BLD: ABNORMAL
EOSINOPHIL # BLD: 0 K/UL (ref 0–0.4)
EOSINOPHIL NFR BLD: 0 % (ref 0–7)
ERYTHROCYTE [DISTWIDTH] IN BLOOD BY AUTOMATED COUNT: 12.9 % (ref 11.5–14.5)
FLUAV RNA SPEC QL NAA+PROBE: NOT DETECTED
FLUBV RNA SPEC QL NAA+PROBE: NOT DETECTED
GLUCOSE SERPL-MCNC: 141 MG/DL (ref 65–100)
HCT VFR BLD AUTO: 43.1 % (ref 36.6–50.3)
HGB BLD-MCNC: 13.5 G/DL (ref 12.1–17)
IMM GRANULOCYTES # BLD AUTO: 0 K/UL (ref 0–0.04)
IMM GRANULOCYTES NFR BLD AUTO: 0 % (ref 0–0.5)
LYMPHOCYTES # BLD: 1.2 K/UL (ref 0.8–3.5)
LYMPHOCYTES NFR BLD: 23 % (ref 12–49)
MCH RBC QN AUTO: 26.7 PG (ref 26–34)
MCHC RBC AUTO-ENTMCNC: 31.3 G/DL (ref 30–36.5)
MCV RBC AUTO: 85.3 FL (ref 80–99)
MONOCYTES # BLD: 0.9 K/UL (ref 0–1)
MONOCYTES NFR BLD: 17 % (ref 5–13)
NEUTS SEG # BLD: 3.2 K/UL (ref 1.8–8)
NEUTS SEG NFR BLD: 58 % (ref 32–75)
NRBC # BLD: 0 K/UL (ref 0–0.01)
NRBC BLD-RTO: 0 PER 100 WBC
PLATELET # BLD AUTO: 171 K/UL (ref 150–400)
PMV BLD AUTO: 12.2 FL (ref 8.9–12.9)
POTASSIUM SERPL-SCNC: 4 MMOL/L (ref 3.5–5.1)
RBC # BLD AUTO: 5.05 M/UL (ref 4.1–5.7)
RBC MORPH BLD: ABNORMAL
SARS-COV-2 RNA RESP QL NAA+PROBE: NOT DETECTED
SODIUM SERPL-SCNC: 138 MMOL/L (ref 136–145)
WBC # BLD AUTO: 5.4 K/UL (ref 4.1–11.1)

## 2024-06-02 PROCEDURE — 87070 CULTURE OTHR SPECIMN AEROBIC: CPT

## 2024-06-02 PROCEDURE — 87636 SARSCOV2 & INF A&B AMP PRB: CPT

## 2024-06-02 PROCEDURE — 71046 X-RAY EXAM CHEST 2 VIEWS: CPT

## 2024-06-02 PROCEDURE — 99284 EMERGENCY DEPT VISIT MOD MDM: CPT

## 2024-06-02 PROCEDURE — 80048 BASIC METABOLIC PNL TOTAL CA: CPT

## 2024-06-02 PROCEDURE — 2580000003 HC RX 258: Performed by: EMERGENCY MEDICINE

## 2024-06-02 PROCEDURE — 85025 COMPLETE CBC W/AUTO DIFF WBC: CPT

## 2024-06-02 PROCEDURE — 6370000000 HC RX 637 (ALT 250 FOR IP): Performed by: EMERGENCY MEDICINE

## 2024-06-02 PROCEDURE — 87880 STREP A ASSAY W/OPTIC: CPT

## 2024-06-02 PROCEDURE — 36415 COLL VENOUS BLD VENIPUNCTURE: CPT

## 2024-06-02 RX ORDER — AMLODIPINE BESYLATE 5 MG/1
5 TABLET ORAL
Status: COMPLETED | OUTPATIENT
Start: 2024-06-02 | End: 2024-06-02

## 2024-06-02 RX ORDER — 0.9 % SODIUM CHLORIDE 0.9 %
1000 INTRAVENOUS SOLUTION INTRAVENOUS ONCE
Status: COMPLETED | OUTPATIENT
Start: 2024-06-02 | End: 2024-06-02

## 2024-06-02 RX ORDER — DOXYCYCLINE HYCLATE 100 MG
100 TABLET ORAL 2 TIMES DAILY
Qty: 20 TABLET | Refills: 0 | Status: SHIPPED | OUTPATIENT
Start: 2024-06-02 | End: 2024-06-12

## 2024-06-02 RX ORDER — AMLODIPINE BESYLATE 10 MG/1
10 TABLET ORAL DAILY
Qty: 90 TABLET | Refills: 0 | Status: SHIPPED | OUTPATIENT
Start: 2024-06-02

## 2024-06-02 RX ORDER — ACETAMINOPHEN 325 MG/1
650 TABLET ORAL
Status: COMPLETED | OUTPATIENT
Start: 2024-06-02 | End: 2024-06-02

## 2024-06-02 RX ADMIN — AMLODIPINE BESYLATE 5 MG: 5 TABLET ORAL at 07:56

## 2024-06-02 RX ADMIN — ACETAMINOPHEN 650 MG: 325 TABLET ORAL at 07:56

## 2024-06-02 RX ADMIN — SODIUM CHLORIDE 1000 ML: 9 INJECTION, SOLUTION INTRAVENOUS at 07:55

## 2024-06-02 ASSESSMENT — PAIN SCALES - GENERAL: PAINLEVEL_OUTOF10: 5

## 2024-06-02 NOTE — ED PROVIDER NOTES
[facility] EMERGENCY DEPT  EMERGENCY DEPARTMENT ENCOUNTER       Pt Name: Shimon Min  MRN: [id number]  Birthdate 1985  Date of evaluation: 6/2/2024  Provider: Syed Pollard DO   PCP: Gladys Hernandez MD  Note Started: 1:37 PM EDT 6/2/24     CHIEF COMPLAINT       Chief Complaint   Patient presents with    Head Congestion     Head and nasal congestion, runny nose and headache since Friday. Nyquil BP medicine took last night. Has chest congestion as well. Had a fever today and yesterday. Fiance tested positive for strep last week.         HISTORY OF PRESENT ILLNESS: 1 or more elements      History From: Patient, History limited by: none     Shimon Min is a 38 y.o. male presents to the department secondary to nasal congestion sore throat and cough.       Please See MDM for Additional Details of the HPI/PMH  Nursing Notes were all reviewed and agreed with or any disagreements were addressed in the HPI.     REVIEW OF SYSTEMS        Positives and Pertinent negatives as per HPI.    PAST HISTORY     Past Medical History:  Past Medical History:   Diagnosis Date    Angioedema     ace induced asthma     Anxiety     Cold-induced asthma     Gout     Hernia of abdominal wall     Hypertension     NAIN (obstructive sleep apnea)        Past Surgical History:  Past Surgical History:   Procedure Laterality Date    ADENOIDECTOMY         Family History:  Family History   Problem Relation Age of Onset    Sleep Apnea Mother     Hypertension Father     Breast Cancer Maternal Grandmother     Hypertension Mother        Social History:  Social History     Tobacco Use    Smoking status: Never    Smokeless tobacco: Never   Substance Use Topics    Alcohol use: No    Drug use: Not Currently       Allergies:  Allergies   Allergen Reactions    Lisinopril Swelling     Pt has sever swelling of the throat and tongue.    Penicillins Other (See Comments)     Allergic as a child    Venlafaxine Palpitations       CURRENT MEDICATIONS      Discharge

## 2024-06-02 NOTE — ED NOTES
Pt discharged by Atul LARSON. Discharge instructions discussed and pt given opportunity to ask questions. Pt ambulatory out of ED

## 2024-06-03 ASSESSMENT — SLEEP AND FATIGUE QUESTIONNAIRES
HOW LIKELY ARE YOU TO NOD OFF OR FALL ASLEEP WHILE SITTING AND READING: WOULD NEVER DOZE
HOW LIKELY ARE YOU TO NOD OFF OR FALL ASLEEP WHILE LYING DOWN TO REST IN THE AFTERNOON WHEN CIRCUMSTANCES PERMIT: WOULD NEVER DOZE
HOW LIKELY ARE YOU TO NOD OFF OR FALL ASLEEP WHILE WATCHING TV: WOULD NEVER DOZE
HOW LIKELY ARE YOU TO NOD OFF OR FALL ASLEEP WHEN YOU ARE A PASSENGER IN A CAR FOR AN HOUR WITHOUT A BREAK: WOULD NEVER DOZE
HOW LIKELY ARE YOU TO NOD OFF OR FALL ASLEEP IN A CAR, WHILE STOPPED FOR A FEW MINUTES IN TRAFFIC: WOULD NEVER DOZE
HOW LIKELY ARE YOU TO NOD OFF OR FALL ASLEEP WHILE SITTING INACTIVE IN A PUBLIC PLACE: WOULD NEVER DOZE
HOW LIKELY ARE YOU TO NOD OFF OR FALL ASLEEP WHILE SITTING AND TALKING TO SOMEONE: WOULD NEVER DOZE
ESS TOTAL SCORE: 0
HOW LIKELY ARE YOU TO NOD OFF OR FALL ASLEEP WHILE SITTING QUIETLY AFTER LUNCH WITHOUT ALCOHOL: WOULD NEVER DOZE

## 2024-06-04 ENCOUNTER — TELEMEDICINE (OUTPATIENT)
Age: 39
End: 2024-06-04
Payer: COMMERCIAL

## 2024-06-04 ENCOUNTER — TELEPHONE (OUTPATIENT)
Age: 39
End: 2024-06-04

## 2024-06-04 DIAGNOSIS — G47.33 OSA (OBSTRUCTIVE SLEEP APNEA): Primary | ICD-10-CM

## 2024-06-04 DIAGNOSIS — I10 PRIMARY HYPERTENSION: ICD-10-CM

## 2024-06-04 LAB
BACTERIA SPEC CULT: NORMAL
SERVICE CMNT-IMP: NORMAL

## 2024-06-04 PROCEDURE — 99213 OFFICE O/P EST LOW 20 MIN: CPT | Performed by: NURSE PRACTITIONER

## 2024-06-04 NOTE — PROGRESS NOTES
home or alternate location of their choice.    Shimon Min, was evaluated through a synchronous (real-time) audio-video encounter. The patient (or guardian if applicable) is aware that this is a billable service, which includes applicable co-pays. This Virtual Visit was conducted with patient's (and/or legal guardian's) consent. Patient identification was verified, and a caregiver was present when appropriate.   The patient was located at Home: [address] [place] [id number]  Provider was located at Home (Appt Dept State): [place]  Confirm you are appropriately licensed, registered, or certified to deliver care in the state where the patient is located as indicated above. If you are not or unsure, please re-schedule the visit: Yes, I confirm.       --SATHISH Gerard NP on 6/4/2024 at 9:02 AM    An electronic signature was used to authenticate this note.

## 2024-06-04 NOTE — PATIENT INSTRUCTIONS
[address]  [place] [address]  Tel.  [phone]  Fax. [phone] [address]  [place] [address]  Tel.  [phone]  Fax. [phone] [address] [place] [address]  Tel.  [phone]  Fax. [phone]     Learning About CPAP for Sleep Apnea  What is CPAP?              CPAP is a small machine that you use at home every night while you sleep. It increases air pressure in your throat to keep your airway open. When you have sleep apnea, this can help you sleep better so you feel much better. CPAP stands for \"continuous positive airway pressure.\"  The CPAP machine will have one of the following:  A mask that covers your nose and mouth  Prongs that fit into your nose  A mask that covers your nose only, the most common type. This type is called NCPAP. The N stands for \"nasal.\"  Why is it done?  CPAP is usually the best treatment for obstructive sleep apnea. It is the first treatment choice and the most widely used. Your doctor may suggest CPAP if you have:  Moderate to severe sleep apnea.  Sleep apnea and coronary artery disease (CAD) or heart failure.  How does it help?  CPAP can help you have more normal sleep, so you feel less sleepy and more alert during the daytime.  CPAP may help keep heart failure or other heart problems from getting worse.  NCPAP may help lower your blood pressure.  If you use CPAP, your bed partner may also sleep better because you are not snoring or restless.  What are the side effects?  Some people who use CPAP have:  A dry or stuffy nose and a sore throat.  Irritated skin on the face.  Sore eyes.  Bloating.  If you have any of these problems, work with your doctor to fix them. Here are some things you can try:  Be sure the mask or nasal prongs fit well.  See if your doctor can adjust the pressure of your CPAP.  If your nose is dry, try a humidifier.  If your nose is runny or stuffy, try decongestant medicine or a steroid

## 2024-06-04 NOTE — TELEPHONE ENCOUNTER
Patient declined scheduling yearly follow up at this time, stated he will call office back later to schedule.

## 2024-06-13 ENCOUNTER — CLINICAL DOCUMENTATION (OUTPATIENT)
Age: 39
End: 2024-06-13

## 2024-06-25 DIAGNOSIS — F41.0 PANIC DISORDER (EPISODIC PAROXYSMAL ANXIETY): ICD-10-CM

## 2024-06-25 RX ORDER — HYDROXYZINE HYDROCHLORIDE 10 MG/1
TABLET, FILM COATED ORAL
Qty: 60 TABLET | Refills: 2 | Status: SHIPPED | OUTPATIENT
Start: 2024-06-25

## 2024-06-28 ENCOUNTER — HOSPITAL ENCOUNTER (EMERGENCY)
Facility: HOSPITAL | Age: 39
Discharge: HOME | End: 2024-06-28
Payer: COMMERCIAL

## 2024-06-28 VITALS
HEART RATE: 85 BPM | BODY MASS INDEX: 42.64 KG/M2 | HEIGHT: 69 IN | DIASTOLIC BLOOD PRESSURE: 90 MMHG | SYSTOLIC BLOOD PRESSURE: 150 MMHG | TEMPERATURE: 98.5 F | WEIGHT: 287.92 LBS | RESPIRATION RATE: 19 BRPM | OXYGEN SATURATION: 97 %

## 2024-06-28 DIAGNOSIS — R07.89 ATYPICAL CHEST PAIN: Primary | ICD-10-CM

## 2024-06-28 LAB
ALBUMIN SERPL-MCNC: 3.8 G/DL (ref 3.5–5)
ALBUMIN/GLOB SERPL: 1 (ref 1.1–2.2)
ALP SERPL-CCNC: 94 U/L (ref 45–117)
ALT SERPL-CCNC: 42 U/L (ref 12–78)
ANION GAP SERPL CALC-SCNC: 5 MMOL/L (ref 5–15)
AST SERPL-CCNC: 18 U/L (ref 15–37)
BASOPHILS # BLD: 0.1 K/UL (ref 0–0.1)
BASOPHILS NFR BLD: 1 % (ref 0–1)
BILIRUB SERPL-MCNC: 0.9 MG/DL (ref 0.2–1)
BUN SERPL-MCNC: 14 MG/DL (ref 6–20)
BUN/CREAT SERPL: 13 (ref 12–20)
CALCIUM SERPL-MCNC: 9.3 MG/DL (ref 8.5–10.1)
CHLORIDE SERPL-SCNC: 105 MMOL/L (ref 97–108)
CO2 SERPL-SCNC: 29 MMOL/L (ref 21–32)
CREAT SERPL-MCNC: 1.1 MG/DL (ref 0.7–1.3)
DIFFERENTIAL METHOD BLD: ABNORMAL
EKG ATRIAL RATE: 85 BPM
EKG DIAGNOSIS: NORMAL
EKG P AXIS: 55 DEGREES
EKG P-R INTERVAL: 130 MS
EKG Q-T INTERVAL: 360 MS
EKG QRS DURATION: 96 MS
EKG QTC CALCULATION (BAZETT): 428 MS
EKG R AXIS: 25 DEGREES
EKG T AXIS: 1 DEGREES
EKG VENTRICULAR RATE: 85 BPM
EOSINOPHIL # BLD: 0.2 K/UL (ref 0–0.4)
EOSINOPHIL NFR BLD: 3 % (ref 0–7)
ERYTHROCYTE [DISTWIDTH] IN BLOOD BY AUTOMATED COUNT: 12.9 % (ref 11.5–14.5)
GLOBULIN SER CALC-MCNC: 3.9 G/DL (ref 2–4)
GLUCOSE SERPL-MCNC: 120 MG/DL (ref 65–100)
HCT VFR BLD AUTO: 40.6 % (ref 36.6–50.3)
HGB BLD-MCNC: 12.9 G/DL (ref 12.1–17)
IMM GRANULOCYTES # BLD AUTO: 0 K/UL (ref 0–0.04)
IMM GRANULOCYTES NFR BLD AUTO: 1 % (ref 0–0.5)
LYMPHOCYTES # BLD: 1.9 K/UL (ref 0.8–3.5)
LYMPHOCYTES NFR BLD: 36 % (ref 12–49)
MCH RBC QN AUTO: 26.9 PG (ref 26–34)
MCHC RBC AUTO-ENTMCNC: 31.8 G/DL (ref 30–36.5)
MCV RBC AUTO: 84.6 FL (ref 80–99)
MONOCYTES # BLD: 0.5 K/UL (ref 0–1)
MONOCYTES NFR BLD: 10 % (ref 5–13)
NEUTS SEG # BLD: 2.5 K/UL (ref 1.8–8)
NEUTS SEG NFR BLD: 49 % (ref 32–75)
NRBC # BLD: 0 K/UL (ref 0–0.01)
NRBC BLD-RTO: 0 PER 100 WBC
PLATELET # BLD AUTO: 192 K/UL (ref 150–400)
PMV BLD AUTO: 12.6 FL (ref 8.9–12.9)
POTASSIUM SERPL-SCNC: 3.7 MMOL/L (ref 3.5–5.1)
PROT SERPL-MCNC: 7.7 G/DL (ref 6.4–8.2)
RBC # BLD AUTO: 4.8 M/UL (ref 4.1–5.7)
SODIUM SERPL-SCNC: 139 MMOL/L (ref 136–145)
TROPONIN I SERPL HS-MCNC: 11 NG/L (ref 0–76)
TROPONIN I SERPL HS-MCNC: 12 NG/L (ref 0–76)
WBC # BLD AUTO: 5.1 K/UL (ref 4.1–11.1)

## 2024-06-28 PROCEDURE — 99284 EMERGENCY DEPT VISIT MOD MDM: CPT

## 2024-06-28 PROCEDURE — 80053 COMPREHEN METABOLIC PANEL: CPT

## 2024-06-28 PROCEDURE — 85025 COMPLETE CBC W/AUTO DIFF WBC: CPT

## 2024-06-28 PROCEDURE — 6370000000 HC RX 637 (ALT 250 FOR IP)

## 2024-06-28 PROCEDURE — 36415 COLL VENOUS BLD VENIPUNCTURE: CPT

## 2024-06-28 PROCEDURE — 93005 ELECTROCARDIOGRAM TRACING: CPT | Performed by: STUDENT IN AN ORGANIZED HEALTH CARE EDUCATION/TRAINING PROGRAM

## 2024-06-28 PROCEDURE — 84484 ASSAY OF TROPONIN QUANT: CPT

## 2024-06-28 RX ORDER — ASPIRIN 81 MG/1
324 TABLET, CHEWABLE ORAL ONCE
Status: COMPLETED | OUTPATIENT
Start: 2024-06-28 | End: 2024-06-28

## 2024-06-28 RX ADMIN — ASPIRIN 324 MG: 81 TABLET, CHEWABLE ORAL at 09:10

## 2024-06-28 ASSESSMENT — PAIN - FUNCTIONAL ASSESSMENT: PAIN_FUNCTIONAL_ASSESSMENT: 0-10

## 2024-06-28 ASSESSMENT — HEART SCORE: ECG: NORMAL

## 2024-06-28 ASSESSMENT — PAIN DESCRIPTION - DESCRIPTORS: DESCRIPTORS: PRESSURE

## 2024-06-28 ASSESSMENT — PAIN SCALES - GENERAL: PAINLEVEL_OUTOF10: 6

## 2024-06-28 ASSESSMENT — PAIN DESCRIPTION - ORIENTATION: ORIENTATION: LEFT

## 2024-06-28 ASSESSMENT — PAIN DESCRIPTION - PAIN TYPE: TYPE: ACUTE PAIN

## 2024-06-28 ASSESSMENT — PAIN DESCRIPTION - LOCATION: LOCATION: CHEST

## 2024-06-28 NOTE — ED PROVIDER NOTES
[facility] EMERGENCY DEPT  EMERGENCY DEPARTMENT ENCOUNTER       Pt Name: Shimon Min  MRN: [id number]  Birthdate 1985  Date of Evaluation: 6/28/2024  Provider: SATHISH Avelar - SERA   PCP: Gladys Hernandez MD  Note Started: 2:15 AM 6/28/24     CHIEF COMPLAINT       Chief Complaint   Patient presents with    Chest Pain     Pt c/o L sided CP x this morning, states its dull/pressure/sharp. Pt states this has happened to him int he past. Pt states that he does take HTN meds and 0.5mg ativan he took PTA.         HISTORY OF PRESENT ILLNESS: 1 or more elements      History From: Patient  HPI Limitations None     Shimon Min is a 38 y.o. male with PMHx of anxiety, hypertension, and NAIN who presents to the ED today for complaints of left-sided chest pain that patient describes as being sharp and dull pain.  Patient states he has had multiple episodes of this in the past which have been attributed to anxiety.  Has never had a heart attack.  Patient is very concerned about his blood pressure being elevated.  Has not taking medicines more than his BP medicines today.  Has not taken aspirin.     See MDM Documentation for further HPI and PMHx     Nursing Notes were all reviewed and agreed with or any disagreements were addressed in the HPI.     REVIEW OF SYSTEMS      Review of Systems     Positives and Pertinent negatives as per HPI.    PAST HISTORY     Past Medical History:  Past Medical History:   Diagnosis Date    Angioedema     ace induced asthma     Anxiety     Cold-induced asthma     Gout     Hernia of abdominal wall     Hypertension     NAIN (obstructive sleep apnea)        Past Surgical History:  Past Surgical History:   Procedure Laterality Date    ADENOIDECTOMY         Family History:  Family History   Problem Relation Age of Onset    Sleep Apnea Mother     Hypertension Father     Breast Cancer Maternal Grandmother     Hypertension Mother        Social History:  Social History     Tobacco Use    Smoking  Ventricular Rate 85 BPM   Atrial Rate 85 BPM   P-R Interval 130 ms   QRS Duration 96 ms   Q-T Interval 360 ms   QTc Calculation (Bazett) 428 ms   P Axis 55 degrees   R Axis 25 degrees   T Axis 1 degrees   Diagnosis Normal sinus rhythm  Normal ECG  When compared with ECG of 18-MAY-2024 09:44,  No significant casey...             PROCEDURES   Unless otherwise noted below, none  Procedures     CRITICAL CARE TIME   none    EMERGENCY DEPARTMENT COURSE and DIFFERENTIAL DIAGNOSIS/MDM   Vitals:    Vitals:    06/28/24 0817 06/28/24 0945 06/28/24 1044   BP: (!) 168/110 (!) 150/90    Pulse: 80  85   Resp: 18  19   Temp: 98.5 °F (36.9 °C)     TempSrc: Oral     SpO2: 99%  97%   Weight: 130.6 kg (287 lb 14.7 oz)     Height: 1.753 m (5' 9\")          Patient was given the following medications:  Medications   aspirin chewable tablet 324 mg (324 mg Oral Given 6/28/24 0910)       CONSULTS: (Who and What was discussed)  None    Social Determinants affecting Dx or Tx: None    Records Reviewed (source and summary of external records): Nursing Notes    MDM: CC/HPI Summary, Chronic Conditions, DDx, ED Course, and Reassessment. Disposition Considerations (Tests not done, Shared Decision Making, Pt Expectation of Test or Tx.):       Shimon Min is a 38 y.o. male with PMHx of anxiety, hypertension, and NAIN who presents to the ED today for complaints of left-sided chest pain that patient describes as being sharp and dull pain.  Patient states he has had multiple episodes of this in the past which have been attributed to anxiety.  Has never had a heart attack.  Patient is very concerned about his blood pressure being elevated.  Has not taking medicines more than his BP medicines today.  Has not taken aspirin.    On exam, well-appearing 38-year-old -American male sitting up in chair in no acute distress.  At various times, patient does seem very anxious about his blood pressure.  Ddx: Anxiety, MSK pain, ACS, atypical chest pain.

## 2024-06-28 NOTE — ED NOTES
Pt discharged by Manuel ZAMORA. Discharge instructions discussed and pt given opportunity to ask questions. Pt ambulatory out of ED

## 2024-07-24 ENCOUNTER — HOSPITAL ENCOUNTER (EMERGENCY)
Facility: HOSPITAL | Age: 39
Discharge: HOME | End: 2024-07-25
Payer: COMMERCIAL

## 2024-07-24 ENCOUNTER — APPOINTMENT (OUTPATIENT)
Facility: HOSPITAL | Age: 39
End: 2024-07-24
Payer: COMMERCIAL

## 2024-07-24 VITALS
OXYGEN SATURATION: 98 % | SYSTOLIC BLOOD PRESSURE: 163 MMHG | HEIGHT: 69 IN | BODY MASS INDEX: 42.78 KG/M2 | HEART RATE: 87 BPM | RESPIRATION RATE: 18 BRPM | WEIGHT: 288.8 LBS | DIASTOLIC BLOOD PRESSURE: 105 MMHG

## 2024-07-24 DIAGNOSIS — I10 ESSENTIAL HYPERTENSION: ICD-10-CM

## 2024-07-24 DIAGNOSIS — R07.9 ACUTE CHEST PAIN: Primary | ICD-10-CM

## 2024-07-24 DIAGNOSIS — F41.9 ACUTE ANXIETY: ICD-10-CM

## 2024-07-24 DIAGNOSIS — K21.9 GASTROESOPHAGEAL REFLUX DISEASE, UNSPECIFIED WHETHER ESOPHAGITIS PRESENT: ICD-10-CM

## 2024-07-24 LAB
ALBUMIN SERPL-MCNC: 3.8 G/DL (ref 3.5–5)
ALBUMIN/GLOB SERPL: 1 (ref 1.1–2.2)
ALP SERPL-CCNC: 106 U/L (ref 45–117)
ALT SERPL-CCNC: 41 U/L (ref 12–78)
ANION GAP SERPL CALC-SCNC: 3 MMOL/L (ref 5–15)
AST SERPL-CCNC: 27 U/L (ref 15–37)
BASOPHILS # BLD: 0.1 K/UL (ref 0–0.1)
BASOPHILS NFR BLD: 1 % (ref 0–1)
BILIRUB SERPL-MCNC: 0.5 MG/DL (ref 0.2–1)
BUN SERPL-MCNC: 13 MG/DL (ref 6–20)
BUN/CREAT SERPL: 11 (ref 12–20)
CALCIUM SERPL-MCNC: 8.7 MG/DL (ref 8.5–10.1)
CHLORIDE SERPL-SCNC: 108 MMOL/L (ref 97–108)
CO2 SERPL-SCNC: 30 MMOL/L (ref 21–32)
CREAT SERPL-MCNC: 1.14 MG/DL (ref 0.7–1.3)
DIFFERENTIAL METHOD BLD: NORMAL
EOSINOPHIL # BLD: 0.2 K/UL (ref 0–0.4)
EOSINOPHIL NFR BLD: 3 % (ref 0–7)
ERYTHROCYTE [DISTWIDTH] IN BLOOD BY AUTOMATED COUNT: 13 % (ref 11.5–14.5)
GLOBULIN SER CALC-MCNC: 3.9 G/DL (ref 2–4)
GLUCOSE SERPL-MCNC: 127 MG/DL (ref 65–100)
HCT VFR BLD AUTO: 41.1 % (ref 36.6–50.3)
HGB BLD-MCNC: 13.1 G/DL (ref 12.1–17)
IMM GRANULOCYTES # BLD AUTO: 0 K/UL (ref 0–0.04)
IMM GRANULOCYTES NFR BLD AUTO: 0 % (ref 0–0.5)
LYMPHOCYTES # BLD: 3.3 K/UL (ref 0.8–3.5)
LYMPHOCYTES NFR BLD: 48 % (ref 12–49)
MCH RBC QN AUTO: 27 PG (ref 26–34)
MCHC RBC AUTO-ENTMCNC: 31.9 G/DL (ref 30–36.5)
MCV RBC AUTO: 84.7 FL (ref 80–99)
MONOCYTES # BLD: 0.7 K/UL (ref 0–1)
MONOCYTES NFR BLD: 10 % (ref 5–13)
NEUTS SEG # BLD: 2.7 K/UL (ref 1.8–8)
NEUTS SEG NFR BLD: 38 % (ref 32–75)
NRBC # BLD: 0 K/UL (ref 0–0.01)
NRBC BLD-RTO: 0 PER 100 WBC
PLATELET # BLD AUTO: 180 K/UL (ref 150–400)
PMV BLD AUTO: 12.1 FL (ref 8.9–12.9)
POTASSIUM SERPL-SCNC: 3.9 MMOL/L (ref 3.5–5.1)
PROT SERPL-MCNC: 7.7 G/DL (ref 6.4–8.2)
RBC # BLD AUTO: 4.85 M/UL (ref 4.1–5.7)
SODIUM SERPL-SCNC: 141 MMOL/L (ref 136–145)
TROPONIN I SERPL HS-MCNC: 12 NG/L (ref 0–76)
WBC # BLD AUTO: 7 K/UL (ref 4.1–11.1)

## 2024-07-24 PROCEDURE — 80053 COMPREHEN METABOLIC PANEL: CPT

## 2024-07-24 PROCEDURE — 99285 EMERGENCY DEPT VISIT HI MDM: CPT

## 2024-07-24 PROCEDURE — 93005 ELECTROCARDIOGRAM TRACING: CPT | Performed by: EMERGENCY MEDICINE

## 2024-07-24 PROCEDURE — 71045 X-RAY EXAM CHEST 1 VIEW: CPT

## 2024-07-24 PROCEDURE — 85025 COMPLETE CBC W/AUTO DIFF WBC: CPT

## 2024-07-24 PROCEDURE — 36415 COLL VENOUS BLD VENIPUNCTURE: CPT

## 2024-07-24 PROCEDURE — 84484 ASSAY OF TROPONIN QUANT: CPT

## 2024-07-24 RX ORDER — FAMOTIDINE 20 MG/1
20 TABLET, FILM COATED ORAL 2 TIMES DAILY
Qty: 28 TABLET | Refills: 0 | Status: SHIPPED | OUTPATIENT
Start: 2024-07-24 | End: 2024-08-07

## 2024-07-25 LAB
EKG ATRIAL RATE: 88 BPM
EKG DIAGNOSIS: NORMAL
EKG P AXIS: 50 DEGREES
EKG P-R INTERVAL: 154 MS
EKG Q-T INTERVAL: 346 MS
EKG QRS DURATION: 102 MS
EKG QTC CALCULATION (BAZETT): 418 MS
EKG R AXIS: 30 DEGREES
EKG T AXIS: -7 DEGREES
EKG VENTRICULAR RATE: 88 BPM

## 2024-07-25 NOTE — DISCHARGE INSTRUCTIONS
Thank You!    It was a pleasure taking care of you in our Emergency Department today. We know that when you come to our Emergency Department, you are entrusting us with your health, comfort, and safety. Our physicians and nurses honor that trust, and truly appreciate the opportunity to care for you and your loved ones.      We also value your feedback. If you receive a survey about your Emergency Department experience today, please fill it out.  We care about our patients' feedback, and we listen to what you have to say.  Thank you.    Edu Ludwig PA-C      ________________________________________________________________________  I have included a copy of your lab results and/or radiologic studies from today's visit so you can have them easily available at your follow-up visit. We hope you feel better and please do not hesitate to contact the ED if you have any questions at all!    Recent Results (from the past 12 hour(s))   EKG 12 Lead    Collection Time: 07/24/24  9:44 PM   Result Value Ref Range    Ventricular Rate 88 BPM    Atrial Rate 88 BPM    P-R Interval 154 ms    QRS Duration 102 ms    Q-T Interval 346 ms    QTc Calculation (Bazett) 418 ms    P Axis 50 degrees    R Axis 30 degrees    T Axis -7 degrees    Diagnosis       Normal sinus rhythm  Normal ECG  When compared with ECG of 28-JUN-2024 08:29,  No significant change was found     CBC with Auto Differential    Collection Time: 07/24/24  9:50 PM   Result Value Ref Range    WBC 7.0 4.1 - 11.1 K/uL    RBC 4.85 4.10 - 5.70 M/uL    Hemoglobin 13.1 12.1 - 17.0 g/dL    Hematocrit 41.1 36.6 - 50.3 %    MCV 84.7 80.0 - 99.0 FL    MCH 27.0 26.0 - 34.0 PG    MCHC 31.9 30.0 - 36.5 g/dL    RDW 13.0 11.5 - 14.5 %    Platelets 180 150 - 400 K/uL    MPV 12.1 8.9 - 12.9 FL    Nucleated RBCs 0.0 0  WBC    nRBC 0.00 0.00 - 0.01 K/uL    Neutrophils % 38 32 - 75 %    Lymphocytes % 48 12 - 49 %    Monocytes % 10 5 - 13 %    Eosinophils % 3 0 - 7 %    Basophils % 1 0

## 2024-07-25 NOTE — ED PROVIDER NOTES
[facility] EMERGENCY DEPT  EMERGENCY DEPARTMENT ENCOUNTER       Pt Name: Shimon Min  MRN: [id number]  Birthdate 1985  Date of evaluation: 7/24/2024  Provider: MIGUEL Tate   PCP: Gladys Hernandez MD  Note Started: 9:52 PM EDT 7/24/24     CHIEF COMPLAINT       Chief Complaint   Patient presents with    Chest Pain     Patient arrives ambulatory to triage with complaint of chest pain for the past 2 days. Patient reports some shortness of breath. Patient denies cough, congestion, fever, chills. Patient reports that he has some tingling in his fingers as well. He also reports that he has some relief when he takes tums.         HISTORY OF PRESENT ILLNESS: 1 or more elements      History From: Patient  HPI Limitations: None     Shimon Min is a 38 y.o. male who presents ambulatory with his girlfriend/fiancé for 2 days of chest pain.  He denies any cough or congestion.  He tells me he does have a history of anxiety and is not sure if it is related but he did take lorazepam earlier due to his symptoms.  He also describes a history of GERD for which she had good improvement with Pepcid in the past.  Denies any abdominal pain but tells me he has had some chicken wings and spicy food and that seems to make his chest pain worse.     Nursing Notes were all reviewed and agreed with or any disagreements were addressed in the HPI.     REVIEW OF SYSTEMS      Review of Systems   Cardiovascular:  Positive for chest pain.   Psychiatric/Behavioral:  The patient is nervous/anxious.         Positives and Pertinent negatives as per HPI.    PAST HISTORY     Past Medical History:  Past Medical History:   Diagnosis Date    Angioedema     ace induced asthma     Anxiety     Cold-induced asthma     Gout     Hernia of abdominal wall     Hypertension     NAIN (obstructive sleep apnea)        Past Surgical History:  Past Surgical History:   Procedure Laterality Date    ADENOIDECTOMY         Family History:  Family History   Problem

## 2024-07-26 ENCOUNTER — TELEMEDICINE (OUTPATIENT)
Age: 39
End: 2024-07-26
Payer: COMMERCIAL

## 2024-07-26 DIAGNOSIS — F41.0 PANIC DISORDER (EPISODIC PAROXYSMAL ANXIETY): ICD-10-CM

## 2024-07-26 PROCEDURE — 99214 OFFICE O/P EST MOD 30 MIN: CPT | Performed by: NURSE PRACTITIONER

## 2024-07-26 RX ORDER — LORAZEPAM 0.5 MG/1
0.5 TABLET ORAL DAILY PRN
Qty: 30 TABLET | Refills: 0 | Status: SHIPPED | OUTPATIENT
Start: 2024-07-26 | End: 2024-08-25

## 2024-07-26 NOTE — PROGRESS NOTES
Shimon Min, was evaluated through a synchronous (real-time) audio-video encounter. The patient (or guardian if applicable) is aware that this is a billable service, which includes applicable co-pays. This Virtual Visit was conducted with patient's (and/or legal guardian's) consent. Patient identification was verified, and a caregiver was present when appropriate.       The patient was located at Home: [address] [place] [id number]  Provider was located at Facility (Appt Dept): [address] [place] [address]  [address]  [place] [address]  Confirm you are appropriately licensed, registered, or certified to deliver care in the state where the patient is located as indicated above. If you are not or unsure, please re-schedule the visit: Yes, I confirm.     CHIEF COMPLAINT:  Shimon Min is a 38 y.o. male and was seen today for follow-up of psychiatric condition and psychotropic medication management.     HPI:    Shimon reports a history of depression and anxiety, with symptoms present for several years. Currently, these symptoms are of moderate severity and occur intermittently. The patient identifies some health-related stressors. He describes his depression and anxiety as fluctuating, with anxiety often dominating his depressive symptoms. However, he has been able to manage using coping skills learned during his participation in a Partial Hospitalization Program (PHP).    His energy level is fair. The patient reports adherence to his medication regimen and denies experiencing any side effects. When feeling overwhelmed, he first attempts to utilize his coping skills, resorting to hydroxyzine if he is unable to manage his anxiety. He only uses lorazepam for severe panic episodes and notes that he has gone weeks without needing to take it.    His appetite is good, and he is planning to improve his diet and increase physical activity to better manage his blood pressure and overall health. His sleep is

## 2024-07-29 ENCOUNTER — OFFICE VISIT (OUTPATIENT)
Age: 39
End: 2024-07-29
Payer: COMMERCIAL

## 2024-07-29 VITALS
DIASTOLIC BLOOD PRESSURE: 98 MMHG | SYSTOLIC BLOOD PRESSURE: 168 MMHG | BODY MASS INDEX: 42.36 KG/M2 | WEIGHT: 286 LBS | HEIGHT: 69 IN

## 2024-07-29 DIAGNOSIS — R07.9 CHEST PAIN, UNSPECIFIED TYPE: Primary | ICD-10-CM

## 2024-07-29 DIAGNOSIS — K21.9 GASTROESOPHAGEAL REFLUX DISEASE WITHOUT ESOPHAGITIS: ICD-10-CM

## 2024-07-29 DIAGNOSIS — F41.1 GENERALIZED ANXIETY DISORDER: ICD-10-CM

## 2024-07-29 DIAGNOSIS — I10 ESSENTIAL (PRIMARY) HYPERTENSION: ICD-10-CM

## 2024-07-29 PROCEDURE — 3080F DIAST BP >= 90 MM HG: CPT | Performed by: NURSE PRACTITIONER

## 2024-07-29 PROCEDURE — 99214 OFFICE O/P EST MOD 30 MIN: CPT | Performed by: NURSE PRACTITIONER

## 2024-07-29 PROCEDURE — 3077F SYST BP >= 140 MM HG: CPT | Performed by: NURSE PRACTITIONER

## 2024-07-29 RX ORDER — COLCHICINE 0.6 MG/1
0.6 TABLET ORAL PRN
Qty: 10 TABLET | Refills: 0
Start: 2024-07-29

## 2024-07-29 NOTE — PATIENT INSTRUCTIONS
You should limit your sodium to 2500mg daily and drink 64 oz of water daily     Please begin taking esomeprazole 20mg daily for acid reflux    Please send me a [employer] message in 3 weeks with an update on your symptoms

## 2024-07-29 NOTE — PROGRESS NOTES
concentric hypertrophy. Normal wall motion. Normal diastolic function.    Aortic Valve: Tricuspid valve.    Tricuspid Valve: Trace regurgitation.    Signed by: Radha Jimenez MD on 8/3/2022  2:37 PM, Signed by: Unknown Provider Result on 8/3/2022 12:00 AM        12/13/22    STRESS TEST ONLY EXERCISE 12/14/2022 10:04 AM (Final)    Narrative  This is a summary report. The complete report is available in the patient's medical record. If you cannot access the medical record, please contact the sending organization for a detailed fax or copy.      ECG: Resting ECG demonstrates normal sinus rhythm.    ECG: Stress ECG was negative for ischemia.    Stress Test: A Francisco protocol stress test was performed. Overall, the patient's exercise capacity was average for their age. The patient reached stage 3 of the protocol and was stressed for 7 min and 5 sec. Hemodynamics are adequate for diagnosis. Blood pressure demonstrated a hypertensive response at rest 154/104. Ok to continue with stress test per Dr. Jimenez. Blood pressure demonstrated a normal response to stress. The heart rate demonstrated a normal response to stress. The patient's heart rate recovery was normal. The patient reported dyspnea and no chest pain during the stress test.    Resting hypertension.    Hypertensive response to exercise.    Otherwise normal ETT>    Signed by: Radha Jimenez MD on 12/14/2022 10:04 AM        Future Appointments   Date Time Provider Department Center   8/29/2024  1:45 PM Gladys Hernandez MD [facility] BS AMB   9/9/2024  9:00 AM Tania Campuzano APRN - NP [facility] BS AMB   1/23/2025  9:00 AM Radha Jimenez MD [facility] Campuzano, [facility]  [facility]     [facility] [facility], [address]  [place] [address]  (O) [phone] (Fax) [phone]     [address] [facility], [address]  [place] [address]  (O) [phone]

## 2024-08-01 ENCOUNTER — TELEPHONE (OUTPATIENT)
Age: 39
End: 2024-08-01

## 2024-08-01 NOTE — TELEPHONE ENCOUNTER
Telephone call made to patient. Two patient identifiers verified.   Advised that a PA was sent for nexium and we are awaiting response; pt stated that his insurance refused coverage and that pt is taking pepcid. Suggested OTC nexium per Tania Campuzano NP. Pt verbalizes understanding. No further questions.    Future Appointments   Date Time Provider Department Center   8/29/2024  1:45 PM [facility]ys Hernandez MD [facility]   9/9/2024  9:00 AM Tania Campuzano APRN - SERA [facility] BS AMB   1/23/2025  9:00 AM [facility]

## 2024-08-01 NOTE — TELEPHONE ENCOUNTER
Received request from [facility] for PA for nexium    PA request completed and sent to plan

## 2024-08-19 ENCOUNTER — TELEPHONE (OUTPATIENT)
Age: 39
End: 2024-08-19

## 2024-08-19 NOTE — TELEPHONE ENCOUNTER
Pt wants a sooner appt with pcp.    States current one is at the time when his daughter gets picked up from school    Can pt be worked in elsewhere or with another dr to be seen for the blood pressure change/adjustments/ and testosterone lab work.      Call pt to advise regarding scheduling.

## 2024-08-20 ENCOUNTER — OFFICE VISIT (OUTPATIENT)
Age: 39
End: 2024-08-20
Payer: COMMERCIAL

## 2024-08-20 VITALS
SYSTOLIC BLOOD PRESSURE: 152 MMHG | HEART RATE: 94 BPM | RESPIRATION RATE: 18 BRPM | WEIGHT: 287.6 LBS | HEIGHT: 69 IN | BODY MASS INDEX: 42.6 KG/M2 | OXYGEN SATURATION: 97 % | DIASTOLIC BLOOD PRESSURE: 102 MMHG | TEMPERATURE: 97.2 F

## 2024-08-20 DIAGNOSIS — R73.9 ELEVATED BLOOD SUGAR: ICD-10-CM

## 2024-08-20 DIAGNOSIS — E78.00 HIGH CHOLESTEROL: ICD-10-CM

## 2024-08-20 DIAGNOSIS — I10 PRIMARY HYPERTENSION: Primary | ICD-10-CM

## 2024-08-20 DIAGNOSIS — E55.9 VITAMIN D DEFICIENCY: ICD-10-CM

## 2024-08-20 DIAGNOSIS — F41.1 GAD (GENERALIZED ANXIETY DISORDER): ICD-10-CM

## 2024-08-20 PROCEDURE — 3080F DIAST BP >= 90 MM HG: CPT | Performed by: INTERNAL MEDICINE

## 2024-08-20 PROCEDURE — 3077F SYST BP >= 140 MM HG: CPT | Performed by: INTERNAL MEDICINE

## 2024-08-20 PROCEDURE — 99214 OFFICE O/P EST MOD 30 MIN: CPT | Performed by: INTERNAL MEDICINE

## 2024-08-20 RX ORDER — VALSARTAN 40 MG/1
40 TABLET ORAL DAILY
Qty: 30 TABLET | Refills: 3 | Status: SHIPPED | OUTPATIENT
Start: 2024-08-20

## 2024-08-20 NOTE — PROGRESS NOTES
\"Have you been to the ER, urgent care clinic since your last visit?  Hospitalized since your last visit?\"    NO    “Have you seen or consulted any other health care providers outside of [facility] since your last visit?”    NO            Click Here for Release of Records Request  
Elevated blood sugar  -     Hemoglobin A1C; Future    Return in about 3 months (around 11/20/2024).       Subjective   History of Present Illness  The patient is a 39-year-old male with a history of anxiety, obesity, and hypertension, presenting for follow-up.    He has had labile hypertension and was last seen in 04/2023. He was referred to Dr. MCKENNA, a cardiologist, in 2023 and had a negative stress test for ischemia. Initially, he was on low-dose valsartan 160 mg and amlodipine 10 mg, which controlled his blood pressure well. However, he self-discontinued the valsartan and is currently only on amlodipine 10 mg.    Since 05/2024, he has been experiencing intermittent high blood pressure, which he attributes to white coat syndrome or stress. His blood pressure was well-controlled a year ago, but he discontinued one of his medications due to concerns about its effect on his diastolic reading. Over the past 6 to 7 months, his blood pressure has been fluctuating, with readings as high as  150/100 at home. He has gained approximately 20 pounds and has not been adhering to a healthy diet for the last 3 to 4 months. He has been advised to resume walking.      He is on disability for anxiety and was partially hospitalized in 03/2024, which helped him. He still has hydroxyzine and lorazepam as needed. Lorazepam is his last resort, and a 30-pill supply lasts him 6 to 7 months. His B12 levels were good when last checked. He takes Nexium occasionally for  GERD.    Review of Systems     10 systems reviewed and negative other then HPI  Reviewed social history, medical history, family history and allergies no changes     Current Outpatient Medications   Medication Sig Dispense Refill    valsartan (DIOVAN) 40 MG tablet Take 1 tablet by mouth daily 30 tablet 3    esomeprazole (NEXIUM) 20 MG delayed release capsule Take 1 capsule by mouth every morning (before breakfast) 30 capsule 1    LORazepam (ATIVAN) 0.5 MG tablet Take 1 tablet

## 2024-08-20 NOTE — PATIENT INSTRUCTIONS
Add the valsartan 40mg daily   Labs fasting in one week   Return for BP check  
Reviewed bowel regimen, which is working slowly.  Needs to drink more fluids, too.  
oriented to person, place, time and situation

## 2024-09-09 ENCOUNTER — OFFICE VISIT (OUTPATIENT)
Age: 39
End: 2024-09-09
Payer: COMMERCIAL

## 2024-09-09 VITALS
RESPIRATION RATE: 21 BRPM | WEIGHT: 287 LBS | DIASTOLIC BLOOD PRESSURE: 90 MMHG | HEIGHT: 69 IN | BODY MASS INDEX: 42.51 KG/M2 | SYSTOLIC BLOOD PRESSURE: 150 MMHG | OXYGEN SATURATION: 99 % | HEART RATE: 78 BPM

## 2024-09-09 DIAGNOSIS — I51.7 LVH (LEFT VENTRICULAR HYPERTROPHY): ICD-10-CM

## 2024-09-09 DIAGNOSIS — K21.9 GASTROESOPHAGEAL REFLUX DISEASE WITHOUT ESOPHAGITIS: ICD-10-CM

## 2024-09-09 DIAGNOSIS — F41.1 GENERALIZED ANXIETY DISORDER: ICD-10-CM

## 2024-09-09 DIAGNOSIS — R07.9 CHEST PAIN, UNSPECIFIED TYPE: ICD-10-CM

## 2024-09-09 DIAGNOSIS — I10 ESSENTIAL (PRIMARY) HYPERTENSION: Primary | ICD-10-CM

## 2024-09-09 PROCEDURE — 3080F DIAST BP >= 90 MM HG: CPT | Performed by: NURSE PRACTITIONER

## 2024-09-09 PROCEDURE — 3077F SYST BP >= 140 MM HG: CPT | Performed by: NURSE PRACTITIONER

## 2024-09-09 PROCEDURE — 99214 OFFICE O/P EST MOD 30 MIN: CPT | Performed by: NURSE PRACTITIONER

## 2024-09-09 RX ORDER — LORAZEPAM 0.5 MG/1
0.5 TABLET ORAL EVERY 6 HOURS PRN
COMMUNITY

## 2024-09-09 RX ORDER — EPLERENONE 25 MG/1
25 TABLET, FILM COATED ORAL DAILY
Qty: 30 TABLET | Refills: 3 | Status: SHIPPED | OUTPATIENT
Start: 2024-09-09

## 2024-09-09 RX ORDER — AMLODIPINE BESYLATE 10 MG/1
10 TABLET ORAL DAILY
Qty: 90 TABLET | Refills: 3 | Status: SHIPPED | OUTPATIENT
Start: 2024-09-09

## 2024-09-22 ENCOUNTER — HOSPITAL ENCOUNTER (EMERGENCY)
Facility: HOSPITAL | Age: 39
Discharge: HOME | End: 2024-09-22
Attending: STUDENT IN AN ORGANIZED HEALTH CARE EDUCATION/TRAINING PROGRAM
Payer: COMMERCIAL

## 2024-09-22 ENCOUNTER — APPOINTMENT (OUTPATIENT)
Facility: HOSPITAL | Age: 39
End: 2024-09-22
Payer: COMMERCIAL

## 2024-09-22 VITALS
DIASTOLIC BLOOD PRESSURE: 94 MMHG | TEMPERATURE: 98.4 F | HEIGHT: 69 IN | BODY MASS INDEX: 41.18 KG/M2 | OXYGEN SATURATION: 98 % | HEART RATE: 85 BPM | RESPIRATION RATE: 14 BRPM | SYSTOLIC BLOOD PRESSURE: 138 MMHG | WEIGHT: 278 LBS

## 2024-09-22 DIAGNOSIS — R53.1 GENERALIZED WEAKNESS: Primary | ICD-10-CM

## 2024-09-22 LAB
ALBUMIN SERPL-MCNC: 3.5 G/DL (ref 3.5–5)
ALBUMIN/GLOB SERPL: 0.9 (ref 1.1–2.2)
ALP SERPL-CCNC: 90 U/L (ref 45–117)
ALT SERPL-CCNC: 31 U/L (ref 12–78)
ANION GAP SERPL CALC-SCNC: 3 MMOL/L (ref 2–12)
APPEARANCE UR: CLEAR
AST SERPL-CCNC: 20 U/L (ref 15–37)
BACTERIA URNS QL MICRO: NEGATIVE /HPF
BASOPHILS # BLD: 0.1 K/UL (ref 0–0.1)
BASOPHILS NFR BLD: 1 % (ref 0–1)
BILIRUB SERPL-MCNC: 0.4 MG/DL (ref 0.2–1)
BILIRUB UR QL: NEGATIVE
BUN SERPL-MCNC: 17 MG/DL (ref 6–20)
BUN/CREAT SERPL: 14 (ref 12–20)
CALCIUM SERPL-MCNC: 8.8 MG/DL (ref 8.5–10.1)
CHLORIDE SERPL-SCNC: 107 MMOL/L (ref 97–108)
CO2 SERPL-SCNC: 28 MMOL/L (ref 21–32)
COLOR UR: NORMAL
CREAT SERPL-MCNC: 1.2 MG/DL (ref 0.7–1.3)
DIFFERENTIAL METHOD BLD: NORMAL
EOSINOPHIL # BLD: 0.1 K/UL (ref 0–0.4)
EOSINOPHIL NFR BLD: 1 % (ref 0–7)
EPITH CASTS URNS QL MICRO: NORMAL /LPF
ERYTHROCYTE [DISTWIDTH] IN BLOOD BY AUTOMATED COUNT: 12.8 % (ref 11.5–14.5)
GLOBULIN SER CALC-MCNC: 4 G/DL (ref 2–4)
GLUCOSE SERPL-MCNC: 99 MG/DL (ref 65–100)
GLUCOSE UR STRIP.AUTO-MCNC: NEGATIVE MG/DL
HCT VFR BLD AUTO: 40.8 % (ref 36.6–50.3)
HGB BLD-MCNC: 12.8 G/DL (ref 12.1–17)
HGB UR QL STRIP: NEGATIVE
IMM GRANULOCYTES # BLD AUTO: 0 K/UL (ref 0–0.04)
IMM GRANULOCYTES NFR BLD AUTO: 0 % (ref 0–0.5)
KETONES UR QL STRIP.AUTO: NEGATIVE MG/DL
LEUKOCYTE ESTERASE UR QL STRIP.AUTO: NEGATIVE
LYMPHOCYTES # BLD: 2.2 K/UL (ref 0.8–3.5)
LYMPHOCYTES NFR BLD: 35 % (ref 12–49)
MAGNESIUM SERPL-MCNC: 1.9 MG/DL (ref 1.6–2.4)
MCH RBC QN AUTO: 26.6 PG (ref 26–34)
MCHC RBC AUTO-ENTMCNC: 31.4 G/DL (ref 30–36.5)
MCV RBC AUTO: 84.8 FL (ref 80–99)
MONOCYTES # BLD: 0.6 K/UL (ref 0–1)
MONOCYTES NFR BLD: 10 % (ref 5–13)
NEUTS SEG # BLD: 3.3 K/UL (ref 1.8–8)
NEUTS SEG NFR BLD: 53 % (ref 32–75)
NITRITE UR QL STRIP.AUTO: NEGATIVE
NRBC # BLD: 0 K/UL (ref 0–0.01)
NRBC BLD-RTO: 0 PER 100 WBC
PH UR STRIP: 7.5 (ref 5–8)
PLATELET # BLD AUTO: 168 K/UL (ref 150–400)
PMV BLD AUTO: 12.9 FL (ref 8.9–12.9)
POTASSIUM SERPL-SCNC: 4 MMOL/L (ref 3.5–5.1)
PROT SERPL-MCNC: 7.5 G/DL (ref 6.4–8.2)
PROT UR STRIP-MCNC: NEGATIVE MG/DL
RBC # BLD AUTO: 4.81 M/UL (ref 4.1–5.7)
RBC #/AREA URNS HPF: NORMAL /HPF (ref 0–5)
SODIUM SERPL-SCNC: 138 MMOL/L (ref 136–145)
SP GR UR REFRACTOMETRY: 1.01
URINE CULTURE IF INDICATED: NORMAL
UROBILINOGEN UR QL STRIP.AUTO: 0.2 EU/DL (ref 0.2–1)
WBC # BLD AUTO: 6.3 K/UL (ref 4.1–11.1)
WBC URNS QL MICRO: NORMAL /HPF (ref 0–4)

## 2024-09-22 PROCEDURE — 83735 ASSAY OF MAGNESIUM: CPT

## 2024-09-22 PROCEDURE — 36415 COLL VENOUS BLD VENIPUNCTURE: CPT

## 2024-09-22 PROCEDURE — 81001 URINALYSIS AUTO W/SCOPE: CPT

## 2024-09-22 PROCEDURE — 80053 COMPREHEN METABOLIC PANEL: CPT

## 2024-09-22 PROCEDURE — 71046 X-RAY EXAM CHEST 2 VIEWS: CPT

## 2024-09-22 PROCEDURE — 2580000003 HC RX 258: Performed by: STUDENT IN AN ORGANIZED HEALTH CARE EDUCATION/TRAINING PROGRAM

## 2024-09-22 PROCEDURE — 85025 COMPLETE CBC W/AUTO DIFF WBC: CPT

## 2024-09-22 PROCEDURE — 99284 EMERGENCY DEPT VISIT MOD MDM: CPT

## 2024-09-22 RX ORDER — 0.9 % SODIUM CHLORIDE 0.9 %
500 INTRAVENOUS SOLUTION INTRAVENOUS ONCE
Status: COMPLETED | OUTPATIENT
Start: 2024-09-22 | End: 2024-09-22

## 2024-09-22 RX ADMIN — SODIUM CHLORIDE 500 ML: 9 INJECTION, SOLUTION INTRAVENOUS at 17:01

## 2024-09-22 ASSESSMENT — PAIN SCALES - GENERAL: PAINLEVEL_OUTOF10: 0

## 2024-09-22 ASSESSMENT — PAIN - FUNCTIONAL ASSESSMENT: PAIN_FUNCTIONAL_ASSESSMENT: 0-10

## 2024-09-26 ENCOUNTER — HOSPITAL ENCOUNTER (EMERGENCY)
Facility: HOSPITAL | Age: 39
Discharge: HOME | End: 2024-09-26
Payer: COMMERCIAL

## 2024-09-26 VITALS
DIASTOLIC BLOOD PRESSURE: 92 MMHG | RESPIRATION RATE: 18 BRPM | BODY MASS INDEX: 41.99 KG/M2 | WEIGHT: 283.51 LBS | TEMPERATURE: 98.8 F | OXYGEN SATURATION: 97 % | HEIGHT: 69 IN | HEART RATE: 86 BPM | SYSTOLIC BLOOD PRESSURE: 146 MMHG

## 2024-09-26 DIAGNOSIS — K21.9 GASTROESOPHAGEAL REFLUX DISEASE, UNSPECIFIED WHETHER ESOPHAGITIS PRESENT: ICD-10-CM

## 2024-09-26 DIAGNOSIS — R07.9 CHEST PAIN, UNSPECIFIED TYPE: Primary | ICD-10-CM

## 2024-09-26 LAB
ALBUMIN SERPL-MCNC: 3.8 G/DL (ref 3.5–5)
ALBUMIN/GLOB SERPL: 0.9 (ref 1.1–2.2)
ALP SERPL-CCNC: 86 U/L (ref 45–117)
ALT SERPL-CCNC: 33 U/L (ref 12–78)
ANION GAP SERPL CALC-SCNC: 4 MMOL/L (ref 2–12)
AST SERPL-CCNC: 24 U/L (ref 15–37)
BASOPHILS # BLD: 0.1 K/UL (ref 0–0.1)
BASOPHILS NFR BLD: 1 % (ref 0–1)
BILIRUB SERPL-MCNC: 0.7 MG/DL (ref 0.2–1)
BUN SERPL-MCNC: 12 MG/DL (ref 6–20)
BUN/CREAT SERPL: 11 (ref 12–20)
CALCIUM SERPL-MCNC: 9.2 MG/DL (ref 8.5–10.1)
CHLORIDE SERPL-SCNC: 105 MMOL/L (ref 97–108)
CO2 SERPL-SCNC: 28 MMOL/L (ref 21–32)
CREAT SERPL-MCNC: 1.09 MG/DL (ref 0.7–1.3)
DIFFERENTIAL METHOD BLD: NORMAL
EOSINOPHIL # BLD: 0.1 K/UL (ref 0–0.4)
EOSINOPHIL NFR BLD: 1 % (ref 0–7)
ERYTHROCYTE [DISTWIDTH] IN BLOOD BY AUTOMATED COUNT: 12.9 % (ref 11.5–14.5)
GLOBULIN SER CALC-MCNC: 4.2 G/DL (ref 2–4)
GLUCOSE SERPL-MCNC: 89 MG/DL (ref 65–100)
HCT VFR BLD AUTO: 41.2 % (ref 36.6–50.3)
HGB BLD-MCNC: 13.1 G/DL (ref 12.1–17)
IMM GRANULOCYTES # BLD AUTO: 0 K/UL (ref 0–0.04)
IMM GRANULOCYTES NFR BLD AUTO: 0 % (ref 0–0.5)
LYMPHOCYTES # BLD: 1.5 K/UL (ref 0.8–3.5)
LYMPHOCYTES NFR BLD: 29 % (ref 12–49)
MCH RBC QN AUTO: 27 PG (ref 26–34)
MCHC RBC AUTO-ENTMCNC: 31.8 G/DL (ref 30–36.5)
MCV RBC AUTO: 84.9 FL (ref 80–99)
MONOCYTES # BLD: 0.5 K/UL (ref 0–1)
MONOCYTES NFR BLD: 9 % (ref 5–13)
NEUTS SEG # BLD: 3.1 K/UL (ref 1.8–8)
NEUTS SEG NFR BLD: 60 % (ref 32–75)
NRBC # BLD: 0 K/UL (ref 0–0.01)
NRBC BLD-RTO: 0 PER 100 WBC
PLATELET # BLD AUTO: 190 K/UL (ref 150–400)
PMV BLD AUTO: 12.7 FL (ref 8.9–12.9)
POTASSIUM SERPL-SCNC: 3.7 MMOL/L (ref 3.5–5.1)
PROT SERPL-MCNC: 8 G/DL (ref 6.4–8.2)
RBC # BLD AUTO: 4.85 M/UL (ref 4.1–5.7)
SODIUM SERPL-SCNC: 137 MMOL/L (ref 136–145)
TROPONIN I SERPL HS-MCNC: 11 NG/L (ref 0–76)
WBC # BLD AUTO: 5.1 K/UL (ref 4.1–11.1)

## 2024-09-26 PROCEDURE — 36415 COLL VENOUS BLD VENIPUNCTURE: CPT

## 2024-09-26 PROCEDURE — 84484 ASSAY OF TROPONIN QUANT: CPT

## 2024-09-26 PROCEDURE — 99284 EMERGENCY DEPT VISIT MOD MDM: CPT

## 2024-09-26 PROCEDURE — 6370000000 HC RX 637 (ALT 250 FOR IP)

## 2024-09-26 PROCEDURE — 80053 COMPREHEN METABOLIC PANEL: CPT

## 2024-09-26 PROCEDURE — 85025 COMPLETE CBC W/AUTO DIFF WBC: CPT

## 2024-09-26 RX ORDER — MAGNESIUM HYDROXIDE/ALUMINUM HYDROXICE/SIMETHICONE 120; 1200; 1200 MG/30ML; MG/30ML; MG/30ML
30 SUSPENSION ORAL
Status: COMPLETED | OUTPATIENT
Start: 2024-09-26 | End: 2024-09-26

## 2024-09-26 RX ORDER — FAMOTIDINE 20 MG/1
20 TABLET, FILM COATED ORAL
Status: COMPLETED | OUTPATIENT
Start: 2024-09-26 | End: 2024-09-26

## 2024-09-26 RX ORDER — LIDOCAINE HYDROCHLORIDE 20 MG/ML
15 SOLUTION OROPHARYNGEAL
Status: COMPLETED | OUTPATIENT
Start: 2024-09-26 | End: 2024-09-26

## 2024-09-26 RX ORDER — FAMOTIDINE 20 MG/1
20 TABLET, FILM COATED ORAL 2 TIMES DAILY
Qty: 60 TABLET | Refills: 0 | Status: SHIPPED | OUTPATIENT
Start: 2024-09-26

## 2024-09-26 RX ADMIN — LIDOCAINE HYDROCHLORIDE 15 ML: 20 SOLUTION ORAL at 12:11

## 2024-09-26 RX ADMIN — FAMOTIDINE 20 MG: 20 TABLET, FILM COATED ORAL at 12:11

## 2024-09-26 RX ADMIN — ALUMINUM HYDROXIDE, MAGNESIUM HYDROXIDE, AND SIMETHICONE 30 ML: 200; 200; 20 SUSPENSION ORAL at 12:11

## 2024-09-26 ASSESSMENT — PAIN DESCRIPTION - LOCATION: LOCATION: CHEST

## 2024-09-27 ENCOUNTER — TELEPHONE (OUTPATIENT)
Age: 39
End: 2024-09-27

## 2024-09-27 LAB
EKG ATRIAL RATE: 87 BPM
EKG DIAGNOSIS: NORMAL
EKG P AXIS: 44 DEGREES
EKG P-R INTERVAL: 154 MS
EKG Q-T INTERVAL: 410 MS
EKG QRS DURATION: 94 MS
EKG QTC CALCULATION (BAZETT): 493 MS
EKG R AXIS: 41 DEGREES
EKG T AXIS: -7 DEGREES
EKG VENTRICULAR RATE: 87 BPM

## 2024-09-27 NOTE — TELEPHONE ENCOUNTER
Patient request a call back explaining his EKG results he had done on today.     Patient [phone]

## 2024-09-30 ENCOUNTER — OFFICE VISIT (OUTPATIENT)
Age: 39
End: 2024-09-30
Payer: COMMERCIAL

## 2024-09-30 VITALS
HEART RATE: 95 BPM | DIASTOLIC BLOOD PRESSURE: 98 MMHG | BODY MASS INDEX: 42.21 KG/M2 | WEIGHT: 285 LBS | HEIGHT: 69 IN | SYSTOLIC BLOOD PRESSURE: 150 MMHG | OXYGEN SATURATION: 98 %

## 2024-09-30 DIAGNOSIS — R07.9 CHEST PAIN, UNSPECIFIED TYPE: ICD-10-CM

## 2024-09-30 DIAGNOSIS — F41.1 GENERALIZED ANXIETY DISORDER: ICD-10-CM

## 2024-09-30 DIAGNOSIS — E66.01 MORBID (SEVERE) OBESITY DUE TO EXCESS CALORIES: ICD-10-CM

## 2024-09-30 DIAGNOSIS — K21.9 GASTROESOPHAGEAL REFLUX DISEASE WITHOUT ESOPHAGITIS: ICD-10-CM

## 2024-09-30 DIAGNOSIS — I10 ESSENTIAL (PRIMARY) HYPERTENSION: Primary | ICD-10-CM

## 2024-09-30 PROCEDURE — 93000 ELECTROCARDIOGRAM COMPLETE: CPT | Performed by: NURSE PRACTITIONER

## 2024-09-30 PROCEDURE — 3080F DIAST BP >= 90 MM HG: CPT | Performed by: NURSE PRACTITIONER

## 2024-09-30 PROCEDURE — 3077F SYST BP >= 140 MM HG: CPT | Performed by: NURSE PRACTITIONER

## 2024-09-30 PROCEDURE — 99214 OFFICE O/P EST MOD 30 MIN: CPT | Performed by: NURSE PRACTITIONER

## 2024-09-30 RX ORDER — AMLODIPINE BESYLATE 5 MG/1
5 TABLET ORAL 2 TIMES DAILY
Qty: 180 TABLET | Refills: 1 | Status: SHIPPED | OUTPATIENT
Start: 2024-09-30

## 2024-09-30 RX ORDER — AMLODIPINE BESYLATE 5 MG/1
5 TABLET ORAL 2 TIMES DAILY
Qty: 60 TABLET | Refills: 1 | Status: SHIPPED | OUTPATIENT
Start: 2024-09-30 | End: 2024-09-30

## 2024-09-30 RX ORDER — CARVEDILOL 6.25 MG/1
6.25 TABLET ORAL 2 TIMES DAILY
Qty: 60 TABLET | Refills: 3 | Status: SHIPPED | OUTPATIENT
Start: 2024-09-30

## 2024-09-30 ASSESSMENT — PATIENT HEALTH QUESTIONNAIRE - PHQ9
SUM OF ALL RESPONSES TO PHQ QUESTIONS 1-9: 0
SUM OF ALL RESPONSES TO PHQ QUESTIONS 1-9: 0
1. LITTLE INTEREST OR PLEASURE IN DOING THINGS: NOT AT ALL
SUM OF ALL RESPONSES TO PHQ QUESTIONS 1-9: 0
SUM OF ALL RESPONSES TO PHQ QUESTIONS 1-9: 0
SUM OF ALL RESPONSES TO PHQ9 QUESTIONS 1 & 2: 0
2. FEELING DOWN, DEPRESSED OR HOPELESS: NOT AT ALL

## 2024-09-30 NOTE — PROGRESS NOTES
(Final)    Narrative  This is a summary report. The complete report is available in the patient's medical record. If you cannot access the medical record, please contact the sending organization for a detailed fax or copy.    Formatting of this result is different from the original.      Left Ventricle: Normal left ventricular systolic function with a visually estimated EF of 55 - 60%. Left ventricle size is normal. Mildly increased wall thickness. Findings consistent with mild concentric hypertrophy. Normal wall motion. Normal diastolic function.    Aortic Valve: Tricuspid valve.    Tricuspid Valve: Trace regurgitation.    Signed by: Radha Jimenez MD on 8/3/2022  2:37 PM, Signed by: Unknown Provider Result on 8/3/2022 12:00 AM        12/13/22    STRESS TEST ONLY EXERCISE 12/14/2022 10:04 AM (Final)    Narrative  This is a summary report. The complete report is available in the patient's medical record. If you cannot access the medical record, please contact the sending organization for a detailed fax or copy.      ECG: Resting ECG demonstrates normal sinus rhythm.    ECG: Stress ECG was negative for ischemia.    Stress Test: A Francisco protocol stress test was performed. Overall, the patient's exercise capacity was average for their age. The patient reached stage 3 of the protocol and was stressed for 7 min and 5 sec. Hemodynamics are adequate for diagnosis. Blood pressure demonstrated a hypertensive response at rest 154/104. Ok to continue with stress test per Dr. Jimenez. Blood pressure demonstrated a normal response to stress. The heart rate demonstrated a normal response to stress. The patient's heart rate recovery was normal. The patient reported dyspnea and no chest pain during the stress test.    Resting hypertension.    Hypertensive response to exercise.    Otherwise normal ETT>    Signed by: Radha Jimenez MD on 12/14/2022 10:04 AM      ROSALINDA Casanova, [facility]  [facility]     [address]

## 2024-09-30 NOTE — PATIENT INSTRUCTIONS
Please begin carvedilol 6.25mg twice daily    Please send me a message with your fiancee's work schedule so I can tell you when to check BP    Please send me BP readings on Monday 10/7    Please make a follow up with your psychiatrist about your anxiety     Keep taking famotidine

## 2024-10-02 ENCOUNTER — PATIENT MESSAGE (OUTPATIENT)
Age: 39
End: 2024-10-02

## 2024-10-06 ENCOUNTER — HOSPITAL ENCOUNTER (EMERGENCY)
Facility: HOSPITAL | Age: 39
Discharge: HOME | End: 2024-10-06
Payer: COMMERCIAL

## 2024-10-06 VITALS
OXYGEN SATURATION: 100 % | HEART RATE: 92 BPM | TEMPERATURE: 99 F | SYSTOLIC BLOOD PRESSURE: 152 MMHG | RESPIRATION RATE: 17 BRPM | DIASTOLIC BLOOD PRESSURE: 105 MMHG

## 2024-10-06 DIAGNOSIS — R42 DIZZINESS: Primary | ICD-10-CM

## 2024-10-06 DIAGNOSIS — F41.1 ANXIETY STATE: ICD-10-CM

## 2024-10-06 DIAGNOSIS — R03.0 ELEVATED BLOOD PRESSURE READING: ICD-10-CM

## 2024-10-06 LAB
ALBUMIN SERPL-MCNC: 3.8 G/DL (ref 3.5–5)
ALBUMIN/GLOB SERPL: 1 (ref 1.1–2.2)
ALP SERPL-CCNC: 82 U/L (ref 45–117)
ALT SERPL-CCNC: 29 U/L (ref 12–78)
ANION GAP SERPL CALC-SCNC: 5 MMOL/L (ref 2–12)
APPEARANCE UR: CLEAR
AST SERPL-CCNC: 23 U/L (ref 15–37)
BACTERIA URNS QL MICRO: NEGATIVE /HPF
BASOPHILS # BLD: 0.1 K/UL (ref 0–0.1)
BASOPHILS NFR BLD: 1 % (ref 0–1)
BILIRUB SERPL-MCNC: 0.7 MG/DL (ref 0.2–1)
BILIRUB UR QL: NEGATIVE
BUN SERPL-MCNC: 18 MG/DL (ref 6–20)
BUN/CREAT SERPL: 17 (ref 12–20)
CALCIUM SERPL-MCNC: 9.2 MG/DL (ref 8.5–10.1)
CHLORIDE SERPL-SCNC: 106 MMOL/L (ref 97–108)
CO2 SERPL-SCNC: 29 MMOL/L (ref 21–32)
COLOR UR: NORMAL
CREAT SERPL-MCNC: 1.06 MG/DL (ref 0.7–1.3)
DIFFERENTIAL METHOD BLD: NORMAL
EOSINOPHIL # BLD: 0.1 K/UL (ref 0–0.4)
EOSINOPHIL NFR BLD: 1 % (ref 0–7)
EPITH CASTS URNS QL MICRO: NORMAL /LPF
ERYTHROCYTE [DISTWIDTH] IN BLOOD BY AUTOMATED COUNT: 12.8 % (ref 11.5–14.5)
FLUAV RNA SPEC QL NAA+PROBE: NOT DETECTED
FLUBV RNA SPEC QL NAA+PROBE: NOT DETECTED
GLOBULIN SER CALC-MCNC: 4 G/DL (ref 2–4)
GLUCOSE SERPL-MCNC: 105 MG/DL (ref 65–100)
GLUCOSE UR STRIP.AUTO-MCNC: NEGATIVE MG/DL
HCT VFR BLD AUTO: 42.2 % (ref 36.6–50.3)
HGB BLD-MCNC: 13.4 G/DL (ref 12.1–17)
HGB UR QL STRIP: NEGATIVE
HYALINE CASTS URNS QL MICRO: NORMAL /LPF (ref 0–2)
IMM GRANULOCYTES # BLD AUTO: 0 K/UL (ref 0–0.04)
IMM GRANULOCYTES NFR BLD AUTO: 0 % (ref 0–0.5)
KETONES UR QL STRIP.AUTO: NEGATIVE MG/DL
LEUKOCYTE ESTERASE UR QL STRIP.AUTO: NEGATIVE
LYMPHOCYTES # BLD: 1.9 K/UL (ref 0.8–3.5)
LYMPHOCYTES NFR BLD: 33 % (ref 12–49)
MCH RBC QN AUTO: 27 PG (ref 26–34)
MCHC RBC AUTO-ENTMCNC: 31.8 G/DL (ref 30–36.5)
MCV RBC AUTO: 84.9 FL (ref 80–99)
MONOCYTES # BLD: 0.7 K/UL (ref 0–1)
MONOCYTES NFR BLD: 12 % (ref 5–13)
NEUTS SEG # BLD: 2.9 K/UL (ref 1.8–8)
NEUTS SEG NFR BLD: 53 % (ref 32–75)
NITRITE UR QL STRIP.AUTO: NEGATIVE
NRBC # BLD: 0 K/UL (ref 0–0.01)
NRBC BLD-RTO: 0 PER 100 WBC
PH UR STRIP: 7.5 (ref 5–8)
PLATELET # BLD AUTO: 194 K/UL (ref 150–400)
PMV BLD AUTO: 12.7 FL (ref 8.9–12.9)
POTASSIUM SERPL-SCNC: 3.7 MMOL/L (ref 3.5–5.1)
PROT SERPL-MCNC: 7.8 G/DL (ref 6.4–8.2)
PROT UR STRIP-MCNC: NEGATIVE MG/DL
RBC # BLD AUTO: 4.97 M/UL (ref 4.1–5.7)
RBC #/AREA URNS HPF: NORMAL /HPF (ref 0–5)
SARS-COV-2 RNA RESP QL NAA+PROBE: NOT DETECTED
SODIUM SERPL-SCNC: 140 MMOL/L (ref 136–145)
SOURCE: NORMAL
SP GR UR REFRACTOMETRY: 1.01
URINE CULTURE IF INDICATED: NORMAL
UROBILINOGEN UR QL STRIP.AUTO: 0.2 EU/DL (ref 0.2–1)
WBC # BLD AUTO: 5.6 K/UL (ref 4.1–11.1)
WBC URNS QL MICRO: NORMAL /HPF (ref 0–4)

## 2024-10-06 PROCEDURE — 93005 ELECTROCARDIOGRAM TRACING: CPT | Performed by: STUDENT IN AN ORGANIZED HEALTH CARE EDUCATION/TRAINING PROGRAM

## 2024-10-06 PROCEDURE — 81001 URINALYSIS AUTO W/SCOPE: CPT

## 2024-10-06 PROCEDURE — 85025 COMPLETE CBC W/AUTO DIFF WBC: CPT

## 2024-10-06 PROCEDURE — 99284 EMERGENCY DEPT VISIT MOD MDM: CPT

## 2024-10-06 PROCEDURE — 36415 COLL VENOUS BLD VENIPUNCTURE: CPT

## 2024-10-06 PROCEDURE — 87636 SARSCOV2 & INF A&B AMP PRB: CPT

## 2024-10-06 PROCEDURE — 80053 COMPREHEN METABOLIC PANEL: CPT

## 2024-10-06 ASSESSMENT — LIFESTYLE VARIABLES
HOW MANY STANDARD DRINKS CONTAINING ALCOHOL DO YOU HAVE ON A TYPICAL DAY: PATIENT DOES NOT DRINK
HOW OFTEN DO YOU HAVE A DRINK CONTAINING ALCOHOL: NEVER

## 2024-10-06 ASSESSMENT — PAIN - FUNCTIONAL ASSESSMENT: PAIN_FUNCTIONAL_ASSESSMENT: NONE - DENIES PAIN

## 2024-10-06 NOTE — ED PROVIDER NOTES
TOOLS:  Not Applicable, Dizziness: MEDICAL DECISION MAKING:   I considered, but did not perform, additional testing such CT stroke and Brain MRI, as well as admission or transfer to a higher level of care.     I utilized an evidence-based risk rating tool (CMT) along with my training and experience to weigh the risk of discharge against the risks of further testing, imaging, or hospitalization. At this time, I estimate the risks of additional testing, imaging, or hospitalization (in the case of discharge home) to be equal to or greater than the risk of discharge.       I have performed and NIHSS exam, and it is 0 with no cerebellar findings. The chance of missing a stroke is less than 1%. MRI at this time would not benefit the patient. MRI is higher risk than performing a NIHSS, and MRI is not as sensitive for acute stroke. With an NIHSS of 0 and no cerebellar findings, acute interventions such as thrombolytics, angiogram, or new anticoagulation most likely have more risk than benefit. [id number]     SHARED DECISION MAKING:   I discussed my risk assessment with the patient. The patient understands and consents to the risk of disposition/plan, as well as the risk of uncertainty in estimating outcomes.  [id number]          Heart Score: 1  NIHSS:0         Disposition Considerations (Tests not done, Shared Decision Making, Pt Expectation of Test or Tx.): Considered stroke workup, see CMT above.    FINAL IMPRESSION     1. Dizziness    2. Anxiety state    3. Elevated blood pressure reading          DISPOSITION/PLAN   DISPOSITION Decision To Discharge 10/06/2024 02:55:15 PM  Condition at Disposition: Data Unavailable    Discharge Note: The patient is stable for discharge home. The signs, symptoms, diagnosis, and discharge instructions have been discussed, understanding conveyed, and agreed upon. The patient is to follow up as recommended or return to ER should their symptoms worsen.      PATIENT REFERRED

## 2024-10-06 NOTE — DISCHARGE INSTRUCTIONS
It is important that you see your primary care doctor for control of your blood pressure.  It was elevated substantially in the emergency department.  If left untreated, elevated blood pressure can lead to endorgan damage to include kidney disease, heart disease and risk of stroke.    Return to the ED immediately if you develop any concerning symptoms, to include concerning headache, dizziness, vision changes, chest pain, difficulty breathing, vomiting, weakness/numbness in the arms, difficulty speaking or swallowing.

## 2024-10-07 DIAGNOSIS — E78.00 HIGH CHOLESTEROL: ICD-10-CM

## 2024-10-07 DIAGNOSIS — F41.1 GAD (GENERALIZED ANXIETY DISORDER): Primary | ICD-10-CM

## 2024-10-07 DIAGNOSIS — R73.9 ELEVATED BLOOD SUGAR: ICD-10-CM

## 2024-10-07 DIAGNOSIS — E55.9 VITAMIN D DEFICIENCY: ICD-10-CM

## 2024-10-07 DIAGNOSIS — F41.0 PANIC DISORDER (EPISODIC PAROXYSMAL ANXIETY): ICD-10-CM

## 2024-10-07 LAB
25(OH)D3 SERPL-MCNC: 24.6 NG/ML (ref 30–100)
CHOLEST SERPL-MCNC: 182 MG/DL
EKG ATRIAL RATE: 86 BPM
EKG DIAGNOSIS: NORMAL
EKG P AXIS: 63 DEGREES
EKG P-R INTERVAL: 154 MS
EKG Q-T INTERVAL: 350 MS
EKG QRS DURATION: 94 MS
EKG QTC CALCULATION (BAZETT): 418 MS
EKG R AXIS: 60 DEGREES
EKG T AXIS: -3 DEGREES
EKG VENTRICULAR RATE: 86 BPM
EST. AVERAGE GLUCOSE BLD GHB EST-MCNC: 105 MG/DL
HBA1C MFR BLD: 5.3 % (ref 4–5.6)
HDLC SERPL-MCNC: 46 MG/DL
HDLC SERPL: 4 (ref 0–5)
LDLC SERPL CALC-MCNC: 123 MG/DL (ref 0–100)
TRIGL SERPL-MCNC: 65 MG/DL
VLDLC SERPL CALC-MCNC: 13 MG/DL

## 2024-10-07 RX ORDER — LORAZEPAM 0.5 MG/1
0.5 TABLET ORAL EVERY 6 HOURS PRN
Qty: 30 TABLET | Refills: 0 | Status: SHIPPED | OUTPATIENT
Start: 2024-10-07 | End: 2024-11-06

## 2024-10-07 NOTE — TELEPHONE ENCOUNTER
Chief Complaint   Patient presents with    Medication Refill       Requested Prescriptions     Pending Prescriptions Disp Refills    LORazepam (ATIVAN) 0.5 MG tablet 30 tablet 0     Sig: Take 1 tablet by mouth every 6 hours as needed for Anxiety for up to 30 days. Max Daily Amount: 2 mg       [facility]    [facility]  Written:07/26/2024  Filled:07/26/2024  Drug:Lorazepam 0.5 Mg Tab  Qty:30.00  Days:30  Provider:Jase Kan    Last visit with clinic:  7/26/2024   Next visit with clinic: 10/17/2024

## 2024-10-13 NOTE — PROGRESS NOTES
Patient: Shimon Min  : 1985    Primary Cardiologist: Radha Jimenez MD   PCP: Gladys Hernandez MD    Today's Date: 10/14/2024    ASSESSMENT AND PLAN:     Assessment and Plan:  HTN  - 7 day monitor 10/2022 - essentially normal, avg HR 77 bpm  - echo 2022 - EF 55-60%, mild increased wall thickness, mild concentric hypertrophy  - ETT WNL in   - likely has some whitecoat HTN  - discussed low sodium diet previously  - advised him to follow up with psychiatry for anxiety management  - less meds are better for him  - trialed tribenzor -  did not like HCTZ   - had dizziness on valsartan  - frequent urination on eplerenone 25mg daily   - could not tolerate coreg 6.25mg BID - felt dizzy  - continue amlodipine 10mg daily, just started taking HCTZ 12.5mg daily  - had issues with gout on HCTZ in the past but seems happy to be on it currently  - advised him to continue monitoring BP at home and send me a [employer] message in 2-3 weeks if BP is still >130/>90  - advised him to try to schedule his echo earlier than 24 at a nearby [facility] - echo to reassess LVH  - he will follow up with Dr. Jimenez next month     2.  Anxiety  -Significant anxiety, social anxiety.  Panic attacks.   -Thinks anxiety started after COVID vaccine.  -Several medication intolerances, followed by psychiatry      3. Chest pain, atypical  - atypical chest pain  - cCTA score of 1, very minimal evidence of CAD in 3/24  - stress test in  without ischemia   - echo pending  - now off famotidine, advised him to follow up with GI, hx of gastritis     4.  NAIN on CPAP    5.  Morbid obesity  Body mass index is 41.64 kg/m².  - working on weight loss, discussed exercising 45 minutes/day 5 days/week     6.  Dyslipidemia  -will plan to check fasting lipids with next lab draw    HISTORY OF PRESENT ILLNESS:     Shimon Min is a 39 y.o. male here for follow up regarding HTN  Hx of GERD and longstanding anxiety  Recent

## 2024-10-14 ENCOUNTER — OFFICE VISIT (OUTPATIENT)
Age: 39
End: 2024-10-14
Payer: COMMERCIAL

## 2024-10-14 ENCOUNTER — TELEPHONE (OUTPATIENT)
Age: 39
End: 2024-10-14

## 2024-10-14 VITALS
OXYGEN SATURATION: 96 % | DIASTOLIC BLOOD PRESSURE: 96 MMHG | HEART RATE: 86 BPM | SYSTOLIC BLOOD PRESSURE: 144 MMHG | HEIGHT: 69 IN | BODY MASS INDEX: 41.77 KG/M2 | WEIGHT: 282 LBS

## 2024-10-14 DIAGNOSIS — I10 ESSENTIAL (PRIMARY) HYPERTENSION: ICD-10-CM

## 2024-10-14 DIAGNOSIS — R07.9 CHEST PAIN, UNSPECIFIED TYPE: Primary | ICD-10-CM

## 2024-10-14 DIAGNOSIS — E66.01 MORBID (SEVERE) OBESITY DUE TO EXCESS CALORIES: ICD-10-CM

## 2024-10-14 DIAGNOSIS — I51.7 LVH (LEFT VENTRICULAR HYPERTROPHY): ICD-10-CM

## 2024-10-14 PROCEDURE — 3080F DIAST BP >= 90 MM HG: CPT | Performed by: NURSE PRACTITIONER

## 2024-10-14 PROCEDURE — 3077F SYST BP >= 140 MM HG: CPT | Performed by: NURSE PRACTITIONER

## 2024-10-14 PROCEDURE — 99214 OFFICE O/P EST MOD 30 MIN: CPT | Performed by: NURSE PRACTITIONER

## 2024-10-14 ASSESSMENT — PATIENT HEALTH QUESTIONNAIRE - PHQ9
SUM OF ALL RESPONSES TO PHQ QUESTIONS 1-9: 0
SUM OF ALL RESPONSES TO PHQ9 QUESTIONS 1 & 2: 0
SUM OF ALL RESPONSES TO PHQ QUESTIONS 1-9: 0
1. LITTLE INTEREST OR PLEASURE IN DOING THINGS: NOT AT ALL
SUM OF ALL RESPONSES TO PHQ QUESTIONS 1-9: 0
SUM OF ALL RESPONSES TO PHQ QUESTIONS 1-9: 0
2. FEELING DOWN, DEPRESSED OR HOPELESS: NOT AT ALL

## 2024-10-14 NOTE — PATIENT INSTRUCTIONS
Please call central scheduling [phone] to see if you can get your echo scheduled earlier than November 13th    If you can get the echo at a nearby hospital sooner than November 13th then please take that appointment and we will cancel your echo with us November 13th    Please check your blood pressure daily (at least one hour after your morning blood pressure medications.)  Keep a written record of your blood pressure readings and bring it to each appointment.  If your systolic blood pressure is consistently greater than 150mmHg or less than 100mmHg then please call the office at [phone].

## 2024-10-14 NOTE — TELEPHONE ENCOUNTER
Patient having issues with getting his supplies from [facility].  Review of chart shows that DME was transferred to [facility].  Sent secure email inquiry to Georgina Morales with [employer] to check if a supply order was received from our office this year.

## 2024-10-22 ENCOUNTER — OFFICE VISIT (OUTPATIENT)
Age: 39
End: 2024-10-22
Payer: COMMERCIAL

## 2024-10-22 VITALS
RESPIRATION RATE: 18 BRPM | TEMPERATURE: 98.4 F | DIASTOLIC BLOOD PRESSURE: 85 MMHG | BODY MASS INDEX: 42.06 KG/M2 | WEIGHT: 284 LBS | HEIGHT: 69 IN | HEART RATE: 80 BPM | OXYGEN SATURATION: 99 % | SYSTOLIC BLOOD PRESSURE: 138 MMHG

## 2024-10-22 DIAGNOSIS — F41.1 GAD (GENERALIZED ANXIETY DISORDER): Primary | ICD-10-CM

## 2024-10-22 DIAGNOSIS — F33.41 RECURRENT MAJOR DEPRESSIVE DISORDER, IN PARTIAL REMISSION (HCC): ICD-10-CM

## 2024-10-22 PROCEDURE — 99214 OFFICE O/P EST MOD 30 MIN: CPT | Performed by: NURSE PRACTITIONER

## 2024-10-22 PROCEDURE — 90833 PSYTX W PT W E/M 30 MIN: CPT | Performed by: NURSE PRACTITIONER

## 2024-10-22 PROCEDURE — 3079F DIAST BP 80-89 MM HG: CPT | Performed by: NURSE PRACTITIONER

## 2024-10-22 PROCEDURE — 3075F SYST BP GE 130 - 139MM HG: CPT | Performed by: NURSE PRACTITIONER

## 2024-10-22 ASSESSMENT — PATIENT HEALTH QUESTIONNAIRE - PHQ9
5. POOR APPETITE OR OVEREATING: NOT AT ALL
1. LITTLE INTEREST OR PLEASURE IN DOING THINGS: NOT AT ALL
10. IF YOU CHECKED OFF ANY PROBLEMS, HOW DIFFICULT HAVE THESE PROBLEMS MADE IT FOR YOU TO DO YOUR WORK, TAKE CARE OF THINGS AT HOME, OR GET ALONG WITH OTHER PEOPLE: SOMEWHAT DIFFICULT
9. THOUGHTS THAT YOU WOULD BE BETTER OFF DEAD, OR OF HURTING YOURSELF: NOT AT ALL
9. THOUGHTS THAT YOU WOULD BE BETTER OFF DEAD, OR OF HURTING YOURSELF: NOT AT ALL
3. TROUBLE FALLING OR STAYING ASLEEP: NOT AT ALL
4. FEELING TIRED OR HAVING LITTLE ENERGY: NOT AT ALL
SUM OF ALL RESPONSES TO PHQ QUESTIONS 1-9: 1
SUM OF ALL RESPONSES TO PHQ QUESTIONS 1-9: 1
2. FEELING DOWN, DEPRESSED OR HOPELESS: SEVERAL DAYS
5. POOR APPETITE OR OVEREATING: NOT AT ALL
SUM OF ALL RESPONSES TO PHQ QUESTIONS 1-9: 1
6. FEELING BAD ABOUT YOURSELF - OR THAT YOU ARE A FAILURE OR HAVE LET YOURSELF OR YOUR FAMILY DOWN: NOT AT ALL
SUM OF ALL RESPONSES TO PHQ QUESTIONS 1-9: 1
10. IF YOU CHECKED OFF ANY PROBLEMS, HOW DIFFICULT HAVE THESE PROBLEMS MADE IT FOR YOU TO DO YOUR WORK, TAKE CARE OF THINGS AT HOME, OR GET ALONG WITH OTHER PEOPLE: SOMEWHAT DIFFICULT
7. TROUBLE CONCENTRATING ON THINGS, SUCH AS READING THE NEWSPAPER OR WATCHING TELEVISION: NOT AT ALL
8. MOVING OR SPEAKING SO SLOWLY THAT OTHER PEOPLE COULD HAVE NOTICED. OR THE OPPOSITE, BEING SO FIGETY OR RESTLESS THAT YOU HAVE BEEN MOVING AROUND A LOT MORE THAN USUAL: NOT AT ALL
6. FEELING BAD ABOUT YOURSELF - OR THAT YOU ARE A FAILURE OR HAVE LET YOURSELF OR YOUR FAMILY DOWN: NOT AT ALL
4. FEELING TIRED OR HAVING LITTLE ENERGY: NOT AT ALL
SUM OF ALL RESPONSES TO PHQ QUESTIONS 1-9: 1
7. TROUBLE CONCENTRATING ON THINGS, SUCH AS READING THE NEWSPAPER OR WATCHING TELEVISION: NOT AT ALL
SUM OF ALL RESPONSES TO PHQ9 QUESTIONS 1 & 2: 1
1. LITTLE INTEREST OR PLEASURE IN DOING THINGS: NOT AT ALL
2. FEELING DOWN, DEPRESSED OR HOPELESS: SEVERAL DAYS
3. TROUBLE FALLING OR STAYING ASLEEP: NOT AT ALL
8. MOVING OR SPEAKING SO SLOWLY THAT OTHER PEOPLE COULD HAVE NOTICED. OR THE OPPOSITE - BEING SO FIDGETY OR RESTLESS THAT YOU HAVE BEEN MOVING AROUND A LOT MORE THAN USUAL: NOT AT ALL

## 2024-10-22 ASSESSMENT — ANXIETY QUESTIONNAIRES
5. BEING SO RESTLESS THAT IT IS HARD TO SIT STILL: NOT AT ALL
3. WORRYING TOO MUCH ABOUT DIFFERENT THINGS: MORE THAN HALF THE DAYS
6. BECOMING EASILY ANNOYED OR IRRITABLE: SEVERAL DAYS
5. BEING SO RESTLESS THAT IT IS HARD TO SIT STILL: NOT AT ALL
7. FEELING AFRAID AS IF SOMETHING AWFUL MIGHT HAPPEN: MORE THAN HALF THE DAYS
IF YOU CHECKED OFF ANY PROBLEMS ON THIS QUESTIONNAIRE, HOW DIFFICULT HAVE THESE PROBLEMS MADE IT FOR YOU TO DO YOUR WORK, TAKE CARE OF THINGS AT HOME, OR GET ALONG WITH OTHER PEOPLE: VERY DIFFICULT
2. NOT BEING ABLE TO STOP OR CONTROL WORRYING: SEVERAL DAYS
3. WORRYING TOO MUCH ABOUT DIFFERENT THINGS: MORE THAN HALF THE DAYS
1. FEELING NERVOUS, ANXIOUS, OR ON EDGE: SEVERAL DAYS
2. NOT BEING ABLE TO STOP OR CONTROL WORRYING: SEVERAL DAYS
GAD7 TOTAL SCORE: 8
7. FEELING AFRAID AS IF SOMETHING AWFUL MIGHT HAPPEN: MORE THAN HALF THE DAYS
IF YOU CHECKED OFF ANY PROBLEMS ON THIS QUESTIONNAIRE, HOW DIFFICULT HAVE THESE PROBLEMS MADE IT FOR YOU TO DO YOUR WORK, TAKE CARE OF THINGS AT HOME, OR GET ALONG WITH OTHER PEOPLE: VERY DIFFICULT
4. TROUBLE RELAXING: SEVERAL DAYS
1. FEELING NERVOUS, ANXIOUS, OR ON EDGE: SEVERAL DAYS
4. TROUBLE RELAXING: SEVERAL DAYS
6. BECOMING EASILY ANNOYED OR IRRITABLE: SEVERAL DAYS

## 2024-10-22 NOTE — PROGRESS NOTES
Chief Complaint   Patient presents with    Medication Check      Vitals:    10/22/24 0834   BP: 138/85   Pulse: 80   Resp: 18   Temp: 98.4 °F (36.9 °C)   SpO2: 99%      Prior to Admission medications    Medication Sig Start Date End Date Taking? Authorizing Provider   hydroCHLOROthiazide 12.5 MG capsule Take 1 capsule by mouth every morning 10/10/24  Yes Tania Campuzano APRN - NP   LORazepam (ATIVAN) 0.5 MG tablet Take 1 tablet by mouth every 6 hours as needed for Anxiety for up to 30 days. Max Daily Amount: 2 mg 10/7/24 11/6/24 Yes Sosa Peterson APRN - NP   amLODIPine (NORVASC) 5 MG tablet Take 1 tablet by mouth in the morning and 1 tablet in the evening.  Patient taking differently: Take 2 tablets by mouth every morning 9/30/24  Yes Tania Campuzano APRN - NP   hydrOXYzine HCl (ATARAX) 10 MG tablet Take 1-2 tablets by mouth twice daily as needed 6/25/24  Yes Sosa Peterson APRN - NP      PHQ-9 Total Score: 1 (10/22/2024  7:58 AM)  Thoughts that you would be better off dead, or of hurting yourself in some way: 0 (10/22/2024  7:58 AM)         10/22/2024     7:58 AM 10/14/2024     9:39 AM 9/30/2024    11:20 AM   PHQ-9    Little interest or pleasure in doing things 0 0 0   Feeling down, depressed, or hopeless 1 0 0   Trouble falling or staying asleep, or sleeping too much 0     Feeling tired or having little energy 0     Poor appetite or overeating 0     Feeling bad about yourself - or that you are a failure or have let yourself or your family down 0     Trouble concentrating on things, such as reading the newspaper or watching television 0     Moving or speaking so slowly that other people could have noticed. Or the opposite - being so fidgety or restless that you have been moving around a lot more than usual 0     Thoughts that you would be better off dead, or of hurting yourself in some way 0     PHQ-2 Score 1 0 0   PHQ-9 Total Score 1 0 0   If you checked off any problems, how

## 2024-10-22 NOTE — PROGRESS NOTES
CHIEF COMPLAINT:  Shimon Min is a 39 y.o. male and was seen today for follow-up of psychiatric condition and psychotropic medication management.     HPI:    Shimon has a history of depression and anxiety, with symptoms persisting over several years. Currently, these symptoms are moderately severe and occur intermittently. The patient identifies certain health-related stressors and reports that his primary care physician has prescribed a new medication for his blood pressure. He describes fluctuations in his depression and anxiety levels, noting that anxiety often prevents him from engaging in activities and causes irritation over minor issues.    His energy level is fair, and he reports adherence to his medication regimen without experiencing any side effects. The patient finds the coping skills learned in the PHP program to be beneficial and makes a concerted effort to use these strategies when experiencing anxiety. However, he continues to report that his anxiety hinders him from participating in activities with his children, grocery shopping, and even cooking meals at home. He expresses a fear of undertaking tasks when his fiancée is not present.    His appetite is good, and he intends to focus on improving his weight and diet. Sleep quality is satisfactory, with the patient achieving 5-7 hours of sleep most nights. He denies any symptoms of psychosis or guadalupe and also denies any suicidal or homicidal ideation.    Current Outpatient Medications on File Prior to Visit   Medication Sig Dispense Refill    hydroCHLOROthiazide 12.5 MG capsule Take 1 capsule by mouth every morning 90 capsule 1    LORazepam (ATIVAN) 0.5 MG tablet Take 1 tablet by mouth every 6 hours as needed for Anxiety for up to 30 days. Max Daily Amount: 2 mg 30 tablet 0    amLODIPine (NORVASC) 5 MG tablet Take 1 tablet by mouth in the morning and 1 tablet in the evening. (Patient taking differently: Take 2 tablets by mouth every morning) 180

## 2024-10-24 ENCOUNTER — HOSPITAL ENCOUNTER (OUTPATIENT)
Facility: HOSPITAL | Age: 39
Discharge: HOME | End: 2024-10-26
Payer: COMMERCIAL

## 2024-10-24 ENCOUNTER — PATIENT MESSAGE (OUTPATIENT)
Age: 39
End: 2024-10-24

## 2024-10-24 ENCOUNTER — TELEPHONE (OUTPATIENT)
Age: 39
End: 2024-10-24

## 2024-10-24 DIAGNOSIS — R07.9 CHEST PAIN, UNSPECIFIED TYPE: ICD-10-CM

## 2024-10-24 LAB
ECHO AO ROOT DIAM: 2.8 CM
ECHO AV AREA PEAK VELOCITY: 2.6 CM2
ECHO AV AREA PLAN: 3.1 CM2
ECHO AV PEAK GRADIENT: 7 MMHG
ECHO AV PEAK VELOCITY: 1.4 M/S
ECHO AV VELOCITY RATIO: 0.79
ECHO EST RA PRESSURE: 5 MMHG
ECHO LA DIAMETER: 4.1 CM
ECHO LA TO AORTIC ROOT RATIO: 1.46
ECHO LA VOL A-L A4C: 58 ML (ref 18–58)
ECHO LA VOL MOD A4C: 55 ML (ref 18–58)
ECHO LV EDV A4C: 144 ML
ECHO LV EF PHYSICIAN: 55 %
ECHO LV EJECTION FRACTION A4C: 47 %
ECHO LV ESV A4C: 77 ML
ECHO LV FRACTIONAL SHORTENING: 33 % (ref 28–44)
ECHO LV INTERNAL DIMENSION DIASTOLIC: 4.9 CM (ref 4.2–5.9)
ECHO LV INTERNAL DIMENSION SYSTOLIC: 3.3 CM
ECHO LV IVSD: 1.5 CM (ref 0.6–1)
ECHO LV MASS 2D: 328.7 G (ref 88–224)
ECHO LV POSTERIOR WALL DIASTOLIC: 1.6 CM (ref 0.6–1)
ECHO LV RELATIVE WALL THICKNESS RATIO: 0.65
ECHO LVOT AREA: 3.5 CM2
ECHO LVOT DIAM: 2.1 CM
ECHO LVOT PEAK GRADIENT: 5 MMHG
ECHO LVOT PEAK VELOCITY: 1.1 M/S
ECHO MV A VELOCITY: 0.58 M/S
ECHO MV AREA PHT: 5.9 CM2
ECHO MV E DECELERATION TIME (DT): 196.7 MS
ECHO MV E VELOCITY: 0.71 M/S
ECHO MV E/A RATIO: 1.22
ECHO MV MAX VELOCITY: 0.8 M/S
ECHO MV MEAN GRADIENT: 1 MMHG
ECHO MV MEAN VELOCITY: 0.6 M/S
ECHO MV PEAK GRADIENT: 3 MMHG
ECHO MV PRESSURE HALF TIME (PHT): 37.1 MS
ECHO MV VTI: 22.3 CM
ECHO PULMONARY ARTERY END DIASTOLIC PRESSURE: 11 MMHG
ECHO PV MAX VELOCITY: 1.5 M/S
ECHO PV PEAK GRADIENT: 13 MMHG
ECHO RIGHT VENTRICULAR SYSTOLIC PRESSURE (RVSP): 34 MMHG
ECHO TV REGURGITANT MAX VELOCITY: 2.67 M/S
ECHO TV REGURGITANT PEAK GRADIENT: 29 MMHG

## 2024-10-24 PROCEDURE — 93306 TTE W/DOPPLER COMPLETE: CPT

## 2024-10-24 NOTE — TELEPHONE ENCOUNTER
Patient called stating he wanted to verify everything sent properly in his email. States he was told by Lary that the ID/INS came through. States he sent several emails with everything attached.    No encounter found in chart to verify with pt what was missing. Advised pt a message would be sent to reach out to him.

## 2024-10-27 ENCOUNTER — PATIENT MESSAGE (OUTPATIENT)
Age: 39
End: 2024-10-27

## 2024-10-28 RX ORDER — AMLODIPINE AND BENAZEPRIL HYDROCHLORIDE 5; 10 MG/1; MG/1
1 CAPSULE ORAL DAILY
Qty: 30 CAPSULE | Refills: 3 | Status: SHIPPED | OUTPATIENT
Start: 2024-10-28 | End: 2024-10-28 | Stop reason: HOSPADM

## 2024-10-28 RX ORDER — AMLODIPINE AND OLMESARTAN MEDOXOMIL 10; 20 MG/1; MG/1
1 TABLET ORAL DAILY
Qty: 30 TABLET | Refills: 1 | Status: SHIPPED | OUTPATIENT
Start: 2024-10-28

## 2024-11-06 ENCOUNTER — HOSPITAL ENCOUNTER (EMERGENCY)
Facility: HOSPITAL | Age: 39
Discharge: HOME | End: 2024-11-06
Payer: COMMERCIAL

## 2024-11-06 ENCOUNTER — APPOINTMENT (OUTPATIENT)
Facility: HOSPITAL | Age: 39
End: 2024-11-06
Payer: COMMERCIAL

## 2024-11-06 VITALS
BODY MASS INDEX: 41.57 KG/M2 | OXYGEN SATURATION: 97 % | SYSTOLIC BLOOD PRESSURE: 147 MMHG | RESPIRATION RATE: 12 BRPM | HEIGHT: 69 IN | DIASTOLIC BLOOD PRESSURE: 98 MMHG | WEIGHT: 280.65 LBS | HEART RATE: 84 BPM | TEMPERATURE: 98.4 F

## 2024-11-06 DIAGNOSIS — R07.9 CHEST PAIN, UNSPECIFIED TYPE: Primary | ICD-10-CM

## 2024-11-06 LAB
ALBUMIN SERPL-MCNC: 3.5 G/DL (ref 3.5–5)
ALBUMIN/GLOB SERPL: 0.9 (ref 1.1–2.2)
ALP SERPL-CCNC: 76 U/L (ref 45–117)
ALT SERPL-CCNC: 30 U/L (ref 12–78)
ANION GAP SERPL CALC-SCNC: 4 MMOL/L (ref 2–12)
AST SERPL-CCNC: 19 U/L (ref 15–37)
BASOPHILS # BLD: 0.1 K/UL (ref 0–0.1)
BASOPHILS NFR BLD: 1 % (ref 0–1)
BILIRUB SERPL-MCNC: 0.7 MG/DL (ref 0.2–1)
BUN SERPL-MCNC: 16 MG/DL (ref 6–20)
BUN/CREAT SERPL: 15 (ref 12–20)
CALCIUM SERPL-MCNC: 9.2 MG/DL (ref 8.5–10.1)
CHLORIDE SERPL-SCNC: 108 MMOL/L (ref 97–108)
CO2 SERPL-SCNC: 28 MMOL/L (ref 21–32)
CREAT SERPL-MCNC: 1.05 MG/DL (ref 0.7–1.3)
DIFFERENTIAL METHOD BLD: NORMAL
EOSINOPHIL # BLD: 0 K/UL (ref 0–0.4)
EOSINOPHIL NFR BLD: 1 % (ref 0–7)
ERYTHROCYTE [DISTWIDTH] IN BLOOD BY AUTOMATED COUNT: 12.7 % (ref 11.5–14.5)
GLOBULIN SER CALC-MCNC: 4.1 G/DL (ref 2–4)
GLUCOSE SERPL-MCNC: 113 MG/DL (ref 65–100)
HCT VFR BLD AUTO: 40.2 % (ref 36.6–50.3)
HGB BLD-MCNC: 12.9 G/DL (ref 12.1–17)
IMM GRANULOCYTES # BLD AUTO: 0 K/UL (ref 0–0.04)
IMM GRANULOCYTES NFR BLD AUTO: 0 % (ref 0–0.5)
LYMPHOCYTES # BLD: 1.6 K/UL (ref 0.8–3.5)
LYMPHOCYTES NFR BLD: 34 % (ref 12–49)
MCH RBC QN AUTO: 27.6 PG (ref 26–34)
MCHC RBC AUTO-ENTMCNC: 32.1 G/DL (ref 30–36.5)
MCV RBC AUTO: 85.9 FL (ref 80–99)
MONOCYTES # BLD: 0.5 K/UL (ref 0–1)
MONOCYTES NFR BLD: 10 % (ref 5–13)
NEUTS SEG # BLD: 2.6 K/UL (ref 1.8–8)
NEUTS SEG NFR BLD: 54 % (ref 32–75)
NRBC # BLD: 0 K/UL (ref 0–0.01)
NRBC BLD-RTO: 0 PER 100 WBC
PLATELET # BLD AUTO: 201 K/UL (ref 150–400)
PMV BLD AUTO: 12.1 FL (ref 8.9–12.9)
POTASSIUM SERPL-SCNC: 3.9 MMOL/L (ref 3.5–5.1)
PROT SERPL-MCNC: 7.6 G/DL (ref 6.4–8.2)
RBC # BLD AUTO: 4.68 M/UL (ref 4.1–5.7)
SODIUM SERPL-SCNC: 140 MMOL/L (ref 136–145)
TROPONIN I SERPL HS-MCNC: 11 NG/L (ref 0–76)
TROPONIN I SERPL HS-MCNC: 12 NG/L (ref 0–76)
WBC # BLD AUTO: 4.8 K/UL (ref 4.1–11.1)

## 2024-11-06 PROCEDURE — 85025 COMPLETE CBC W/AUTO DIFF WBC: CPT

## 2024-11-06 PROCEDURE — 93005 ELECTROCARDIOGRAM TRACING: CPT

## 2024-11-06 PROCEDURE — 36415 COLL VENOUS BLD VENIPUNCTURE: CPT

## 2024-11-06 PROCEDURE — 84484 ASSAY OF TROPONIN QUANT: CPT

## 2024-11-06 PROCEDURE — 80053 COMPREHEN METABOLIC PANEL: CPT

## 2024-11-06 PROCEDURE — 6360000002 HC RX W HCPCS

## 2024-11-06 PROCEDURE — 6370000000 HC RX 637 (ALT 250 FOR IP)

## 2024-11-06 PROCEDURE — 96374 THER/PROPH/DIAG INJ IV PUSH: CPT

## 2024-11-06 PROCEDURE — 71045 X-RAY EXAM CHEST 1 VIEW: CPT

## 2024-11-06 PROCEDURE — 99285 EMERGENCY DEPT VISIT HI MDM: CPT

## 2024-11-06 RX ORDER — FAMOTIDINE 20 MG/1
20 TABLET, FILM COATED ORAL 2 TIMES DAILY
Qty: 60 TABLET | Refills: 0 | Status: SHIPPED | OUTPATIENT
Start: 2024-11-06

## 2024-11-06 RX ORDER — LORAZEPAM 2 MG/ML
1 INJECTION INTRAMUSCULAR ONCE
Status: COMPLETED | OUTPATIENT
Start: 2024-11-06 | End: 2024-11-06

## 2024-11-06 RX ORDER — ACETAMINOPHEN 325 MG/1
650 TABLET ORAL
Status: COMPLETED | OUTPATIENT
Start: 2024-11-06 | End: 2024-11-06

## 2024-11-06 RX ADMIN — LORAZEPAM 0.5 MG: 2 INJECTION INTRAMUSCULAR; INTRAVENOUS at 07:52

## 2024-11-06 RX ADMIN — LIDOCAINE HYDROCHLORIDE 40 ML: 20 SOLUTION ORAL at 07:52

## 2024-11-06 RX ADMIN — ACETAMINOPHEN 650 MG: 325 TABLET ORAL at 07:52

## 2024-11-06 ASSESSMENT — HEART SCORE: ECG: NORMAL

## 2024-11-06 NOTE — ED NOTES
Attempted to give patient ativan. Patient states he takes 0.5 mg PO at home.  Patient requesting that dose.  0.5mg ativan given at this time and documented in MAR.  Remaining dose wasted with Yohana Aleman RN.  Spoke with MIGUEL Cassidy who is aware.

## 2024-11-06 NOTE — ED PROVIDER NOTES
\[facility]\"" EMERGENCY DEPT  EMERGENCY DEPARTMENT ENCOUNTER       Pt Name: Shimon Min  MRN: [id number]  Birthdate 1985  Date of evaluation: 11/6/2024  Provider: Sade Cassidy PA-C   PCP: Gladys Hernandez MD  Note Started: 7:42 AM EST 11/6/24     CHIEF COMPLAINT       Chief Complaint   Patient presents with    Chest Pain     Pt presents ambulatory to triage w/ complaints of intermittent, L sided CP and dizziness starting yesterday. HX of hypertension, pt reports he has been working w/ his PCP and trying different medications for this        HISTORY OF PRESENT ILLNESS: 1 or more elements      History From: Patient  HPI Limitations: None     Shimon Min is a 39 y.o. male with past medical history ADHD, anxiety, hypertension, NAIN, gout who presents with complaint of chest pain x 1 day.  Patient reports that he feels a sharp stabbing pain on the left side of his chest that has been ongoing now for 1 day.  Patient reports that he did try to take Pepcid which provided some relief.  He also reports that his fiancée took his blood pressure, states it was 150s systolic over 90s diastolic.  Patient reports that he is taking amlodipine for blood pressure, reports he had it this morning around 6 am.  He reports that he is working with a cardiologist on adding a second blood pressure medication, reports that they tried him on hydrochlorothiazide however this caused him to have gout, he tried olmesartan however this caused him to have dizziness.  He is not currently taking a second blood pressure medication.  He denies any exertional chest pain, reports that he walked 2 miles 2 days ago and did not have chest pain with this.  He denies arm pain, jaw pain. he denies any fever, chills, nausea, vomiting, abdominal pain, bowel/bladder changes, lightheadedness, dizziness, syncope, diaphoresis.       Nursing Notes were all reviewed and agreed with or any disagreements were addressed in the HPI.     REVIEW OF SYSTEMS      Review

## 2024-11-07 LAB
EKG ATRIAL RATE: 85 BPM
EKG DIAGNOSIS: NORMAL
EKG P AXIS: 64 DEGREES
EKG P-R INTERVAL: 148 MS
EKG Q-T INTERVAL: 356 MS
EKG QRS DURATION: 96 MS
EKG QTC CALCULATION (BAZETT): 423 MS
EKG R AXIS: 77 DEGREES
EKG T AXIS: 1 DEGREES
EKG VENTRICULAR RATE: 85 BPM

## 2024-11-16 ENCOUNTER — HOSPITAL ENCOUNTER (EMERGENCY)
Facility: HOSPITAL | Age: 39
Discharge: HOME | End: 2024-11-16
Payer: COMMERCIAL

## 2024-11-16 ENCOUNTER — APPOINTMENT (OUTPATIENT)
Facility: HOSPITAL | Age: 39
End: 2024-11-16
Payer: COMMERCIAL

## 2024-11-16 VITALS
RESPIRATION RATE: 11 BRPM | TEMPERATURE: 99 F | HEART RATE: 93 BPM | WEIGHT: 275 LBS | DIASTOLIC BLOOD PRESSURE: 90 MMHG | SYSTOLIC BLOOD PRESSURE: 141 MMHG | OXYGEN SATURATION: 97 % | BODY MASS INDEX: 40.61 KG/M2

## 2024-11-16 DIAGNOSIS — J01.90 ACUTE RHINOSINUSITIS: Primary | ICD-10-CM

## 2024-11-16 DIAGNOSIS — R51.9 SINUS HEADACHE: ICD-10-CM

## 2024-11-16 DIAGNOSIS — R07.9 CHEST PAIN, UNSPECIFIED TYPE: ICD-10-CM

## 2024-11-16 LAB
ALBUMIN SERPL-MCNC: 3.7 G/DL (ref 3.5–5)
ALBUMIN/GLOB SERPL: 1 (ref 1.1–2.2)
ALP SERPL-CCNC: 92 U/L (ref 45–117)
ALT SERPL-CCNC: 32 U/L (ref 12–78)
ANION GAP SERPL CALC-SCNC: 4 MMOL/L (ref 2–12)
AST SERPL-CCNC: 23 U/L (ref 15–37)
BASOPHILS # BLD: 0.1 K/UL (ref 0–0.1)
BASOPHILS NFR BLD: 1 % (ref 0–1)
BILIRUB SERPL-MCNC: 0.7 MG/DL (ref 0.2–1)
BUN SERPL-MCNC: 11 MG/DL (ref 6–20)
BUN/CREAT SERPL: 9 (ref 12–20)
CALCIUM SERPL-MCNC: 8.6 MG/DL (ref 8.5–10.1)
CHLORIDE SERPL-SCNC: 105 MMOL/L (ref 97–108)
CO2 SERPL-SCNC: 29 MMOL/L (ref 21–32)
CREAT SERPL-MCNC: 1.21 MG/DL (ref 0.7–1.3)
D DIMER PPP FEU-MCNC: 0.23 MG/L FEU (ref 0–0.65)
DIFFERENTIAL METHOD BLD: NORMAL
EOSINOPHIL # BLD: 0.1 K/UL (ref 0–0.4)
EOSINOPHIL NFR BLD: 1 % (ref 0–7)
ERYTHROCYTE [DISTWIDTH] IN BLOOD BY AUTOMATED COUNT: 12.3 % (ref 11.5–14.5)
GLOBULIN SER CALC-MCNC: 3.8 G/DL (ref 2–4)
GLUCOSE SERPL-MCNC: 118 MG/DL (ref 65–100)
HCT VFR BLD AUTO: 41.2 % (ref 36.6–50.3)
HGB BLD-MCNC: 13.3 G/DL (ref 12.1–17)
IMM GRANULOCYTES # BLD AUTO: 0 K/UL (ref 0–0.04)
IMM GRANULOCYTES NFR BLD AUTO: 0 % (ref 0–0.5)
LYMPHOCYTES # BLD: 1.9 K/UL (ref 0.8–3.5)
LYMPHOCYTES NFR BLD: 35 % (ref 12–49)
MCH RBC QN AUTO: 27.3 PG (ref 26–34)
MCHC RBC AUTO-ENTMCNC: 32.3 G/DL (ref 30–36.5)
MCV RBC AUTO: 84.4 FL (ref 80–99)
MONOCYTES # BLD: 0.5 K/UL (ref 0–1)
MONOCYTES NFR BLD: 10 % (ref 5–13)
NEUTS SEG # BLD: 2.8 K/UL (ref 1.8–8)
NEUTS SEG NFR BLD: 53 % (ref 32–75)
NRBC # BLD: 0 K/UL (ref 0–0.01)
NRBC BLD-RTO: 0 PER 100 WBC
PLATELET # BLD AUTO: 176 K/UL (ref 150–400)
PMV BLD AUTO: 12.4 FL (ref 8.9–12.9)
POTASSIUM SERPL-SCNC: 4.1 MMOL/L (ref 3.5–5.1)
PROT SERPL-MCNC: 7.5 G/DL (ref 6.4–8.2)
RBC # BLD AUTO: 4.88 M/UL (ref 4.1–5.7)
SODIUM SERPL-SCNC: 138 MMOL/L (ref 136–145)
TROPONIN I SERPL HS-MCNC: 12 NG/L (ref 0–76)
WBC # BLD AUTO: 5.3 K/UL (ref 4.1–11.1)

## 2024-11-16 PROCEDURE — 6370000000 HC RX 637 (ALT 250 FOR IP)

## 2024-11-16 PROCEDURE — 93005 ELECTROCARDIOGRAM TRACING: CPT | Performed by: STUDENT IN AN ORGANIZED HEALTH CARE EDUCATION/TRAINING PROGRAM

## 2024-11-16 PROCEDURE — 36415 COLL VENOUS BLD VENIPUNCTURE: CPT

## 2024-11-16 PROCEDURE — 96374 THER/PROPH/DIAG INJ IV PUSH: CPT

## 2024-11-16 PROCEDURE — 84484 ASSAY OF TROPONIN QUANT: CPT

## 2024-11-16 PROCEDURE — 6360000002 HC RX W HCPCS

## 2024-11-16 PROCEDURE — 85379 FIBRIN DEGRADATION QUANT: CPT

## 2024-11-16 PROCEDURE — 80053 COMPREHEN METABOLIC PANEL: CPT

## 2024-11-16 PROCEDURE — 71046 X-RAY EXAM CHEST 2 VIEWS: CPT

## 2024-11-16 PROCEDURE — 85025 COMPLETE CBC W/AUTO DIFF WBC: CPT

## 2024-11-16 PROCEDURE — 96375 TX/PRO/DX INJ NEW DRUG ADDON: CPT

## 2024-11-16 PROCEDURE — 99285 EMERGENCY DEPT VISIT HI MDM: CPT

## 2024-11-16 RX ORDER — KETOROLAC TROMETHAMINE 30 MG/ML
30 INJECTION, SOLUTION INTRAMUSCULAR; INTRAVENOUS ONCE
Status: COMPLETED | OUTPATIENT
Start: 2024-11-16 | End: 2024-11-16

## 2024-11-16 RX ORDER — IBUPROFEN 800 MG/1
800 TABLET, FILM COATED ORAL EVERY 8 HOURS PRN
Qty: 30 TABLET | Refills: 0 | Status: SHIPPED | OUTPATIENT
Start: 2024-11-16 | End: 2024-11-24

## 2024-11-16 RX ORDER — PSEUDOEPHEDRINE HCL 30 MG/1
30 TABLET, FILM COATED ORAL ONCE
Status: COMPLETED | OUTPATIENT
Start: 2024-11-16 | End: 2024-11-16

## 2024-11-16 RX ORDER — DOXYCYCLINE HYCLATE 100 MG
100 TABLET ORAL 2 TIMES DAILY
Qty: 14 TABLET | Refills: 0 | Status: SHIPPED | OUTPATIENT
Start: 2024-11-16 | End: 2024-11-23

## 2024-11-16 RX ORDER — DEXAMETHASONE SODIUM PHOSPHATE 10 MG/ML
10 INJECTION, SOLUTION INTRAMUSCULAR; INTRAVENOUS ONCE
Status: DISCONTINUED | OUTPATIENT
Start: 2024-11-16 | End: 2024-11-16 | Stop reason: HOSPADM

## 2024-11-16 RX ORDER — PREDNISONE 20 MG/1
40 TABLET ORAL DAILY
Qty: 10 TABLET | Refills: 0 | Status: SHIPPED | OUTPATIENT
Start: 2024-11-16 | End: 2024-11-20 | Stop reason: ALTCHOICE

## 2024-11-16 RX ORDER — DIPHENHYDRAMINE HYDROCHLORIDE 50 MG/ML
12.5 INJECTION INTRAMUSCULAR; INTRAVENOUS ONCE
Status: COMPLETED | OUTPATIENT
Start: 2024-11-16 | End: 2024-11-16

## 2024-11-16 RX ORDER — AZELASTINE 1 MG/ML
2 SPRAY, METERED NASAL 2 TIMES DAILY
Qty: 60 ML | Refills: 0 | Status: SHIPPED | OUTPATIENT
Start: 2024-11-16 | End: 2024-11-24

## 2024-11-16 RX ADMIN — KETOROLAC TROMETHAMINE 30 MG: 30 INJECTION, SOLUTION INTRAMUSCULAR at 13:56

## 2024-11-16 RX ADMIN — DIPHENHYDRAMINE HYDROCHLORIDE 12.5 MG: 50 INJECTION INTRAMUSCULAR; INTRAVENOUS at 13:58

## 2024-11-16 RX ADMIN — PSEUDOEPHEDRINE HCL 30 MG: 30 TABLET, FILM COATED ORAL at 13:55

## 2024-11-16 ASSESSMENT — PAIN - FUNCTIONAL ASSESSMENT: PAIN_FUNCTIONAL_ASSESSMENT: 0-10

## 2024-11-16 ASSESSMENT — PAIN DESCRIPTION - LOCATION: LOCATION: CHEST

## 2024-11-16 ASSESSMENT — HEART SCORE: ECG: NORMAL

## 2024-11-16 ASSESSMENT — PAIN SCALES - GENERAL: PAINLEVEL_OUTOF10: 6

## 2024-11-16 NOTE — ED PROVIDER NOTES
[facility] EMERGENCY DEPT  EMERGENCY DEPARTMENT ENCOUNTER       Pt Name: Shimon Min  MRN: [id number]  Birthdate 1985  Date of evaluation: 11/16/2024  Provider: SATHISH Aguilar - CNP   PCP: Gladys Hernandez MD  Note Started:  1:33 PM EST 11/16/24     CHIEF COMPLAINT       Chief Complaint   Patient presents with    Chest Pain     Pt presents ambulatory to triage w/ complaints of intermittent CP, headaches, and bilateral ear pain x2 days.         HISTORY OF PRESENT ILLNESS: 1 or more elements      History From: Patient  HPI Limitations: None     Shimon Min is a 39 y.o. male past medical history of hypertension, anxiety, who presents complaining of chest pain x 2 days.  Patient also complaining of headache, earache, and not feeling well since he started taking Prilosec for reflux.  Patient states that he has had increased heart rate in the last few days.  Reports that the headache feels like pressure, mainly in the forehead, reports taking Tylenol with mild relief.     Nursing Notes were all reviewed and agreed with or any disagreements were addressed in the HPI.     REVIEW OF SYSTEMS      Review of Systems     Positives and Pertinent negatives as per HPI.    PAST HISTORY     Past Medical History:  Past Medical History:   Diagnosis Date    ADHD (attention deficit hyperactivity disorder)     Angioedema     ace induced asthma     Anxiety     Cold-induced asthma     Gout     Hernia of abdominal wall     Hypertension     NAIN (obstructive sleep apnea)        Past Surgical History:  Past Surgical History:   Procedure Laterality Date    ADENOIDECTOMY         Family History:  Family History   Problem Relation Age of Onset    Sleep Apnea Mother     Hypertension Father     Breast Cancer Maternal Grandmother     Hypertension Mother        Social History:  Social History     Tobacco Use    Smoking status: Never    Smokeless tobacco: Never   Substance Use Topics    Alcohol use: No    Drug use: Never

## 2024-11-17 LAB
EKG ATRIAL RATE: 97 BPM
EKG DIAGNOSIS: NORMAL
EKG P AXIS: 49 DEGREES
EKG P-R INTERVAL: 148 MS
EKG Q-T INTERVAL: 344 MS
EKG QRS DURATION: 96 MS
EKG QTC CALCULATION (BAZETT): 436 MS
EKG R AXIS: 19 DEGREES
EKG T AXIS: 5 DEGREES
EKG VENTRICULAR RATE: 97 BPM

## 2024-11-20 ENCOUNTER — OFFICE VISIT (OUTPATIENT)
Age: 39
End: 2024-11-20
Payer: COMMERCIAL

## 2024-11-20 VITALS
OXYGEN SATURATION: 95 % | DIASTOLIC BLOOD PRESSURE: 80 MMHG | HEART RATE: 100 BPM | HEIGHT: 69 IN | BODY MASS INDEX: 41.25 KG/M2 | TEMPERATURE: 99 F | WEIGHT: 278.5 LBS | RESPIRATION RATE: 16 BRPM | SYSTOLIC BLOOD PRESSURE: 145 MMHG

## 2024-11-20 DIAGNOSIS — I10 PRIMARY HYPERTENSION: ICD-10-CM

## 2024-11-20 DIAGNOSIS — R00.2 PALPITATIONS: ICD-10-CM

## 2024-11-20 DIAGNOSIS — E66.813 CLASS 3 SEVERE OBESITY DUE TO EXCESS CALORIES WITH SERIOUS COMORBIDITY AND BODY MASS INDEX (BMI) OF 40.0 TO 44.9 IN ADULT: Primary | ICD-10-CM

## 2024-11-20 DIAGNOSIS — T14.90XA TRAUMA IN CHILDHOOD: ICD-10-CM

## 2024-11-20 DIAGNOSIS — G47.33 OSA (OBSTRUCTIVE SLEEP APNEA): ICD-10-CM

## 2024-11-20 DIAGNOSIS — E66.01 CLASS 3 SEVERE OBESITY DUE TO EXCESS CALORIES WITH SERIOUS COMORBIDITY AND BODY MASS INDEX (BMI) OF 40.0 TO 44.9 IN ADULT: Primary | ICD-10-CM

## 2024-11-20 PROCEDURE — 99214 OFFICE O/P EST MOD 30 MIN: CPT | Performed by: FAMILY MEDICINE

## 2024-11-20 PROCEDURE — 3077F SYST BP >= 140 MM HG: CPT | Performed by: FAMILY MEDICINE

## 2024-11-20 PROCEDURE — 3079F DIAST BP 80-89 MM HG: CPT | Performed by: FAMILY MEDICINE

## 2024-11-20 RX ORDER — LORAZEPAM 0.5 MG/1
0.5 TABLET ORAL EVERY 6 HOURS PRN
COMMUNITY

## 2024-11-20 ASSESSMENT — PATIENT HEALTH QUESTIONNAIRE - PHQ9
5. POOR APPETITE OR OVEREATING: NOT AT ALL
7. TROUBLE CONCENTRATING ON THINGS, SUCH AS READING THE NEWSPAPER OR WATCHING TELEVISION: NOT AT ALL
SUM OF ALL RESPONSES TO PHQ QUESTIONS 1-9: 0
8. MOVING OR SPEAKING SO SLOWLY THAT OTHER PEOPLE COULD HAVE NOTICED. OR THE OPPOSITE, BEING SO FIGETY OR RESTLESS THAT YOU HAVE BEEN MOVING AROUND A LOT MORE THAN USUAL: NOT AT ALL
6. FEELING BAD ABOUT YOURSELF - OR THAT YOU ARE A FAILURE OR HAVE LET YOURSELF OR YOUR FAMILY DOWN: NOT AT ALL
1. LITTLE INTEREST OR PLEASURE IN DOING THINGS: NOT AT ALL
9. THOUGHTS THAT YOU WOULD BE BETTER OFF DEAD, OR OF HURTING YOURSELF: NOT AT ALL
SUM OF ALL RESPONSES TO PHQ QUESTIONS 1-9: 0
3. TROUBLE FALLING OR STAYING ASLEEP: NOT AT ALL
SUM OF ALL RESPONSES TO PHQ QUESTIONS 1-9: 0
4. FEELING TIRED OR HAVING LITTLE ENERGY: NOT AT ALL
SUM OF ALL RESPONSES TO PHQ QUESTIONS 1-9: 0
SUM OF ALL RESPONSES TO PHQ9 QUESTIONS 1 & 2: 0
2. FEELING DOWN, DEPRESSED OR HOPELESS: NOT AT ALL

## 2024-11-20 NOTE — PROGRESS NOTES
Identified pt with two pt identifiers (name and ). Reviewed chart in preparation for visit and have obtained necessary documentation.    Shimon Min is a 39 y.o. male  Chief Complaint   Patient presents with    New Patient     BP (!) 145/80 (Site: Right Upper Arm, Position: Sitting, Cuff Size: Large Adult)   Pulse 100   Temp 99 °F (37.2 °C) (Oral)   Resp 16   Ht 1.753 m (5' 9\")   Wt 126.3 kg (278 lb 8 oz)   SpO2 95%   BMI 41.13 kg/m²     1. Have you been to the ER, urgent care clinic since your last visit?  Hospitalized since your last visit?yes - chest pain/anxiety    2. Have you seen or consulted any other health care providers outside of the [facility] since your last visit?  Include any pap smears or colon screening. no    Patient and provider made aware of elevated BP x2. Patient asymptomatic. Patient reminded to monitor BP, continue to take BP medications if prescribed, and follow up with PCP/Cardiologist.  Patient expressed understanding and agreement.    Patient and provider made aware of elevated BP x2. Patient asymptomatic. Patient reminded to monitor BP, continue to take BP medications if prescribed, and follow up with PCP/Cardiologist.  Patient expressed understanding and agreement.      Patient reports regular elevated BP readings, checks BP daily at home, and takes BP medications.

## 2024-11-20 NOTE — PROGRESS NOTES
[facility] Weight Loss Program Progress Note: Initial Physician Visit     Shimon Min is a 39 y.o. male who is here for medical screening for entering the [facility] Weight Loss Program.   The patient denies any disease state that requires protein restriction.    CC: class 3 Obesity    Referred by self reason referredget better control of GERD caused by overeating        Starting weight 278    Weight History  I personally reviewed the Medical Screening [facility] completed by patient and scanned into media section of chart.  It includes duration of their obesity, maximum weight, goal weight  all of which give the context of their obesity AND a Family History of their obesity.  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   Parents and siblings are overweight    Weight loss History  I personally reviewed the Medical Screening [facility] completed by the patient and scanned into media section of chart.  It includes number of weight loss attempts, the weight loss program that patients was most successful using, and if they have any hx of anorectic medication use, including OTC supplements.   Never tried any programs  Only worked on his own with walking and eating less  Significant Medical History  Past Medical History:   Diagnosis Date    ADHD (attention deficit hyperactivity disorder)     Angioedema     ace induced asthma     Anxiety     Cold-induced asthma     Gout     Hernia of abdominal wall     Hypertension     NAIN (obstructive sleep apnea)           Patient's medicactions that may contribute  none  Plan to become pregant within 6 months NA    I personally reviewed the Medical Screening [facility] completed by the patient and scanned into media section of chart.  This allows me to assess associated symptoms that are significant in the assessment of the patient's obesity and the

## 2024-11-25 DIAGNOSIS — F41.0 PANIC DISORDER (EPISODIC PAROXYSMAL ANXIETY): Primary | ICD-10-CM

## 2024-11-25 RX ORDER — LORAZEPAM 0.5 MG/1
0.5 TABLET ORAL EVERY 6 HOURS PRN
Qty: 30 TABLET | Refills: 0 | Status: SHIPPED | OUTPATIENT
Start: 2024-11-25 | End: 2024-12-25

## 2024-11-25 NOTE — TELEPHONE ENCOUNTER
Chief Complaint   Patient presents with    Medication Refill       Requested Prescriptions     Pending Prescriptions Disp Refills    LORazepam (ATIVAN) 0.5 MG tablet 30 tablet 0     Sig: Take 1 tablet by mouth every 6 hours as needed for Anxiety for up to 30 days. Max Daily Amount: 2 mg       Preferred Pharmacy:[facility]/pharmacy #[id number] [place] [id number] [facility] [phone] - F [phone]     [place]  Written:10/07/2024  Filled:10/07/2024  Drug:Lorazepam 0.5 Mg Tab  Qty:30.00  Days:30  Provider:Jase Kan    Last visit with clinic:  10/22/2024

## 2024-12-03 ENCOUNTER — TELEMEDICINE (OUTPATIENT)
Age: 39
End: 2024-12-03

## 2024-12-03 DIAGNOSIS — E66.01 CLASS 3 SEVERE OBESITY DUE TO EXCESS CALORIES WITH SERIOUS COMORBIDITY AND BODY MASS INDEX (BMI) OF 40.0 TO 44.9 IN ADULT: Primary | ICD-10-CM

## 2024-12-03 DIAGNOSIS — E66.813 CLASS 3 SEVERE OBESITY DUE TO EXCESS CALORIES WITH SERIOUS COMORBIDITY AND BODY MASS INDEX (BMI) OF 40.0 TO 44.9 IN ADULT: Primary | ICD-10-CM

## 2024-12-03 NOTE — PROGRESS NOTES
pineapple  Many mornings will go to [facility] and get egg and cheese thierno, isma, and oj  L: take out often.  Most recently was vegetable fried rice, ate whole pint. Water  D: out to eat, take out often.  \"Hard to cook dinner\" but didn't elaborate.  [employer]. [employer]. Charcoal chicken, rice.  S: cookie occasionally.    Water 20 ounces a day in winter. 1/2 gallon water a day in summer.  No coffee.  Sweet tea out to eat. No juice. No alcohol.    Stops eating at 9pm.    Admits he is lazy about meal prepping.       Estimate Needs   Calories:  1600 Protein: 120 Carbs: 120 Fat: 71   Kcal/day  g/day  g/day  g/day        percent: 30  30  40               Education & Recommendations provided: Pt educated on:  balanced plate ( ¼  plate lean protein, ¼ plate complex carb, ½ plate non-starchy vegetables)  proper hydration with mostly sugary free, calorie free, caffeine free beverages especially plain water  mindful eating stopping at first sign of fullness/limiting distractions/eating due to physical hunger and not as a coping strategy.  routine meal patterns of eating or drinking a meal replacement shake approx. every 3-4 hours.  Snacks consisting of lean protein, fiber, and optional healthy fat     Handouts Provided: [x]  Carbohydrates  [x]  Protein  [x]  Fiber  [x]  Serving Sizes  [x]  Meal and Snack Ideas  []  Food Journals []  Diabetes  []  Cholesterol  []  Sodium  [x]  Gen Nutr Guidelines  []  SBGM Guidelines  []  Others:   Information Reviewed with: patient   Readiness to Change Stage: []  Pre-contemplative    []  Contemplative  []  Preparation               [x]  Action                  []  Maintenance   Potential Barriers to Learning: []  Decline in memory    []  Language barrier   []  Other:  []  Emotional                  []  Limited mobility  []  Lack of motivation     [] Vision, hearing or cognitive impairment   Expected Compliance: Good      Nutritional Goal - To promote lifestyle changes to result in:

## 2024-12-06 ENCOUNTER — OFFICE VISIT (OUTPATIENT)
Age: 39
End: 2024-12-06
Payer: COMMERCIAL

## 2024-12-06 VITALS
RESPIRATION RATE: 18 BRPM | WEIGHT: 284 LBS | TEMPERATURE: 97.9 F | HEIGHT: 69 IN | HEART RATE: 86 BPM | SYSTOLIC BLOOD PRESSURE: 139 MMHG | BODY MASS INDEX: 42.06 KG/M2 | OXYGEN SATURATION: 98 % | DIASTOLIC BLOOD PRESSURE: 89 MMHG

## 2024-12-06 DIAGNOSIS — T75.3XXD MOTION SICKNESS, SUBSEQUENT ENCOUNTER: ICD-10-CM

## 2024-12-06 DIAGNOSIS — R73.9 ELEVATED BLOOD SUGAR: ICD-10-CM

## 2024-12-06 DIAGNOSIS — I10 PRIMARY HYPERTENSION: ICD-10-CM

## 2024-12-06 DIAGNOSIS — R73.01 IFG (IMPAIRED FASTING GLUCOSE): Primary | ICD-10-CM

## 2024-12-06 DIAGNOSIS — Z91.018 FOOD ALLERGY: ICD-10-CM

## 2024-12-06 LAB — GLUCOSE, POC: 105 MG/DL

## 2024-12-06 PROCEDURE — 99214 OFFICE O/P EST MOD 30 MIN: CPT | Performed by: INTERNAL MEDICINE

## 2024-12-06 PROCEDURE — PBSHW AMB POC GLUCOSE BLOOD, BY GLUCOSE MONITORING DEVICE: Performed by: INTERNAL MEDICINE

## 2024-12-06 PROCEDURE — 3075F SYST BP GE 130 - 139MM HG: CPT | Performed by: INTERNAL MEDICINE

## 2024-12-06 PROCEDURE — 82962 GLUCOSE BLOOD TEST: CPT | Performed by: INTERNAL MEDICINE

## 2024-12-06 PROCEDURE — 3079F DIAST BP 80-89 MM HG: CPT | Performed by: INTERNAL MEDICINE

## 2024-12-06 RX ORDER — BLOOD-GLUCOSE METER
1 KIT MISCELLANEOUS DAILY
Qty: 1 KIT | Refills: 0 | Status: SHIPPED | OUTPATIENT
Start: 2024-12-06

## 2024-12-06 RX ORDER — SCOLOPAMINE TRANSDERMAL SYSTEM 1 MG/1
1 PATCH, EXTENDED RELEASE TRANSDERMAL
Qty: 2 PATCH | Refills: 0 | Status: SHIPPED | OUTPATIENT
Start: 2024-12-06

## 2024-12-06 RX ORDER — AMLODIPINE BESYLATE 10 MG/1
10 TABLET ORAL DAILY
Qty: 90 TABLET | Refills: 0 | Status: SHIPPED | OUTPATIENT
Start: 2024-12-06

## 2024-12-06 NOTE — PROGRESS NOTES
Shimon Min (:  1985) is a 39 y.o. male, Established patient, here for evaluation of the following chief complaint(s):  Allergies (Pt requesting allergy blood test), Blood Sugar Problem (Low blood sugar, dizziness and shaking), and Anxiety         Assessment & Plan  1. Dizziness.  He experiences dizziness and sickness when he does not eat, which improves with food intake. He was advised to maintain a consistent diet with high protein intake at every meal to avoid these symptoms. He should eat every 3 hours and include protein in each meal. He was also advised to avoid fried and greasy foods.  Recent HA1C is normal     2. Hypertension.  He is currently taking amlodipine 10 mg for high blood pressure. Blood pressure readings at home fluctuate, especially during panic attacks or after consuming unhealthy food. Amlodipine 10 mg was refilled. He has not tolerated addition of arbs . His BP is acceptable 130s/80s   He was encouraged to continue monitoring his blood pressure and to maintain a healthy diet and exercise routine.  DASH diet     3. Anxiety.  He takes lorazepam as needed, approximately twice a week, for anxiety. He is followed by psychiatry.  He is on disability due to anxiety and has lost jobs because of it. He was advised to continue his current medication regimen and to consider non-pharmacological methods to manage anxiety, such as regular exercise and mindfulness.    4. Health Maintenance.  His A1c was recorded as 5.3 in 10/2023, indicating no diabetes. Vitamin D levels were slightly low at 24.6. LDL cholesterol was slightly elevated at 123, but overall, the cholesterol panel was within normal limits. Kidney function and blood count were normal. Glucose levels were satisfactory. A TTE revealed normal left ventricular function with mild concentric hypertrophy due to hypertension. A chest CTA showed no blockages with a calcium score of 1.       5. Reports possible thoat discomfort after eating

## 2024-12-06 NOTE — PROGRESS NOTES
\"Have you been to the ER, urgent care clinic since your last visit?  Hospitalized since your last visit?\"    Yes, ER multiple times    “Have you seen or consulted any other health care providers outside our system since your last visit?”    NO

## 2024-12-11 LAB
ALLERGEN EGG WHITE IGE: <0.1 KU/L
ALLERGEN MILK IGE: <0.1 KU/L
CLAM IGE QN: <0.1 KU/L
CODFISH IGE QN: <0.1 KU/L
COMMENT:: NORMAL
CORN IGE QN: 0.31 KU/L
Lab: ABNORMAL
PEANUT (RARA H) 1 IGE QN: <0.1 KU/L
PEANUT (RARA H) 2 IGE QN: <0.1 KU/L
PEANUT (RARA H) 3 IGE QN: <0.1 KU/L
PEANUT (RARA H) 8 IGE QN: 1.32 KU/L
PEANUT (RARA H) 9 IGE QN: <0.1 KU/L
PEANUT COMPONENT ARA H 6: <0.1 KU/L
PEANUT IGE QN: 0.28 KU/L
SCALLOP IGE QN: <0.1 KU/L
SESAME SEED IGE QN: 0.25 KU/L
SHRIMP IGE QN: 6.24 KU/L
SOYBEAN IGE QN: 0.11 KU/L
WALNUT IGE QN: 0.11 KU/L
WHEAT IGE QN: 0.19 KU/L

## 2025-01-01 ENCOUNTER — HOSPITAL ENCOUNTER (EMERGENCY)
Facility: HOSPITAL | Age: 40
Discharge: HOME | End: 2025-01-01
Payer: COMMERCIAL

## 2025-01-01 ENCOUNTER — APPOINTMENT (OUTPATIENT)
Facility: HOSPITAL | Age: 40
End: 2025-01-01
Payer: COMMERCIAL

## 2025-01-01 VITALS
OXYGEN SATURATION: 99 % | HEART RATE: 97 BPM | TEMPERATURE: 99 F | SYSTOLIC BLOOD PRESSURE: 168 MMHG | WEIGHT: 284 LBS | DIASTOLIC BLOOD PRESSURE: 99 MMHG | BODY MASS INDEX: 42.06 KG/M2 | RESPIRATION RATE: 12 BRPM | HEIGHT: 69 IN

## 2025-01-01 DIAGNOSIS — R00.2 PALPITATIONS: Primary | ICD-10-CM

## 2025-01-01 LAB
ALBUMIN SERPL-MCNC: 3.6 G/DL (ref 3.5–5)
ALBUMIN/GLOB SERPL: 0.8 (ref 1.1–2.2)
ALP SERPL-CCNC: 96 U/L (ref 45–117)
ALT SERPL-CCNC: 39 U/L (ref 12–78)
ANION GAP SERPL CALC-SCNC: 4 MMOL/L (ref 2–12)
AST SERPL-CCNC: 22 U/L (ref 15–37)
BASOPHILS # BLD: 0.1 K/UL (ref 0–0.1)
BASOPHILS NFR BLD: 2 % (ref 0–1)
BILIRUB SERPL-MCNC: 0.5 MG/DL (ref 0.2–1)
BUN SERPL-MCNC: 15 MG/DL (ref 6–20)
BUN/CREAT SERPL: 13 (ref 12–20)
CALCIUM SERPL-MCNC: 8.7 MG/DL (ref 8.5–10.1)
CHLORIDE SERPL-SCNC: 107 MMOL/L (ref 97–108)
CO2 SERPL-SCNC: 27 MMOL/L (ref 21–32)
CREAT SERPL-MCNC: 1.18 MG/DL (ref 0.7–1.3)
D DIMER PPP FEU-MCNC: 0.25 MG/L FEU (ref 0–0.65)
DIFFERENTIAL METHOD BLD: ABNORMAL
EOSINOPHIL # BLD: 0.1 K/UL (ref 0–0.4)
EOSINOPHIL NFR BLD: 2 % (ref 0–7)
ERYTHROCYTE [DISTWIDTH] IN BLOOD BY AUTOMATED COUNT: 12.6 % (ref 11.5–14.5)
GLOBULIN SER CALC-MCNC: 4.4 G/DL (ref 2–4)
GLUCOSE SERPL-MCNC: 138 MG/DL (ref 65–100)
HCT VFR BLD AUTO: 40.7 % (ref 36.6–50.3)
HGB BLD-MCNC: 13.2 G/DL (ref 12.1–17)
IMM GRANULOCYTES # BLD AUTO: 0 K/UL (ref 0–0.04)
IMM GRANULOCYTES NFR BLD AUTO: 0 % (ref 0–0.5)
LYMPHOCYTES # BLD: 1.7 K/UL (ref 0.8–3.5)
LYMPHOCYTES NFR BLD: 28 % (ref 12–49)
MAGNESIUM SERPL-MCNC: 1.9 MG/DL (ref 1.6–2.4)
MCH RBC QN AUTO: 27 PG (ref 26–34)
MCHC RBC AUTO-ENTMCNC: 32.4 G/DL (ref 30–36.5)
MCV RBC AUTO: 83.2 FL (ref 80–99)
MONOCYTES # BLD: 0.7 K/UL (ref 0–1)
MONOCYTES NFR BLD: 12 % (ref 5–13)
NEUTS SEG # BLD: 3.5 K/UL (ref 1.8–8)
NEUTS SEG NFR BLD: 56 % (ref 32–75)
NRBC # BLD: 0 K/UL (ref 0–0.01)
NRBC BLD-RTO: 0 PER 100 WBC
PLATELET # BLD AUTO: 205 K/UL (ref 150–400)
PMV BLD AUTO: 12.4 FL (ref 8.9–12.9)
POTASSIUM SERPL-SCNC: 3.7 MMOL/L (ref 3.5–5.1)
PROT SERPL-MCNC: 8 G/DL (ref 6.4–8.2)
RBC # BLD AUTO: 4.89 M/UL (ref 4.1–5.7)
RBC MORPH BLD: ABNORMAL
SODIUM SERPL-SCNC: 138 MMOL/L (ref 136–145)
TROPONIN I SERPL HS-MCNC: 12 NG/L (ref 0–76)
TROPONIN I SERPL HS-MCNC: 12 NG/L (ref 0–76)
WBC # BLD AUTO: 6.1 K/UL (ref 4.1–11.1)

## 2025-01-01 PROCEDURE — 84484 ASSAY OF TROPONIN QUANT: CPT

## 2025-01-01 PROCEDURE — 80053 COMPREHEN METABOLIC PANEL: CPT

## 2025-01-01 PROCEDURE — 85379 FIBRIN DEGRADATION QUANT: CPT

## 2025-01-01 PROCEDURE — 71046 X-RAY EXAM CHEST 2 VIEWS: CPT

## 2025-01-01 PROCEDURE — 36415 COLL VENOUS BLD VENIPUNCTURE: CPT

## 2025-01-01 PROCEDURE — 99285 EMERGENCY DEPT VISIT HI MDM: CPT

## 2025-01-01 PROCEDURE — 93005 ELECTROCARDIOGRAM TRACING: CPT | Performed by: EMERGENCY MEDICINE

## 2025-01-01 PROCEDURE — 83735 ASSAY OF MAGNESIUM: CPT

## 2025-01-01 PROCEDURE — 85025 COMPLETE CBC W/AUTO DIFF WBC: CPT

## 2025-01-01 NOTE — ED PROVIDER NOTES
- 17.0 g/dL    Hematocrit 40.7 36.6 - 50.3 %    MCV 83.2 80.0 - 99.0 FL    MCH 27.0 26.0 - 34.0 PG    MCHC 32.4 30.0 - 36.5 g/dL    RDW 12.6 11.5 - 14.5 %    Platelets 205 150 - 400 K/uL    MPV 12.4 8.9 - 12.9 FL    Nucleated RBCs 0.0 0  WBC    nRBC 0.00 0.00 - 0.01 K/uL    Neutrophils % 56 32 - 75 %    Lymphocytes % 28 12 - 49 %    Monocytes % 12 5 - 13 %    Eosinophils % 2 0 - 7 %    Basophils % 2 (H) 0 - 1 %    Immature Granulocytes % 0 0.0 - 0.5 %    Neutrophils Absolute 3.5 1.8 - 8.0 K/UL    Lymphocytes Absolute 1.7 0.8 - 3.5 K/UL    Monocytes Absolute 0.7 0.0 - 1.0 K/UL    Eosinophils Absolute 0.1 0.0 - 0.4 K/UL    Basophils Absolute 0.1 0.0 - 0.1 K/UL    Immature Granulocytes Absolute 0.0 0.00 - 0.04 K/UL    Differential Type MANUAL      RBC Comment NORMOCYTIC, NORMOCHROMIC     Troponin    Collection Time: 01/01/25  2:09 PM   Result Value Ref Range    Troponin, High Sensitivity 12 0 - 76 ng/L   Comprehensive Metabolic Panel    Collection Time: 01/01/25  2:09 PM   Result Value Ref Range    Sodium 138 136 - 145 mmol/L    Potassium 3.7 3.5 - 5.1 mmol/L    Chloride 107 97 - 108 mmol/L    CO2 27 21 - 32 mmol/L    Anion Gap 4 2 - 12 mmol/L    Glucose 138 (H) 65 - 100 mg/dL    BUN 15 6 - 20 MG/DL    Creatinine 1.18 0.70 - 1.30 MG/DL    BUN/Creatinine Ratio 13 12 - 20      Est, Glom Filt Rate 81 >60 ml/min/1.73m2    Calcium 8.7 8.5 - 10.1 MG/DL    Total Bilirubin 0.5 0.2 - 1.0 MG/DL    ALT 39 12 - 78 U/L    AST 22 15 - 37 U/L    Alk Phosphatase 96 45 - 117 U/L    Total Protein 8.0 6.4 - 8.2 g/dL    Albumin 3.6 3.5 - 5.0 g/dL    Globulin 4.4 (H) 2.0 - 4.0 g/dL    Albumin/Globulin Ratio 0.8 (L) 1.1 - 2.2     D-Dimer, Quantitative    Collection Time: 01/01/25  2:09 PM   Result Value Ref Range    D-Dimer, Quant 0.25 0.00 - 0.65 mg/L FEU   Magnesium    Collection Time: 01/01/25  2:09 PM   Result Value Ref Range    Magnesium 1.9 1.6 - 2.4 mg/dL   Troponin    Collection Time: 01/01/25  3:15 PM   Result Value Ref

## 2025-01-01 NOTE — DISCHARGE INSTRUCTIONS
MANUAL      RBC Comment NORMOCYTIC, NORMOCHROMIC     Troponin    Collection Time: 01/01/25  2:09 PM   Result Value Ref Range    Troponin, High Sensitivity 12 0 - 76 ng/L   Comprehensive Metabolic Panel    Collection Time: 01/01/25  2:09 PM   Result Value Ref Range    Sodium 138 136 - 145 mmol/L    Potassium 3.7 3.5 - 5.1 mmol/L    Chloride 107 97 - 108 mmol/L    CO2 27 21 - 32 mmol/L    Anion Gap 4 2 - 12 mmol/L    Glucose 138 (H) 65 - 100 mg/dL    BUN 15 6 - 20 MG/DL    Creatinine 1.18 0.70 - 1.30 MG/DL    BUN/Creatinine Ratio 13 12 - 20      Est, Glom Filt Rate 81 >60 ml/min/1.73m2    Calcium 8.7 8.5 - 10.1 MG/DL    Total Bilirubin 0.5 0.2 - 1.0 MG/DL    ALT 39 12 - 78 U/L    AST 22 15 - 37 U/L    Alk Phosphatase 96 45 - 117 U/L    Total Protein 8.0 6.4 - 8.2 g/dL    Albumin 3.6 3.5 - 5.0 g/dL    Globulin 4.4 (H) 2.0 - 4.0 g/dL    Albumin/Globulin Ratio 0.8 (L) 1.1 - 2.2     D-Dimer, Quantitative    Collection Time: 01/01/25  2:09 PM   Result Value Ref Range    D-Dimer, Quant 0.25 0.00 - 0.65 mg/L FEU   Magnesium    Collection Time: 01/01/25  2:09 PM   Result Value Ref Range    Magnesium 1.9 1.6 - 2.4 mg/dL   Troponin    Collection Time: 01/01/25  3:15 PM   Result Value Ref Range    Troponin, High Sensitivity 12 0 - 76 ng/L     No results found.  ------------------------------------------------------------------------------------------------------------  The evaluation and treatment you received in the Emergency Department were for an urgent problem. It is important that you follow-up with a doctor, nurse practitioner, or physician assistant to:  (1) confirm your diagnosis,  (2) re-evaluation of changes in your illness and treatment, and (3) for ongoing care. Please take your discharge instructions with you when you go to your follow-up appointment.     If you have any problem arranging a follow-up appointment, contact us!  If your symptoms become worse or you do not improve as expected, please return to us. We

## 2025-01-02 ENCOUNTER — TELEPHONE (OUTPATIENT)
Age: 40
End: 2025-01-02

## 2025-01-02 LAB
EKG ATRIAL RATE: 99 BPM
EKG DIAGNOSIS: NORMAL
EKG P AXIS: 62 DEGREES
EKG P-R INTERVAL: 156 MS
EKG Q-T INTERVAL: 338 MS
EKG QRS DURATION: 92 MS
EKG QTC CALCULATION (BAZETT): 433 MS
EKG R AXIS: 67 DEGREES
EKG T AXIS: 15 DEGREES
EKG VENTRICULAR RATE: 99 BPM

## 2025-01-02 NOTE — TELEPHONE ENCOUNTER
Called patient, verified idx2. Called patient to discuss upcoming appointment with Dr Jimenez as his records show that he has changed to [facility]. Patient confirmed this information and asked to cancel his follow up's with Dr Jimenez, which has been done per patient request.   Patient expressed understanding and disconnected the call. Thanks.

## 2025-01-03 ENCOUNTER — TELEMEDICINE (OUTPATIENT)
Age: 40
End: 2025-01-03
Payer: COMMERCIAL

## 2025-01-03 DIAGNOSIS — F90.2 ATTENTION DEFICIT HYPERACTIVITY DISORDER (ADHD), COMBINED TYPE: Primary | ICD-10-CM

## 2025-01-03 PROCEDURE — 99214 OFFICE O/P EST MOD 30 MIN: CPT | Performed by: NURSE PRACTITIONER

## 2025-01-03 RX ORDER — GUANFACINE 1 MG/1
1 TABLET, EXTENDED RELEASE ORAL NIGHTLY
Qty: 30 TABLET | Refills: 1 | Status: SHIPPED | OUTPATIENT
Start: 2025-01-03

## 2025-01-03 ASSESSMENT — PATIENT HEALTH QUESTIONNAIRE - PHQ9
5. POOR APPETITE OR OVEREATING: SEVERAL DAYS
10. IF YOU CHECKED OFF ANY PROBLEMS, HOW DIFFICULT HAVE THESE PROBLEMS MADE IT FOR YOU TO DO YOUR WORK, TAKE CARE OF THINGS AT HOME, OR GET ALONG WITH OTHER PEOPLE: SOMEWHAT DIFFICULT
SUM OF ALL RESPONSES TO PHQ QUESTIONS 1-9: 10
1. LITTLE INTEREST OR PLEASURE IN DOING THINGS: NEARLY EVERY DAY
3. TROUBLE FALLING OR STAYING ASLEEP: SEVERAL DAYS
1. LITTLE INTEREST OR PLEASURE IN DOING THINGS: NEARLY EVERY DAY
6. FEELING BAD ABOUT YOURSELF - OR THAT YOU ARE A FAILURE OR HAVE LET YOURSELF OR YOUR FAMILY DOWN: NOT AT ALL
SUM OF ALL RESPONSES TO PHQ QUESTIONS 1-9: 10
7. TROUBLE CONCENTRATING ON THINGS, SUCH AS READING THE NEWSPAPER OR WATCHING TELEVISION: NEARLY EVERY DAY
9. THOUGHTS THAT YOU WOULD BE BETTER OFF DEAD, OR OF HURTING YOURSELF: NOT AT ALL
4. FEELING TIRED OR HAVING LITTLE ENERGY: SEVERAL DAYS
8. MOVING OR SPEAKING SO SLOWLY THAT OTHER PEOPLE COULD HAVE NOTICED. OR THE OPPOSITE - BEING SO FIDGETY OR RESTLESS THAT YOU HAVE BEEN MOVING AROUND A LOT MORE THAN USUAL: NOT AT ALL
3. TROUBLE FALLING OR STAYING ASLEEP: SEVERAL DAYS
5. POOR APPETITE OR OVEREATING: SEVERAL DAYS
9. THOUGHTS THAT YOU WOULD BE BETTER OFF DEAD, OR OF HURTING YOURSELF: NOT AT ALL
7. TROUBLE CONCENTRATING ON THINGS, SUCH AS READING THE NEWSPAPER OR WATCHING TELEVISION: NEARLY EVERY DAY
4. FEELING TIRED OR HAVING LITTLE ENERGY: SEVERAL DAYS
SUM OF ALL RESPONSES TO PHQ QUESTIONS 1-9: 10
2. FEELING DOWN, DEPRESSED OR HOPELESS: SEVERAL DAYS
2. FEELING DOWN, DEPRESSED OR HOPELESS: SEVERAL DAYS
SUM OF ALL RESPONSES TO PHQ QUESTIONS 1-9: 10
6. FEELING BAD ABOUT YOURSELF - OR THAT YOU ARE A FAILURE OR HAVE LET YOURSELF OR YOUR FAMILY DOWN: NOT AT ALL
SUM OF ALL RESPONSES TO PHQ9 QUESTIONS 1 & 2: 4
8. MOVING OR SPEAKING SO SLOWLY THAT OTHER PEOPLE COULD HAVE NOTICED. OR THE OPPOSITE, BEING SO FIGETY OR RESTLESS THAT YOU HAVE BEEN MOVING AROUND A LOT MORE THAN USUAL: NOT AT ALL
SUM OF ALL RESPONSES TO PHQ QUESTIONS 1-9: 10
10. IF YOU CHECKED OFF ANY PROBLEMS, HOW DIFFICULT HAVE THESE PROBLEMS MADE IT FOR YOU TO DO YOUR WORK, TAKE CARE OF THINGS AT HOME, OR GET ALONG WITH OTHER PEOPLE: SOMEWHAT DIFFICULT

## 2025-01-03 ASSESSMENT — ANXIETY QUESTIONNAIRES
1. FEELING NERVOUS, ANXIOUS, OR ON EDGE: MORE THAN HALF THE DAYS
6. BECOMING EASILY ANNOYED OR IRRITABLE: NEARLY EVERY DAY
5. BEING SO RESTLESS THAT IT IS HARD TO SIT STILL: MORE THAN HALF THE DAYS
5. BEING SO RESTLESS THAT IT IS HARD TO SIT STILL: MORE THAN HALF THE DAYS
7. FEELING AFRAID AS IF SOMETHING AWFUL MIGHT HAPPEN: SEVERAL DAYS
GAD7 TOTAL SCORE: 13
2. NOT BEING ABLE TO STOP OR CONTROL WORRYING: SEVERAL DAYS
6. BECOMING EASILY ANNOYED OR IRRITABLE: NEARLY EVERY DAY
IF YOU CHECKED OFF ANY PROBLEMS ON THIS QUESTIONNAIRE, HOW DIFFICULT HAVE THESE PROBLEMS MADE IT FOR YOU TO DO YOUR WORK, TAKE CARE OF THINGS AT HOME, OR GET ALONG WITH OTHER PEOPLE: EXTREMELY DIFFICULT
4. TROUBLE RELAXING: NEARLY EVERY DAY
7. FEELING AFRAID AS IF SOMETHING AWFUL MIGHT HAPPEN: SEVERAL DAYS
IF YOU CHECKED OFF ANY PROBLEMS ON THIS QUESTIONNAIRE, HOW DIFFICULT HAVE THESE PROBLEMS MADE IT FOR YOU TO DO YOUR WORK, TAKE CARE OF THINGS AT HOME, OR GET ALONG WITH OTHER PEOPLE: EXTREMELY DIFFICULT
1. FEELING NERVOUS, ANXIOUS, OR ON EDGE: MORE THAN HALF THE DAYS
3. WORRYING TOO MUCH ABOUT DIFFERENT THINGS: SEVERAL DAYS
3. WORRYING TOO MUCH ABOUT DIFFERENT THINGS: SEVERAL DAYS
2. NOT BEING ABLE TO STOP OR CONTROL WORRYING: SEVERAL DAYS
4. TROUBLE RELAXING: NEARLY EVERY DAY

## 2025-01-03 NOTE — PROGRESS NOTES
Shimon Min, was evaluated through a synchronous (real-time) audio-video encounter. The patient (or guardian if applicable) is aware that this is a billable service, which includes applicable co-pays. This Virtual Visit was conducted with patient's (and/or legal guardian's) consent. Patient identification was verified, and a caregiver was present when appropriate.     The patient was located at Home: [address] [place] [address]  Provider was located at Facility (Appt Dept): [facility] [address], [address]  [place] [address]  Confirm you are appropriately licensed, registered, or certified to deliver care in the state where the patient is located as indicated above. If you are not or unsure, please re-schedule the visit: Yes, I confirm.       CHIEF COMPLAINT:  Shimon Min is a 39 y.o. male and was seen today for follow-up of psychiatric condition and psychotropic medication management.     HPI:    Shimon reports the following psychiatric symptoms by hx:  anxiety, inattentiveness, irritability.  Overall symptoms have been present for years. Currently symptoms are of moderate severity. The symptoms occur intermittently. Patient reports stress with weight and overall health. He reports that he was evaluated at [facility] and was cleared of having any heart conditions. Patient reports that he has to work on his weight and continue to eat healthy to get better control of his blood pressure. He feels his depression and anxiety are manageable at this time. His ability to focus and concentrate has worsened. He states that he finds himself talking over others and becoming easily agitated at small things. He reports compliance with medications. Patient only takes lorazepam as needed. Denies side effects. Patient Appetite:improved, eating healthier and working with nutritionist. Sleep: no change from normal. Patient denies symptoms of psychosis or guadalupe. Denies suicidal or homicidal ideation.    Current

## 2025-01-03 NOTE — PROGRESS NOTES
Chief Complaint   Patient presents with    Medication Check     Prior to Admission medications    Medication Sig Start Date End Date Taking? Authorizing Provider   amLODIPine (NORVASC) 10 MG tablet Take 1 tablet by mouth daily 12/6/24  Yes Gladys Hernandez MD   scopolamine (TRANSDERM-SCOP) transdermal patch Place 1 patch onto the skin every 72 hours 12/6/24  Yes Gladys Hernandez MD   glucose monitoring kit 1 kit by Does not apply route daily 12/6/24  Yes Gladys Hernandez MD         7/26/2024     7:13 AM   Patient-Reported Vitals   Patient-Reported Weight 270   Patient-Reported Height 5'9   Patient-Reported Systolic 131 mmHg   Patient-Reported Diastolic 86 mmHg   Patient-Reported Pulse 75   Patient-Reported Temperature 98.2   Patient-Reported SpO2 98%      PHQ-9 Total Score: 10 (1/3/2025  8:05 AM)  Thoughts that you would be better off dead, or of hurting yourself in some way: 0 (1/3/2025  8:05 AM)         1/3/2025     8:05 AM 11/20/2024    10:48 AM 10/22/2024     7:58 AM   PHQ-9    Little interest or pleasure in doing things 3 0 0   Feeling down, depressed, or hopeless 1 0 1   Trouble falling or staying asleep, or sleeping too much 1 0 0   Feeling tired or having little energy 1 0 0   Poor appetite or overeating 1 0 0   Feeling bad about yourself - or that you are a failure or have let yourself or your family down 0 0 0   Trouble concentrating on things, such as reading the newspaper or watching television 3 0 0   Moving or speaking so slowly that other people could have noticed. Or the opposite - being so fidgety or restless that you have been moving around a lot more than usual 0 0 0   Thoughts that you would be better off dead, or of hurting yourself in some way 0 0 0   PHQ-2 Score 4 0 1   PHQ-9 Total Score 10 0 1   If you checked off any problems, how difficult have these problems made it for you to do your work, take care of things at home, or get along with other people? 1  1           1/3/2025

## 2025-01-06 ENCOUNTER — TELEPHONE (OUTPATIENT)
Age: 40
End: 2025-01-06

## 2025-01-07 ENCOUNTER — TELEPHONE (OUTPATIENT)
Age: 40
End: 2025-01-07

## 2025-01-07 NOTE — TELEPHONE ENCOUNTER
Patient was changed from an in-office visit to a virtual this mroning at 8a due to the weather. CMA contacted patient for rooming process and informing him of delay, patient stated he wanted to cancel and disconnected call.     Called patient in attempt to cancel/reschedule. No answer, call directed to voicemail.

## 2025-01-21 ENCOUNTER — TELEPHONE (OUTPATIENT)
Age: 40
End: 2025-01-21

## 2025-01-21 NOTE — TELEPHONE ENCOUNTER
Called patient who is interested in bariatric surgery. Sent patient the bariatric seminar link and informed him once completed, he will need to fill out the intake form and a staff member will contact him after for next steps.

## 2025-01-25 ENCOUNTER — PATIENT MESSAGE (OUTPATIENT)
Age: 40
End: 2025-01-25

## 2025-01-27 ENCOUNTER — TELEPHONE (OUTPATIENT)
Age: 40
End: 2025-01-27

## 2025-01-28 ENCOUNTER — OFFICE VISIT (OUTPATIENT)
Age: 40
End: 2025-01-28
Payer: COMMERCIAL

## 2025-01-28 ENCOUNTER — TELEPHONE (OUTPATIENT)
Age: 40
End: 2025-01-28

## 2025-01-28 VITALS
BODY MASS INDEX: 42.88 KG/M2 | HEART RATE: 89 BPM | WEIGHT: 289.5 LBS | HEIGHT: 69 IN | DIASTOLIC BLOOD PRESSURE: 100 MMHG | RESPIRATION RATE: 20 BRPM | OXYGEN SATURATION: 99 % | TEMPERATURE: 99.1 F | SYSTOLIC BLOOD PRESSURE: 164 MMHG

## 2025-01-28 DIAGNOSIS — K21.9 GASTROESOPHAGEAL REFLUX DISEASE WITHOUT ESOPHAGITIS: ICD-10-CM

## 2025-01-28 DIAGNOSIS — G47.33 OSA (OBSTRUCTIVE SLEEP APNEA): ICD-10-CM

## 2025-01-28 DIAGNOSIS — I10 PRIMARY HYPERTENSION: ICD-10-CM

## 2025-01-28 DIAGNOSIS — E66.01 OBESITY, MORBID: Primary | ICD-10-CM

## 2025-01-28 PROCEDURE — 99204 OFFICE O/P NEW MOD 45 MIN: CPT | Performed by: SURGERY

## 2025-01-28 PROCEDURE — 3077F SYST BP >= 140 MM HG: CPT | Performed by: SURGERY

## 2025-01-28 PROCEDURE — 3080F DIAST BP >= 90 MM HG: CPT | Performed by: SURGERY

## 2025-01-28 RX ORDER — LORAZEPAM 0.5 MG/1
0.5 TABLET ORAL EVERY 6 HOURS PRN
COMMUNITY
End: 2025-01-29 | Stop reason: DRUGHIGH

## 2025-01-28 RX ORDER — AMLODIPINE BESYLATE 5 MG/1
5 TABLET ORAL DAILY
COMMUNITY
Start: 2025-01-10

## 2025-01-28 ASSESSMENT — PATIENT HEALTH QUESTIONNAIRE - PHQ9
SUM OF ALL RESPONSES TO PHQ QUESTIONS 1-9: 0
SUM OF ALL RESPONSES TO PHQ9 QUESTIONS 1 & 2: 0
1. LITTLE INTEREST OR PLEASURE IN DOING THINGS: NOT AT ALL
SUM OF ALL RESPONSES TO PHQ QUESTIONS 1-9: 0
2. FEELING DOWN, DEPRESSED OR HOPELESS: NOT AT ALL

## 2025-01-28 NOTE — TELEPHONE ENCOUNTER
I called patient back and he said he could stop the Omeprzaole for 2 weeks sand then he will get his H Pylori test done. Pt said he will not have any problems being off of it as he only uses as needed. Pt in agreement.

## 2025-01-28 NOTE — PROGRESS NOTES
Identified patient with two patient identifiers (name and ). Reviewed chart in preparation for visit and have obtained necessary documentation.    Shimon Min is a 39 y.o. male  Chief Complaint   Patient presents with    Bariatric, Initial Visit     New Bariatric patient interested in lap sleeve gastrectomy     BP (!) 164/100 (Site: Left Upper Arm, Position: Sitting, Cuff Size: Large Adult)   Pulse 89   Temp 99.1 °F (37.3 °C)   Resp 20   Ht 1.753 m (5' 9\")   Wt 131.3 kg (289 lb 8 oz)   SpO2 99%   BMI 42.75 kg/m²         Patient and provider made aware of elevated BP x2. Patient asymptomatic. Patient reminded to monitor BP, continue to take BP medications if prescribed, and follow up with PCP/Cardiologist.  Patient expressed understanding and agreement.       1. Have you been to the ER, urgent care clinic since your last visit?  Hospitalized since your last visit?no    2. Have you seen or consulted any other health care providers outside of the [facility] since your last visit?  Include any pap smears or colon screening. no

## 2025-01-28 NOTE — TELEPHONE ENCOUNTER
Patient stated [facility] can't complete the H. Pylori Breath Test due to a medication he is taking. Please contact patient regarding how to handle this situation

## 2025-01-29 ENCOUNTER — TELEPHONE (OUTPATIENT)
Age: 40
End: 2025-01-29

## 2025-01-29 DIAGNOSIS — F41.0 PANIC DISORDER (EPISODIC PAROXYSMAL ANXIETY): ICD-10-CM

## 2025-01-29 RX ORDER — LORAZEPAM 0.5 MG/1
0.5 TABLET ORAL DAILY PRN
Qty: 30 TABLET | Refills: 0 | Status: SHIPPED | OUTPATIENT
Start: 2025-01-29 | End: 2025-02-28

## 2025-01-29 NOTE — PROGRESS NOTES
Bariatric Surgery Consultation    Subjective:     The patient is a 39 y.o. obese  male with a Body mass index is 42.75 kg/m²..  The patient has been at his heaviest weight for the past 2 years;  he has been overweight since young adulthood; he has been considering surgery since 2024;  he desires surgery at this time because medical efforts at weight loss have been ineffective.  Shimon Min has tried multiple diets in his lifetime most recently tried unsupervised diets.    Bariatric comorbidities present are   Patient Active Problem List   Diagnosis    Obesity, morbid    Cold-induced asthma    Hypertension    Chronic bilateral low back pain with sciatica    NAIN (obstructive sleep apnea)    Gout    Palpitations    Panic disorder    SEBASTIÁN (generalized anxiety disorder)    Social anxiety disorder     The patient desires laparoscopic sleeve gastrectomy for surgical weight loss.  The patients goal weight is 190 lbs.  The highest acceptable weight is 220 lbs. These goals are consistent with expected outcomes of their desired operation.  his Medical goals are NAIN resolution; his qualty of life goals are decreased fatigue.    Patient Active Problem List    Diagnosis Date Noted    Panic disorder 10/14/2022    SEBASTIÁN (generalized anxiety disorder) 10/14/2022    Social anxiety disorder 10/14/2022    Palpitations 07/29/2022    Obesity, morbid 03/29/2018    Chronic bilateral low back pain with sciatica 03/29/2018    Cold-induced asthma     Hypertension     NAIN (obstructive sleep apnea)     Gout       Past Surgical History:   Procedure Laterality Date    ADENOIDECTOMY        Social History     Tobacco Use    Smoking status: Never    Smokeless tobacco: Never   Substance Use Topics    Alcohol use: No      Family History   Problem Relation Age of Onset    Sleep Apnea Mother     Hypertension Father     Breast Cancer Maternal Grandmother     Hypertension Mother       Prior to Admission medications    Medication Sig

## 2025-01-30 ENCOUNTER — TELEPHONE (OUTPATIENT)
Age: 40
End: 2025-01-30

## 2025-01-30 NOTE — TELEPHONE ENCOUNTER
Returned patients call.  Patient stated that he no longer wants to participate in the SWL program

## 2025-01-30 NOTE — TELEPHONE ENCOUNTER
Patient stated he is no longer interested in Bariatric Surgery; canceled upcoming appointments; transferred patient to Bariatric Coordinator for additional assistance

## 2025-02-03 ENCOUNTER — HOSPITAL ENCOUNTER (EMERGENCY)
Facility: HOSPITAL | Age: 40
Discharge: HOME | End: 2025-02-03
Attending: STUDENT IN AN ORGANIZED HEALTH CARE EDUCATION/TRAINING PROGRAM
Payer: COMMERCIAL

## 2025-02-03 ENCOUNTER — APPOINTMENT (OUTPATIENT)
Facility: HOSPITAL | Age: 40
End: 2025-02-03
Payer: COMMERCIAL

## 2025-02-03 ENCOUNTER — PATIENT MESSAGE (OUTPATIENT)
Age: 40
End: 2025-02-03

## 2025-02-03 VITALS
WEIGHT: 289.46 LBS | TEMPERATURE: 99.1 F | DIASTOLIC BLOOD PRESSURE: 101 MMHG | SYSTOLIC BLOOD PRESSURE: 157 MMHG | BODY MASS INDEX: 42.87 KG/M2 | OXYGEN SATURATION: 96 % | HEART RATE: 84 BPM | RESPIRATION RATE: 20 BRPM | HEIGHT: 69 IN

## 2025-02-03 DIAGNOSIS — R16.0 LIVER MASS, LEFT LOBE: ICD-10-CM

## 2025-02-03 DIAGNOSIS — N50.812 PAIN IN LEFT TESTICLE: Primary | ICD-10-CM

## 2025-02-03 DIAGNOSIS — R16.0 LIVER MASS: Primary | ICD-10-CM

## 2025-02-03 LAB
APPEARANCE UR: CLEAR
BACTERIA URNS QL MICRO: NEGATIVE /HPF
BILIRUB UR QL: NEGATIVE
COLOR UR: ABNORMAL
EPITH CASTS URNS QL MICRO: ABNORMAL /LPF
GLUCOSE UR STRIP.AUTO-MCNC: NEGATIVE MG/DL
HGB UR QL STRIP: NEGATIVE
HYALINE CASTS URNS QL MICRO: ABNORMAL /LPF (ref 0–2)
KETONES UR QL STRIP.AUTO: ABNORMAL MG/DL
LEUKOCYTE ESTERASE UR QL STRIP.AUTO: NEGATIVE
NITRITE UR QL STRIP.AUTO: NEGATIVE
PH UR STRIP: 6 (ref 5–8)
PROT UR STRIP-MCNC: 30 MG/DL
RBC #/AREA URNS HPF: ABNORMAL /HPF (ref 0–5)
SP GR UR REFRACTOMETRY: 1.02
URINE CULTURE IF INDICATED: ABNORMAL
UROBILINOGEN UR QL STRIP.AUTO: 0.2 EU/DL (ref 0.2–1)
WBC URNS QL MICRO: ABNORMAL /HPF (ref 0–4)

## 2025-02-03 PROCEDURE — 76870 US EXAM SCROTUM: CPT

## 2025-02-03 PROCEDURE — 99284 EMERGENCY DEPT VISIT MOD MDM: CPT

## 2025-02-03 PROCEDURE — 81001 URINALYSIS AUTO W/SCOPE: CPT

## 2025-02-03 PROCEDURE — 6370000000 HC RX 637 (ALT 250 FOR IP): Performed by: STUDENT IN AN ORGANIZED HEALTH CARE EDUCATION/TRAINING PROGRAM

## 2025-02-03 RX ORDER — ACETAMINOPHEN 500 MG
1000 TABLET ORAL
Status: COMPLETED | OUTPATIENT
Start: 2025-02-03 | End: 2025-02-03

## 2025-02-03 RX ADMIN — ACETAMINOPHEN 1000 MG: 500 TABLET, FILM COATED ORAL at 10:14

## 2025-02-03 ASSESSMENT — PAIN DESCRIPTION - PAIN TYPE: TYPE: ACUTE PAIN

## 2025-02-03 ASSESSMENT — PAIN DESCRIPTION - ORIENTATION: ORIENTATION: LEFT

## 2025-02-03 ASSESSMENT — PAIN DESCRIPTION - DESCRIPTORS: DESCRIPTORS: SORE;TENDER

## 2025-02-03 ASSESSMENT — PAIN DESCRIPTION - LOCATION: LOCATION: SCROTUM

## 2025-02-03 ASSESSMENT — PAIN SCALES - GENERAL: PAINLEVEL_OUTOF10: 7

## 2025-02-03 ASSESSMENT — PAIN DESCRIPTION - FREQUENCY: FREQUENCY: CONTINUOUS

## 2025-02-03 NOTE — ED PROVIDER NOTES
[facility] EMERGENCY DEPARTMENT  EMERGENCY DEPARTMENT ENCOUNTER       Pt Name: Shimon Min  MRN: [id number]  Birthdate 1985  Date of evaluation: 2/3/2025  Provider: Alexey Deleon MD   PCP: Gladys Hernandez MD  Note Started: 10:31 AM EST 2/3/25     CHIEF COMPLAINT       Chief Complaint   Patient presents with    Scrotal Pain     Pt ambulatory with cc of left testicle pain x2 days. Pt states he feels and knot on the inside. Denies dysuria.         HISTORY OF PRESENT ILLNESS: 1 or more elements      History From: patient, History limited by: none     Shimon Min is a 39 y.o. male presenting with left scrotal pain.  This has been ongoing for about 2-3 days, notes he can feel a knot on it.  Denies trauma.  Denies dysuria, discharge, NV, abdominal pain.  Notes it hurts if he flexes his hip.         Please See MDM for Additional Details of the HPI/PMH  Nursing Notes were all reviewed and agreed with or any disagreements were addressed in the HPI.     REVIEW OF SYSTEMS        Positives and Pertinent negatives as per HPI.    PAST HISTORY     Past Medical History:  Past Medical History:   Diagnosis Date    ADHD (attention deficit hyperactivity disorder)     Angioedema     ace induced asthma     Anxiety     Cold-induced asthma     Depression     Gout     Hernia of abdominal wall     Hypertension     NAIN (obstructive sleep apnea)        Past Surgical History:  Past Surgical History:   Procedure Laterality Date    ADENOIDECTOMY         Family History:  Family History   Problem Relation Age of Onset    Sleep Apnea Mother     Hypertension Father     Breast Cancer Maternal Grandmother     Hypertension Mother        Social History:  Social History     Tobacco Use    Smoking status: Never    Smokeless tobacco: Never   Vaping Use    Vaping status: Never Used   Substance Use Topics    Alcohol use: No    Drug use: Never       Allergies:  Allergies   Allergen Reactions    Hydrochlorothiazide Other (See

## 2025-02-05 DIAGNOSIS — R16.0 LIVER MASS, LEFT LOBE: ICD-10-CM

## 2025-02-05 DIAGNOSIS — R16.0 LIVER MASS: ICD-10-CM

## 2025-02-05 LAB
ALBUMIN SERPL-MCNC: 3.9 G/DL (ref 3.5–5)
ALBUMIN/GLOB SERPL: 1.1 (ref 1.1–2.2)
ALP SERPL-CCNC: 84 U/L (ref 45–117)
ALT SERPL-CCNC: 31 U/L (ref 12–78)
ANION GAP SERPL CALC-SCNC: 6 MMOL/L (ref 2–12)
AST SERPL-CCNC: 29 U/L (ref 15–37)
BILIRUB SERPL-MCNC: 0.7 MG/DL (ref 0.2–1)
BUN SERPL-MCNC: 14 MG/DL (ref 6–20)
BUN/CREAT SERPL: 14 (ref 12–20)
CALCIUM SERPL-MCNC: 9.3 MG/DL (ref 8.5–10.1)
CHLORIDE SERPL-SCNC: 105 MMOL/L (ref 97–108)
CO2 SERPL-SCNC: 29 MMOL/L (ref 21–32)
CREAT SERPL-MCNC: 0.99 MG/DL (ref 0.7–1.3)
GLOBULIN SER CALC-MCNC: 3.7 G/DL (ref 2–4)
GLUCOSE SERPL-MCNC: 99 MG/DL (ref 65–100)
POTASSIUM SERPL-SCNC: 4.2 MMOL/L (ref 3.5–5.1)
PROT SERPL-MCNC: 7.6 G/DL (ref 6.4–8.2)
SODIUM SERPL-SCNC: 140 MMOL/L (ref 136–145)

## 2025-02-10 ENCOUNTER — TELEPHONE (OUTPATIENT)
Age: 40
End: 2025-02-10

## 2025-02-10 NOTE — TELEPHONE ENCOUNTER
[employer] states that request has not been approved and fax with info will be sent.  This is CT abdomin     Or contact #[phone]  option 4    Case #[id number]

## 2025-02-12 ENCOUNTER — TELEPHONE (OUTPATIENT)
Age: 40
End: 2025-02-12

## 2025-02-12 NOTE — TELEPHONE ENCOUNTER
Sanna with [facility] auth dept states that there needs to be another peer/peer scheduled as this morning's was missed.      Doctor called in and while I was trying to find someone the other physician hung up as they only hold for so long.     Someone will need to reschedule.     #[phone] option 4     case #[id number]

## 2025-02-13 ENCOUNTER — TELEPHONE (OUTPATIENT)
Age: 40
End: 2025-02-13

## 2025-02-13 NOTE — TELEPHONE ENCOUNTER
Peer to Peer on Friday 2/14 @8:45   Please transfer the call to clinic team.   Do not hang up until provider is on the call.

## 2025-02-14 ENCOUNTER — TELEPHONE (OUTPATIENT)
Age: 40
End: 2025-02-14

## 2025-02-14 NOTE — TELEPHONE ENCOUNTER
Peer to peer was done for patients CT scan. The scan was approved.     Approval number is [id number]     Called the Auth dept.     Sent patient message to reschedule Ct scan.

## 2025-03-04 RX ORDER — COLCHICINE 0.6 MG/1
0.6 TABLET ORAL DAILY
Qty: 30 TABLET | Refills: 3 | Status: SHIPPED | OUTPATIENT
Start: 2025-03-04

## 2025-03-04 NOTE — TELEPHONE ENCOUNTER
PCP: Gladys Hernandez MD    Last appt:   12/6/2024    Future Appointments   Date Time Provider Department Center   3/7/2025  2:00 PM [facility]       Requested Prescriptions     Pending Prescriptions Disp Refills    colchicine (COLCRYS) 0.6 MG tablet 30 tablet 3     Sig: Take 1 tablet by mouth daily

## 2025-03-13 ENCOUNTER — TELEMEDICINE (OUTPATIENT)
Age: 40
End: 2025-03-13
Payer: MEDICAID

## 2025-03-13 DIAGNOSIS — F90.2 ATTENTION DEFICIT HYPERACTIVITY DISORDER (ADHD), COMBINED TYPE: ICD-10-CM

## 2025-03-13 DIAGNOSIS — F41.1 GAD (GENERALIZED ANXIETY DISORDER): Primary | ICD-10-CM

## 2025-03-13 PROCEDURE — 99214 OFFICE O/P EST MOD 30 MIN: CPT | Performed by: NURSE PRACTITIONER

## 2025-03-13 RX ORDER — AMLODIPINE BESYLATE 10 MG/1
10 TABLET ORAL DAILY
COMMUNITY
Start: 2025-03-03

## 2025-03-13 ASSESSMENT — PATIENT HEALTH QUESTIONNAIRE - PHQ9
3. TROUBLE FALLING OR STAYING ASLEEP: NOT AT ALL
8. MOVING OR SPEAKING SO SLOWLY THAT OTHER PEOPLE COULD HAVE NOTICED. OR THE OPPOSITE, BEING SO FIGETY OR RESTLESS THAT YOU HAVE BEEN MOVING AROUND A LOT MORE THAN USUAL: NOT AT ALL
8. MOVING OR SPEAKING SO SLOWLY THAT OTHER PEOPLE COULD HAVE NOTICED. OR THE OPPOSITE - BEING SO FIDGETY OR RESTLESS THAT YOU HAVE BEEN MOVING AROUND A LOT MORE THAN USUAL: NOT AT ALL
5. POOR APPETITE OR OVEREATING: NOT AT ALL
SUM OF ALL RESPONSES TO PHQ QUESTIONS 1-9: 2
3. TROUBLE FALLING OR STAYING ASLEEP: NOT AT ALL
6. FEELING BAD ABOUT YOURSELF - OR THAT YOU ARE A FAILURE OR HAVE LET YOURSELF OR YOUR FAMILY DOWN: NOT AT ALL
SUM OF ALL RESPONSES TO PHQ QUESTIONS 1-9: 2
7. TROUBLE CONCENTRATING ON THINGS, SUCH AS READING THE NEWSPAPER OR WATCHING TELEVISION: SEVERAL DAYS
1. LITTLE INTEREST OR PLEASURE IN DOING THINGS: NOT AT ALL
4. FEELING TIRED OR HAVING LITTLE ENERGY: SEVERAL DAYS
9. THOUGHTS THAT YOU WOULD BE BETTER OFF DEAD, OR OF HURTING YOURSELF: NOT AT ALL
4. FEELING TIRED OR HAVING LITTLE ENERGY: SEVERAL DAYS
10. IF YOU CHECKED OFF ANY PROBLEMS, HOW DIFFICULT HAVE THESE PROBLEMS MADE IT FOR YOU TO DO YOUR WORK, TAKE CARE OF THINGS AT HOME, OR GET ALONG WITH OTHER PEOPLE: SOMEWHAT DIFFICULT
7. TROUBLE CONCENTRATING ON THINGS, SUCH AS READING THE NEWSPAPER OR WATCHING TELEVISION: SEVERAL DAYS
5. POOR APPETITE OR OVEREATING: NOT AT ALL
SUM OF ALL RESPONSES TO PHQ QUESTIONS 1-9: 2
2. FEELING DOWN, DEPRESSED OR HOPELESS: NOT AT ALL
6. FEELING BAD ABOUT YOURSELF - OR THAT YOU ARE A FAILURE OR HAVE LET YOURSELF OR YOUR FAMILY DOWN: NOT AT ALL
9. THOUGHTS THAT YOU WOULD BE BETTER OFF DEAD, OR OF HURTING YOURSELF: NOT AT ALL
2. FEELING DOWN, DEPRESSED OR HOPELESS: NOT AT ALL
SUM OF ALL RESPONSES TO PHQ QUESTIONS 1-9: 2
1. LITTLE INTEREST OR PLEASURE IN DOING THINGS: NOT AT ALL
10. IF YOU CHECKED OFF ANY PROBLEMS, HOW DIFFICULT HAVE THESE PROBLEMS MADE IT FOR YOU TO DO YOUR WORK, TAKE CARE OF THINGS AT HOME, OR GET ALONG WITH OTHER PEOPLE: SOMEWHAT DIFFICULT
SUM OF ALL RESPONSES TO PHQ QUESTIONS 1-9: 2

## 2025-03-13 ASSESSMENT — ANXIETY QUESTIONNAIRES
4. TROUBLE RELAXING: SEVERAL DAYS
2. NOT BEING ABLE TO STOP OR CONTROL WORRYING: SEVERAL DAYS
2. NOT BEING ABLE TO STOP OR CONTROL WORRYING: SEVERAL DAYS
3. WORRYING TOO MUCH ABOUT DIFFERENT THINGS: SEVERAL DAYS
GAD7 TOTAL SCORE: 7
IF YOU CHECKED OFF ANY PROBLEMS ON THIS QUESTIONNAIRE, HOW DIFFICULT HAVE THESE PROBLEMS MADE IT FOR YOU TO DO YOUR WORK, TAKE CARE OF THINGS AT HOME, OR GET ALONG WITH OTHER PEOPLE: SOMEWHAT DIFFICULT
6. BECOMING EASILY ANNOYED OR IRRITABLE: MORE THAN HALF THE DAYS
5. BEING SO RESTLESS THAT IT IS HARD TO SIT STILL: NOT AT ALL
5. BEING SO RESTLESS THAT IT IS HARD TO SIT STILL: NOT AT ALL
1. FEELING NERVOUS, ANXIOUS, OR ON EDGE: SEVERAL DAYS
6. BECOMING EASILY ANNOYED OR IRRITABLE: MORE THAN HALF THE DAYS
7. FEELING AFRAID AS IF SOMETHING AWFUL MIGHT HAPPEN: SEVERAL DAYS
7. FEELING AFRAID AS IF SOMETHING AWFUL MIGHT HAPPEN: SEVERAL DAYS
4. TROUBLE RELAXING: SEVERAL DAYS
IF YOU CHECKED OFF ANY PROBLEMS ON THIS QUESTIONNAIRE, HOW DIFFICULT HAVE THESE PROBLEMS MADE IT FOR YOU TO DO YOUR WORK, TAKE CARE OF THINGS AT HOME, OR GET ALONG WITH OTHER PEOPLE: SOMEWHAT DIFFICULT
3. WORRYING TOO MUCH ABOUT DIFFERENT THINGS: SEVERAL DAYS
1. FEELING NERVOUS, ANXIOUS, OR ON EDGE: SEVERAL DAYS

## 2025-03-13 NOTE — PROGRESS NOTES
Chief Complaint   Patient presents with    Medication Check     Prior to Admission medications    Medication Sig Start Date End Date Taking? Authorizing Provider   amLODIPine (NORVASC) 10 MG tablet Take 1 tablet by mouth daily 3/3/25  Yes Mar Lemon MD   colchicine (COLCRYS) 0.6 MG tablet Take 1 tablet by mouth daily 3/4/25  Yes Gladys Hernandez MD   amLODIPine (NORVASC) 5 MG tablet Take 1 tablet by mouth daily 1/10/25  Yes Mar Lemon MD   omeprazole (PRILOSEC) 20 MG delayed release capsule Take by mouth daily 1/24/25  Yes Mar Lemon MD         3/13/2025     2:26 PM   Patient-Reported Vitals   Patient-Reported Weight Na   Patient-Reported Height Na   Patient-Reported Systolic 138 mmHg   Patient-Reported Diastolic 88 mmHg   Patient-Reported Pulse 79   Patient-Reported Temperature 98.2   Patient-Reported SpO2 97      PHQ-9 Total Score: (Patient-Rptd) 2 (3/13/2025  2:27 PM)  Thoughts that you would be better off dead, or of hurting yourself in some way: (Patient-Rptd) 0 (3/13/2025  2:27 PM)         3/13/2025     2:27 PM 1/28/2025    10:53 AM 1/3/2025     8:05 AM   PHQ-9    Little interest or pleasure in doing things 0 0 3   Feeling down, depressed, or hopeless 0 0 1   Trouble falling or staying asleep, or sleeping too much 0  1   Feeling tired or having little energy 1  1   Poor appetite or overeating 0  1   Feeling bad about yourself - or that you are a failure or have let yourself or your family down 0  0   Trouble concentrating on things, such as reading the newspaper or watching television 1  3   Moving or speaking so slowly that other people could have noticed. Or the opposite - being so fidgety or restless that you have been moving around a lot more than usual 0  0   Thoughts that you would be better off dead, or of hurting yourself in some way 0  0   PHQ-2 Score 0  0 4    PHQ-9 Total Score 2  0 10    If you checked off any problems, how difficult have these problems made it

## 2025-03-13 NOTE — PROGRESS NOTES
Shimon Min, was evaluated through a synchronous (real-time) audio-video encounter. The patient (or guardian if applicable) is aware that this is a billable service, which includes applicable co-pays. This Virtual Visit was conducted with patient's (and/or legal guardian's) consent. Patient identification was verified, and a caregiver was present when appropriate.     The patient was located at Home: [facility] [address]  Provider was located at Facility (Appt Dept): [address] [place] [address]  [address]  [place] [address]  Confirm you are appropriately licensed, registered, or certified to deliver care in the state where the patient is located as indicated above. If you are not or unsure, please re-schedule the visit: Yes, I confirm.     CHIEF COMPLAINT:  Shimon Min is a 39 y.o. male and was seen today for follow-up of psychiatric condition and psychotropic medication management.     HPI:    Shimon reports the following psychiatric symptoms by hx:  anxiety, inattentiveness, irritability.  Overall symptoms have been present for years. Currently symptoms are of moderate severity. The symptoms occur intermittently. Patient reports stress with weight and his health. He reports that he is thinking about bariatric surgery and has some upcoming procedures for his GI issues. He feels his depression and anxiety are manageable at this time. His ability to focus and concentrate has fair. He states that he was not able to get past taking two doses of guanfacine out of fear that his blood pressure may drop. Patient takes lorazepam as needed. Denies side effects. Patient Appetite:about the same, eating healthier and working with nutritionist. He has tried to exercise more. Sleep: no change from normal. Patient denies symptoms of psychosis or guadalupe. Denies suicidal or homicidal ideation.     Current Outpatient Medications on File Prior to Visit   Medication Sig Dispense Refill    amLODIPine (NORVASC) 10

## 2025-03-14 ENCOUNTER — HOSPITAL ENCOUNTER (OUTPATIENT)
Facility: HOSPITAL | Age: 40
Discharge: HOME | End: 2025-03-17
Attending: INTERNAL MEDICINE
Payer: MEDICARE

## 2025-03-14 DIAGNOSIS — R12 PYROSIS: ICD-10-CM

## 2025-03-14 DIAGNOSIS — R14.0 BLOATING: ICD-10-CM

## 2025-03-14 PROCEDURE — A9541 TC99M SULFUR COLLOID: HCPCS | Performed by: INTERNAL MEDICINE

## 2025-03-14 PROCEDURE — 78264 GASTRIC EMPTYING IMG STUDY: CPT

## 2025-03-14 PROCEDURE — 3430000000 HC RX DIAGNOSTIC RADIOPHARMACEUTICAL: Performed by: INTERNAL MEDICINE

## 2025-03-14 RX ORDER — TECHNETIUM TC 99M SULFUR COLLOID 2 MG
1 KIT MISCELLANEOUS ONCE
Status: COMPLETED | OUTPATIENT
Start: 2025-03-14 | End: 2025-03-14

## 2025-03-14 RX ADMIN — TECHNETIUM TC 99M SULFUR COLLOID 1 MILLICURIE: KIT at 08:54

## 2025-03-18 ENCOUNTER — HOSPITAL ENCOUNTER (EMERGENCY)
Facility: HOSPITAL | Age: 40
Discharge: HOME | End: 2025-03-18
Attending: EMERGENCY MEDICINE
Payer: MEDICARE

## 2025-03-18 ENCOUNTER — APPOINTMENT (OUTPATIENT)
Facility: HOSPITAL | Age: 40
End: 2025-03-18
Payer: MEDICARE

## 2025-03-18 VITALS
RESPIRATION RATE: 14 BRPM | WEIGHT: 286.38 LBS | HEART RATE: 89 BPM | SYSTOLIC BLOOD PRESSURE: 161 MMHG | TEMPERATURE: 98.4 F | DIASTOLIC BLOOD PRESSURE: 95 MMHG | BODY MASS INDEX: 42.29 KG/M2 | OXYGEN SATURATION: 98 %

## 2025-03-18 DIAGNOSIS — F41.1 GAD (GENERALIZED ANXIETY DISORDER): Primary | ICD-10-CM

## 2025-03-18 DIAGNOSIS — R42 DIZZINESS: Primary | ICD-10-CM

## 2025-03-18 LAB
ALBUMIN SERPL-MCNC: 3.5 G/DL (ref 3.5–5)
ALBUMIN/GLOB SERPL: 0.9 (ref 1.1–2.2)
ALP SERPL-CCNC: 82 U/L (ref 45–117)
ALT SERPL-CCNC: 37 U/L (ref 12–78)
ANION GAP SERPL CALC-SCNC: 2 MMOL/L (ref 2–12)
AST SERPL-CCNC: 32 U/L (ref 15–37)
BASOPHILS # BLD: 0.05 K/UL (ref 0–0.1)
BASOPHILS NFR BLD: 1 % (ref 0–1)
BILIRUB SERPL-MCNC: 0.7 MG/DL (ref 0.2–1)
BUN SERPL-MCNC: 15 MG/DL (ref 6–20)
BUN/CREAT SERPL: 14 (ref 12–20)
CALCIUM SERPL-MCNC: 9 MG/DL (ref 8.5–10.1)
CHLORIDE SERPL-SCNC: 105 MMOL/L (ref 97–108)
CO2 SERPL-SCNC: 32 MMOL/L (ref 21–32)
CREAT SERPL-MCNC: 1.04 MG/DL (ref 0.7–1.3)
DIFFERENTIAL METHOD BLD: NORMAL
EOSINOPHIL # BLD: 0.04 K/UL (ref 0–0.4)
EOSINOPHIL NFR BLD: 0.8 % (ref 0–7)
ERYTHROCYTE [DISTWIDTH] IN BLOOD BY AUTOMATED COUNT: 13 % (ref 11.5–14.5)
GLOBULIN SER CALC-MCNC: 3.9 G/DL (ref 2–4)
GLUCOSE SERPL-MCNC: 133 MG/DL (ref 65–100)
HCT VFR BLD AUTO: 42.6 % (ref 36.6–50.3)
HGB BLD-MCNC: 13.2 G/DL (ref 12.1–17)
IMM GRANULOCYTES # BLD AUTO: 0.01 K/UL (ref 0–0.04)
IMM GRANULOCYTES NFR BLD AUTO: 0.2 % (ref 0–0.5)
LYMPHOCYTES # BLD: 1.76 K/UL (ref 0.8–3.5)
LYMPHOCYTES NFR BLD: 35.3 % (ref 12–49)
MCH RBC QN AUTO: 26.7 PG (ref 26–34)
MCHC RBC AUTO-ENTMCNC: 31 G/DL (ref 30–36.5)
MCV RBC AUTO: 86.2 FL (ref 80–99)
MONOCYTES # BLD: 0.43 K/UL (ref 0–1)
MONOCYTES NFR BLD: 8.6 % (ref 5–13)
NEUTS SEG # BLD: 2.69 K/UL (ref 1.8–8)
NEUTS SEG NFR BLD: 54.1 % (ref 32–75)
NRBC # BLD: 0 K/UL (ref 0–0.01)
NRBC BLD-RTO: 0 PER 100 WBC
PLATELET # BLD AUTO: 195 K/UL (ref 150–400)
PMV BLD AUTO: 12.9 FL (ref 8.9–12.9)
POTASSIUM SERPL-SCNC: 4.1 MMOL/L (ref 3.5–5.1)
PROT SERPL-MCNC: 7.4 G/DL (ref 6.4–8.2)
RBC # BLD AUTO: 4.94 M/UL (ref 4.1–5.7)
SODIUM SERPL-SCNC: 139 MMOL/L (ref 136–145)
TROPONIN I SERPL HS-MCNC: 14 NG/L (ref 0–76)
WBC # BLD AUTO: 5 K/UL (ref 4.1–11.1)

## 2025-03-18 PROCEDURE — 6370000000 HC RX 637 (ALT 250 FOR IP)

## 2025-03-18 PROCEDURE — 99284 EMERGENCY DEPT VISIT MOD MDM: CPT

## 2025-03-18 PROCEDURE — 70450 CT HEAD/BRAIN W/O DYE: CPT

## 2025-03-18 PROCEDURE — 80053 COMPREHEN METABOLIC PANEL: CPT

## 2025-03-18 PROCEDURE — 84484 ASSAY OF TROPONIN QUANT: CPT

## 2025-03-18 PROCEDURE — 36415 COLL VENOUS BLD VENIPUNCTURE: CPT

## 2025-03-18 PROCEDURE — 85025 COMPLETE CBC W/AUTO DIFF WBC: CPT

## 2025-03-18 PROCEDURE — 93005 ELECTROCARDIOGRAM TRACING: CPT | Performed by: EMERGENCY MEDICINE

## 2025-03-18 RX ORDER — LORAZEPAM 0.5 MG/1
0.5 TABLET ORAL DAILY PRN
Qty: 30 TABLET | Refills: 0 | Status: SHIPPED | OUTPATIENT
Start: 2025-03-18 | End: 2025-04-17

## 2025-03-18 RX ORDER — MECLIZINE HYDROCHLORIDE 25 MG/1
25 TABLET ORAL 3 TIMES DAILY PRN
Qty: 30 TABLET | Refills: 0 | Status: SHIPPED | OUTPATIENT
Start: 2025-03-18 | End: 2025-03-28

## 2025-03-18 RX ORDER — ACETAMINOPHEN 500 MG
1000 TABLET ORAL
Status: COMPLETED | OUTPATIENT
Start: 2025-03-18 | End: 2025-03-18

## 2025-03-18 RX ORDER — LORAZEPAM 0.5 MG/1
0.5 TABLET ORAL PRN
COMMUNITY
Start: 2025-03-18 | End: 2025-03-18 | Stop reason: SDUPTHER

## 2025-03-18 RX ORDER — MECLIZINE HCL 12.5 MG 12.5 MG/1
25 TABLET ORAL ONCE
Status: COMPLETED | OUTPATIENT
Start: 2025-03-18 | End: 2025-03-18

## 2025-03-18 RX ADMIN — ACETAMINOPHEN 1000 MG: 500 TABLET ORAL at 11:18

## 2025-03-18 RX ADMIN — MECLIZINE 25 MG: 12.5 TABLET ORAL at 11:18

## 2025-03-18 ASSESSMENT — PAIN SCALES - GENERAL: PAINLEVEL_OUTOF10: 0

## 2025-03-18 NOTE — DISCHARGE INSTRUCTIONS
Summary:  You were seen in the emergency department for dizziness.  We did basic labs which were normal.  We also did a CAT scan of your head, this was normal as well.  We gave you a medication called Antivert/meclizine in the emergency department which improved her symptoms somewhat.  Were not sure whether or not your dizziness is rigid and anxiety versus another cause.    Plan:   -Use Antivert as needed for dizziness  -Follow-up with psychiatry  -Follow-up with neurology    Please make an appointment with your family doctor or the physician you were referred to for follow-up of this visit as instructed on your discharge paperwork. Return to the ER if you are unable to be seen or if you are unable to be seen in a timely manner.    Return Precautions  Should you experience abdominal pain lasting greater than 6 hours, chest pain, difficulty breathing, fever/chills, numbness/tingling, skin changes or other symptoms that concern you, return to the ED sooner. If you feel worse over the next 24 hours, please return to the ED. We are available 24 hours a day. Thank you for trusting us with your care!

## 2025-03-18 NOTE — ED PROVIDER NOTES
162/108 (!) 161/95   Pulse: 98 89   Resp: 20 14   Temp: 98.4 °F (36.9 °C)    SpO2: 100% 98%   Weight: 129.9 kg (286 lb 6 oz)         Patient was given the following medications:  Medications   meclizine (ANTIVERT) tablet 25 mg (25 mg Oral Given 3/18/25 1118)   acetaminophen (TYLENOL) tablet 1,000 mg (1,000 mg Oral Given 3/18/25 1118)       Medical Decision Making   Shimon Min is a 39 y.o. male with PMH of hypertension, NAIN, gout, panic disorder, SEBASTIÁN and social anxiety disorder presenting to the emergency department with dizziness.  No associated chest pain or shortness of breath.  Dizziness has been ongoing for weeks, has had workups at outside ED's without a diagnosis.  Dizziness is intermittent and associated with tingling in the hands and feet.  Of note does have an extensive history of anxiety, perhaps his symptoms are secondary to his anxiety.  Patient reports that he has follow-up with psychiatry next week.  Also has a referral to neurology already.    Pertinent physical exam findings: Well appearing. VSS. A+Ox4.  Neurologically intact, no focal deficits.  Vision intact, has corrective vision lenses.  Pupils equal round reactive.  NIH score 0  EKG NSR, LVH.  Heart score 2.  No active chest pain, will obtain 1 troponin to rule out ACS.    DDX: Anxiety, vertigo, vestibular neuritis, dehydration, electrolyte abnormality, URI    Plan: CBC, BMP, troponin, EKG, CT head, Antivert, Tylenol    Dispo: pending further workup and clinical course.       Amount and/or Complexity of Data Reviewed  Labs: ordered. Decision-making details documented in ED Course.  Radiology: ordered. Decision-making details documented in ED Course.  ECG/medicine tests: ordered. Decision-making details documented in ED Course.    Risk  OTC drugs.        ED Course as of 03/18/25 1510   Tue Mar 18, 2025   8950 ED EKG interpretation:  Rhythm: normal sinus rhythm; and regular . Rate (approx.): 87; Axis: normal; P wave: normal; QRS interval:

## 2025-03-18 NOTE — TELEPHONE ENCOUNTER
Chief Complaint   Patient presents with    Medication Refill       Requested Prescriptions     Pending Prescriptions Disp Refills    LORazepam (ATIVAN) 0.5 MG tablet 30 tablet 0     Sig: Take 1 tablet by mouth daily as needed for Anxiety for up to 30 days. Max Daily Amount: 0.5 mg       Preferred Pharmacy:[facility]/pharmacy #[id number] [place] [id number] [facility] [phone] - F [phone]     [place] :  Written:01/29/20225  Filled:01/29/2025  Drug:Lorazepam 0.5 Mg Tablet   Qty:30.00  Days:30  Provider:Jase Kan    Last visit with clinic:  3/13/2025

## 2025-03-19 LAB
EKG ATRIAL RATE: 87 BPM
EKG DIAGNOSIS: NORMAL
EKG P AXIS: 50 DEGREES
EKG P-R INTERVAL: 162 MS
EKG Q-T INTERVAL: 348 MS
EKG QRS DURATION: 106 MS
EKG QTC CALCULATION (BAZETT): 418 MS
EKG R AXIS: 27 DEGREES
EKG T AXIS: -10 DEGREES
EKG VENTRICULAR RATE: 87 BPM

## 2025-03-19 RX ORDER — ESCITALOPRAM OXALATE 5 MG/1
5 TABLET ORAL DAILY
Qty: 30 TABLET | Refills: 1 | Status: SHIPPED | OUTPATIENT
Start: 2025-03-19

## 2025-03-27 ENCOUNTER — HOSPITAL ENCOUNTER (EMERGENCY)
Facility: HOSPITAL | Age: 40
Discharge: HOME | End: 2025-03-27
Attending: EMERGENCY MEDICINE
Payer: MEDICARE

## 2025-03-27 VITALS
OXYGEN SATURATION: 96 % | WEIGHT: 280 LBS | DIASTOLIC BLOOD PRESSURE: 104 MMHG | HEIGHT: 69 IN | TEMPERATURE: 97.8 F | RESPIRATION RATE: 18 BRPM | HEART RATE: 89 BPM | SYSTOLIC BLOOD PRESSURE: 159 MMHG | BODY MASS INDEX: 41.47 KG/M2

## 2025-03-27 DIAGNOSIS — R10.84 GENERALIZED ABDOMINAL PAIN: ICD-10-CM

## 2025-03-27 DIAGNOSIS — R42 LIGHTHEADEDNESS: Primary | ICD-10-CM

## 2025-03-27 LAB
ALBUMIN SERPL-MCNC: 3.6 G/DL (ref 3.5–5)
ALBUMIN/GLOB SERPL: 0.9 (ref 1.1–2.2)
ALP SERPL-CCNC: 99 U/L (ref 45–117)
ALT SERPL-CCNC: 31 U/L (ref 12–78)
ANION GAP SERPL CALC-SCNC: 5 MMOL/L (ref 2–12)
AST SERPL-CCNC: 16 U/L (ref 15–37)
BASOPHILS # BLD: 0.06 K/UL (ref 0–0.1)
BASOPHILS NFR BLD: 0.9 % (ref 0–1)
BILIRUB SERPL-MCNC: 0.5 MG/DL (ref 0.2–1)
BUN SERPL-MCNC: 11 MG/DL (ref 6–20)
BUN/CREAT SERPL: 11 (ref 12–20)
CALCIUM SERPL-MCNC: 8.7 MG/DL (ref 8.5–10.1)
CHLORIDE SERPL-SCNC: 104 MMOL/L (ref 97–108)
CO2 SERPL-SCNC: 31 MMOL/L (ref 21–32)
CREAT SERPL-MCNC: 1.01 MG/DL (ref 0.7–1.3)
DIFFERENTIAL METHOD BLD: NORMAL
EOSINOPHIL # BLD: 0.12 K/UL (ref 0–0.4)
EOSINOPHIL NFR BLD: 1.9 % (ref 0–7)
ERYTHROCYTE [DISTWIDTH] IN BLOOD BY AUTOMATED COUNT: 12.4 % (ref 11.5–14.5)
GLOBULIN SER CALC-MCNC: 4.2 G/DL (ref 2–4)
GLUCOSE SERPL-MCNC: 97 MG/DL (ref 65–100)
HCT VFR BLD AUTO: 41.9 % (ref 36.6–50.3)
HGB BLD-MCNC: 13.6 G/DL (ref 12.1–17)
IMM GRANULOCYTES # BLD AUTO: 0.01 K/UL (ref 0–0.04)
IMM GRANULOCYTES NFR BLD AUTO: 0.2 % (ref 0–0.5)
LIPASE SERPL-CCNC: 72 U/L (ref 13–75)
LYMPHOCYTES # BLD: 1.9 K/UL (ref 0.8–3.5)
LYMPHOCYTES NFR BLD: 29.6 % (ref 12–49)
MCH RBC QN AUTO: 26.8 PG (ref 26–34)
MCHC RBC AUTO-ENTMCNC: 32.5 G/DL (ref 30–36.5)
MCV RBC AUTO: 82.5 FL (ref 80–99)
MONOCYTES # BLD: 0.6 K/UL (ref 0–1)
MONOCYTES NFR BLD: 9.4 % (ref 5–13)
NEUTS SEG # BLD: 3.72 K/UL (ref 1.8–8)
NEUTS SEG NFR BLD: 58 % (ref 32–75)
NRBC # BLD: 0 K/UL (ref 0–0.01)
NRBC BLD-RTO: 0 PER 100 WBC
PLATELET # BLD AUTO: 205 K/UL (ref 150–400)
PMV BLD AUTO: 12.6 FL (ref 8.9–12.9)
POTASSIUM SERPL-SCNC: 3.9 MMOL/L (ref 3.5–5.1)
PROT SERPL-MCNC: 7.8 G/DL (ref 6.4–8.2)
RBC # BLD AUTO: 5.08 M/UL (ref 4.1–5.7)
SODIUM SERPL-SCNC: 140 MMOL/L (ref 136–145)
WBC # BLD AUTO: 6.4 K/UL (ref 4.1–11.1)

## 2025-03-27 PROCEDURE — 85025 COMPLETE CBC W/AUTO DIFF WBC: CPT

## 2025-03-27 PROCEDURE — 83690 ASSAY OF LIPASE: CPT

## 2025-03-27 PROCEDURE — 36415 COLL VENOUS BLD VENIPUNCTURE: CPT

## 2025-03-27 PROCEDURE — 80053 COMPREHEN METABOLIC PANEL: CPT

## 2025-03-27 PROCEDURE — 99284 EMERGENCY DEPT VISIT MOD MDM: CPT

## 2025-03-27 RX ORDER — 0.9 % SODIUM CHLORIDE 0.9 %
500 INTRAVENOUS SOLUTION INTRAVENOUS ONCE
Status: DISCONTINUED | OUTPATIENT
Start: 2025-03-27 | End: 2025-03-27 | Stop reason: HOSPADM

## 2025-03-27 RX ORDER — LORAZEPAM 1 MG/1
0.5 TABLET ORAL
Status: DISCONTINUED | OUTPATIENT
Start: 2025-03-27 | End: 2025-03-27 | Stop reason: HOSPADM

## 2025-03-27 ASSESSMENT — PAIN SCALES - GENERAL: PAINLEVEL_OUTOF10: 4

## 2025-03-27 ASSESSMENT — PAIN - FUNCTIONAL ASSESSMENT: PAIN_FUNCTIONAL_ASSESSMENT: 0-10

## 2025-03-27 NOTE — ED NOTES
Pt presents ambulatory to ED complaining of abdominal discomfort, increase anxiety affecting his BP and dizziness. He denies vision changes, loss of sensation in his extremities, pt speech is clear and answers questions approprietly. Pt had polyps removed at [facility] x1 day ago, he was educated not to take any sedative medications following procedure. He denies taking his lexapro last night. He denies chest pain or SOB. Pt is alert and oriented x 4, RR even and unlabored, skin is warm and dry. Assessment completed and pt updated on plan of care.        Emergency Department Nursing Plan of Care        The Nursing Plan of Care is developed from the Nursing assessment and Emergency Department Attending provider initial evaluation.  The plan of care may be reviewed in the “ED Provider note”.

## 2025-03-27 NOTE — DISCHARGE INSTRUCTIONS
You are seen in the emergency department for your symptoms.  Please continue to take your medicines as prescribed at home.  Follow-up with your primary care doctor and GI doctor.  Return for new or worsening symptoms anytime.

## 2025-03-27 NOTE — ED TRIAGE NOTES
Pt arrives ambulatory with cc of abdominal pain and dizziness.  Pt has anxiety.  Pt states he had an EGD at [facility] yesterday and they removed 2 polyps.

## 2025-03-27 NOTE — ED PROVIDER NOTES
[facility] EMERGENCY DEPARTMENT  EMERGENCY DEPARTMENT ENCOUNTER       Pt Name: Shimon Min  MRN: [id number]  Birthdate 1985  Date of evaluation: 3/27/2025  Provider: Stew Mendoza MD   PCP: Gladys Hernandez MD  Note Started: 12:24 PM EDT 3/27/25     CHIEF COMPLAINT       Chief Complaint   Patient presents with    Abdominal Pain    Dizziness        HISTORY OF PRESENT ILLNESS: 1 or more elements      History From: patient, History limited by: none     Shimon Min is a 39 y.o. male presents to the emergency department with a chief complaint of abdominal pain and dizziness.    Patient reports he is undergoing preoperative clearance for bariatric surgery at [facility].  Underwent endoscopy yesterday.  Endoscopy report noted in ED course.  Reports yesterday noted feeling somewhat lightheaded as well as bloating with abdominal pain.    Denies chest pain and shortness of breath.  Reports history of anxiety and reports symptoms make this worse.  Patient attributes many of these to a \"panic attack\" and reports symptoms are improving.  Denies any blood in stool or vomit.       Please See MDM for Additional Details of the HPI/PMH  Nursing Notes were all reviewed and agreed with or any disagreements were addressed in the HPI.     REVIEW OF SYSTEMS        Positives and Pertinent negatives as per HPI.    PAST HISTORY     Past Medical History:  Past Medical History:   Diagnosis Date    ADHD (attention deficit hyperactivity disorder)     Angioedema     ace induced asthma     Anxiety     Cold-induced asthma     Depression     Gout     Hernia of abdominal wall     Hypertension     NAIN (obstructive sleep apnea)        Past Surgical History:  Past Surgical History:   Procedure Laterality Date    ADENOIDECTOMY         Family History:  Family History   Problem Relation Age of Onset    Sleep Apnea Mother     Hypertension Father     Breast Cancer Maternal Grandmother     Hypertension Mother        Social History:  Social

## 2025-03-27 NOTE — ED NOTES
Discharge instructions were given to the patient by SONAM PARRISH RN.     The patient left the Emergency Department alert and oriented and in no acute distress with 0 prescription. The patient was encouraged to call or return to the ED for worsening issues or problems and was encouraged to schedule a follow up appointment for continuing care.     Ambulation assessment completed before discharge.  Pt left Emergency Department ambulating at baseline with no ortho devices  Ortho device education: none    The patient verbalized understanding of discharge instructions and prescriptions, all questions were answered. The patient has no further concerns at this time.

## 2025-03-28 ENCOUNTER — APPOINTMENT (OUTPATIENT)
Facility: HOSPITAL | Age: 40
End: 2025-03-28
Payer: MEDICARE

## 2025-03-28 ENCOUNTER — HOSPITAL ENCOUNTER (EMERGENCY)
Facility: HOSPITAL | Age: 40
Discharge: HOME | End: 2025-03-28
Payer: MEDICARE

## 2025-03-28 VITALS
DIASTOLIC BLOOD PRESSURE: 107 MMHG | OXYGEN SATURATION: 99 % | SYSTOLIC BLOOD PRESSURE: 158 MMHG | RESPIRATION RATE: 17 BRPM | BODY MASS INDEX: 41.47 KG/M2 | HEIGHT: 69 IN | TEMPERATURE: 99 F | WEIGHT: 280 LBS | HEART RATE: 78 BPM

## 2025-03-28 DIAGNOSIS — R14.0 ABDOMINAL BLOATING: ICD-10-CM

## 2025-03-28 DIAGNOSIS — R07.9 CHEST PAIN, UNSPECIFIED TYPE: Primary | ICD-10-CM

## 2025-03-28 LAB
ALBUMIN SERPL-MCNC: 3.7 G/DL (ref 3.5–5)
ALBUMIN/GLOB SERPL: 0.9 (ref 1.1–2.2)
ALP SERPL-CCNC: 81 U/L (ref 45–117)
ALT SERPL-CCNC: 32 U/L (ref 12–78)
ANION GAP SERPL CALC-SCNC: 3 MMOL/L (ref 2–12)
AST SERPL-CCNC: 18 U/L (ref 15–37)
BASOPHILS # BLD: 0.05 K/UL (ref 0–0.1)
BASOPHILS NFR BLD: 1 % (ref 0–1)
BILIRUB SERPL-MCNC: 0.8 MG/DL (ref 0.2–1)
BUN SERPL-MCNC: 11 MG/DL (ref 6–20)
BUN/CREAT SERPL: 10 (ref 12–20)
CALCIUM SERPL-MCNC: 9 MG/DL (ref 8.5–10.1)
CHLORIDE SERPL-SCNC: 104 MMOL/L (ref 97–108)
CO2 SERPL-SCNC: 30 MMOL/L (ref 21–32)
COMMENT:: NORMAL
CREAT SERPL-MCNC: 1.13 MG/DL (ref 0.7–1.3)
D DIMER PPP FEU-MCNC: 0.2 MG/L FEU (ref 0–0.65)
DIFFERENTIAL METHOD BLD: NORMAL
EKG ATRIAL RATE: 86 BPM
EKG DIAGNOSIS: NORMAL
EKG P AXIS: 54 DEGREES
EKG P-R INTERVAL: 160 MS
EKG Q-T INTERVAL: 358 MS
EKG QRS DURATION: 94 MS
EKG QTC CALCULATION (BAZETT): 428 MS
EKG R AXIS: 86 DEGREES
EKG T AXIS: -5 DEGREES
EKG VENTRICULAR RATE: 86 BPM
EOSINOPHIL # BLD: 0.04 K/UL (ref 0–0.4)
EOSINOPHIL NFR BLD: 0.8 % (ref 0–7)
ERYTHROCYTE [DISTWIDTH] IN BLOOD BY AUTOMATED COUNT: 12.6 % (ref 11.5–14.5)
FLUAV RNA SPEC QL NAA+PROBE: NOT DETECTED
FLUBV RNA SPEC QL NAA+PROBE: NOT DETECTED
GLOBULIN SER CALC-MCNC: 4.1 G/DL (ref 2–4)
GLUCOSE SERPL-MCNC: 136 MG/DL (ref 65–100)
HCT VFR BLD AUTO: 43.1 % (ref 36.6–50.3)
HGB BLD-MCNC: 13.5 G/DL (ref 12.1–17)
IMM GRANULOCYTES # BLD AUTO: 0.01 K/UL (ref 0–0.04)
IMM GRANULOCYTES NFR BLD AUTO: 0.2 % (ref 0–0.5)
LYMPHOCYTES # BLD: 1.66 K/UL (ref 0.8–3.5)
LYMPHOCYTES NFR BLD: 32.4 % (ref 12–49)
MAGNESIUM SERPL-MCNC: 2.1 MG/DL (ref 1.6–2.4)
MCH RBC QN AUTO: 26.7 PG (ref 26–34)
MCHC RBC AUTO-ENTMCNC: 31.3 G/DL (ref 30–36.5)
MCV RBC AUTO: 85.3 FL (ref 80–99)
MONOCYTES # BLD: 0.45 K/UL (ref 0–1)
MONOCYTES NFR BLD: 8.8 % (ref 5–13)
NEUTS SEG # BLD: 2.91 K/UL (ref 1.8–8)
NEUTS SEG NFR BLD: 56.8 % (ref 32–75)
NRBC # BLD: 0 K/UL (ref 0–0.01)
NRBC BLD-RTO: 0 PER 100 WBC
PLATELET # BLD AUTO: 181 K/UL (ref 150–400)
PMV BLD AUTO: 12.6 FL (ref 8.9–12.9)
POTASSIUM SERPL-SCNC: 3.6 MMOL/L (ref 3.5–5.1)
PROT SERPL-MCNC: 7.8 G/DL (ref 6.4–8.2)
RBC # BLD AUTO: 5.05 M/UL (ref 4.1–5.7)
SARS-COV-2 RNA RESP QL NAA+PROBE: NOT DETECTED
SODIUM SERPL-SCNC: 137 MMOL/L (ref 136–145)
SOURCE: NORMAL
SPECIMEN HOLD: NORMAL
TROPONIN I SERPL HS-MCNC: 12 NG/L (ref 0–76)
WBC # BLD AUTO: 5.1 K/UL (ref 4.1–11.1)

## 2025-03-28 PROCEDURE — 85025 COMPLETE CBC W/AUTO DIFF WBC: CPT

## 2025-03-28 PROCEDURE — 80053 COMPREHEN METABOLIC PANEL: CPT

## 2025-03-28 PROCEDURE — 93005 ELECTROCARDIOGRAM TRACING: CPT | Performed by: STUDENT IN AN ORGANIZED HEALTH CARE EDUCATION/TRAINING PROGRAM

## 2025-03-28 PROCEDURE — 36415 COLL VENOUS BLD VENIPUNCTURE: CPT

## 2025-03-28 PROCEDURE — 85379 FIBRIN DEGRADATION QUANT: CPT

## 2025-03-28 PROCEDURE — 84484 ASSAY OF TROPONIN QUANT: CPT

## 2025-03-28 PROCEDURE — 83735 ASSAY OF MAGNESIUM: CPT

## 2025-03-28 PROCEDURE — 99285 EMERGENCY DEPT VISIT HI MDM: CPT

## 2025-03-28 PROCEDURE — 87636 SARSCOV2 & INF A&B AMP PRB: CPT

## 2025-03-28 PROCEDURE — 6360000004 HC RX CONTRAST MEDICATION: Performed by: PHYSICIAN ASSISTANT

## 2025-03-28 PROCEDURE — 71046 X-RAY EXAM CHEST 2 VIEWS: CPT

## 2025-03-28 PROCEDURE — 6370000000 HC RX 637 (ALT 250 FOR IP): Performed by: PHYSICIAN ASSISTANT

## 2025-03-28 PROCEDURE — 74177 CT ABD & PELVIS W/CONTRAST: CPT

## 2025-03-28 RX ORDER — IOPAMIDOL 755 MG/ML
100 INJECTION, SOLUTION INTRAVASCULAR
Status: COMPLETED | OUTPATIENT
Start: 2025-03-28 | End: 2025-03-28

## 2025-03-28 RX ADMIN — IOPAMIDOL 100 ML: 755 INJECTION, SOLUTION INTRAVENOUS at 11:41

## 2025-03-28 RX ADMIN — LIDOCAINE HYDROCHLORIDE 40 ML: 20 SOLUTION ORAL at 12:23

## 2025-03-28 ASSESSMENT — PAIN DESCRIPTION - PAIN TYPE: TYPE: ACUTE PAIN

## 2025-03-28 ASSESSMENT — PAIN DESCRIPTION - LOCATION: LOCATION: CHEST

## 2025-03-28 ASSESSMENT — PAIN DESCRIPTION - DESCRIPTORS: DESCRIPTORS: BURNING

## 2025-03-28 ASSESSMENT — PAIN DESCRIPTION - ONSET: ONSET: ON-GOING

## 2025-03-28 ASSESSMENT — PAIN DESCRIPTION - FREQUENCY: FREQUENCY: INTERMITTENT

## 2025-03-28 ASSESSMENT — PAIN - FUNCTIONAL ASSESSMENT
PAIN_FUNCTIONAL_ASSESSMENT: 0-10
PAIN_FUNCTIONAL_ASSESSMENT: PREVENTS OR INTERFERES SOME ACTIVE ACTIVITIES AND ADLS

## 2025-03-28 ASSESSMENT — PAIN DESCRIPTION - ORIENTATION: ORIENTATION: RIGHT

## 2025-03-28 ASSESSMENT — PAIN SCALES - GENERAL: PAINLEVEL_OUTOF10: 5

## 2025-03-28 NOTE — ED PROVIDER NOTES
[facility] EMERGENCY DEPARTMENT  EMERGENCY DEPARTMENT ENCOUNTER       Pt Name: Shimon Min  MRN: [id number]  Birthdate 1985  Date of Evaluation: 3/28/2025  Provider: Yulisa Girard PA-C   PCP: Gladys Hernandez MD  Note Started: 12:41 PM 3/28/25     CHIEF COMPLAINT       Chief Complaint   Patient presents with    Shortness of Breath     Pt states that he just had an endoscopy. Pt states his LBM was 2 days ago. Pt also c/o now of CP and SOB x yesterday. Pt states he feels burning on R side. Pt going through bariatric tx for possible sleeve or gastric bypass surgery. Pt took 0.5 ativan PTA, and missed his HTN med this AM. Pt appears anxious         HISTORY OF PRESENT ILLNESS: 1 or more elements      History From: Patient  None     Shimon Min is a 39 y.o. male who presents to the ED today concerns of shortness of breath and a burning sensation of his chest.  Recently had an endoscopy.  Recently seen here.  States no imaging was performed.  States he was concerned he has not had a bowel movement.  He feels bloated and distended.  Presents back here for further evaluation     Nursing Notes were all reviewed and agreed with or any disagreements were addressed in the HPI.     REVIEW OF SYSTEMS      Review of Systems     Positives and Pertinent negatives as per HPI.    PAST HISTORY     Past Medical History:  Past Medical History:   Diagnosis Date    ADHD (attention deficit hyperactivity disorder)     Angioedema     ace induced asthma     Anxiety     Cold-induced asthma     Depression     Gout     Hernia of abdominal wall     Hypertension     NAIN (obstructive sleep apnea)        Past Surgical History:  Past Surgical History:   Procedure Laterality Date    ADENOIDECTOMY         Family History:  Family History   Problem Relation Age of Onset    Sleep Apnea Mother     Hypertension Father     Breast Cancer Maternal Grandmother     Hypertension Mother        Social History:  Social History

## 2025-03-29 ENCOUNTER — HOSPITAL ENCOUNTER (EMERGENCY)
Facility: HOSPITAL | Age: 40
Discharge: HOME | End: 2025-03-29
Attending: EMERGENCY MEDICINE
Payer: MEDICARE

## 2025-03-29 VITALS
SYSTOLIC BLOOD PRESSURE: 148 MMHG | TEMPERATURE: 99 F | RESPIRATION RATE: 18 BRPM | WEIGHT: 280 LBS | HEIGHT: 69 IN | HEART RATE: 97 BPM | OXYGEN SATURATION: 97 % | BODY MASS INDEX: 41.47 KG/M2 | DIASTOLIC BLOOD PRESSURE: 102 MMHG

## 2025-03-29 DIAGNOSIS — I10 ACCELERATED HYPERTENSION: Primary | ICD-10-CM

## 2025-03-29 DIAGNOSIS — R35.0 URINARY FREQUENCY: ICD-10-CM

## 2025-03-29 DIAGNOSIS — F41.1 ANXIETY STATE: ICD-10-CM

## 2025-03-29 LAB
APPEARANCE UR: CLEAR
BACTERIA URNS QL MICRO: NEGATIVE /HPF
BILIRUB UR QL: NEGATIVE
COLOR UR: NORMAL
EPITH CASTS URNS QL MICRO: NORMAL /LPF
GLUCOSE BLD STRIP.AUTO-MCNC: 90 MG/DL (ref 65–117)
GLUCOSE UR STRIP.AUTO-MCNC: NEGATIVE MG/DL
HGB UR QL STRIP: NEGATIVE
KETONES UR QL STRIP.AUTO: NEGATIVE MG/DL
LEUKOCYTE ESTERASE UR QL STRIP.AUTO: NEGATIVE
NITRITE UR QL STRIP.AUTO: NEGATIVE
PH UR STRIP: 7.5 (ref 5–8)
PROT UR STRIP-MCNC: NEGATIVE MG/DL
RBC #/AREA URNS HPF: NORMAL /HPF (ref 0–5)
SERVICE CMNT-IMP: NORMAL
SP GR UR REFRACTOMETRY: <1.005
URINE CULTURE IF INDICATED: NORMAL
UROBILINOGEN UR QL STRIP.AUTO: 0.2 EU/DL (ref 0.2–1)
WBC URNS QL MICRO: NORMAL /HPF (ref 0–4)

## 2025-03-29 PROCEDURE — 81001 URINALYSIS AUTO W/SCOPE: CPT

## 2025-03-29 PROCEDURE — 82962 GLUCOSE BLOOD TEST: CPT

## 2025-03-29 PROCEDURE — 6370000000 HC RX 637 (ALT 250 FOR IP): Performed by: EMERGENCY MEDICINE

## 2025-03-29 PROCEDURE — 99283 EMERGENCY DEPT VISIT LOW MDM: CPT

## 2025-03-29 RX ORDER — ACETAMINOPHEN 500 MG
1000 TABLET ORAL
Status: COMPLETED | OUTPATIENT
Start: 2025-03-29 | End: 2025-03-29

## 2025-03-29 RX ADMIN — ACETAMINOPHEN 1000 MG: 500 TABLET ORAL at 12:03

## 2025-03-29 ASSESSMENT — PAIN DESCRIPTION - DESCRIPTORS: DESCRIPTORS: ACHING

## 2025-03-29 ASSESSMENT — PAIN SCALES - GENERAL: PAINLEVEL_OUTOF10: 4

## 2025-03-29 ASSESSMENT — PAIN DESCRIPTION - LOCATION: LOCATION: HEAD

## 2025-03-29 ASSESSMENT — PAIN - FUNCTIONAL ASSESSMENT: PAIN_FUNCTIONAL_ASSESSMENT: NONE - DENIES PAIN

## 2025-03-29 NOTE — ED NOTES
Pt presents to ED complaining of urinary frequency started this morning. Pt stated he's been peeing every 30 minutes since this morning.  Pt reports he took his blood sugar earlier and it was 159 mg/dl at 8 am after eating oatmeal. Pt denies pain upon urination, abdominal pain, nausea and vomiting.    Pt reports history of Anxiety and Hypertension.   Pt is alert and oriented x 4, RR even and unlabored, skin is warm and dry. Assesment completed and pt updated on plan of care.       Emergency Department Nursing Plan of Care       The Nursing Plan of Care is developed from the Nursing assessment and Emergency Department Attending provider initial evaluation.  The plan of care may be reviewed in the “ED Provider note”.    The Plan of Care was developed with the following considerations:   Presenting ambulatory assessment: Ambulating at baseline  Patient / Family readiness to learn indicated by: verbalized understanding  Persons(s) to be included in education: patient   Barriers to Learning/Limitations: None    Signed     SONAM PARRISH RN    3/29/2025   11:16 AM

## 2025-03-29 NOTE — ED PROVIDER NOTES
[facility] EMERGENCY DEPARTMENT  EMERGENCY DEPARTMENT ENCOUNTER       Pt Name: Shimon Min  MRN: [id number]  Birthdate 1985  Date of evaluation: 3/29/2025  Provider: Chang Yin MD   PCP: Gladys Hernandez MD  Note Started: 12:43 PM 3/29/25     CHIEF COMPLAINT       Chief Complaint   Patient presents with    Urinary Frequency        HISTORY OF PRESENT ILLNESS: 1 or more elements      History From: Patient, History limited by: No limitations     Shimon Min is a 39 y.o. male with history of hypertension, anxiety who presents with chief complaint of concern about blood pressure, urinary frequency, concern about blood glucose level.  Patient ate breakfast and immediately checked his blood glucose level, it was elevated 150 and he became concerned about this.  States that he has had increased anxiety recently and notices that every time he is anxious his blood pressure is elevated however it is typically normal around 140/90 when he is not anxious.  Denies any chest pain, shortness of breath, change in vision.  Additionally endorses urinary frequency today.     Nursing Notes were all reviewed and agreed with or any disagreements were addressed in the HPI.     REVIEW OF SYSTEMS        Positives and Pertinent negatives as per HPI.    PAST HISTORY     Past Medical History:  Past Medical History:   Diagnosis Date    ADHD (attention deficit hyperactivity disorder)     Angioedema     ace induced asthma     Anxiety     Cold-induced asthma     Depression     Gout     Hernia of abdominal wall     Hypertension     NAIN (obstructive sleep apnea)        Past Surgical History:  Past Surgical History:   Procedure Laterality Date    ADENOIDECTOMY         Family History:  Family History   Problem Relation Age of Onset    Sleep Apnea Mother     Hypertension Father     Breast Cancer Maternal Grandmother     Hypertension Mother        Social History:  Social History     Tobacco Use    Smoking status: Never

## 2025-03-29 NOTE — DISCHARGE INSTRUCTIONS
You were evaluated in the emergency department for urinary frequency and concern about your blood pressure.  Your examination was reassuring as was your work-up including urinalysis and glucose check.  It will be important for you to follow-up with your primary care physician in 3-5 days.  If you develop worsening symptoms such as fevers, chills, chest pain, shortness of breath, nausea or vomiting, or abdominal pain, please return to the emergency department immediately.

## 2025-04-09 LAB
EKG ATRIAL RATE: 94 BPM
EKG DIAGNOSIS: NORMAL
EKG P AXIS: 43 DEGREES
EKG P-R INTERVAL: 146 MS
EKG Q-T INTERVAL: 344 MS
EKG QRS DURATION: 98 MS
EKG QTC CALCULATION (BAZETT): 430 MS
EKG R AXIS: 2 DEGREES
EKG T AXIS: 4 DEGREES
EKG VENTRICULAR RATE: 94 BPM

## 2025-04-14 ENCOUNTER — OFFICE VISIT (OUTPATIENT)
Age: 40
End: 2025-04-14

## 2025-04-14 VITALS
DIASTOLIC BLOOD PRESSURE: 80 MMHG | BODY MASS INDEX: 41.5 KG/M2 | SYSTOLIC BLOOD PRESSURE: 140 MMHG | HEART RATE: 83 BPM | OXYGEN SATURATION: 96 % | WEIGHT: 281 LBS | TEMPERATURE: 98.3 F

## 2025-04-14 DIAGNOSIS — R42 DIZZINESS: Primary | ICD-10-CM

## 2025-04-14 NOTE — PROGRESS NOTES
2025   Shimon Min (: 1985) is a 39 y.o. male, New patient, here for evaluation of the following chief complaint(s):  Other (DIZZINESS SINCE THIS MORNING STARTED NEW MEDICATION/)     ASSESSMENT/PLAN:  Below is the assessment and plan developed based on review of pertinent history, physical exam, labs, studies, and medications.  Assessment & Plan  Dizziness          Exam and history consistent with Dizziness.  -Recommend continuing Lexapro as prescribed and contacting psychiatrist about side effect concerns  -Please drink at least 64 ounces of fluid a day, or more when doing intensive exercise  -Continue hydrochlorothiazide as prescribed   -Please follow up with your PCP in the next 2-4 weeks     If symptoms persist or worsen, please contact your PCP and/or return to Urgent Care.         Handout given with care instructions  2. OTC for symptom management. Increase fluid intake, ensure adequate nutritional intake.  3. Follow up with PCP as needed.  4. Go to ED with development of any acute symptoms.     Follow up:  No follow-ups on file.  Follow up immediately for any new, worsening or changes or if symptoms are not improving over the next 5-7 days.     SUBJECTIVE/OBJECTIVE:  HPI   Mr Min presents with concern for intermittent bouts of dizziness since starting is new medication Lexapro for panic attacks. He states he does overall feel much better since starting the medication and is not having as many issues with panic attacks but he was just concerned about the intermittent symptoms. He states he has gotten dehydrated at times recently and that he is also on a water pill for his blood pressure. Otherwise, he denies any headache, fever, chills, shortness of breath, chest pain, or other systemic complaints or concerns at this time.     Other (DIZZINESS SINCE THIS MORNING STARTED NEW MEDICATION/)      No results found for any visits on 25.    No results found for this visit on 25.    Review

## 2025-04-14 NOTE — PATIENT INSTRUCTIONS
Exam and history consistent with Dizziness.  -Recommend continuing Lexapro as prescribed and contacting psychiatrist about side effect concerns  -Please drink at least 64 ounces of fluid a day, or more when doing intensive exercise  -Continue hydrochlorothiazide as prescribed   -Please follow up with your PCP in the next 2-4 weeks     If symptoms persist or worsen, please contact your PCP and/or return to Urgent Care.

## 2025-04-15 ENCOUNTER — TELEMEDICINE (OUTPATIENT)
Age: 40
End: 2025-04-15

## 2025-04-15 DIAGNOSIS — F41.1 GENERALIZED ANXIETY DISORDER: Primary | ICD-10-CM

## 2025-04-15 DIAGNOSIS — F32.5 DEPRESSION, MAJOR, IN REMISSION: ICD-10-CM

## 2025-04-15 NOTE — PROGRESS NOTES
Shimon Min, was evaluated through a synchronous (real-time) audio-video encounter. The patient (or guardian if applicable) is aware that this is a billable service, which includes applicable co-pays. This Virtual Visit was conducted with patient's (and/or legal guardian's) consent. Patient identification was verified, and a caregiver was present when appropriate.     The patient was located at Home: [address] [place] [address]  Provider was located at Facility (Appt Dept): [facility] [address], [address]  [place] [address]  Confirm you are appropriately licensed, registered, or certified to deliver care in the state where the patient is located as indicated above. If you are not or unsure, please re-schedule the visit: Yes, I confirm.     CHIEF COMPLAINT:  Shimon Min is a 39 y.o. male and was seen today for follow-up of psychiatric condition and psychotropic medication management.     HPI:    Shimon reports the following psychiatric symptoms by hx: anxiety, inattentiveness. Overall symptoms have been present for years. Currently symptoms are of low severity. The symptoms occur intermittently. Patient reports stress with weight and his health. He is currently being seen by [facility] where he states he has been educated on the the risk and benefits of bariatric surgery and is receiving dietary counseling. Patient states that he has tried other approaches to losing weight and feel confident that this procedure will be helpful in not only reducing his weight but his risk for diseases like diabetes and cardiac conditions. Patient reports that he has some anxiety but feels that the Lexapro has been helpful. In lessening this. He is on week 3 of use and he and his wife has notice that her has not been as restless. Patient's wife states that he would usually wake up more panicky and jittery but has not  done so since taking the medication consistently. Patient Appetite:decreased 
1/3/2025     8:03 AM 10/22/2024     7:57 AM   SEBASTIÁN-7 SCREENING   Feeling nervous, anxious, or on edge Several days More than half the days Several days   Not being able to stop or control worrying Several days Several days Several days   Worrying too much about different things Several days Several days More than half the days   Trouble relaxing Several days Nearly every day Several days   Being so restless that it is hard to sit still Not at all More than half the days Not at all   Becoming easily annoyed or irritable More than half the days Nearly every day Several days   Feeling afraid as if something awful might happen Several days Several days More than half the days   SEBASTIÁN-7 Total Score 7  13  8    How difficult have these problems made it for you to do your work, take care of things at home, or get along with other people? Somewhat difficult Extremely difficult Very difficult       Patient-reported        \"Have you been to the ER, urgent care clinic since your last visit?  Hospitalized since your last visit?\"    NO    “Have you seen or consulted any other health care providers outside of [facility] since your last visit?”    NO      Two patient identifiers verified.  [place].   I have reviewed and completed all areas required for vv rooming process.

## 2025-04-21 DIAGNOSIS — F41.1 GENERALIZED ANXIETY DISORDER: Primary | ICD-10-CM

## 2025-04-21 RX ORDER — LORAZEPAM 0.5 MG/1
0.5 TABLET ORAL PRN
COMMUNITY
End: 2025-04-21 | Stop reason: SDUPTHER

## 2025-04-21 NOTE — TELEPHONE ENCOUNTER
Chief Complaint   Patient presents with    Medication Refill       Requested Prescriptions     Pending Prescriptions Disp Refills    LORazepam (ATIVAN) 0.5 MG tablet 30 tablet 0     Sig: Take 1 tablet by mouth as needed for Anxiety for up to 30 days. Max Daily Amount: 0.5 mg       Preferred Pharmacy:[facility]/pharmacy #[id number] - [place], [place] - [address] - P [phone] - F [phone]     [place] :  Written:03/18/2025  Filled:03/18/2025  Drug:Lorazepam 0.5 Mg Tablet   Qty:30.00  Days:30  Provider:Jase Kan    Last visit with clinic:  3/13/2025

## 2025-04-22 RX ORDER — LORAZEPAM 0.5 MG/1
0.5 TABLET ORAL PRN
Qty: 30 TABLET | Refills: 0 | Status: SHIPPED | OUTPATIENT
Start: 2025-04-22 | End: 2025-05-22

## 2025-04-23 ENCOUNTER — TELEMEDICINE (OUTPATIENT)
Age: 40
End: 2025-04-23

## 2025-04-23 DIAGNOSIS — G47.33 OSA (OBSTRUCTIVE SLEEP APNEA): Primary | ICD-10-CM

## 2025-04-24 ENCOUNTER — HOSPITAL ENCOUNTER (EMERGENCY)
Facility: HOSPITAL | Age: 40
Discharge: HOME | End: 2025-04-24
Attending: EMERGENCY MEDICINE
Payer: MEDICARE

## 2025-04-24 VITALS
SYSTOLIC BLOOD PRESSURE: 154 MMHG | HEIGHT: 69 IN | OXYGEN SATURATION: 98 % | TEMPERATURE: 99.1 F | DIASTOLIC BLOOD PRESSURE: 95 MMHG | WEIGHT: 283.5 LBS | BODY MASS INDEX: 41.99 KG/M2 | RESPIRATION RATE: 20 BRPM | HEART RATE: 96 BPM

## 2025-04-24 DIAGNOSIS — R10.13 ABDOMINAL PAIN, EPIGASTRIC: Primary | ICD-10-CM

## 2025-04-24 LAB
ALBUMIN SERPL-MCNC: 3.8 G/DL (ref 3.5–5)
ALBUMIN/GLOB SERPL: 0.9 (ref 1.1–2.2)
ALP SERPL-CCNC: 95 U/L (ref 45–117)
ALT SERPL-CCNC: 33 U/L (ref 12–78)
ANION GAP SERPL CALC-SCNC: 6 MMOL/L (ref 2–12)
AST SERPL-CCNC: 24 U/L (ref 15–37)
BASOPHILS # BLD: 0.07 K/UL (ref 0–0.1)
BASOPHILS NFR BLD: 1 % (ref 0–1)
BILIRUB SERPL-MCNC: 0.6 MG/DL (ref 0.2–1)
BUN SERPL-MCNC: 18 MG/DL (ref 6–20)
BUN/CREAT SERPL: 17 (ref 12–20)
CALCIUM SERPL-MCNC: 9.4 MG/DL (ref 8.5–10.1)
CHLORIDE SERPL-SCNC: 103 MMOL/L (ref 97–108)
CO2 SERPL-SCNC: 30 MMOL/L (ref 21–32)
CREAT SERPL-MCNC: 1.05 MG/DL (ref 0.7–1.3)
DIFFERENTIAL METHOD BLD: NORMAL
EKG ATRIAL RATE: 93 BPM
EKG DIAGNOSIS: NORMAL
EKG P-R INTERVAL: 148 MS
EKG Q-T INTERVAL: 344 MS
EKG QRS DURATION: 96 MS
EKG QTC CALCULATION (BAZETT): 427 MS
EKG R AXIS: 175 DEGREES
EKG T AXIS: 201 DEGREES
EKG VENTRICULAR RATE: 93 BPM
EOSINOPHIL # BLD: 0.13 K/UL (ref 0–0.4)
EOSINOPHIL NFR BLD: 2 % (ref 0–7)
ERYTHROCYTE [DISTWIDTH] IN BLOOD BY AUTOMATED COUNT: 12.9 % (ref 11.5–14.5)
GLOBULIN SER CALC-MCNC: 4.2 G/DL (ref 2–4)
GLUCOSE SERPL-MCNC: 108 MG/DL (ref 65–100)
HCT VFR BLD AUTO: 40.5 % (ref 36.6–50.3)
HGB BLD-MCNC: 13.2 G/DL (ref 12.1–17)
IMM GRANULOCYTES # BLD AUTO: 0 K/UL
IMM GRANULOCYTES NFR BLD AUTO: 0 %
LIPASE SERPL-CCNC: 38 U/L (ref 13–75)
LYMPHOCYTES # BLD: 1.68 K/UL (ref 0.8–3.5)
LYMPHOCYTES NFR BLD: 25 % (ref 12–49)
MCH RBC QN AUTO: 26.9 PG (ref 26–34)
MCHC RBC AUTO-ENTMCNC: 32.6 G/DL (ref 30–36.5)
MCV RBC AUTO: 82.7 FL (ref 80–99)
MONOCYTES # BLD: 0.67 K/UL (ref 0–1)
MONOCYTES NFR BLD: 10 % (ref 5–13)
NEUTS SEG # BLD: 4.15 K/UL (ref 1.8–8)
NEUTS SEG NFR BLD: 62 % (ref 32–75)
NRBC # BLD: 0 K/UL (ref 0–0.01)
NRBC BLD-RTO: 0 PER 100 WBC
PLATELET # BLD AUTO: 188 K/UL (ref 150–400)
PMV BLD AUTO: 12.4 FL (ref 8.9–12.9)
POTASSIUM SERPL-SCNC: 3.6 MMOL/L (ref 3.5–5.1)
PROT SERPL-MCNC: 8 G/DL (ref 6.4–8.2)
RBC # BLD AUTO: 4.9 M/UL (ref 4.1–5.7)
RBC MORPH BLD: NORMAL
SODIUM SERPL-SCNC: 139 MMOL/L (ref 136–145)
TROPONIN I SERPL HS-MCNC: 9 NG/L (ref 0–76)
WBC # BLD AUTO: 6.7 K/UL (ref 4.1–11.1)

## 2025-04-24 PROCEDURE — 83690 ASSAY OF LIPASE: CPT

## 2025-04-24 PROCEDURE — 80053 COMPREHEN METABOLIC PANEL: CPT

## 2025-04-24 PROCEDURE — 36415 COLL VENOUS BLD VENIPUNCTURE: CPT

## 2025-04-24 PROCEDURE — 99284 EMERGENCY DEPT VISIT MOD MDM: CPT

## 2025-04-24 PROCEDURE — 85025 COMPLETE CBC W/AUTO DIFF WBC: CPT

## 2025-04-24 PROCEDURE — 84484 ASSAY OF TROPONIN QUANT: CPT

## 2025-04-24 ASSESSMENT — PAIN - FUNCTIONAL ASSESSMENT: PAIN_FUNCTIONAL_ASSESSMENT: 0-10

## 2025-04-24 ASSESSMENT — PAIN DESCRIPTION - LOCATION: LOCATION: CHEST;ABDOMEN

## 2025-04-24 ASSESSMENT — PAIN SCALES - GENERAL: PAINLEVEL_OUTOF10: 5

## 2025-04-24 ASSESSMENT — PAIN DESCRIPTION - ORIENTATION: ORIENTATION: LEFT

## 2025-04-24 ASSESSMENT — PAIN DESCRIPTION - DESCRIPTORS: DESCRIPTORS: TIGHTNESS;PRESSURE

## 2025-04-24 NOTE — ED NOTES
Pt presents to ED complaining of upper abdominal pain and some chest discomfort on the left side of his chest x a few days. Pt reports the sx worsen after the pt eats. Pt also endorses hx of anxiety and states that when his anxiety flares up this sometimes happens. Pt is alert and oriented x 4, RR even and unlabored, skin is warm and dry. Pt appears in NAD at this time. Assessment completed and pt updated on plan of care.  Call bell in reach.    Emergency Department Nursing Plan of Care  The Nursing Plan of Care is developed from the Nursing assessment and Emergency Department Attending provider initial evaluation.  The plan of care may be reviewed in the “ED Provider note”.      The Plan of Care was developed with the following considerations:  Patient / Family readiness to learn indicated by:Refer to Medical chart in [facility]  Persons(s) to be included in education: Refer to Medical chart in [facility]  Barriers to Learning/Limitations:Normal     Signed    Rosie Alex RN  4/24/2025 12:03 PM

## 2025-04-24 NOTE — ED NOTES
Discharge instructions were given to the patient by SHREYAS Velez.     The patient left the Emergency Department alert and oriented and in no acute distress with 0 prescriptions. The patient was encouraged to call or return to the ED for worsening issues or problems and was encouraged to schedule a follow up appointment for continuing care.     Ambulation assessment completed before discharge.  Pt left Emergency Department ambulating at baseline with no ortho devices  Ortho device education: none    The patient verbalized understanding of discharge instructions and prescriptions, all questions were answered. The patient has no further concerns at this time.

## 2025-04-24 NOTE — ED PROVIDER NOTES
[facility] EMERGENCY DEPARTMENT  EMERGENCY DEPARTMENT ENCOUNTER       Pt Name: Shimon Min  MRN: [id number]  Birthdate 1985  Date of evaluation: 4/24/2025  Provider: Kristy Orellana MD   PCP: Gladys Hernandez MD  Note Started: 11:59 AM EDT 4/24/25     CHIEF COMPLAINT       Chief Complaint   Patient presents with    Abdominal Pain    Chest Pain        HISTORY OF PRESENT ILLNESS: 1 or more elements      History From: Patient, History limited by: none     Shimon Min is a 39 y.o. male who presents with a chief complaint of left upper quadrant abdominal pain.       Please See MDM for Additional Details of the HPI/PMH  Nursing Notes were all reviewed and agreed with or any disagreements were addressed in the HPI.     REVIEW OF SYSTEMS        Positives and Pertinent negatives as per HPI.    PAST HISTORY     Past Medical History:  Past Medical History:   Diagnosis Date    ADHD (attention deficit hyperactivity disorder)     Angioedema     ace induced asthma     Anxiety     Cold-induced asthma     Depression     Gout     Hernia of abdominal wall     Hypertension     NAIN (obstructive sleep apnea)        Past Surgical History:  Past Surgical History:   Procedure Laterality Date    ADENOIDECTOMY         Family History:  Family History   Problem Relation Age of Onset    Sleep Apnea Mother     Hypertension Father     Breast Cancer Maternal Grandmother     Hypertension Mother        Social History:  Social History     Tobacco Use    Smoking status: Never    Smokeless tobacco: Never   Vaping Use    Vaping status: Never Used   Substance Use Topics    Alcohol use: No    Drug use: Never       Allergies:  Allergies   Allergen Reactions    Lisinopril Swelling     Pt has sever swelling of the throat and tongue.    Penicillins Other (See Comments)     Allergic as a child    Venlafaxine Palpitations       CURRENT MEDICATIONS      Previous Medications    AMLODIPINE (NORVASC) 10 MG TABLET    Take 1 tablet by mouth  signs, symptoms, diagnosis, treatment and prognosis and additionally agrees to follow up as discussed.  He also agrees with the care-plan and conveys that all of his questions have been answered.  I have also provided discharge instructions for him that include: educational information regarding their diagnosis and treatment, and list of reasons why they would want to return to the ED prior to their follow-up appointment, should his condition change.     CLINICAL IMPRESSION    Discharge Note: The patient is stable for discharge home. The signs, symptoms, diagnosis, and discharge instructions have been discussed, understanding conveyed, and agreed upon. The patient is to follow up as recommended or return to ER should their symptoms worsen.      PATIENT REFERRED TO:  Gladys Hernandez MD  [address]  [place] [id number] [phone]    Schedule an appointment as soon as possible for a visit          DISCHARGE MEDICATIONS:     Medication List        ASK your doctor about these medications      amLODIPine 10 MG tablet  Commonly known as: NORVASC     escitalopram 5 MG tablet  Commonly known as: LEXAPRO  TAKE 1 TABLET BY MOUTH EVERY DAY     LORazepam 0.5 MG tablet  Commonly known as: ATIVAN  Take 1 tablet by mouth as needed for Anxiety for up to 30 days. Max Daily Amount: 0.5 mg                DISCONTINUED MEDICATIONS:  Current Discharge Medication List          I am the Primary Clinician of Record.   Kristy Orellana MD (electronically signed)    (Please note that parts of this dictation were completed with voice recognition software. Quite often unanticipated grammatical, syntax, homophones, and other interpretive errors are inadvertently transcribed by the computer software. Please disregards these errors. Please excuse any errors that have escaped final proofreading.)         Kristy Orellana MD  04/25/25 [id number]

## 2025-04-29 LAB
EKG ATRIAL RATE: 93 BPM
EKG DIAGNOSIS: NORMAL
EKG P-R INTERVAL: 148 MS
EKG Q-T INTERVAL: 344 MS
EKG QRS DURATION: 96 MS
EKG QTC CALCULATION (BAZETT): 427 MS
EKG R AXIS: 175 DEGREES
EKG T AXIS: 201 DEGREES
EKG VENTRICULAR RATE: 93 BPM

## 2025-05-22 ENCOUNTER — TELEMEDICINE (OUTPATIENT)
Age: 40
End: 2025-05-22

## 2025-05-22 ENCOUNTER — CLINICAL DOCUMENTATION (OUTPATIENT)
Age: 40
End: 2025-05-22

## 2025-05-22 DIAGNOSIS — G47.33 OSA (OBSTRUCTIVE SLEEP APNEA): Primary | ICD-10-CM

## 2025-05-22 DIAGNOSIS — I10 PRIMARY HYPERTENSION: ICD-10-CM

## 2025-05-22 RX ORDER — HYDROCHLOROTHIAZIDE 12.5 MG/1
12.5 TABLET ORAL DAILY
COMMUNITY

## 2025-05-22 ASSESSMENT — SLEEP AND FATIGUE QUESTIONNAIRES
DO YOU HAVE DIFFICULTY BEING AS ACTIVE AS YOU WANT TO BE IN THE EVENING BECAUSE YOU ARE SLEEPY OR TIRED: NO
HOW LIKELY ARE YOU TO NOD OFF OR FALL ASLEEP WHILE LYING DOWN TO REST IN THE AFTERNOON WHEN CIRCUMSTANCES PERMIT: WOULD NEVER DOZE
HOW LIKELY ARE YOU TO NOD OFF OR FALL ASLEEP WHILE SITTING AND TALKING TO SOMEONE: WOULD NEVER DOZE
HOW LIKELY ARE YOU TO NOD OFF OR FALL ASLEEP WHILE WATCHING TV: WOULD NEVER DOZE
DO YOU HAVE DIFFICULTY WATCHING A MOVIE OR VIDEO BECAUSE YOU BECOME SLEEPY OR TIRED: NO
DO YOU HAVE DIFFICULTY VISITING YOUR FAMILY OR FRIENDS IN THEIR HOME BECAUSE YOU BECOME SLEEPY OR TIRED: NO
DO YOU HAVE DIFFICULTY OPERATING A MOTOR VEHICLE FOR SHORT DISTANCES (LESS THAN 100 MILES) BECAUSE YOU BECOME SLEEPY: NO
HOW LIKELY ARE YOU TO NOD OFF OR FALL ASLEEP WHEN YOU ARE A PASSENGER IN A CAR FOR AN HOUR WITHOUT A BREAK: SLIGHT CHANCE OF DOZING
HOW LIKELY ARE YOU TO NOD OFF OR FALL ASLEEP WHILE SITTING AND TALKING TO SOMEONE: WOULD NEVER DOZE
HOW LIKELY ARE YOU TO NOD OFF OR FALL ASLEEP WHILE SITTING INACTIVE IN A PUBLIC PLACE: WOULD NEVER DOZE
FOSQ SCORE: 20
HOW LIKELY ARE YOU TO NOD OFF OR FALL ASLEEP WHILE SITTING AND READING: WOULD NEVER DOZE
ESS TOTAL SCORE: 1
DO YOU GENERALLY HAVE DIFFICULTY REMEMBERING THINGS BECAUSE YOU ARE SLEEPY OR TIRED: NO
HOW LIKELY ARE YOU TO NOD OFF OR FALL ASLEEP WHILE WATCHING TV: WOULD NEVER DOZE
DO YOU HAVE DIFFICULTY BEING AS ACTIVE AS YOU WANT TO BE IN THE MORNING BECAUSE YOU ARE SLEEPY OR TIRED: NO
DO YOU HAVE DIFFICULTY OPERATING A MOTOR VEHICLE FOR LONG DISTANCES (GREATER THAN 100 MILES) BECAUSE YOU BECOME SLEEPY: NO
HOW LIKELY ARE YOU TO NOD OFF OR FALL ASLEEP IN A CAR, WHILE STOPPED FOR A FEW MINUTES IN TRAFFIC: WOULD NEVER DOZE
DO YOU HAVE DIFFICULTY CONCENTRATING ON THE THINGS YOU DO BECAUSE YOU ARE SLEEPY OR TIRED: NO
HOW LIKELY ARE YOU TO NOD OFF OR FALL ASLEEP WHILE SITTING QUIETLY AFTER LUNCH WITHOUT ALCOHOL: WOULD NEVER DOZE
HOW LIKELY ARE YOU TO NOD OFF OR FALL ASLEEP IN A CAR, WHILE STOPPED FOR A FEW MINUTES IN TRAFFIC: WOULD NEVER DOZE
HAS YOUR MOOD BEEN AFFECTED BECAUSE YOU ARE SLEEPY OR TIRED: NO
HOW LIKELY ARE YOU TO NOD OFF OR FALL ASLEEP WHILE SITTING QUIETLY AFTER LUNCH WITHOUT ALCOHOL: WOULD NEVER DOZE
HOW LIKELY ARE YOU TO NOD OFF OR FALL ASLEEP WHEN YOU ARE A PASSENGER IN A CAR FOR AN HOUR WITHOUT A BREAK: SLIGHT CHANCE OF DOZING
HOW LIKELY ARE YOU TO NOD OFF OR FALL ASLEEP WHILE SITTING INACTIVE IN A PUBLIC PLACE: WOULD NEVER DOZE
HOW LIKELY ARE YOU TO NOD OFF OR FALL ASLEEP WHILE LYING DOWN TO REST IN THE AFTERNOON WHEN CIRCUMSTANCES PERMIT: WOULD NEVER DOZE
HOW LIKELY ARE YOU TO NOD OFF OR FALL ASLEEP WHILE SITTING AND READING: WOULD NEVER DOZE
HAS YOUR RELATIONSHIP WITH FAMILY, FRIENDS OR WORK COLLEAGUES BEEN AFFECTED BECAUSE YOU ARE SLEEPY OR TIRED: NO

## 2025-05-22 NOTE — PATIENT INSTRUCTIONS
[address]  [place] [address]  Tel.  [phone]  Fax. [phone] [address]  [place] [address]  Tel.  [phone]  Fax. [phone] [address] [address]  Tel.  [phone]  Fax. [phone]     Learning About CPAP for Sleep Apnea  What is CPAP?              CPAP is a small machine that you use at home every night while you sleep. It increases air pressure in your throat to keep your airway open. When you have sleep apnea, this can help you sleep better so you feel much better. CPAP stands for \"continuous positive airway pressure.\"  The CPAP machine will have one of the following:  A mask that covers your nose and mouth  Prongs that fit into your nose  A mask that covers your nose only, the most common type. This type is called NCPAP. The N stands for \"nasal.\"  Why is it done?  CPAP is usually the best treatment for obstructive sleep apnea. It is the first treatment choice and the most widely used. Your doctor may suggest CPAP if you have:  Moderate to severe sleep apnea.  Sleep apnea and coronary artery disease (CAD) or heart failure.  How does it help?  CPAP can help you have more normal sleep, so you feel less sleepy and more alert during the daytime.  CPAP may help keep heart failure or other heart problems from getting worse.  NCPAP may help lower your blood pressure.  If you use CPAP, your bed partner may also sleep better because you are not snoring or restless.  What are the side effects?  Some people who use CPAP have:  A dry or stuffy nose and a sore throat.  Irritated skin on the face.  Sore eyes.  Bloating.  If you have any of these problems, work with your doctor to fix them. Here are some things you can try:  Be sure the mask or nasal prongs fit well.  See if your doctor can adjust the pressure of your CPAP.  If your nose is dry, try a humidifier.  If your nose is runny or stuffy, try decongestant medicine or a steroid

## 2025-05-22 NOTE — PROGRESS NOTES
Watching TV 0 0 0 0 0 1   Sitting, inactive in a public place (e.g. a theatre or a meeting) 0 0 0 0 0 0   As a passenger in a car for an hour without a break 1 0 0 0 1 1   Lying down to rest in the afternoon when circumstances permit 0 0 0 0 0 1   Sitting and talking to someone 0 0 0 0 0 0   Sitting quietly after a lunch without alcohol 0 0 0 0 0 0   In a car, while stopped for a few minutes in traffic 0 0 0 0 0 0   [place] Sleepiness Score 1  0 0 1 2 4       Patient-reported       which reflect improved sleep quality over therapy time.    O>      Vital Signs: (As obtained by patient/caregiver at home)      5/22/2025     5:51 AM   Patient-Reported Vitals   Patient-Reported Weight 275   Patient-Reported Height 5'9   Patient-Reported Systolic 131 mmHg   Patient-Reported Diastolic 78 mmHg   Patient-Reported Pulse 86   Patient-Reported Temperature 98.4   Patient-Reported SpO2 98       Calculated BMI 40.6      Constitutional: [x] Appears well-developed and well-nourished [x] No apparent distress      [] Abnormal -     Mental status: [x] Alert and awake  [x] Oriented to person/place/time [x] Able to follow commands    [] Abnormal -     Eyes:   EOM    [x]  Normal    [] Abnormal -   Sclera  [x]  Normal    [] Abnormal -          Discharge [x]  None visible   [] Abnormal -     HENT: [x] Normocephalic, atraumatic      External Ears [x] Normal  [] Abnormal -    Neck: [x] No visualized mass [] Abnormal -     Pulmonary/Chest: [x] Respiratory effort normal   [x] No visualized signs of difficulty breathing or respiratory distress        [] Abnormal -       Neurological:        [x] No Facial Asymmetry (Cranial nerve 7 motor function) (limited exam due to video visit)          [x] No gaze palsy        [] Abnormal -          Skin:        [x] No significant exanthematous lesions or discoloration noted on facial skin         [] Abnormal -            Psychiatric:       [x] Normal Affect [] Abnormal -        Other pertinent observable

## 2025-05-28 ENCOUNTER — APPOINTMENT (OUTPATIENT)
Facility: HOSPITAL | Age: 40
End: 2025-05-28
Payer: MEDICARE

## 2025-05-28 ENCOUNTER — HOSPITAL ENCOUNTER (EMERGENCY)
Facility: HOSPITAL | Age: 40
Discharge: HOME | End: 2025-05-28
Payer: MEDICARE

## 2025-05-28 VITALS
HEIGHT: 69 IN | SYSTOLIC BLOOD PRESSURE: 147 MMHG | BODY MASS INDEX: 40.73 KG/M2 | RESPIRATION RATE: 18 BRPM | DIASTOLIC BLOOD PRESSURE: 103 MMHG | HEART RATE: 93 BPM | OXYGEN SATURATION: 99 % | TEMPERATURE: 98.6 F | WEIGHT: 275 LBS

## 2025-05-28 DIAGNOSIS — M10.071 ACUTE IDIOPATHIC GOUT INVOLVING TOE OF RIGHT FOOT: Primary | ICD-10-CM

## 2025-05-28 PROCEDURE — 99283 EMERGENCY DEPT VISIT LOW MDM: CPT

## 2025-05-28 PROCEDURE — 6370000000 HC RX 637 (ALT 250 FOR IP)

## 2025-05-28 PROCEDURE — 73630 X-RAY EXAM OF FOOT: CPT

## 2025-05-28 RX ORDER — OXYCODONE HYDROCHLORIDE 5 MG/1
5 TABLET ORAL
Refills: 0 | Status: COMPLETED | OUTPATIENT
Start: 2025-05-28 | End: 2025-05-28

## 2025-05-28 RX ORDER — LIDOCAINE 4 G/G
1 PATCH TOPICAL
Status: DISCONTINUED | OUTPATIENT
Start: 2025-05-28 | End: 2025-05-28 | Stop reason: HOSPADM

## 2025-05-28 RX ORDER — METHYLPREDNISOLONE 4 MG/1
TABLET ORAL
Qty: 1 KIT | Refills: 0 | Status: SHIPPED | OUTPATIENT
Start: 2025-05-28 | End: 2025-06-03

## 2025-05-28 RX ORDER — NAPROXEN 500 MG/1
500 TABLET ORAL 2 TIMES DAILY
Qty: 60 TABLET | Refills: 0 | Status: SHIPPED | OUTPATIENT
Start: 2025-05-28

## 2025-05-28 RX ADMIN — OXYCODONE 5 MG: 5 TABLET ORAL at 07:44

## 2025-05-28 ASSESSMENT — PAIN DESCRIPTION - LOCATION
LOCATION: FOOT
LOCATION: FOOT

## 2025-05-28 ASSESSMENT — PAIN DESCRIPTION - DESCRIPTORS
DESCRIPTORS: SHARP
DESCRIPTORS: ACHING

## 2025-05-28 ASSESSMENT — PAIN SCALES - GENERAL
PAINLEVEL_OUTOF10: 10
PAINLEVEL_OUTOF10: 10

## 2025-05-28 ASSESSMENT — PAIN - FUNCTIONAL ASSESSMENT: PAIN_FUNCTIONAL_ASSESSMENT: PREVENTS OR INTERFERES SOME ACTIVE ACTIVITIES AND ADLS

## 2025-05-28 ASSESSMENT — PAIN DESCRIPTION - ORIENTATION
ORIENTATION: RIGHT
ORIENTATION: RIGHT

## 2025-05-28 ASSESSMENT — PAIN DESCRIPTION - PAIN TYPE: TYPE: ACUTE PAIN

## 2025-05-28 NOTE — DISCHARGE INSTRUCTIONS
Thank You!    It was a pleasure taking care of you in our Emergency Department today. We know that when you come to our Emergency Department, you are entrusting us with your health, comfort, and safety. Our physicians and nurses honor that trust, and truly appreciate the opportunity to care for you and your loved ones.      We also value your feedback. If you receive a survey about your Emergency Department experience today, please fill it out.  We care about our patients' feedback, and we listen to what you have to say.  Thank you.    Franky Mortensen MD  ________________________________________________________________________  I have included a copy of your lab results and/or radiologic studies from today's visit so you can have them easily available at your follow-up visit. We hope you feel better and please do not hesitate to contact the ED if you have any questions at all!    No results found for this or any previous visit (from the past 12 hours).  XR FOOT RIGHT (MIN 3 VIEWS)   Final Result   No acute fracture.      Electronically signed by Luis Antunez          The exam and treatment you received in the Emergency Department were for an urgent problem and are not intended as complete care. It is important that you follow up with a doctor, nurse practitioner, or physician assistant for ongoing care. If your symptoms become worse or you do not improve as expected and you are unable to reach your usual health care provider, you should return to the Emergency Department. We are available 24 hours a day.    Please take your discharge instructions with you when you go to your follow-up appointment.     If a prescription has been provided, please have it filled as soon as possible to prevent a delay in treatment. Read the entire medication instruction sheet provided to you by the pharmacy. If you have any questions or reservations about taking the medication due to side effects or interactions with other

## 2025-06-05 ENCOUNTER — OFFICE VISIT (OUTPATIENT)
Age: 40
End: 2025-06-05

## 2025-06-05 VITALS
DIASTOLIC BLOOD PRESSURE: 93 MMHG | SYSTOLIC BLOOD PRESSURE: 142 MMHG | OXYGEN SATURATION: 100 % | TEMPERATURE: 98.8 F | WEIGHT: 285 LBS | HEART RATE: 67 BPM | BODY MASS INDEX: 42.09 KG/M2 | RESPIRATION RATE: 16 BRPM

## 2025-06-05 DIAGNOSIS — M10.9 ACUTE GOUT INVOLVING TOE OF RIGHT FOOT, UNSPECIFIED CAUSE: Primary | ICD-10-CM

## 2025-06-05 RX ORDER — METHYLPREDNISOLONE 4 MG/1
TABLET ORAL
Qty: 1 KIT | Refills: 0 | Status: SHIPPED | OUTPATIENT
Start: 2025-06-05 | End: 2025-06-11

## 2025-06-05 NOTE — PATIENT INSTRUCTIONS
Take the steroid pack exactly as prescribed.  Keep foot elevated as much as possible.    Follow up with PCP for any new or worsening symptoms.

## 2025-06-05 NOTE — PROGRESS NOTES
Patient Name: Shimon Min   YOB: 1985   Patient Status: New patient,   Chief Complaint: Foot Pain (Right foot pain. DX with gout and missed medication./X 2 weeks  ) and Mass (Mass on abdomin, Patient \"popped\" once and it returned. /X 1 month )      ____________________________________________________________________________________________    External Records Reviewed: None    Limitation to History: None    Outside Historian: None    SUBJECTIVE/OBJECTIVE:  Shimon Min is a 39 y.o. male presents with complaint of right great toe pain and swelling.  Symptoms began 2 week(s) ago and are worsening since onset.  He reports he seen in the emergency department and given a Medrol Dosepak but he did not finish it.  He also reports he took a colchicine last night that has not eliminated the pain.  He does have a history of gout and reports very infrequent attacks.  He states he is going on a cruise tomorrow and needs to get something immediately.  He is also complaining of a small growth underneath his umbilicus that has been there for several months.  No other acute symptoms reported at this time.          PAST MEDICAL HISTORY:   Medical: Pt  has a past medical history of ADHD (attention deficit hyperactivity disorder), Angioedema, Anxiety, Cold-induced asthma, Depression, Gout, Hernia of abdominal wall, Hypertension, and NAIN (obstructive sleep apnea).  Surgical: Pt  has a past surgical history that includes Adenoidectomy.  Family: Pt family history includes Breast Cancer in his maternal grandmother; Hypertension in his father and mother; Sleep Apnea in his mother.  Social: Pt   Social History     Socioeconomic History    Marital status:      Spouse name: Not on file    Number of children: Not on file    Years of education: Not on file    Highest education level: Not on file   Occupational History    Not on file   Tobacco Use    Smoking status: Never    Smokeless tobacco: Never   Vaping Use

## 2025-06-18 ENCOUNTER — OFFICE VISIT (OUTPATIENT)
Age: 40
End: 2025-06-18

## 2025-06-18 VITALS
BODY MASS INDEX: 42.09 KG/M2 | RESPIRATION RATE: 18 BRPM | WEIGHT: 285 LBS | HEART RATE: 86 BPM | DIASTOLIC BLOOD PRESSURE: 110 MMHG | SYSTOLIC BLOOD PRESSURE: 157 MMHG | TEMPERATURE: 98.3 F | OXYGEN SATURATION: 98 %

## 2025-06-18 DIAGNOSIS — M10.471 ACUTE GOUT DUE TO OTHER SECONDARY CAUSE INVOLVING TOE OF RIGHT FOOT: Primary | ICD-10-CM

## 2025-06-18 RX ORDER — NAPROXEN 500 MG/1
500 TABLET ORAL 2 TIMES DAILY
Qty: 28 TABLET | Refills: 0 | Status: SHIPPED | OUTPATIENT
Start: 2025-06-18 | End: 2025-07-02

## 2025-06-18 NOTE — ASSESSMENT & PLAN NOTE
Orders:    naproxen (NAPROSYN) 500 MG tablet; Take 1 tablet by mouth 2 times daily for 14 days  Exam and history consistent with recurrent acute gout flare.   -Naproxen 500 mg twice a day until flare resolves. Please make sure food is on stomach prior to taking Naproxen  -Please drink at least 64 ounces of fluid a day   -Can also take prescribed Colchicine for current flare  -Tylenol 500 mg every 6 hours as needed for pain. Okay to take with Naproxen when possible   -Avoid alcohol, red meats, seafood, and other purine rich foods   -Please follow up with your PCP in the next 2-3 weeks     If symptoms persist or worsen, please contact your PCP and/or return to Urgent Care.

## 2025-06-18 NOTE — PROGRESS NOTES
2025   Shimon Min (: 1985) is a 39 y.o. male, Established patient, here for evaluation of the following chief complaint(s):  Gout (C/o gout in right foot. Sx 2 weeks.  Gout recurring. Sx consist of swelling. )     ASSESSMENT/PLAN:  Below is the assessment and plan developed based on review of pertinent history, physical exam, labs, studies, and medications.  Assessment & Plan  Acute gout due to other secondary cause involving toe of right foot       Orders:    naproxen (NAPROSYN) 500 MG tablet; Take 1 tablet by mouth 2 times daily for 14 days  Exam and history consistent with recurrent acute gout flare.   -Naproxen 500 mg twice a day until flare resolves. Please make sure food is on stomach prior to taking Naproxen  -Please drink at least 64 ounces of fluid a day   -Can also take prescribed Colchicine for current flare  -Tylenol 500 mg every 6 hours as needed for pain. Okay to take with Naproxen when possible   -Avoid alcohol, red meats, seafood, and other purine rich foods   -Please follow up with your PCP in the next 2-3 weeks     If symptoms persist or worsen, please contact your PCP and/or return to Urgent Care.         Handout given with care instructions  2. OTC for symptom management. Increase fluid intake, ensure adequate nutritional intake.  3. Follow up with PCP as needed.  4. Go to ED with development of any acute symptoms.     Follow up:  No follow-ups on file.  Follow up immediately for any new, worsening or changes or if symptoms are not improving over the next 5-7 days.     SUBJECTIVE/OBJECTIVE:    Gout     Mr Min presents with concern for another gout flare in the right foot/toe after he was eating burgers every day for the last week. He states his previous flare he was seen for on  resolved with medrol dosepak before he started eating the burgers. He states that this was a special occasion, and that he doesn't normally eat so many burgers. Otherwise, he denies any headache,

## 2025-06-18 NOTE — PATIENT INSTRUCTIONS
Exam and history consistent with recurrent acute gout flare.   -Naproxen 500 mg twice a day until flare resolves. Please make sure food is on stomach prior to taking Naproxen  -Please drink at least 64 ounces of fluid a day   -Can also take prescribed Colchicine for current flare  -Tylenol 500 mg every 6 hours as needed for pain. Okay to take with Naproxen when possible   -Avoid alcohol, red meats, seafood, and other purine rich foods   -Please follow up with your PCP in the next 2-3 weeks     If symptoms persist or worsen,

## 2025-06-20 ENCOUNTER — APPOINTMENT (OUTPATIENT)
Facility: HOSPITAL | Age: 40
End: 2025-06-20
Payer: MEDICARE

## 2025-06-20 ENCOUNTER — HOSPITAL ENCOUNTER (EMERGENCY)
Facility: HOSPITAL | Age: 40
Discharge: HOME | End: 2025-06-20
Attending: STUDENT IN AN ORGANIZED HEALTH CARE EDUCATION/TRAINING PROGRAM
Payer: MEDICARE

## 2025-06-20 VITALS
OXYGEN SATURATION: 96 % | TEMPERATURE: 98.8 F | HEART RATE: 80 BPM | BODY MASS INDEX: 42.91 KG/M2 | WEIGHT: 290.57 LBS | DIASTOLIC BLOOD PRESSURE: 92 MMHG | RESPIRATION RATE: 17 BRPM | SYSTOLIC BLOOD PRESSURE: 164 MMHG

## 2025-06-20 DIAGNOSIS — F41.1 GENERALIZED ANXIETY DISORDER: Primary | ICD-10-CM

## 2025-06-20 DIAGNOSIS — I10 ESSENTIAL HYPERTENSION: ICD-10-CM

## 2025-06-20 DIAGNOSIS — F41.0 PANIC DISORDER (EPISODIC PAROXYSMAL ANXIETY): ICD-10-CM

## 2025-06-20 DIAGNOSIS — T73.3XXA FATIGUE DUE TO EXCESSIVE EXERTION, INITIAL ENCOUNTER: Primary | ICD-10-CM

## 2025-06-20 LAB
ALBUMIN SERPL-MCNC: 3.5 G/DL (ref 3.5–5)
ALBUMIN/GLOB SERPL: 0.9 (ref 1.1–2.2)
ALP SERPL-CCNC: 98 U/L (ref 45–117)
ALT SERPL-CCNC: 36 U/L (ref 12–78)
ANION GAP SERPL CALC-SCNC: 5 MMOL/L (ref 2–12)
AST SERPL-CCNC: 25 U/L (ref 15–37)
BASOPHILS # BLD: 0.06 K/UL (ref 0–0.1)
BASOPHILS NFR BLD: 0.9 % (ref 0–1)
BILIRUB SERPL-MCNC: 0.6 MG/DL (ref 0.2–1)
BUN SERPL-MCNC: 16 MG/DL (ref 6–20)
BUN/CREAT SERPL: 17 (ref 12–20)
CALCIUM SERPL-MCNC: 9 MG/DL (ref 8.5–10.1)
CHLORIDE SERPL-SCNC: 105 MMOL/L (ref 97–108)
CO2 SERPL-SCNC: 30 MMOL/L (ref 21–32)
CREAT SERPL-MCNC: 0.94 MG/DL (ref 0.7–1.3)
DIFFERENTIAL METHOD BLD: NORMAL
EKG ATRIAL RATE: 74 BPM
EKG DIAGNOSIS: NORMAL
EKG P AXIS: 71 DEGREES
EKG P-R INTERVAL: 146 MS
EKG Q-T INTERVAL: 356 MS
EKG QRS DURATION: 98 MS
EKG QTC CALCULATION (BAZETT): 395 MS
EKG R AXIS: 67 DEGREES
EKG T AXIS: 13 DEGREES
EKG VENTRICULAR RATE: 74 BPM
EOSINOPHIL # BLD: 0.06 K/UL (ref 0–0.4)
EOSINOPHIL NFR BLD: 0.9 % (ref 0–7)
ERYTHROCYTE [DISTWIDTH] IN BLOOD BY AUTOMATED COUNT: 13.1 % (ref 11.5–14.5)
GLOBULIN SER CALC-MCNC: 4 G/DL (ref 2–4)
GLUCOSE SERPL-MCNC: 119 MG/DL (ref 65–100)
HCT VFR BLD AUTO: 40.9 % (ref 36.6–50.3)
HGB BLD-MCNC: 12.6 G/DL (ref 12.1–17)
IMM GRANULOCYTES # BLD AUTO: 0.03 K/UL (ref 0–0.04)
IMM GRANULOCYTES NFR BLD AUTO: 0.5 % (ref 0–0.5)
LYMPHOCYTES # BLD: 1.72 K/UL (ref 0.8–3.5)
LYMPHOCYTES NFR BLD: 26.5 % (ref 12–49)
MAGNESIUM SERPL-MCNC: 2 MG/DL (ref 1.6–2.4)
MCH RBC QN AUTO: 26.7 PG (ref 26–34)
MCHC RBC AUTO-ENTMCNC: 30.8 G/DL (ref 30–36.5)
MCV RBC AUTO: 86.7 FL (ref 80–99)
MONOCYTES # BLD: 0.58 K/UL (ref 0–1)
MONOCYTES NFR BLD: 8.9 % (ref 5–13)
NEUTS SEG # BLD: 4.05 K/UL (ref 1.8–8)
NEUTS SEG NFR BLD: 62.3 % (ref 32–75)
NRBC # BLD: 0 K/UL (ref 0–0.01)
NRBC BLD-RTO: 0 PER 100 WBC
PLATELET # BLD AUTO: 162 K/UL (ref 150–400)
PMV BLD AUTO: 12.4 FL (ref 8.9–12.9)
POTASSIUM SERPL-SCNC: 4.1 MMOL/L (ref 3.5–5.1)
PROT SERPL-MCNC: 7.5 G/DL (ref 6.4–8.2)
RBC # BLD AUTO: 4.72 M/UL (ref 4.1–5.7)
SODIUM SERPL-SCNC: 140 MMOL/L (ref 136–145)
SPECIMEN HOLD: NORMAL
TROPONIN I SERPL HS-MCNC: 14 NG/L (ref 0–76)
WBC # BLD AUTO: 6.5 K/UL (ref 4.1–11.1)

## 2025-06-20 PROCEDURE — 85025 COMPLETE CBC W/AUTO DIFF WBC: CPT

## 2025-06-20 PROCEDURE — 80053 COMPREHEN METABOLIC PANEL: CPT

## 2025-06-20 PROCEDURE — 36415 COLL VENOUS BLD VENIPUNCTURE: CPT

## 2025-06-20 PROCEDURE — 99285 EMERGENCY DEPT VISIT HI MDM: CPT

## 2025-06-20 PROCEDURE — 71046 X-RAY EXAM CHEST 2 VIEWS: CPT

## 2025-06-20 PROCEDURE — 83735 ASSAY OF MAGNESIUM: CPT

## 2025-06-20 PROCEDURE — 84484 ASSAY OF TROPONIN QUANT: CPT

## 2025-06-20 RX ORDER — LORAZEPAM 0.5 MG/1
0.5 TABLET ORAL DAILY PRN
Qty: 30 TABLET | Refills: 0 | Status: SHIPPED | OUTPATIENT
Start: 2025-06-20 | End: 2025-07-20

## 2025-06-20 RX ORDER — ESCITALOPRAM OXALATE 5 MG/1
5 TABLET ORAL DAILY
Qty: 30 TABLET | Refills: 2 | Status: SHIPPED | OUTPATIENT
Start: 2025-06-20

## 2025-06-20 RX ORDER — LORAZEPAM 0.5 MG/1
0.5 TABLET ORAL
COMMUNITY
End: 2025-06-20 | Stop reason: SDUPTHER

## 2025-06-20 ASSESSMENT — PAIN - FUNCTIONAL ASSESSMENT: PAIN_FUNCTIONAL_ASSESSMENT: NONE - DENIES PAIN

## 2025-06-20 NOTE — TELEPHONE ENCOUNTER
Chief Complaint   Patient presents with    Medication Refill       Requested Prescriptions     Pending Prescriptions Disp Refills    LORazepam (ATIVAN) 0.5 MG tablet 30 tablet 0     Sig: Take 1 tablet by mouth as needed for Anxiety for up to 30 days. Max Daily Amount: 0.5 mg       Preferred Pharmacy:[facility]/pharmacy #[id number] - [place], [place] - [address] - P [phone] - F [phone]     [place] :  Written:04/22/2025  Filled:04/22/2025  Drug:Lorazepam 0.5 Mg Tablet   Qty:30.00  Days:30  Provider:Jase Kan    Last visit with clinic:  04/15/2025

## 2025-06-20 NOTE — ED PROVIDER NOTES
[facility] EMERGENCY DEPARTMENT  EMERGENCY DEPARTMENT ENCOUNTER       Pt Name: Shimon Min  MRN: [id number]  Birthdate 1985  Date of evaluation: 6/20/2025  Provider: Alexey Deleon MD   PCP: Gladys Hernandez MD  Note Started: 12:19 PM EDT 6/20/25     CHIEF COMPLAINT       Chief Complaint   Patient presents with    Shortness of Breath    Fatigue     Patient ambulatory to triage c/o shortness of breath and fatigue x3-4 days. Patient endorses feeling \"drained\" over the last couple days and reports that his blood pressure has been slightly elevated over the last couple days. Patient reports he went to [facility] and was in the water park a few days ago and the next day, he woke up feeling like this.         HISTORY OF PRESENT ILLNESS: 1 or more elements      History From: patient, History limited by: none     Shimon Min is a 39 y.o. male presenting with fatigue.  He notes he went to [facility] 2 days ago and was outside for 4 hours.  He was very fatigued yesterday but was able to drink water and rehydrate himself.  Today he notes he is feeling much better but wanted to get checked out.  Notes he was short of breath a little bit earlier today.       Please See MDM for Additional Details of the HPI/PMH  Nursing Notes were all reviewed and agreed with or any disagreements were addressed in the HPI.     REVIEW OF SYSTEMS        Positives and Pertinent negatives as per HPI.    PAST HISTORY     Past Medical History:  Past Medical History:   Diagnosis Date    ADHD (attention deficit hyperactivity disorder)     Angioedema     ace induced asthma     Anxiety     Cold-induced asthma     Depression     Gout     Hernia of abdominal wall     Hypertension     NAIN (obstructive sleep apnea)        Past Surgical History:  Past Surgical History:   Procedure Laterality Date    ADENOIDECTOMY         Family History:  Family History   Problem Relation Age of Onset    Sleep Apnea Mother     Hypertension

## 2025-06-29 ENCOUNTER — APPOINTMENT (OUTPATIENT)
Facility: HOSPITAL | Age: 40
End: 2025-06-29
Payer: MEDICARE

## 2025-06-29 ENCOUNTER — HOSPITAL ENCOUNTER (EMERGENCY)
Facility: HOSPITAL | Age: 40
Discharge: HOME | End: 2025-06-29
Attending: STUDENT IN AN ORGANIZED HEALTH CARE EDUCATION/TRAINING PROGRAM
Payer: MEDICARE

## 2025-06-29 VITALS
RESPIRATION RATE: 15 BRPM | BODY MASS INDEX: 43.4 KG/M2 | TEMPERATURE: 98.6 F | HEART RATE: 99 BPM | OXYGEN SATURATION: 99 % | HEIGHT: 69 IN | WEIGHT: 293 LBS | SYSTOLIC BLOOD PRESSURE: 156 MMHG | DIASTOLIC BLOOD PRESSURE: 109 MMHG

## 2025-06-29 DIAGNOSIS — R53.1 GENERALIZED WEAKNESS: ICD-10-CM

## 2025-06-29 DIAGNOSIS — R00.2 PALPITATIONS: Primary | ICD-10-CM

## 2025-06-29 LAB
ALBUMIN SERPL-MCNC: 3.6 G/DL (ref 3.5–5)
ALBUMIN/GLOB SERPL: 0.8 (ref 1.1–2.2)
ALP SERPL-CCNC: 105 U/L (ref 45–117)
ALT SERPL-CCNC: 36 U/L (ref 12–78)
ANION GAP SERPL CALC-SCNC: 7 MMOL/L (ref 2–12)
AST SERPL-CCNC: 24 U/L (ref 15–37)
BASOPHILS # BLD: 0.06 K/UL (ref 0–0.1)
BASOPHILS NFR BLD: 1 % (ref 0–1)
BILIRUB SERPL-MCNC: 0.5 MG/DL (ref 0.2–1)
BUN SERPL-MCNC: 14 MG/DL (ref 6–20)
BUN/CREAT SERPL: 13 (ref 12–20)
CALCIUM SERPL-MCNC: 8.9 MG/DL (ref 8.5–10.1)
CHLORIDE SERPL-SCNC: 102 MMOL/L (ref 97–108)
CO2 SERPL-SCNC: 29 MMOL/L (ref 21–32)
COMMENT:: NORMAL
CREAT SERPL-MCNC: 1.09 MG/DL (ref 0.7–1.3)
D DIMER PPP FEU-MCNC: 0.29 MG/L FEU (ref 0–0.65)
DIFFERENTIAL METHOD BLD: NORMAL
EKG ATRIAL RATE: 113 BPM
EKG DIAGNOSIS: NORMAL
EKG P AXIS: 57 DEGREES
EKG P-R INTERVAL: 146 MS
EKG Q-T INTERVAL: 332 MS
EKG QRS DURATION: 94 MS
EKG QTC CALCULATION (BAZETT): 455 MS
EKG R AXIS: 70 DEGREES
EKG T AXIS: -20 DEGREES
EKG VENTRICULAR RATE: 113 BPM
EOSINOPHIL # BLD: 0 K/UL (ref 0–0.4)
EOSINOPHIL NFR BLD: 0 % (ref 0–7)
ERYTHROCYTE [DISTWIDTH] IN BLOOD BY AUTOMATED COUNT: 13 % (ref 11.5–14.5)
GLOBULIN SER CALC-MCNC: 4.3 G/DL (ref 2–4)
GLUCOSE SERPL-MCNC: 154 MG/DL (ref 65–100)
HCT VFR BLD AUTO: 39.4 % (ref 36.6–50.3)
HGB BLD-MCNC: 13 G/DL (ref 12.1–17)
IMM GRANULOCYTES # BLD AUTO: 0 K/UL
IMM GRANULOCYTES NFR BLD AUTO: 0 %
LYMPHOCYTES # BLD: 1.7 K/UL (ref 0.8–3.5)
LYMPHOCYTES NFR BLD: 27 % (ref 12–49)
MAGNESIUM SERPL-MCNC: 1.9 MG/DL (ref 1.6–2.4)
MCH RBC QN AUTO: 27.3 PG (ref 26–34)
MCHC RBC AUTO-ENTMCNC: 33 G/DL (ref 30–36.5)
MCV RBC AUTO: 82.6 FL (ref 80–99)
MONOCYTES # BLD: 0.57 K/UL (ref 0–1)
MONOCYTES NFR BLD: 9 % (ref 5–13)
NEUTS SEG # BLD: 3.97 K/UL (ref 1.8–8)
NEUTS SEG NFR BLD: 63 % (ref 32–75)
NRBC # BLD: 0 K/UL (ref 0–0.01)
NRBC BLD-RTO: 0 PER 100 WBC
PLATELET # BLD AUTO: 165 K/UL (ref 150–400)
POTASSIUM SERPL-SCNC: 3.8 MMOL/L (ref 3.5–5.1)
PROT SERPL-MCNC: 7.9 G/DL (ref 6.4–8.2)
RBC # BLD AUTO: 4.77 M/UL (ref 4.1–5.7)
RBC MORPH BLD: NORMAL
SODIUM SERPL-SCNC: 138 MMOL/L (ref 136–145)
SPECIMEN HOLD: NORMAL
TROPONIN I SERPL HS-MCNC: 15 NG/L (ref 0–76)
TSH SERPL DL<=0.05 MIU/L-ACNC: 1.16 UIU/ML (ref 0.36–3.74)
WBC # BLD AUTO: 6.3 K/UL (ref 4.1–11.1)

## 2025-06-29 PROCEDURE — 84443 ASSAY THYROID STIM HORMONE: CPT

## 2025-06-29 PROCEDURE — 84484 ASSAY OF TROPONIN QUANT: CPT

## 2025-06-29 PROCEDURE — 85025 COMPLETE CBC W/AUTO DIFF WBC: CPT

## 2025-06-29 PROCEDURE — 2580000003 HC RX 258: Performed by: STUDENT IN AN ORGANIZED HEALTH CARE EDUCATION/TRAINING PROGRAM

## 2025-06-29 PROCEDURE — 83735 ASSAY OF MAGNESIUM: CPT

## 2025-06-29 PROCEDURE — 71045 X-RAY EXAM CHEST 1 VIEW: CPT

## 2025-06-29 PROCEDURE — 85379 FIBRIN DEGRADATION QUANT: CPT

## 2025-06-29 PROCEDURE — 99285 EMERGENCY DEPT VISIT HI MDM: CPT

## 2025-06-29 PROCEDURE — 96360 HYDRATION IV INFUSION INIT: CPT

## 2025-06-29 PROCEDURE — 80053 COMPREHEN METABOLIC PANEL: CPT

## 2025-06-29 PROCEDURE — 93005 ELECTROCARDIOGRAM TRACING: CPT | Performed by: STUDENT IN AN ORGANIZED HEALTH CARE EDUCATION/TRAINING PROGRAM

## 2025-06-29 RX ORDER — 0.9 % SODIUM CHLORIDE 0.9 %
1000 INTRAVENOUS SOLUTION INTRAVENOUS ONCE
Status: COMPLETED | OUTPATIENT
Start: 2025-06-29 | End: 2025-06-29

## 2025-06-29 RX ORDER — LORAZEPAM 1 MG/1
1 TABLET ORAL ONCE
Status: DISCONTINUED | OUTPATIENT
Start: 2025-06-29 | End: 2025-06-29 | Stop reason: HOSPADM

## 2025-06-29 RX ADMIN — SODIUM CHLORIDE 1000 ML: 0.9 INJECTION, SOLUTION INTRAVENOUS at 17:04

## 2025-06-29 NOTE — ED NOTES
Discharge instructions were given to the patient by AURELIA EDGAR RN and FREDERIC RN.  The patient left the Emergency Department alert and oriented and in no acute distress with 0 prescription(s). The patient was encouraged to call or return to the ED for worsening issues or problems and was encouraged to schedule a follow up appointment for continuing care.  The patient verbalized understanding of discharge instructions and prescriptions; all questions were answered. The patient has no further concerns at this time.

## 2025-06-29 NOTE — ED NOTES
Pt presents to ED complaining of tachycardia and malaise earlier today. Pt states that he has a hx of anxiety, and had a panic attack earlier today. Pt also states that he was started on spironolactone x 3 days ago, and is concerned about his potassium. Pt denies dizziness, lightheadedness, and chest pain, and is also taking 0.5 mg of ativan and amlodipine. Pt is alert and oriented x 4, RR even and unlabored, skin is warm and dry. Pt appears in NAD at this time. Assessment completed and pt updated on plan of care.  Call bell in reach.   Emergency Department Nursing Plan of Care  The Nursing Plan of Care is developed from the Nursing assessment and Emergency Department Attending provider initial evaluation.  The plan of care may be reviewed in the “ED Provider note”.  The Plan of Care was developed with the following considerations:  Patient / Family readiness to learn indicated by:Refer to Medical chart in [facility]  Persons(s) to be included in education: Refer to Medical chart in [facility]  Barriers to Learning/Limitations:Normal

## 2025-06-29 NOTE — ED PROVIDER NOTES
EMERGENCY DEPARTMENT HISTORY AND PHYSICAL EXAM      Date: 6/29/2025  Patient Name: Shimon Min    History of Presenting Illness     Chief Complaint   Patient presents with    Palpitations         HPI: History From: patient, History limited by: none  Shimon Min, 39 y.o. male presents to the ED with cc of palpitations.  He states that he was started on spironolactone 3 days ago.  Over the past 2 days, he has been feeling generalized weakness, feeling like his body is heavy, and feeling shaky.  He states that when he walks around he feels like his heart is racing.  He denies any chest pain or shortness of breath.  He took his vital signs at home, noted his blood pressure and heart rate to be elevated.  He states that this caused him to panic.  He has a history of anxiety, so took one of his Ativan 0.5 mg 2 hours prior to arrival, he will take this as needed.  He denies fever, no cough, no unilateral leg pain or leg swelling, no weakness or numbness on one side of the body, no speech changes, no hemianopia.  He denies any other recent changes in his medications.  He reports past medical history of hypertension.  He denies prior surgeries.  He denies tobacco, alcohol or illicit drug use.  He takes Lexapro, amlodipine, allopurinol, and Ativan as needed.          There are no other complaints, changes, or physical findings at this time.    PCP: Gladys Hernandez MD    No current facility-administered medications on file prior to encounter.     Current Outpatient Medications on File Prior to Encounter   Medication Sig Dispense Refill    escitalopram (LEXAPRO) 5 MG tablet TAKE 1 TABLET BY MOUTH EVERY DAY 30 tablet 2    LORazepam (ATIVAN) 0.5 MG tablet Take 1 tablet by mouth daily as needed for Anxiety for up to 30 days. Max Daily Amount: 0.5 mg 30 tablet 0    naproxen (NAPROSYN) 500 MG tablet Take 1 tablet by mouth 2 times daily for 14 days 28 tablet 0    hydroCHLOROthiazide 12.5 MG tablet Take 1 tablet by mouth

## 2025-06-29 NOTE — ED NOTES
ED visit d/t palpitations / chest pain - onset of sxs, 2 days ago - Endorses, recently started on spirolactone. 3 deficits - Denies N / V / D / sick contacts / similar sxs in the past;;

## 2025-07-04 LAB
EKG ATRIAL RATE: 74 BPM
EKG DIAGNOSIS: NORMAL
EKG P AXIS: 71 DEGREES
EKG P-R INTERVAL: 146 MS
EKG Q-T INTERVAL: 356 MS
EKG QRS DURATION: 98 MS
EKG QTC CALCULATION (BAZETT): 395 MS
EKG R AXIS: 67 DEGREES
EKG T AXIS: 13 DEGREES
EKG VENTRICULAR RATE: 74 BPM

## 2025-07-11 ENCOUNTER — TELEMEDICINE (OUTPATIENT)
Age: 40
End: 2025-07-11

## 2025-07-11 DIAGNOSIS — F41.1 GENERALIZED ANXIETY DISORDER: Primary | ICD-10-CM

## 2025-07-11 DIAGNOSIS — F41.0 PANIC DISORDER (EPISODIC PAROXYSMAL ANXIETY): ICD-10-CM

## 2025-07-11 RX ORDER — SPIRONOLACTONE 25 MG/1
25 TABLET ORAL DAILY
COMMUNITY
Start: 2025-06-27 | End: 2026-06-27

## 2025-07-11 RX ORDER — TESTOSTERONE GEL, 1% 10 MG/G
50 GEL TRANSDERMAL DAILY
COMMUNITY
Start: 2025-06-27

## 2025-07-11 RX ORDER — ESCITALOPRAM OXALATE 10 MG/1
10 TABLET ORAL DAILY
Qty: 30 TABLET | Refills: 2 | Status: SHIPPED | OUTPATIENT
Start: 2025-07-11

## 2025-07-11 RX ORDER — HYDROQUINONE 40 MG/G
CREAM TOPICAL
COMMUNITY
Start: 2025-06-23

## 2025-07-11 ASSESSMENT — PATIENT HEALTH QUESTIONNAIRE - PHQ9
10. IF YOU CHECKED OFF ANY PROBLEMS, HOW DIFFICULT HAVE THESE PROBLEMS MADE IT FOR YOU TO DO YOUR WORK, TAKE CARE OF THINGS AT HOME, OR GET ALONG WITH OTHER PEOPLE: NOT DIFFICULT AT ALL
4. FEELING TIRED OR HAVING LITTLE ENERGY: SEVERAL DAYS
2. FEELING DOWN, DEPRESSED OR HOPELESS: NOT AT ALL
1. LITTLE INTEREST OR PLEASURE IN DOING THINGS: NOT AT ALL
6. FEELING BAD ABOUT YOURSELF - OR THAT YOU ARE A FAILURE OR HAVE LET YOURSELF OR YOUR FAMILY DOWN: NOT AT ALL
7. TROUBLE CONCENTRATING ON THINGS, SUCH AS READING THE NEWSPAPER OR WATCHING TELEVISION: SEVERAL DAYS
SUM OF ALL RESPONSES TO PHQ QUESTIONS 1-9: 2
9. THOUGHTS THAT YOU WOULD BE BETTER OFF DEAD, OR OF HURTING YOURSELF: NOT AT ALL
8. MOVING OR SPEAKING SO SLOWLY THAT OTHER PEOPLE COULD HAVE NOTICED. OR THE OPPOSITE, BEING SO FIGETY OR RESTLESS THAT YOU HAVE BEEN MOVING AROUND A LOT MORE THAN USUAL: NOT AT ALL
3. TROUBLE FALLING OR STAYING ASLEEP: NOT AT ALL
SUM OF ALL RESPONSES TO PHQ QUESTIONS 1-9: 2
5. POOR APPETITE OR OVEREATING: NOT AT ALL

## 2025-07-11 NOTE — PROGRESS NOTES
Shimon Min, was evaluated through a synchronous (real-time) audio-video encounter. The patient (or guardian if applicable) is aware that this is a billable service, which includes applicable co-pays. This Virtual Visit was conducted with patient's (and/or legal guardian's) consent. Patient identification was verified, and a caregiver was present when appropriate.       The patient was located at Home: [address] [place] [id number]  Provider was located at Facility (Appt Dept): [facility] [address], [address]  [place] [address]  Confirm you are appropriately licensed, registered, or certified to deliver care in the state where the patient is located as indicated above. If you are not or unsure, please re-schedule the visit: Yes, I confirm.     CHIEF COMPLAINT:  Shimon Min is a 39 y.o. male and was seen today for follow-up of psychiatric condition and psychotropic medication management.     HPI:    Shimon reports the following psychiatric symptoms by hx:  anxiety, inattentiveness, irritability.  Overall symptoms have been present for years. Currently symptoms are of moderate severity. The symptoms occur intermittently. Patient denies any new stressor. He reports that his insurance has approve his bariatric surgery and that a surgery date should be scheduled soon. Patient reports feeling that his depression and anxiety has been doing well over the last 4 months with the medication. He states that he still has moments of elevated anxiety but does not wake up every day with high anxiety. Patient wants to discuss increase his dose of escitalopram. He reports compliance with current 5 mg dose. Denies any side effects or concerns. Patient reports it has been helpful but feels he may have build up a tolerance and could benefit from a higher dose. Patient takes lorazepam as needed. Patient Appetite:about the same, eating healthier and working with nutritionist. He continues to be as physically

## 2025-07-11 NOTE — PROGRESS NOTES
Chief Complaint   Patient presents with    Medication Check     Prior to Admission medications    Medication Sig Start Date End Date Taking? Authorizing Provider   hydroquinone 4 % cream APPLY TO DARK AREAS ON BODY TWICE A DAY 6/23/25  Yes Mar Lemon MD   testosterone (ANDROGEL; TESTIM) 50 MG/5GM (1%) GEL 1% gel Place 0.05 g onto the skin daily. 6/27/25  Yes Mar Lemon MD   spironolactone (ALDACTONE) 25 MG tablet Take 1 tablet by mouth daily 6/27/25 6/27/26 Yes Mar Lemon MD   escitalopram (LEXAPRO) 5 MG tablet TAKE 1 TABLET BY MOUTH EVERY DAY 6/20/25  Yes Sosa Peterson APRN - NP   LORazepam (ATIVAN) 0.5 MG tablet Take 1 tablet by mouth daily as needed for Anxiety for up to 30 days. Max Daily Amount: 0.5 mg 6/20/25 7/20/25 Yes Sosa Peterson APRN - NP   amLODIPine (NORVASC) 10 MG tablet Take 1 tablet by mouth daily 3/3/25  Yes Mar Lemon MD   naproxen (NAPROSYN) 500 MG tablet Take 1 tablet by mouth 2 times daily for 14 days 6/18/25 7/2/25  Ian Sánchez MD   hydroCHLOROthiazide 12.5 MG tablet Take 1 tablet by mouth daily  Patient not taking: Reported on 7/11/2025    Mar Lemon MD         5/22/2025     5:51 AM   Patient-Reported Vitals   Patient-Reported Weight 275   Patient-Reported Height 5'9   Patient-Reported Systolic 131 mmHg   Patient-Reported Diastolic 78 mmHg   Patient-Reported Pulse 86   Patient-Reported Temperature 98.4   Patient-Reported SpO2 98            7/11/2025     1:10 PM 3/13/2025     2:27 PM 1/28/2025    10:53 AM   PHQ-9    Little interest or pleasure in doing things 0 0 0   Feeling down, depressed, or hopeless 0 0 0   Trouble falling or staying asleep, or sleeping too much 0 0    Feeling tired or having little energy 1 1    Poor appetite or overeating 0 0    Feeling bad about yourself - or that you are a failure or have let yourself or your family down 0 0    Trouble concentrating on things, such as reading the newspaper or

## 2025-07-27 ENCOUNTER — HOSPITAL ENCOUNTER (EMERGENCY)
Facility: HOSPITAL | Age: 40
Discharge: HOME | End: 2025-07-27
Attending: EMERGENCY MEDICINE
Payer: MEDICARE

## 2025-07-27 VITALS
RESPIRATION RATE: 18 BRPM | HEIGHT: 69 IN | TEMPERATURE: 98.8 F | BODY MASS INDEX: 42.51 KG/M2 | HEART RATE: 94 BPM | SYSTOLIC BLOOD PRESSURE: 158 MMHG | WEIGHT: 287 LBS | OXYGEN SATURATION: 99 % | DIASTOLIC BLOOD PRESSURE: 105 MMHG

## 2025-07-27 DIAGNOSIS — R53.83 FATIGUE, UNSPECIFIED TYPE: ICD-10-CM

## 2025-07-27 DIAGNOSIS — R63.8 ALTERATION IN NUTRITION: Primary | ICD-10-CM

## 2025-07-27 LAB
ALBUMIN SERPL-MCNC: 3.6 G/DL (ref 3.5–5)
ALBUMIN/GLOB SERPL: 0.9 (ref 1.1–2.2)
ALP SERPL-CCNC: 76 U/L (ref 45–117)
ALT SERPL-CCNC: 32 U/L (ref 12–78)
ANION GAP SERPL CALC-SCNC: 8 MMOL/L (ref 2–12)
AST SERPL-CCNC: 22 U/L (ref 15–37)
BASOPHILS # BLD: 0.07 K/UL (ref 0–0.1)
BASOPHILS NFR BLD: 1.4 % (ref 0–1)
BILIRUB SERPL-MCNC: 0.7 MG/DL (ref 0.2–1)
BUN SERPL-MCNC: 18 MG/DL (ref 6–20)
BUN/CREAT SERPL: 19 (ref 12–20)
CALCIUM SERPL-MCNC: 8.6 MG/DL (ref 8.5–10.1)
CHLORIDE SERPL-SCNC: 101 MMOL/L (ref 97–108)
CO2 SERPL-SCNC: 29 MMOL/L (ref 21–32)
CREAT SERPL-MCNC: 0.97 MG/DL (ref 0.7–1.3)
DIFFERENTIAL METHOD BLD: ABNORMAL
EOSINOPHIL # BLD: 0.02 K/UL (ref 0–0.4)
EOSINOPHIL NFR BLD: 0.4 % (ref 0–7)
ERYTHROCYTE [DISTWIDTH] IN BLOOD BY AUTOMATED COUNT: 12.8 % (ref 11.5–14.5)
GLOBULIN SER CALC-MCNC: 3.9 G/DL (ref 2–4)
GLUCOSE SERPL-MCNC: 116 MG/DL (ref 65–100)
HCT VFR BLD AUTO: 39.3 % (ref 36.6–50.3)
HGB BLD-MCNC: 12.7 G/DL (ref 12.1–17)
IMM GRANULOCYTES # BLD AUTO: 0.02 K/UL (ref 0–0.04)
IMM GRANULOCYTES NFR BLD AUTO: 0.4 % (ref 0–0.5)
LYMPHOCYTES # BLD: 1.1 K/UL (ref 0.8–3.5)
LYMPHOCYTES NFR BLD: 22.6 % (ref 12–49)
MAGNESIUM SERPL-MCNC: 2 MG/DL (ref 1.6–2.4)
MCH RBC QN AUTO: 26.8 PG (ref 26–34)
MCHC RBC AUTO-ENTMCNC: 32.3 G/DL (ref 30–36.5)
MCV RBC AUTO: 83.1 FL (ref 80–99)
MONOCYTES # BLD: 0.56 K/UL (ref 0–1)
MONOCYTES NFR BLD: 11.5 % (ref 5–13)
NEUTS SEG # BLD: 3.09 K/UL (ref 1.8–8)
NEUTS SEG NFR BLD: 63.7 % (ref 32–75)
NRBC # BLD: 0 K/UL (ref 0–0.01)
NRBC BLD-RTO: 0 PER 100 WBC
PLATELET # BLD AUTO: 167 K/UL (ref 150–400)
PMV BLD AUTO: 11.6 FL (ref 8.9–12.9)
POTASSIUM SERPL-SCNC: 3.5 MMOL/L (ref 3.5–5.1)
PROT SERPL-MCNC: 7.5 G/DL (ref 6.4–8.2)
RBC # BLD AUTO: 4.73 M/UL (ref 4.1–5.7)
SODIUM SERPL-SCNC: 138 MMOL/L (ref 136–145)
WBC # BLD AUTO: 4.9 K/UL (ref 4.1–11.1)

## 2025-07-27 PROCEDURE — 83735 ASSAY OF MAGNESIUM: CPT

## 2025-07-27 PROCEDURE — 80053 COMPREHEN METABOLIC PANEL: CPT

## 2025-07-27 PROCEDURE — 36415 COLL VENOUS BLD VENIPUNCTURE: CPT

## 2025-07-27 PROCEDURE — 85025 COMPLETE CBC W/AUTO DIFF WBC: CPT

## 2025-07-27 PROCEDURE — 99283 EMERGENCY DEPT VISIT LOW MDM: CPT

## 2025-07-27 RX ORDER — ONDANSETRON 2 MG/ML
4 INJECTION INTRAMUSCULAR; INTRAVENOUS EVERY 6 HOURS PRN
Status: DISCONTINUED | OUTPATIENT
Start: 2025-07-27 | End: 2025-07-27 | Stop reason: HOSPADM

## 2025-07-27 ASSESSMENT — PAIN - FUNCTIONAL ASSESSMENT: PAIN_FUNCTIONAL_ASSESSMENT: NONE - DENIES PAIN

## 2025-07-27 NOTE — ED NOTES
Discharge instructions were given to the patient by Merly PRITCHETT. The patient left the Emergency Department ambulatory, alert and oriented and in no acute distress with 0 prescriptions. The patient was encouraged to call or return to the ED for worsening issues or problems and was encouraged to schedule a follow up appointment for continuing care. The patient verbalized understanding of discharge instructions and prescriptions, all questions were answered. The patient has no further concerns at this time.

## 2025-07-27 NOTE — ED TRIAGE NOTES
Patient is on day three of a liquid diet in preparation for bariatric surgery that is scheduled of 8/8/2025.  Today he reports feeling dizzy and weak.

## 2025-07-27 NOTE — ED PROVIDER NOTES
[facility] EMERGENCY DEPARTMENT  EMERGENCY DEPARTMENT ENCOUNTER       Pt Name: Shimon Min  MRN: [id number]  Birthdate 1985  Date of evaluation: 7/27/2025  Provider: Linn Conde MD   PCP: Gladys Hernandez MD  Note Started: 8:35 AM 7/27/25     (Please note that parts of this dictation were completed with voice recognition software. Quite often unanticipated grammatical, syntax, homophones, and other interpretive errors are inadvertently transcribed by the computer software. Please disregards these errors. Please excuse any errors that have escaped final proofreading.)    CHIEF COMPLAINT       Chief Complaint   Patient presents with    Dizziness        HISTORY OF PRESENT ILLNESS: 1 or more elements      History From: patient, History limited by:  none     Shimon Min is a 39 y.o. male who presents ambulatory to the ED complaining of feeling drained and dizzy.  Patient is currently on a preop liquid diet in preparation for bariatric surgery August 8.  He is on day 3.  He has been following the prescribed diet which includes water, Gatorade, protein shakes, low-sodium broth.  States he is lost 6 pounds in 3 days.  Woke up today feeling drained.  Denies pain, fever, vomiting, diarrhea.  He states he is feeling queasy.  He has a history of anxiety and took an Ativan this morning because his symptoms made him anxious.  Denies bleeding or history of anemia     Nursing Notes were all reviewed and agreed with or any disagreements were addressed in the HPI.     REVIEW OF SYSTEMS        Positives and Pertinent negatives as per HPI.    PAST HISTORY     Past Medical History:  Past Medical History:   Diagnosis Date    ADHD (attention deficit hyperactivity disorder)     Angioedema     ace induced asthma     Anxiety     Cold-induced asthma     Depression     Gout     Hernia of abdominal wall     Hypertension     NAIN (obstructive sleep apnea)        Past Surgical History:  Past Surgical History:   Procedure

## 2025-07-27 NOTE — ED NOTES
Pt presents to ED complaining of dizziness and weakness in the legs x this morning. Pt reports he is on day 3 of his pre-op liquid diet. Pt states he is having bariatric surgery on 8/3/25. Pt reports he has been intaking water, gatorade zero, protein shakes, low sodium broth and popsicles. Pt reports nausea. Denies SOB and vomiting. Pt is alert and oriented x 4, RR even and unlabored, skin is warm and dry. Pt appears in NAD at this time. Assessment completed and pt updated on plan of care.  Call bell in reach.  Emergency Department Nursing Plan of Care  The Nursing Plan of Care is developed from the Nursing assessment and Emergency Department Attending provider initial evaluation.  The plan of care may be reviewed in the “ED Provider note”.  The Plan of Care was developed with the following considerations:  Patient / Family readiness to learn indicated by:Refer to Medical chart in [facility]  Persons(s) to be included in education: Refer to Medical chart in [facility]  Barriers to Learning/Limitations:Normal

## 2025-08-04 DIAGNOSIS — F41.0 PANIC DISORDER (EPISODIC PAROXYSMAL ANXIETY): ICD-10-CM

## 2025-08-04 DIAGNOSIS — F41.1 GENERALIZED ANXIETY DISORDER: ICD-10-CM

## 2025-08-04 RX ORDER — LORAZEPAM 0.5 MG/1
0.5 TABLET ORAL EVERY 6 HOURS PRN
COMMUNITY
End: 2025-08-04

## 2025-08-04 RX ORDER — LORAZEPAM 0.5 MG/1
0.5 TABLET ORAL EVERY 6 HOURS PRN
OUTPATIENT
Start: 2025-08-04

## 2025-08-04 RX ORDER — LORAZEPAM 0.5 MG/1
0.5 TABLET ORAL DAILY PRN
Qty: 30 TABLET | Refills: 0 | Status: SHIPPED | OUTPATIENT
Start: 2025-08-04 | End: 2025-09-03

## 2025-08-20 ENCOUNTER — CLINICAL DOCUMENTATION (OUTPATIENT)
Age: 40
End: 2025-08-20

## 2025-08-20 DIAGNOSIS — G47.33 OSA (OBSTRUCTIVE SLEEP APNEA): Primary | ICD-10-CM

## 2025-08-21 ENCOUNTER — CLINICAL DOCUMENTATION (OUTPATIENT)
Age: 40
End: 2025-08-21

## 2025-08-21 DIAGNOSIS — G47.33 OSA (OBSTRUCTIVE SLEEP APNEA): Primary | ICD-10-CM

## 2025-08-25 ENCOUNTER — CLINICAL DOCUMENTATION (OUTPATIENT)
Age: 40
End: 2025-08-25

## 2025-08-27 ENCOUNTER — PATIENT MESSAGE (OUTPATIENT)
Age: 40
End: 2025-08-27

## 2025-08-27 DIAGNOSIS — F41.0 PANIC DISORDER (EPISODIC PAROXYSMAL ANXIETY): ICD-10-CM

## 2025-08-27 DIAGNOSIS — F41.1 GAD (GENERALIZED ANXIETY DISORDER): Primary | ICD-10-CM

## 2025-09-02 ENCOUNTER — PROCEDURE VISIT (OUTPATIENT)
Age: 40
End: 2025-09-02

## 2025-09-02 DIAGNOSIS — G47.33 OSA (OBSTRUCTIVE SLEEP APNEA): Primary | ICD-10-CM

## 2025-09-02 RX ORDER — ESCITALOPRAM OXALATE 5 MG/1
5 TABLET ORAL DAILY
Qty: 30 TABLET | Refills: 2 | Status: SHIPPED | OUTPATIENT
Start: 2025-09-02 | End: 2025-12-01